# Patient Record
Sex: FEMALE | Race: BLACK OR AFRICAN AMERICAN | Employment: UNEMPLOYED | URBAN - METROPOLITAN AREA
[De-identification: names, ages, dates, MRNs, and addresses within clinical notes are randomized per-mention and may not be internally consistent; named-entity substitution may affect disease eponyms.]

---

## 2017-07-06 ENCOUNTER — HOSPITAL ENCOUNTER (INPATIENT)
Age: 52
LOS: 4 days | Discharge: HOME | DRG: 750 | End: 2017-07-10
Attending: EMERGENCY MEDICINE | Admitting: PSYCHIATRY & NEUROLOGY
Payer: MEDICAID

## 2017-07-06 DIAGNOSIS — R45.851 SUICIDAL IDEATION: Primary | ICD-10-CM

## 2017-07-06 PROBLEM — F31.9 BIPOLAR 1 DISORDER (HCC): Status: ACTIVE | Noted: 2017-07-06

## 2017-07-06 LAB
ALBUMIN SERPL BCP-MCNC: 3.7 G/DL (ref 3.5–5)
ALBUMIN/GLOB SERPL: 0.9 {RATIO} (ref 1.1–2.2)
ALP SERPL-CCNC: 147 U/L (ref 45–117)
ALT SERPL-CCNC: 24 U/L (ref 12–78)
AMPHET UR QL SCN: NEGATIVE
ANION GAP BLD CALC-SCNC: 7 MMOL/L (ref 5–15)
APAP SERPL-MCNC: <2 UG/ML (ref 10–30)
APPEARANCE UR: CLEAR
AST SERPL W P-5'-P-CCNC: 15 U/L (ref 15–37)
BACTERIA URNS QL MICRO: NEGATIVE /HPF
BARBITURATES UR QL SCN: NEGATIVE
BASOPHILS # BLD AUTO: 0 K/UL (ref 0–0.1)
BASOPHILS # BLD: 0 % (ref 0–1)
BENZODIAZ UR QL: NEGATIVE
BILIRUB SERPL-MCNC: 0.3 MG/DL (ref 0.2–1)
BILIRUB UR QL: NEGATIVE
BUN SERPL-MCNC: 10 MG/DL (ref 6–20)
BUN/CREAT SERPL: 9 (ref 12–20)
CALCIUM SERPL-MCNC: 9.6 MG/DL (ref 8.5–10.1)
CANNABINOIDS UR QL SCN: NEGATIVE
CHLORIDE SERPL-SCNC: 110 MMOL/L (ref 97–108)
CO2 SERPL-SCNC: 27 MMOL/L (ref 21–32)
COCAINE UR QL SCN: NEGATIVE
COLOR UR: ABNORMAL
CREAT SERPL-MCNC: 1.1 MG/DL (ref 0.55–1.02)
DRUG SCRN COMMENT,DRGCM: NORMAL
EOSINOPHIL # BLD: 0.2 K/UL (ref 0–0.4)
EOSINOPHIL NFR BLD: 2 % (ref 0–7)
EPITH CASTS URNS QL MICRO: ABNORMAL /LPF
ERYTHROCYTE [DISTWIDTH] IN BLOOD BY AUTOMATED COUNT: 12.8 % (ref 11.5–14.5)
ETHANOL SERPL-MCNC: <10 MG/DL
GLOBULIN SER CALC-MCNC: 3.9 G/DL (ref 2–4)
GLUCOSE SERPL-MCNC: 112 MG/DL (ref 65–100)
GLUCOSE UR STRIP.AUTO-MCNC: NEGATIVE MG/DL
HCT VFR BLD AUTO: 38.4 % (ref 35–47)
HGB BLD-MCNC: 13 G/DL (ref 11.5–16)
HGB UR QL STRIP: NEGATIVE
HYALINE CASTS URNS QL MICRO: ABNORMAL /LPF (ref 0–5)
KETONES UR QL STRIP.AUTO: NEGATIVE MG/DL
LEUKOCYTE ESTERASE UR QL STRIP.AUTO: NEGATIVE
LYMPHOCYTES # BLD AUTO: 31 % (ref 12–49)
LYMPHOCYTES # BLD: 4.2 K/UL (ref 0.8–3.5)
MCH RBC QN AUTO: 32.9 PG (ref 26–34)
MCHC RBC AUTO-ENTMCNC: 33.9 G/DL (ref 30–36.5)
MCV RBC AUTO: 97.2 FL (ref 80–99)
METHADONE UR QL: NEGATIVE
MONOCYTES # BLD: 0.9 K/UL (ref 0–1)
MONOCYTES NFR BLD AUTO: 6 % (ref 5–13)
NEUTS SEG # BLD: 8.4 K/UL (ref 1.8–8)
NEUTS SEG NFR BLD AUTO: 61 % (ref 32–75)
NITRITE UR QL STRIP.AUTO: NEGATIVE
OPIATES UR QL: NEGATIVE
PCP UR QL: NEGATIVE
PH UR STRIP: 6 [PH] (ref 5–8)
PLATELET # BLD AUTO: 231 K/UL (ref 150–400)
POTASSIUM SERPL-SCNC: 4.1 MMOL/L (ref 3.5–5.1)
PROT SERPL-MCNC: 7.6 G/DL (ref 6.4–8.2)
PROT UR STRIP-MCNC: ABNORMAL MG/DL
RBC # BLD AUTO: 3.95 M/UL (ref 3.8–5.2)
RBC #/AREA URNS HPF: ABNORMAL /HPF (ref 0–5)
SALICYLATES SERPL-MCNC: 3.7 MG/DL (ref 2.8–20)
SODIUM SERPL-SCNC: 144 MMOL/L (ref 136–145)
SP GR UR REFRACTOMETRY: 1.02 (ref 1–1.03)
UROBILINOGEN UR QL STRIP.AUTO: 4 EU/DL (ref 0.2–1)
WBC # BLD AUTO: 13.7 K/UL (ref 3.6–11)
WBC URNS QL MICRO: ABNORMAL /HPF (ref 0–4)

## 2017-07-06 PROCEDURE — 93005 ELECTROCARDIOGRAM TRACING: CPT

## 2017-07-06 PROCEDURE — 80053 COMPREHEN METABOLIC PANEL: CPT | Performed by: NURSE PRACTITIONER

## 2017-07-06 PROCEDURE — 99284 EMERGENCY DEPT VISIT MOD MDM: CPT

## 2017-07-06 PROCEDURE — 36415 COLL VENOUS BLD VENIPUNCTURE: CPT | Performed by: NURSE PRACTITIONER

## 2017-07-06 PROCEDURE — 80307 DRUG TEST PRSMV CHEM ANLYZR: CPT | Performed by: NURSE PRACTITIONER

## 2017-07-06 PROCEDURE — 81001 URINALYSIS AUTO W/SCOPE: CPT | Performed by: NURSE PRACTITIONER

## 2017-07-06 PROCEDURE — 65220000003 HC RM SEMIPRIVATE PSYCH

## 2017-07-06 PROCEDURE — 85025 COMPLETE CBC W/AUTO DIFF WBC: CPT | Performed by: NURSE PRACTITIONER

## 2017-07-06 PROCEDURE — 74011250637 HC RX REV CODE- 250/637: Performed by: PSYCHIATRY & NEUROLOGY

## 2017-07-06 PROCEDURE — 90791 PSYCH DIAGNOSTIC EVALUATION: CPT

## 2017-07-06 RX ORDER — IBUPROFEN 200 MG
1 TABLET ORAL
Status: DISCONTINUED | OUTPATIENT
Start: 2017-07-06 | End: 2017-07-10 | Stop reason: HOSPADM

## 2017-07-06 RX ORDER — LITHIUM CARBONATE 300 MG/1
600 CAPSULE ORAL 2 TIMES DAILY
COMMUNITY
End: 2017-07-10

## 2017-07-06 RX ORDER — OLANZAPINE 15 MG/1
15 TABLET ORAL
COMMUNITY
End: 2017-07-10

## 2017-07-06 RX ORDER — ADHESIVE BANDAGE
30 BANDAGE TOPICAL DAILY PRN
Status: DISCONTINUED | OUTPATIENT
Start: 2017-07-06 | End: 2017-07-10 | Stop reason: HOSPADM

## 2017-07-06 RX ORDER — ZOLPIDEM TARTRATE 10 MG/1
10 TABLET ORAL
Status: DISCONTINUED | OUTPATIENT
Start: 2017-07-06 | End: 2017-07-10 | Stop reason: HOSPADM

## 2017-07-06 RX ORDER — LORAZEPAM 2 MG/ML
2 INJECTION INTRAMUSCULAR
Status: DISCONTINUED | OUTPATIENT
Start: 2017-07-06 | End: 2017-07-10 | Stop reason: HOSPADM

## 2017-07-06 RX ORDER — LORAZEPAM 1 MG/1
1 TABLET ORAL
Status: DISCONTINUED | OUTPATIENT
Start: 2017-07-06 | End: 2017-07-10 | Stop reason: HOSPADM

## 2017-07-06 RX ORDER — OLANZAPINE 5 MG/1
5 TABLET ORAL
Status: DISCONTINUED | OUTPATIENT
Start: 2017-07-06 | End: 2017-07-10 | Stop reason: HOSPADM

## 2017-07-06 RX ORDER — TOPIRAMATE 100 MG/1
100 TABLET, FILM COATED ORAL 2 TIMES DAILY
COMMUNITY
End: 2017-07-10

## 2017-07-06 RX ORDER — LAMOTRIGINE 100 MG/1
100 TABLET ORAL 2 TIMES DAILY
COMMUNITY
End: 2017-07-10

## 2017-07-06 RX ORDER — LISINOPRIL 40 MG/1
40 TABLET ORAL DAILY
Status: ON HOLD | COMMUNITY
End: 2017-07-10

## 2017-07-06 RX ORDER — TRAMADOL HYDROCHLORIDE 50 MG/1
50 TABLET ORAL
COMMUNITY
End: 2017-07-10

## 2017-07-06 RX ORDER — IBUPROFEN 400 MG/1
400 TABLET ORAL
Status: DISCONTINUED | OUTPATIENT
Start: 2017-07-06 | End: 2017-07-10 | Stop reason: HOSPADM

## 2017-07-06 RX ORDER — BENZTROPINE MESYLATE 2 MG/1
2 TABLET ORAL
Status: DISCONTINUED | OUTPATIENT
Start: 2017-07-06 | End: 2017-07-10 | Stop reason: HOSPADM

## 2017-07-06 RX ORDER — BENZTROPINE MESYLATE 1 MG/ML
2 INJECTION INTRAMUSCULAR; INTRAVENOUS
Status: DISCONTINUED | OUTPATIENT
Start: 2017-07-06 | End: 2017-07-10 | Stop reason: HOSPADM

## 2017-07-06 RX ORDER — ACETAMINOPHEN 325 MG/1
650 TABLET ORAL
Status: DISCONTINUED | OUTPATIENT
Start: 2017-07-06 | End: 2017-07-10 | Stop reason: HOSPADM

## 2017-07-06 RX ADMIN — IBUPROFEN 400 MG: 400 TABLET, FILM COATED ORAL at 22:53

## 2017-07-06 NOTE — IP AVS SNAPSHOT
[address] 
[phone] Patient: Susan Dubois MRN: [id number] :1965 You are allergic to the following Allergen Reactions Aspirin Nausea and Vomiting Morphine Hives Recent Documentation Height Weight Breastfeeding? BMI Smoking Status 1.727 m 62.9 kg No 21.07 kg/m2 Current Some Day Smoker Emergency Contacts Name Discharge Info Relation Home Work Mobile [employer] [20] [phone] About your hospitalization You were admitted on:  2017 You last received care in the:  [facility] You were discharged on:  July 10, 2017 Why you were hospitalized Your primary diagnosis was:  Schizoaffective Disorder, Bipolar Type (Hcc) Your diagnoses also included:  Bipolar 1 Disorder (Hcc), Vision Impairment, Essential Hypertension Providers Seen During Your Hospitalizations Provider Role Specialty Primary office phone Holli Suero. Eliana Soto MD Attending Provider Emergency Medicine [phone] Ladon Goodpasture, MD Attending Provider Psychiatry [phone] Your Primary Care Physician (PCP) Primary Care Physician Office Phone Office Fax Greg Pitt [phone] Follow-up Information Follow up With Details Comments Contact Info Ladon Goodpasture, MD Schedule an appointment as soon as possible for a visit  [address] [facility] [phone] 
[phone] 
  
 Ab Jose NP On 2017 you have a 2:45 appointment  [phone] Current Discharge Medication List  
  
CONTINUE these medications which have CHANGED Dose & Instructions Dispensing Information Comments Morning Noon Evening Bedtime  
 lamoTRIgine 25 mg tablet Commonly known as: LaMICtal  
What changed:   
- medication strength 
- how much to take - when to take this Your next dose is:  2017 Dose:  25 mg Take 1 Tab by mouth daily. Indications: DEPRESSION ASSOCIATED WITH BIPOLAR DISORDER Quantity:  15 Tab Refills:  1 OLANZapine 10 mg tablet Commonly known as:  ZyPREXA What changed:   
- medication strength 
- how much to take Your next dose is: Tonight, 07/10/2017 Dose:  10 mg Take 1 Tab by mouth nightly. Indications: DEPRESSION ASSOCIATED WITH BIPOLAR DISORDER, ADJUNCT Quantity:  15 Tab Refills:  1 CONTINUE these medications which have NOT CHANGED Dose & Instructions Dispensing Information Comments Morning Noon Evening Bedtime  
 lisinopril 40 mg tablet Commonly known as:  Tita Justin Dose:  40 mg Take 1 Tab by mouth daily. Indications: hypertension Quantity:  15 Tab Refills:  1 ONGLYZA 2.5 mg tablet Generic drug:  sAXagliptin Dose:  2.5 mg Take 2.5 mg by mouth daily. Refills:  0  
     
  
   
   
   
  
 topiramate 100 mg tablet Commonly known as:  TOPAMAX Dose:  100 mg Take 1 Tab by mouth two (2) times a day. Indications: mood lability Quantity:  30 Tab Refills:  1 V-C FORTE PO Dose:  1 Cap Take 1 Cap by mouth daily. Refills:  0 STOP taking these medications   
 lithium carbonate 300 mg capsule  
   
  
 traMADol 50 mg tablet Commonly known as:  ULTRAM  
   
  
  
  
Where to Get Your Medications Information on where to get these meds will be given to you by the nurse or doctor. ! Ask your nurse or doctor about these medications  
  lamoTRIgine 25 mg tablet  
 lisinopril 40 mg tablet OLANZapine 10 mg tablet  
 topiramate 100 mg tablet Discharge Instructions DISCHARGE SUMMARY 
 
NAME:Parulbrian Pastor : 1965 MRN: [id number] The patient Natalie Eastman exhibits the ability to control behavior in a less restrictive environment. Patient's level of functioning is improving. No assaultive/destructive behavior has been observed for the past 24 hours. No suicidal/homicidal threat or behavior has been observed for the past 24 hours. There is no evidence of serious medication side effects. Patient has not been in physical or protective restraints for at least the past 24 hours. If weapons involved, how are they secured? No weapons involved Is patient aware of and in agreement with discharge plan? Patient is aware of discharge and is in agreement Arrangements for medication:  Prescriptions given to patient. Copy of discharge instructions to  provider?:  Fax to [facility] [phone] Arrangements for transportation home: patient to arrange Keep all follow up appointments as scheduled, continue to take prescribed medications per physician instructions. Mental health crisis number:  930 or your local mental health crisis line number at [phone] DISCHARGE SUMMARY from Nurse The following personal items are in your possession at time of discharge: 
 
Dental Appliances: None Visual Aid: Glasses Home Medications: None Jewelry: None Clothing: Pajamas, Pants, Shirt (nightie,2shirts,pants) Other Valuables: Personal toiletries (shaq,hairgel,cocoa butter. ..in cubbie) Personal Items Sent to Safe: none PATIENT INSTRUCTIONS: 
What to do at Home: 
Recommended activity: Activity as tolerated If you experience any of the following symptoms thoughts of wanting to hurt yourself or others, increased hopelessness/helplessness please follow up with 911 or your local mental health crisis line number at [phone] . *  Please give a list of your current medications to your Primary Care Provider.  
 
*  Please update this list whenever your medications are discontinued, doses are 
    changed, or new medications (including over-the-counter products) are added. *  Please carry medication information at all times in case of emergency situations. These are general instructions for a healthy lifestyle: No smoking/ No tobacco products/ Avoid exposure to second hand smoke Surgeon General's Warning:  Quitting smoking now greatly reduces serious risk to your health. Obesity, smoking, and sedentary lifestyle greatly increases your risk for illness A healthy diet, regular physical exercise & weight monitoring are important for maintaining a healthy lifestyle You may be retaining fluid if you have a history of heart failure or if you experience any of the following symptoms:  Weight gain of 3 pounds or more overnight or 5 pounds in a week, increased swelling in our hands or feet or shortness of breath while lying flat in bed. Please call your doctor as soon as you notice any of these symptoms; do not wait until your next office visit. Recognize signs and symptoms of STROKE: 
 
F-face looks uneven A-arms unable to move or move unevenly S-speech slurred or non-existent T-time-call 911 as soon as signs and symptoms begin-DO NOT go Back to bed or wait to see if you get better-TIME IS BRAIN. Warning Signs of HEART ATTACK Call 911 if you have these symptoms: 
? Chest discomfort. Most heart attacks involve discomfort in the center of the chest that lasts more than a few minutes, or that goes away and comes back. It can feel like uncomfortable pressure, squeezing, fullness, or pain. ? Discomfort in other areas of the upper body. Symptoms can include pain or discomfort in one or both arms, the back, neck, jaw, or stomach. ? Shortness of breath with or without chest discomfort. ? Other signs may include breaking out in a cold sweat, nausea, or lightheadedness. Don't wait more than five minutes to call [address]!  Fast action can save your life. Calling 911 is almost always the fastest way to get lifesaving treatment. Emergency Medical Services staff can begin treatment when they arrive  up to an hour sooner than if someone gets to the hospital by car. The discharge information has been reviewed with the patient. The patient verbalized understanding. Discharge medications reviewed with the patient and appropriate educational materials and side effects teaching were provided. Discharge Orders None General Information Please provide this summary of care documentation to your next provider. Introducing hospitals & HEALTH SERVICES! Lexi Macedo introduces [facility] patient portal. Now you can access parts of your medical record, email your doctor's office, and request medication refills online. 1. In your internet browser, go to [email or web address] 2. Click on the First Time User? Click Here link in the Sign In box. You will see the New Member Sign Up page. 3. Enter your [facility] Access Code exactly as it appears below. You will not need to use this code after youve completed the sign-up process. If you do not sign up before the expiration date, you must request a new code. · [facility] Access Code: [email or web address] Expires: 10/8/2017  1:15 PM 
 
4. Enter the last four digits of your Social Security Number (xxxx) and Date of Birth (mm/dd/yyyy) as indicated and click Submit. You will be taken to the next sign-up page. 5. Create a [facility] ID. This will be your [facility] login ID and cannot be changed, so think of one that is secure and easy to remember. 6. Create a [facility] password. You can change your password at any time. 7. Enter your Password Reset Question and Answer. This can be used at a later time if you forget your password. 8. Enter your e-mail address. You will receive e-mail notification when new information is available in [address] 9. Click Sign Up. You can now view and download portions of your medical record. 10. Click the Download Summary menu link to download a portable copy of your medical information. If you have questions, please visit the Frequently Asked Questions section of the [employer] website. Remember, [employer] is NOT to be used for urgent needs. For medical emergencies, dial 911. Now available from your iPhone and Android! Patient Signature:  ____________________________________________________________ Date:  ____________________________________________________________  
  
Edrie Gunnels Provider Signature:  ____________________________________________________________ Date:  ____________________________________________________________

## 2017-07-06 NOTE — ED TRIAGE NOTES
Triage: pt arrives by EMS for c/o SI x 2 weeks. Pt attempted to take all of her pills to overdose - was stopped by . \"i have a plan but i'm not gonna tell you. \" Denies HI. Tearful in triage.
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
## 2017-07-06 NOTE — IP AVS SNAPSHOT
[address] 
[phone] Patient: Glenys Pantoja MRN: [id number] :1965 You are allergic to the following Allergen Reactions Aspirin Nausea and Vomiting Morphine Hives Recent Documentation Height Weight Breastfeeding? BMI Smoking Status 1.727 m 62.9 kg No 21.07 kg/m2 Current Some Day Smoker Emergency Contacts Name Discharge Info Relation Home Work Mobile [employer] [20] [phone] About your hospitalization You were admitted on:  2017 You last received care in the:  [facility] You were discharged on:  July 10, 2017 Unit phone number:  [phone] Why you were hospitalized Your primary diagnosis was:  Schizoaffective Disorder, Bipolar Type (Hcc) Your diagnoses also included:  Bipolar 1 Disorder (Hcc), Vision Impairment, Essential Hypertension Providers Seen During Your Hospitalizations Provider Role Specialty Primary office phone Moses Arthur. Ulises Castro MD Attending Provider Emergency Medicine [phone] Zachariah Moreno MD Attending Provider Psychiatry [phone] Your Primary Care Physician (PCP) Primary Care Physician Office Phone Office Fax Anish Ten [phone] Follow-up Information Follow up With Details Comments Contact Info Zachariah Moreno MD Schedule an appointment as soon as possible for a visit  [address] [facility] [phone] 
[phone] 
  
 Guicho Hilario NP On 2017 you have a 2:45 appointment  [phone] Current Discharge Medication List  
  
CONTINUE these medications which have CHANGED Dose & Instructions Dispensing Information Comments Morning Noon Evening Bedtime  
 lamoTRIgine 25 mg tablet Commonly known as: LaMICtal  
What changed: - medication strength 
- how much to take - when to take this Your next dose is:  2017 Dose:  25 mg Take 1 Tab by mouth daily. Indications: DEPRESSION ASSOCIATED WITH BIPOLAR DISORDER Quantity:  15 Tab Refills:  1 OLANZapine 10 mg tablet Commonly known as:  ZyPREXA What changed:   
- medication strength 
- how much to take Your next dose is: Tonight, 07/10/2017 Dose:  10 mg Take 1 Tab by mouth nightly. Indications: DEPRESSION ASSOCIATED WITH BIPOLAR DISORDER, ADJUNCT Quantity:  15 Tab Refills:  1 CONTINUE these medications which have NOT CHANGED Dose & Instructions Dispensing Information Comments Morning Noon Evening Bedtime  
 lisinopril 40 mg tablet Commonly known as:  Therisa Semen Dose:  40 mg Take 1 Tab by mouth daily. Indications: hypertension Quantity:  15 Tab Refills:  1 ONGLYZA 2.5 mg tablet Generic drug:  sAXagliptin Dose:  2.5 mg Take 2.5 mg by mouth daily. Refills:  0  
     
  
   
   
   
  
 topiramate 100 mg tablet Commonly known as:  TOPAMAX Dose:  100 mg Take 1 Tab by mouth two (2) times a day. Indications: mood lability Quantity:  30 Tab Refills:  1 V-C FORTE PO Dose:  1 Cap Take 1 Cap by mouth daily. Refills:  0 STOP taking these medications   
 lithium carbonate 300 mg capsule  
   
  
 traMADol 50 mg tablet Commonly known as:  ULTRAM  
   
  
  
  
Where to Get Your Medications Information on where to get these meds will be given to you by the nurse or doctor. ! Ask your nurse or doctor about these medications  
  lamoTRIgine 25 mg tablet  
 lisinopril 40 mg tablet OLANZapine 10 mg tablet  
 topiramate 100 mg tablet Discharge Instructions DISCHARGE SUMMARY 
 
NAME:Parul Bashir : 1965 MRN: [id number] The patient Navid Navarrete exhibits the ability to control behavior in a less restrictive environment. Patient's level of functioning is improving. No assaultive/destructive behavior has been observed for the past 24 hours. No suicidal/homicidal threat or behavior has been observed for the past 24 hours. There is no evidence of serious medication side effects. Patient has not been in physical or protective restraints for at least the past 24 hours. If weapons involved, how are they secured? No weapons involved Is patient aware of and in agreement with discharge plan? Patient is aware of discharge and is in agreement Arrangements for medication:  Prescriptions given to patient. Copy of discharge instructions to  provider?:  [facility] [phone] Arrangements for transportation home: patient to arrange Keep all follow up appointments as scheduled, continue to take prescribed medications per physician instructions. Mental health crisis number:  759 or your local mental health crisis line number at [phone] DISCHARGE SUMMARY from Nurse The following personal items are in your possession at time of discharge: 
 
Dental Appliances: None Visual Aid: Glasses Home Medications: None Jewelry: None Clothing: Pajamas, Pants, Shirt (nightie,2shirts,pants) Other Valuables: Personal toiletries (shaq,hairgel,cocoa butter. ..in cubbie) Personal Items Sent to Safe: none PATIENT INSTRUCTIONS: 
What to do at Home: 
Recommended activity: Activity as tolerated If you experience any of the following symptoms thoughts of wanting to hurt yourself or others, increased hopelessness/helplessness please follow up with 911 or your local mental health crisis line number at [phone] . *  Please give a list of your current medications to your Primary Care Provider. *  Please update this list whenever your medications are discontinued, doses are 
    changed, or new medications (including over-the-counter products) are added. *  Please carry medication information at all times in case of emergency situations. These are general instructions for a healthy lifestyle: No smoking/ No tobacco products/ Avoid exposure to second hand smoke Surgeon General's Warning:  Quitting smoking now greatly reduces serious risk to your health. Obesity, smoking, and sedentary lifestyle greatly increases your risk for illness A healthy diet, regular physical exercise & weight monitoring are important for maintaining a healthy lifestyle You may be retaining fluid if you have a history of heart failure or if you experience any of the following symptoms:  Weight gain of 3 pounds or more overnight or 5 pounds in a week, increased swelling in our hands or feet or shortness of breath while lying flat in bed. Please call your doctor as soon as you notice any of these symptoms; do not wait until your next office visit. Recognize signs and symptoms of STROKE: 
 
F-face looks uneven A-arms unable to move or move unevenly S-speech slurred or non-existent T-time-call 911 as soon as signs and symptoms begin-DO NOT go Back to bed or wait to see if you get better-TIME IS BRAIN. Warning Signs of HEART ATTACK Call 911 if you have these symptoms: 
? Chest discomfort. Most heart attacks involve discomfort in the center of the chest that lasts more than a few minutes, or that goes away and comes back. It can feel like uncomfortable pressure, squeezing, fullness, or pain. ? Discomfort in other areas of the upper body. Symptoms can include pain or discomfort in one or both arms, the back, neck, jaw, or stomach. ? Shortness of breath with or without chest discomfort. ? Other signs may include breaking out in a cold sweat, nausea, or lightheadedness. Don't wait more than five minutes to call [address]! Fast action can save your life. Calling 911 is almost always the fastest way to get lifesaving treatment. Emergency Medical Services staff can begin treatment when they arrive  up to an hour sooner than if someone gets to the hospital by car. The discharge information has been reviewed with the patient. The patient verbalized understanding. Discharge medications reviewed with the patient and appropriate educational materials and side effects teaching were provided. Discharge Orders None Introducing [facility]! Eva Austin introduces [employer] patient portal. Now you can access parts of your medical record, email your doctor's office, and request medication refills online. 1. In your internet browser, go to [email or web address] 2. Click on the First Time User? Click Here link in the Sign In box. You will see the New Member Sign Up page. 3. Enter your [employer] Access Code exactly as it appears below. You will not need to use this code after youve completed the sign-up process. If you do not sign up before the expiration date, you must request a new code. · [employer] Access Code: [email or web address] Expires: 10/8/2017  1:15 PM 
 
4. Enter the last four digits of your Social Security Number (xxxx) and Date of Birth (mm/dd/yyyy) as indicated and click Submit. You will be taken to the next sign-up page. 5. Create a [employer] ID. This will be your [employer] login ID and cannot be changed, so think of one that is secure and easy to remember. 6. Create a [employer] password. You can change your password at any time. 7. Enter your Password Reset Question and Answer. This can be used at a later time if you forget your password. 8. Enter your e-mail address. You will receive e-mail notification when new information is available in [address] 9. Click Sign Up. You can now view and download portions of your medical record. 10. Click the Download Summary menu link to download a portable copy of your medical information. If you have questions, please visit the Frequently Asked Questions section of the [email or web address] website. Remember, [email or web address] is NOT to be used for urgent needs. For medical emergencies, dial 911. Now available from your iPhone and Android! General Information Please provide this summary of care documentation to your next provider. Patient Signature:  ____________________________________________________________ Date:  ____________________________________________________________  
  
Terry Snipe Provider Signature:  ____________________________________________________________ Date:  ____________________________________________________________

## 2017-07-06 NOTE — IP AVS SNAPSHOT
Current Discharge Medication List  
  
CONTINUE these medications which have CHANGED Dose & Instructions Dispensing Information Comments Morning Noon Evening Bedtime  
 lamoTRIgine 25 mg tablet Commonly known as: LaMICtal  
What changed:   
- medication strength 
- how much to take - when to take this Your next dose is:  07/11/2017 Dose:  25 mg Take 1 Tab by mouth daily. Indications: DEPRESSION ASSOCIATED WITH BIPOLAR DISORDER Quantity:  15 Tab Refills:  1 OLANZapine 10 mg tablet Commonly known as:  ZyPREXA What changed:   
- medication strength 
- how much to take Your next dose is: Tonight, 07/10/2017 Dose:  10 mg Take 1 Tab by mouth nightly. Indications: DEPRESSION ASSOCIATED WITH BIPOLAR DISORDER, ADJUNCT Quantity:  15 Tab Refills:  1 CONTINUE these medications which have NOT CHANGED Dose & Instructions Dispensing Information Comments Morning Noon Evening Bedtime  
 lisinopril 40 mg tablet Commonly known as:  Velora Driss Dose:  40 mg Take 1 Tab by mouth daily. Indications: hypertension Quantity:  15 Tab Refills:  1 ONGLYZA 2.5 mg tablet Generic drug:  sAXagliptin Dose:  2.5 mg Take 2.5 mg by mouth daily. Refills:  0  
     
  
   
   
   
  
 topiramate 100 mg tablet Commonly known as:  TOPAMAX Dose:  100 mg Take 1 Tab by mouth two (2) times a day. Indications: mood lability Quantity:  30 Tab Refills:  1 V-C FORTE PO Dose:  1 Cap Take 1 Cap by mouth daily. Refills:  0 STOP taking these medications   
 lithium carbonate 300 mg capsule  
   
  
 traMADol 50 mg tablet Commonly known as:  ULTRAM  
   
  
  
  
Where to Get Your Medications Information on where to get these meds will be given to you by the nurse or doctor. ! Ask your nurse or doctor about these medications  
  lamoTRIgine 25 mg tablet  
 lisinopril 40 mg tablet OLANZapine 10 mg tablet  
 topiramate 100 mg tablet

## 2017-07-07 PROBLEM — H54.7 VISION IMPAIRMENT: Status: ACTIVE | Noted: 2017-07-07

## 2017-07-07 PROBLEM — F25.0 SCHIZOAFFECTIVE DISORDER, BIPOLAR TYPE (HCC): Status: ACTIVE | Noted: 2017-07-07

## 2017-07-07 PROBLEM — F31.9 BIPOLAR 1 DISORDER (HCC): Status: RESOLVED | Noted: 2017-07-06 | Resolved: 2017-07-07

## 2017-07-07 PROBLEM — I10 ESSENTIAL HYPERTENSION: Status: ACTIVE | Noted: 2017-07-07

## 2017-07-07 LAB
ATRIAL RATE: 88 BPM
CALCULATED P AXIS, ECG09: 77 DEGREES
CALCULATED R AXIS, ECG10: 78 DEGREES
CALCULATED T AXIS, ECG11: 60 DEGREES
DIAGNOSIS, 93000: NORMAL
GLUCOSE BLD STRIP.AUTO-MCNC: 88 MG/DL (ref 65–100)
P-R INTERVAL, ECG05: 174 MS
Q-T INTERVAL, ECG07: 358 MS
QRS DURATION, ECG06: 74 MS
QTC CALCULATION (BEZET), ECG08: 433 MS
SERVICE CMNT-IMP: NORMAL
VENTRICULAR RATE, ECG03: 88 BPM

## 2017-07-07 PROCEDURE — 74011250637 HC RX REV CODE- 250/637: Performed by: PSYCHIATRY & NEUROLOGY

## 2017-07-07 PROCEDURE — 82962 GLUCOSE BLOOD TEST: CPT

## 2017-07-07 PROCEDURE — 65220000003 HC RM SEMIPRIVATE PSYCH

## 2017-07-07 RX ORDER — OLANZAPINE 5 MG/1
10 TABLET ORAL
Status: DISCONTINUED | OUTPATIENT
Start: 2017-07-07 | End: 2017-07-10 | Stop reason: HOSPADM

## 2017-07-07 RX ORDER — LAMOTRIGINE 25 MG/1
25 TABLET ORAL DAILY
Status: DISCONTINUED | OUTPATIENT
Start: 2017-07-07 | End: 2017-07-10 | Stop reason: HOSPADM

## 2017-07-07 RX ORDER — TOPIRAMATE 100 MG/1
100 TABLET, FILM COATED ORAL 2 TIMES DAILY
Status: DISCONTINUED | OUTPATIENT
Start: 2017-07-07 | End: 2017-07-10 | Stop reason: HOSPADM

## 2017-07-07 RX ADMIN — TOPIRAMATE 100 MG: 100 TABLET, FILM COATED ORAL at 21:54

## 2017-07-07 RX ADMIN — OLANZAPINE 10 MG: 5 TABLET, FILM COATED ORAL at 21:53

## 2017-07-07 RX ADMIN — LORAZEPAM 1 MG: 1 TABLET ORAL at 12:57

## 2017-07-07 RX ADMIN — TOPIRAMATE 100 MG: 100 TABLET, FILM COATED ORAL at 14:16

## 2017-07-07 RX ADMIN — LAMOTRIGINE 25 MG: 25 TABLET ORAL at 14:16

## 2017-07-07 RX ADMIN — IBUPROFEN 400 MG: 400 TABLET, FILM COATED ORAL at 11:14

## 2017-07-07 RX ADMIN — OLANZAPINE 5 MG: 5 TABLET, FILM COATED ORAL at 13:04

## 2017-07-07 NOTE — BH NOTES
PRN Medication Documentation    Specific patient behavior that led to need for PRN medication: Pt anxious, hearing voices  Staff interventions attempted prior to PRN being given: Redirection, distraction  PRN medication given:PO Ativan, Zyprexa    1445-  Patient response/effectiveness of PRN medication:  Pt resting in her room

## 2017-07-07 NOTE — PROGRESS NOTES
Problem: Depressed Mood (Adult/Pediatric)  Goal: *STG: Remains safe in hospital  Outcome: Progressing Towards Goal  Visible on the unit. Compliant with meals and medications. Mood is calm and quiet. Pt denies SI. Continue to encourage and educate.

## 2017-07-07 NOTE — PROGRESS NOTES
Problem: Depressed Mood (Adult/Pediatric)7/12/17  Goal: *STG: Demonstrates reduction in symptoms and increase in insight into coping skills/future focused  Outcome: Not Progressing Towards Goal  Self reports depression with SI but mark for safety on unit. Patient stated \"My neighbor is a drug dealer and says mean things to me. I want a new place to live. All this makes me so sad. \"  Goal: *STG: Complies with medication therapy  Outcome: Not Progressing Towards Goal  Stated 'I haven't been taking my medications for a long while because the voices told me not to do it. \"    Problem: Falls - Risk of  Goal: *Absence of falls  Outcome: Progressing Towards Goal  Gait is steady with no reported fall history. Legal blindness is a concern related to this issue.     [address] [address]  Master Treatment Plan for Thom James Treatment Plan Initiated: 7/7/17    Treatment Plan Modalities:  Type of Modality Amount  (x minutes) Frequency (x/week) Duration (x days) Name of Responsible Staff   Community & wrap-up meetings to encourage peer interactions 15 7 1   BAKARI Mulligan Located within [facility]   Group psychotherapy to assist in building coping skills and internal controls 60 7 1 Cheng Acosta [facility]   Therapeutic activity groups to build coping skills 60 7 1 Jose Alberto [facility]   Psychoeducation in group setting to address:   Medication education   15 7 1 MACI Rodriguez RN   Coping skills         Relaxation techniques         Symptom management         Discharge planning   On admission 7 5712 [facility]   Spirituality    60 2 [address]         Recovery/AA/NA         Physician medication management   15 7 1 Dr Prince Yeung

## 2017-07-07 NOTE — H&P
INITIAL PSYCHIATRIC EVALUATION         IDENTIFICATION:    Patient Name  Navid Navarrete   Date of Birth 1965   [facility] [id number]   Medical Record Number  [id number]      Age  47 y.o. PCP Mal Smith MD   Admit date:  7/6/2017    Room Number  726/02  @ [facility], 1928[facility]1983   Date of Service  7/7/2017            HISTORY         REASON FOR HOSPITALIZATION:  CC: Suicidal. Pt admitted on a voluntary basis for suicidal ideations and psychosis posing a risk of harm to himself and others    HISTORY OF PRESENT ILLNESS:    The patient, Navid Navarrete, is a 47 y.o. BLACK OR  female with severe vision impairment,  with a past psychiatric history significant for Schizoaffective Disorder, who presents at this time with complaints of (and/or evidence of) the following emotional symptoms: suicidal thoughts/threats and psychotic behavior. The patient was a very poor historian, but ER note reflects that the patient reported to her  feeling increasingly depressed, hearing voices and planning to kill herself via OD. She only reported a neighbor calling her names as a stressor and high drug activity in her apartment building. ALLERGIES:  Allergies   Allergen Reactions    Aspirin Nausea and Vomiting    Morphine Hives      MEDICATIONS PRIOR TO ADMISSION:  Prescriptions Prior to Admission   Medication Sig    lamoTRIgine (LAMICTAL) 100 mg tablet Take 100 mg by mouth two (2) times a day.  lisinopril (PRINIVIL, ZESTRIL) 40 mg tablet Take 40 mg by mouth daily.  lithium carbonate 300 mg capsule Take 600 mg by mouth two (2) times a day.  MULTIVIT-MINS NO.7/FOLIC ACID (V-C FORTE PO) Take 1 Cap by mouth daily.  OLANZapine (ZYPREXA) 15 mg tablet Take 15 mg by mouth nightly.  sAXagliptin (ONGLYZA) 2.5 mg tablet Take 2.5 mg by mouth daily.  topiramate (TOPAMAX) 100 mg tablet Take 100 mg by mouth two (2) times a day.     traMADol (ULTRAM) 50 mg tablet Take 50 mg by mouth three (3) times daily as needed for Pain. PAST MEDICAL HISTORY:  Past Medical History:   Diagnosis Date    Diabetes ([facility] [address]     Hypertension     Psychiatric disorder    History reviewed. No pertinent surgical history. SOCIAL HISTORY: lives alone; 6 children, but limited contact; unemployed  Social History     Social History    Marital status: SINGLE     Spouse name: N/A    Number of children: N/A    Years of education: N/A     Occupational History    Not on file. Social History Main Topics    Smoking status: Current Some Day Smoker    Smokeless tobacco: Not on file    Alcohol use No    Drug use: No    Sexual activity: Not on file     Other Topics Concern    Not on file     Social History Narrative    No narrative on file      FAMILY HISTORY: History reviewed. No pertinent family history. History reviewed. No pertinent family history. REVIEW OF SYSTEMS:   Negative except (+)depressed, suicidal, AH  Pertinent items are noted in the History of Present Illness. All other Systems reviewed and are considered negative. MENTAL STATUS EXAM & VITALS         MENTAL STATUS EXAM (MSE):    MSE FINDINGS ARE WITHIN NORMAL LIMITS (WNL) UNLESS OTHERWISE STATED BELOW. ( ALL OF THE BELOW CATEGORIES OF THE MSE HAVE BEEN REVIEWED (reviewed 7/7/2017) AND UPDATED AS DEEMED APPROPRIATE )  General Presentation age appropriate and casually dressed, cooperative   Orientation oriented to time, place and person   Vital Signs  See below (reviewed 7/7/2017); Vital Signs (BP, Pulse, & Temp) are within normal limits if not listed below.    Gait and Station Stable/steady, no ataxia   Musculoskeletal System No extrapyramidal symptoms (EPS); no abnormal muscular movements or Tardive Dyskinesia (TD); muscle strength and tone are within normal limits   Language No aphasia or dysarthria   Speech:  hypoverbal and soft   Thought Processes Illogical; slow rate of thoughts; poor abstract reasoning/computation Thought Associations blocked  and circumstantial   Thought Content auditory hallucinations   Suicidal Ideations death wishes with plan to od   Homicidal Ideations none   Mood:  depressed   Affect:  depressed   Memory recent  good   Memory remote:  good   Concentration/Attention:  wnl   Fund of Knowledge wnl   Insight:  limited   Reliability poor   Judgment:  poor            VITALS:     Patient Vitals for the past 24 hrs:   Temp Pulse Resp BP SpO2   07/07/17 1639 98.6 °F (37 °C) 77 16 127/84 100 %   07/07/17 1103 98.6 °F (37 °C) 72 16 132/89 100 %   07/07/17 0751 98.1 °F (36.7 °C) 77 16 130/88 100 %   07/06/17 2103 98.8 °F (37.1 °C) 76 14 136/81 98 %     Wt Readings from Last 3 Encounters:   07/06/17 62.9 kg (138 lb 9.6 oz)     Temp Readings from Last 3 Encounters:   07/07/17 98.6 °F (37 °C)     BP Readings from Last 3 Encounters:   07/07/17 127/84     Pulse Readings from Last 3 Encounters:   07/07/17 77            DATA       LABORATORY DATA:  Labs Reviewed   CBC WITH AUTOMATED DIFF - Abnormal; Notable for the following:        Result Value    WBC 13.7 (*)     ABS. NEUTROPHILS 8.4 (*)     ABS. LYMPHOCYTES 4.2 (*)     All other components within normal limits   METABOLIC PANEL, COMPREHENSIVE - Abnormal; Notable for the following:     Chloride 110 (*)     Glucose 112 (*)     Creatinine 1.10 (*)     BUN/Creatinine ratio 9 (*)     GFR est non-AA 52 (*)     Alk.  phosphatase 147 (*)     A-G Ratio 0.9 (*)     All other components within normal limits   ACETAMINOPHEN - Abnormal; Notable for the following:     Acetaminophen level <2 (*)     All other components within normal limits   URINALYSIS W/MICROSCOPIC - Abnormal; Notable for the following:     Protein TRACE (*)     Urobilinogen 4.0 (*)     All other components within normal limits   ETHYL ALCOHOL   SALICYLATE   DRUG SCREEN, URINE   GLUCOSE, POC     Admission on 07/06/2017   Component Date Value Ref Range Status    WBC 07/06/2017 13.7* 3.6 - 11.0 K/uL Final    RBC 07/06/2017 3.95  3.80 - 5.20 M/uL Final    HGB 07/06/2017 13.0  11.5 - 16.0 g/dL Final    HCT 07/06/2017 38.4  35.0 - 47.0 % Final    MCV 07/06/2017 97.2  80.0 - 99.0 FL Final    MCH 07/06/2017 32.9  26.0 - 34.0 PG Final    MCHC 07/06/2017 33.9  30.0 - 36.5 g/dL Final    RDW 07/06/2017 12.8  11.5 - 14.5 % Final    PLATELET 12/31/4407 404  150 - 400 K/uL Final    NEUTROPHILS 07/06/2017 61  32 - 75 % Final    LYMPHOCYTES 07/06/2017 31  12 - 49 % Final    MONOCYTES 07/06/2017 6  5 - 13 % Final    EOSINOPHILS 07/06/2017 2  0 - 7 % Final    BASOPHILS 07/06/2017 0  0 - 1 % Final    ABS. NEUTROPHILS 07/06/2017 8.4* 1.8 - 8.0 K/UL Final    ABS. LYMPHOCYTES 07/06/2017 4.2* 0.8 - 3.5 K/UL Final    ABS. MONOCYTES 07/06/2017 0.9  0.0 - 1.0 K/UL Final    ABS. EOSINOPHILS 07/06/2017 0.2  0.0 - 0.4 K/UL Final    ABS. BASOPHILS 07/06/2017 0.0  0.0 - 0.1 K/UL Final    Sodium 07/06/2017 144  136 - 145 mmol/L Final    Potassium 07/06/2017 4.1  3.5 - 5.1 mmol/L Final    Chloride 07/06/2017 110* 97 - 108 mmol/L Final    CO2 07/06/2017 27  21 - 32 mmol/L Final    Anion gap 07/06/2017 7  5 - 15 mmol/L Final    Glucose 07/06/2017 112* 65 - 100 mg/dL Final    BUN 07/06/2017 10  6 - 20 MG/DL Final    Creatinine 07/06/2017 1.10* 0.55 - 1.02 MG/DL Final    BUN/Creatinine ratio 07/06/2017 9* 12 - 20   Final    GFR est AA 07/06/2017 >60  >60 ml/min/1.73m2 Final    GFR est non-AA 07/06/2017 52* >60 ml/min/1.73m2 Final    Calcium 07/06/2017 9.6  8.5 - 10.1 MG/DL Final    Bilirubin, total 07/06/2017 0.3  0.2 - 1.0 MG/DL Final    ALT (SGPT) 07/06/2017 24  12 - 78 U/L Final    AST (SGOT) 07/06/2017 15  15 - 37 U/L Final    Alk.  phosphatase 07/06/2017 147* 45 - 117 U/L Final    Protein, total 07/06/2017 7.6  6.4 - 8.2 g/dL Final    Albumin 07/06/2017 3.7  3.5 - 5.0 g/dL Final    Globulin 07/06/2017 3.9  2.0 - 4.0 g/dL Final    A-G Ratio 07/06/2017 0.9* 1.1 - 2.2   Final    ALCOHOL(ETHYL),SERUM 07/06/2017 <10  <10 MG/DL Final    SALICYLATE 14/31/0176 3.7  2.8 - 20.0 MG/DL Final    Acetaminophen level 07/06/2017 <2* 10 - 30 ug/mL Final    Color 07/06/2017 YELLOW/STRAW    Final    Appearance 07/06/2017 CLEAR  CLEAR   Final    Specific gravity 07/06/2017 1.021  1.003 - 1.030   Final    pH (UA) 07/06/2017 6.0  5.0 - 8.0   Final    Protein 07/06/2017 TRACE* NEG mg/dL Final    Glucose 07/06/2017 NEGATIVE   NEG mg/dL Final    Ketone 07/06/2017 NEGATIVE   NEG mg/dL Final    Bilirubin 07/06/2017 NEGATIVE   NEG   Final    Blood 07/06/2017 NEGATIVE   NEG   Final    Urobilinogen 07/06/2017 4.0* 0.2 - 1.0 EU/dL Final    Nitrites 07/06/2017 NEGATIVE   NEG   Final    Leukocyte Esterase 07/06/2017 NEGATIVE   NEG   Final    WBC 07/06/2017 0-4  0 - 4 /hpf Final    RBC 07/06/2017 0-5  0 - 5 /hpf Final    Epithelial cells 07/06/2017 FEW  FEW /lpf Final    Bacteria 07/06/2017 NEGATIVE   NEG /hpf Final    Hyaline cast 07/06/2017 0-2  0 - 5 /lpf Final    AMPHETAMINES 07/06/2017 NEGATIVE   NEG   Final    BARBITURATES 07/06/2017 NEGATIVE   NEG   Final    BENZODIAZEPINE 07/06/2017 NEGATIVE   NEG   Final    COCAINE 07/06/2017 NEGATIVE   NEG   Final    METHADONE 07/06/2017 NEGATIVE   NEG   Final    OPIATES 07/06/2017 NEGATIVE   NEG   Final    PCP(PHENCYCLIDINE) 07/06/2017 NEGATIVE   NEG   Final    THC (TH-CANNABINOL) 07/06/2017 NEGATIVE   NEG   Final    Drug screen comment 07/06/2017 (NOTE)   Final    Ventricular Rate 07/06/2017 88  BPM Final    Atrial Rate 07/06/2017 88  BPM Final    P-R Interval 07/06/2017 174  ms Final    QRS Duration 07/06/2017 74  ms Final    Q-T Interval 07/06/2017 358  ms Final    QTC Calculation (Bezet) 07/06/2017 433  ms Final    Calculated P Axis 07/06/2017 77  degrees Final    Calculated R Axis 07/06/2017 78  degrees Final    Calculated T Axis 07/06/2017 60  degrees Final    Diagnosis 07/06/2017    Final                    Value:Normal sinus rhythm  When compared with ECG of 25-MAY-2000 10:17,  Vent. rate has increased BY  29 BPM  Confirmed by Farzana Crooks M.D., Catrachito Woo ([id number]) on 7/7/2017 8:52:34 AM      Glucose (POC) 07/07/2017 88  65 - 100 mg/dL Final    Performed by 07/07/2017 Roxane Zhang   Final        RADIOLOGY REPORTS:  No results found for this or any previous visit. No results found.            MEDICATIONS       ALL MEDICATIONS  Current Facility-Administered Medications   Medication Dose Route Frequency    OLANZapine (ZyPREXA) tablet 10 mg  10 mg Oral QHS    topiramate (TOPAMAX) tablet 100 mg  100 mg Oral BID    lamoTRIgine (LaMICtal) tablet 25 mg  25 mg Oral DAILY    ziprasidone (GEODON) 20 mg in sterile water (preservative free) 1 mL injection  20 mg IntraMUSCular BID PRN    OLANZapine (ZyPREXA) tablet 5 mg  5 mg Oral Q6H PRN    benztropine (COGENTIN) tablet 2 mg  2 mg Oral BID PRN    benztropine (COGENTIN) injection 2 mg  2 mg IntraMUSCular BID PRN    LORazepam (ATIVAN) injection 2 mg  2 mg IntraMUSCular Q4H PRN    LORazepam (ATIVAN) tablet 1 mg  1 mg Oral Q4H PRN    zolpidem (AMBIEN) tablet 10 mg  10 mg Oral QHS PRN    acetaminophen (TYLENOL) tablet 650 mg  650 mg Oral Q4H PRN    ibuprofen (MOTRIN) tablet 400 mg  400 mg Oral Q8H PRN    magnesium hydroxide (MILK OF MAGNESIA) 400 mg/5 mL oral suspension 30 mL  30 mL Oral DAILY PRN    nicotine (NICODERM CQ) 21 mg/24 hr patch 1 Patch  1 Patch TransDERmal DAILY PRN      SCHEDULED MEDICATIONS  Current Facility-Administered Medications   Medication Dose Route Frequency    OLANZapine (ZyPREXA) tablet 10 mg  10 mg Oral QHS    topiramate (TOPAMAX) tablet 100 mg  100 mg Oral BID    lamoTRIgine (LaMICtal) tablet 25 mg  25 mg Oral DAILY                ASSESSMENT & PLAN        The patient Kimberly Johnson is a 47 y.o.  female who presents at this time for treatment of the following diagnoses:  Patient Active Hospital Problem List:     Schizoaffective disorder ([facility] [address] (7/6/2017)    Assessment: severe mood disorder and associated psychosis    Plan: Agree with inpatient admission for further stabilization, safety monitoring and medication management  Medication management- resume home meds since patient reports noncompliance  Check lithium level, prior to restarting lithium  Provided supportive psychotherapy  Gather collateral information from family          In summary, Natalie Eastman presents with a severe exacerbation of the principal diagnosis, Schizoaffective Disorder. While on the unit Parul Brothers will be provided with individual, milieu, occupational, group, and substance abuse therapies to address target symptoms as deemed appropriate for the individual patient. I will continue to monitor blood levels ( lithium---a drug with a narrow therapeutic index= NTI) and associated labs for drug therapy implemented that require intense monitoring for toxicity as deemed appropriate base on current medication side effects and pharmacodynamically determined drug 1/2 lives. I agree with decision to admit patient. I have spoken to [facility] psychiatric /ED staff regarding the nature of patients's admission at this time. A coordinated, multidisplinary treatment team (includes the nurse, unit pharmcist,  and writer) round was conducted for this initial evaluation with the patient present. The following regarding medications was addressed during rounds with patient:   the risks and benefits of the proposed medication. The patient was given the opportunity to ask questions. Informed consent given to the use of the above medications. I will continue to adjust psychiatric and non-psychiatric medications (see above \"medication\" section and orders section for details) as deemed appropriate & based upon diagnoses and response to treatment. I have reviewed admission (and previous/old) labs and medical tests in the EHR and or transferring hospital documents.  I will continue to order blood tests/labs and diagnostic tests as deemed appropriate and review results as they become available (see orders for details). I have reviewed old psychiatric and medical records available in the EHR. I Will order additional psychiatric records from other institutions to further elucidate the nature of patient's psychopathology and review once available. I will gather additional collateral information from friends, family and o/p treatment team to further elucidate the nature of patient's psychopathology and baselline level of psychiatric functioning.         ESTIMATED LENGTH OF STAY:   3-5 days       STRENGTHS:  Access to housing/residential stability, Interpersonal/supportive relationships (family, friends, peers) and Knowledge of medications                      SIGNED:    Jg Arcos MD  7/7/2017

## 2017-07-07 NOTE — BH NOTES
PRN Medication Documentation    Specific patient behavior that led to need for PRN medication: left shoulder pain  Staff interventions attempted prior to PRN being given:   PRN medication given:Motrin 400 mg po  Patient response/effectiveness of PRN medication:

## 2017-07-07 NOTE — ED NOTES
TRANSFER - OUT REPORT:    Verbal report given to Rajiv RN(name) on Jameson Sanders  being transferred to (unit) for routine progression of care       Report consisted of patients Situation, Background, Assessment and   Recommendations(SBAR). Information from the following report(s) SBAR and MAR was reviewed with the receiving nurse. Lines:       Opportunity for questions and clarification was provided.       Patient transported with:   Registered Nurse   CARLO

## 2017-07-07 NOTE — INTERDISCIPLINARY ROUNDS
Behavioral Health Interdisciplinary Rounds     Patient Name: Sherin Daley  Age: 47 y.o. Room/Bed:  726/02  Primary Diagnosis: <principal problem not specified>   Admission Status: Voluntary     Readmission within 30 days: no  Power of  in place: no  Patient requires a blocked bed: no          Reason for blocked bed:     VTE Prophylaxis: Not indicated  Mobility needs/Fall risk: no    Nutritional Plan: no  Consults:          Labs/Testing due today?: no    Sleep hours:  6 1/2 hours       Participation in Care/Groups:  New pt   Medication Compliant?: Yes  PRNS (last 24 hours): Pain    Restraints (last 24 hours):  no  Substance Abuse:  no  CIWA (range last 24 hours):  COWS (range last 24 hours):   Alcohol screening (AUDIT) completed -  AUDIT Score: 0  If applicable, date SBIRT discussed in treatment team AND documented:   Tobacco - patient is a smoker:  ocasionally  Date tobacco education completed by RN:   24 hour chart check complete: yes    Patient goal(s) for today: - \" I want to Move , to much drug activity \"   Treatment team focus/goals: Plan to restart medications.    LOS:  1  Expected LOS: TBD   Psychiatric Advanced Care Directives -  no  Name of Decision maker if patient has Psychiatric Care Directive   Patient was offered information   Financial concerns/prescription coverage:   Date of last family contact:      Family requesting physician contact today:    Discharge plan:- she will return home when ready for discharge        Outpatient provider(s): Sheri Bar NP - Thor Jarquin, - Janeen Greenberg - therapist     Participating treatment team members: Sherin Daley,  - Ava Aguiar - Edelmira Fagan, PharmD. - Den Bhatia RN

## 2017-07-07 NOTE — PROGRESS NOTES
Problem: Falls - Risk of  Goal: *Absence of falls  Outcome: Progressing Towards Goal  Lying quietly with eyes closed , respirations even and unlabored , NAD noted   Bathroom light on for safety , Q15 min safety checks continue

## 2017-07-07 NOTE — BH NOTES
TRANSFER - IN REPORT:    Verbal report received from Kurt Valentine on [address]  being received from ED for routine progression of care      Report consisted of patients Situation, Background, Assessment and   Recommendations(SBAR). Information from the following report(s) SBAR was reviewed with the receiving nurse. Opportunity for questions and clarification was provided. Assessment completed upon patients arrival to unit and care assumed.
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
## 2017-07-07 NOTE — BH NOTES
GROUP THERAPY PROGRESS NOTE    Parul Mayfield did not participate in a forty-five minute Acute Unit Afternoon Process Group with a focus on identifying feelings and planning for the rest of the day.
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
## 2017-07-07 NOTE — PROGRESS NOTES
Admission Medication Reconciliation:    Information obtained from: Patient; Rx Query    Significant PMH/Disease States:   Past Medical History:   Diagnosis Date    Diabetes ([facility] [address]     Hypertension     Psychiatric disorder        Chief Complaint for this Admission:  Mental health problem    Allergies:  Aspirin and Morphine    Prior to Admission Medications:   Prior to Admission Medications   Prescriptions Last Dose Informant Patient Reported? Taking? MULTIVIT-MINS NO.7/FOLIC ACID (V-C FORTE PO) 7/6/2017 at Unknown time  Yes Yes   Sig: Take 1 Cap by mouth daily. OLANZapine (ZYPREXA) 15 mg tablet 7/5/2017 at Unknown time  Yes Yes   Sig: Take 15 mg by mouth nightly. lamoTRIgine (LAMICTAL) 100 mg tablet 7/6/2017 at Unknown time  Yes Yes   Sig: Take 100 mg by mouth two (2) times a day. lisinopril (PRINIVIL, ZESTRIL) 40 mg tablet 7/6/2017 at Unknown time  Yes Yes   Sig: Take 40 mg by mouth daily. lithium carbonate 300 mg capsule 7/6/2017 at Unknown time  Yes Yes   Sig: Take 600 mg by mouth two (2) times a day. sAXagliptin (ONGLYZA) 2.5 mg tablet 7/6/2017 at Unknown time  Yes Yes   Sig: Take 2.5 mg by mouth daily. topiramate (TOPAMAX) 100 mg tablet 7/6/2017 at Unknown time  Yes Yes   Sig: Take 100 mg by mouth two (2) times a day. traMADol (ULTRAM) 50 mg tablet 7/6/2017 at Unknown time  Yes Yes   Sig: Take 50 mg by mouth three (3) times daily. Facility-Administered Medications: None         Comments/Recommendations: Added lamictal, lisinopril, lithium carbonate, v-c forte, onglyza, topamax, tramadol.     Zbigniew Lanier, KathieD, BCPS

## 2017-07-08 LAB
DATE LAST DOSE: ABNORMAL
EST. AVERAGE GLUCOSE BLD GHB EST-MCNC: 117 MG/DL
HBA1C MFR BLD: 5.7 % (ref 4.2–6.3)
LITHIUM SERPL-SCNC: <0.2 MMOL/L (ref 0.6–1.2)
REPORTED DOSE,DOSE: ABNORMAL UNITS
REPORTED DOSE/TIME,TMG: ABNORMAL

## 2017-07-08 PROCEDURE — 65220000003 HC RM SEMIPRIVATE PSYCH

## 2017-07-08 PROCEDURE — 83036 HEMOGLOBIN GLYCOSYLATED A1C: CPT | Performed by: PSYCHIATRY & NEUROLOGY

## 2017-07-08 PROCEDURE — 80178 ASSAY OF LITHIUM: CPT | Performed by: PSYCHIATRY & NEUROLOGY

## 2017-07-08 PROCEDURE — 74011250637 HC RX REV CODE- 250/637: Performed by: PSYCHIATRY & NEUROLOGY

## 2017-07-08 PROCEDURE — 80061 LIPID PANEL: CPT | Performed by: PSYCHIATRY & NEUROLOGY

## 2017-07-08 PROCEDURE — 36415 COLL VENOUS BLD VENIPUNCTURE: CPT | Performed by: PSYCHIATRY & NEUROLOGY

## 2017-07-08 RX ADMIN — TOPIRAMATE 100 MG: 100 TABLET, FILM COATED ORAL at 08:25

## 2017-07-08 RX ADMIN — LAMOTRIGINE 25 MG: 25 TABLET ORAL at 08:25

## 2017-07-08 RX ADMIN — OLANZAPINE 10 MG: 5 TABLET, FILM COATED ORAL at 21:39

## 2017-07-08 RX ADMIN — TOPIRAMATE 100 MG: 100 TABLET, FILM COATED ORAL at 21:39

## 2017-07-08 NOTE — PROGRESS NOTES
Problem: Depressed Mood (Adult/Pediatric)  Goal: *STG: Remains safe in hospital  Outcome: Progressing Towards Goal  Pt is out in milieu interacting with select peers. Affect is somewhat flat and depressed. Denies any s/i. Will continue to monitor pt q 15 min checks.

## 2017-07-08 NOTE — BH NOTES
PSYCHIATRIC PROGRESS NOTE         Patient Name  Sherin Daley   Date of Birth 1965   [facility] [id number]   Medical Record Number  [id number]      Age  47 y.o. PCP General Mylene MD   Admit date:  7/6/2017    Room Number  726/02  @ Replaced by [facility]   Date of Service  7/8/2017          PSYCHOTHERAPY SESSION NOTE:  Length of psychotherapy session: 45 minutes    Main condition/diagnosis/issues treated during session today, 7/8/2017 :COPING SKILLS, MEDICATION COMPLIANCE anxiety management    I employed Cognitive Behavioral therapy techniques, Reality-Oriented psychotherapy, as well as supportive psychotherapy in regards to various ongoing psychosocial stressors, including the following: pre-admission and current problems; housing issues; medical issues; and stress of hospitalization. Interpersonal relationship issues and psychodynamic conflicts explored. Attempts made to alleviate maladaptive patterns. Overall, patient is not progressing    Treatment Plan Update (reviewed an updated 7/8/2017) : I will modify psychotherapy tx plan by implementing more stress management strategies, building upon cognitive behavioral techniques, increasing coping skills, as well as shoring up psychological defenses). An extended energy and skill set was needed to engage pt in psychotherapy due to some of the following: resistiveness, complexity, negativity, confrontational nature, hostile behaviors, and/or severe abnormalities in thought processes/psychosis resulting in the loss of expressive/receptive language communication skills. E & M PROGRESS NOTE:         HISTORY       CC:  \"depression \"  HISTORY OF PRESENT ILLNESS/INTERVAL HISTORY:  (reviewed/updated 7/8/2017). per initial evaluation:     Sherin Daley presents/reports/evidences the following emotional symptoms today, 7/8/2017:depression. The above symptoms have been present for 1 week.  These symptoms are of severe severity. The symptoms are constant in nature. Additional symptomatology and features include anxiety. SIDE EFFECTS: (reviewed/updated 7/8/2017)  None reported or admitted to. No noted toxicity with use of Depakote/Tegretol/lithium/Clozaril/TCAs   ALLERGIES:(reviewed/updated 7/8/2017)  Allergies   Allergen Reactions    Aspirin Nausea and Vomiting    Morphine Hives      MEDICATIONS PRIOR TO ADMISSION:(reviewed/updated 7/8/2017)  Prescriptions Prior to Admission   Medication Sig    lamoTRIgine (LAMICTAL) 100 mg tablet Take 100 mg by mouth two (2) times a day.  lisinopril (PRINIVIL, ZESTRIL) 40 mg tablet Take 40 mg by mouth daily.  lithium carbonate 300 mg capsule Take 600 mg by mouth two (2) times a day.  MULTIVIT-MINS NO.7/FOLIC ACID (V-C FORTE PO) Take 1 Cap by mouth daily.  OLANZapine (ZYPREXA) 15 mg tablet Take 15 mg by mouth nightly.  sAXagliptin (ONGLYZA) 2.5 mg tablet Take 2.5 mg by mouth daily.  topiramate (TOPAMAX) 100 mg tablet Take 100 mg by mouth two (2) times a day.  traMADol (ULTRAM) 50 mg tablet Take 50 mg by mouth three (3) times daily as needed for Pain. PAST MEDICAL HISTORY: Past medical history from the initial psychiatric evaluation has been reviewed (reviewed/updated 7/8/2017) with no additional updates (I asked patient and no additional past medical history provided). Past Medical History:   Diagnosis Date    Diabetes ([facility]     Hypertension     Psychiatric disorder    History reviewed. No pertinent surgical history. SOCIAL HISTORY: Social history from the initial psychiatric evaluation has been reviewed (reviewed/updated 7/8/2017) with no additional updates (I asked patient and no additional social history provided). Social History     Social History    Marital status: SINGLE     Spouse name: N/A    Number of children: N/A    Years of education: N/A     Occupational History    Not on file.      Social History Main Topics    Smoking status: Current Some Day Smoker    Smokeless tobacco: Not on file    Alcohol use No    Drug use: No    Sexual activity: Not on file     Other Topics Concern    Not on file     Social History Narrative    No narrative on file      FAMILY HISTORY: Family history from the initial psychiatric evaluation has been reviewed (reviewed/updated 7/8/2017) with no additional updates (I asked patient and no additional family history provided). History reviewed. No pertinent family history. REVIEW OF SYSTEMS: (reviewed/updated 7/8/2017)  Appetite:no change from normal   Sleep: no change   All other Review of Systems: Psychological ROS: depressed mood  Respiratory ROS: no cough, shortness of breath, or wheezing  Cardiovascular ROS: no chest pain or dyspnea on exertion         [facility] (MSE):    MSE FINDINGS ARE WITHIN NORMAL LIMITS (WNL) UNLESS OTHERWISE STATED BELOW. ( ALL OF THE BELOW CATEGORIES OF THE MSE HAVE BEEN REVIEWED (reviewed 7/8/2017) AND UPDATED AS DEEMED APPROPRIATE )  General Presentation age appropriate, evasive and guarded   Orientation Alert and Oriented x 1   Vital Signs  See below (reviewed 7/8/2017); Vital Signs (BP, Pulse, & Temp) are within normal limits if not listed below.    Gait and Station Stable/steady, no ataxia   Musculoskeletal System No extrapyramidal symptoms (EPS); no abnormal muscular movements or Tardive Dyskinesia (TD); muscle strength and tone are within normal limits   Language No aphasia or dysarthria   Speech:  hypoverbal   Thought Processes concrete; slow rate of thoughts; poor abstract reasoning/computation   Thought Associations blocked    Thought Content internally preoccupied   Suicidal Ideations none   Homicidal Ideations none   Mood:  anxious    Affect:  mood-congruent   Memory recent  fair   Memory remote:  good   Concentration/Attention:  fair   Fund of Knowledge below average   Insight:  poor   Reliability poor   Judgment:  poor          VITALS: Patient Vitals for the past 24 hrs:   Temp Pulse Resp BP SpO2   07/08/17 0800 98.1 °F (36.7 °C) 87 16 128/83 100 %   07/07/17 2000 97.3 °F (36.3 °C) 71 16 116/81 -   07/07/17 1639 98.6 °F (37 °C) 77 16 127/84 100 %     Wt Readings from Last 3 Encounters:   07/06/17 62.9 kg (138 lb 9.6 oz)     Temp Readings from Last 3 Encounters:   07/08/17 98.1 °F (36.7 °C)     BP Readings from Last 3 Encounters:   07/08/17 128/83     Pulse Readings from Last 3 Encounters:   07/08/17 87            DATA     LABORATORY DATA:(reviewed/updated 7/8/2017)  Recent Results (from the past 24 hour(s))   LITHIUM    Collection Time: 07/08/17  9:10 AM   Result Value Ref Range    Lithium level <0.20 (L) 0.60 - 1.20 MMOL/L    Reported dose date: NOT PROVIDED      Reported dose time: NOT PROVIDED      Reported dose: NOT PROVIDED UNITS   HEMOGLOBIN A1C WITH EAG    Collection Time: 07/08/17  9:10 AM   Result Value Ref Range    Hemoglobin A1c 5.7 4.2 - 6.3 %    Est. average glucose 117 mg/dL     No results found for: VALF2, VALAC, VALP, VALPR, DS6, CRBAM, CRBAMP, CARB2, XCRBAM  Lab Results   Component Value Date/Time    Lithium level <0.20 07/08/2017 09:10 AM      RADIOLOGY REPORTS:(reviewed/updated 7/8/2017)  No results found.        MEDICATIONS     ALL MEDICATIONS:   Current Facility-Administered Medications   Medication Dose Route Frequency    OLANZapine (ZyPREXA) tablet 10 mg  10 mg Oral QHS    topiramate (TOPAMAX) tablet 100 mg  100 mg Oral BID    lamoTRIgine (LaMICtal) tablet 25 mg  25 mg Oral DAILY    ziprasidone (GEODON) 20 mg in sterile water (preservative free) 1 mL injection  20 mg IntraMUSCular BID PRN    OLANZapine (ZyPREXA) tablet 5 mg  5 mg Oral Q6H PRN    benztropine (COGENTIN) tablet 2 mg  2 mg Oral BID PRN    benztropine (COGENTIN) injection 2 mg  2 mg IntraMUSCular BID PRN    LORazepam (ATIVAN) injection 2 mg  2 mg IntraMUSCular Q4H PRN    LORazepam (ATIVAN) tablet 1 mg  1 mg Oral Q4H PRN    zolpidem (AMBIEN) tablet 10 mg  10 mg Oral QHS PRN    acetaminophen (TYLENOL) tablet 650 mg  650 mg Oral Q4H PRN    ibuprofen (MOTRIN) tablet 400 mg  400 mg Oral Q8H PRN    magnesium hydroxide (MILK OF MAGNESIA) 400 mg/5 mL oral suspension 30 mL  30 mL Oral DAILY PRN    nicotine (NICODERM CQ) 21 mg/24 hr patch 1 Patch  1 Patch TransDERmal DAILY PRN      SCHEDULED MEDICATIONS:   Current Facility-Administered Medications   Medication Dose Route Frequency    OLANZapine (ZyPREXA) tablet 10 mg  10 mg Oral QHS    topiramate (TOPAMAX) tablet 100 mg  100 mg Oral BID    lamoTRIgine (LaMICtal) tablet 25 mg  25 mg Oral DAILY          ASSESSMENT & PLAN     DIAGNOSES REQUIRING ACTIVE TREATMENT AND MONITORING: (reviewed/updated 7/8/2017)  Patient Active Hospital Problem List:   [facility]ffective [facility], [facility] ([facility] (7/7/2017)    Assessment: noy/ depression alternating with psychosis    Plan: continue with mood stabilizers and antipstchotic medications   Vision impairment (7/7/2017)    Assessment: stable    Plan: continue current care   Essential hypertension (7/7/2017)    Assessment:elevated blood pressure -stable    Plan: continue current management            I will continue to monitor blood levels (Depakote, Tegretol, lithium, clozapine---a drug with a narrow therapeutic index= NTI) and associated labs for drug therapy implemented that require intense monitoring for toxicity as deemed appropriate based on current medication side effects and pharmacodynamically determined drug 1/2 lives. In summary, Dave Senior, is a 47 y.o.  female who presents with a severe exacerbation of the principal diagnosis of Schizoaffective disorder, bipolar type ([facility]  Patient's condition is worsening/not improving/not stable . Patient requires continued inpatient hospitalization for further stabilization, safety monitoring and medication management.   I will continue to coordinate the provision of individual, milieu, occupational, group, and substance abuse therapies to address target symptoms/diagnoses as deemed appropriate for the individual patient. A coordinated, multidisplinary treatment team round was conducted with the patient (this team consists of the nurse, psychiatric unit pharmcist,  and writer). Complete current electronic health record for patient has been reviewed today including consultant notes, ancillary staff notes, nurses and psychiatric tech notes. Suicide risk assessment completed and patient deemed to be of low risk for suicide at this time. The following regarding medications was addressed during rounds with patient:   the risks and benefits of the proposed medication. The patient was given the opportunity to ask questions. Informed consent given to the use of the above medications. Will continue to adjust psychiatric and non-psychiatric medications (see above \"medication\" section and orders section for details) as deemed appropriate & based upon diagnoses and response to treatment. I will continue to order blood tests/labs and diagnostic tests as deemed appropriate and review results as they become available (see orders for details and above listed lab/test results). I will order psychiatric records from previous [facility] hospitals to further elucidate the nature of patient's psychopathology and review once available. I will gather additional collateral information from friends, family and o/p treatment team to further elucidate the nature of patient's psychopathology and baselline level of psychiatric functioning.          I certify that this patient's inpatient psychiatric hospital services furnished since the previous certification were, and continue to be, required for treatment that could reasonably be expected to improve the patient's condition, or for diagnostic study, and that the patient continues to need, on a daily basis, active treatment furnished directly by or requiring the supervision of inpatient psychiatric facility personnel. In addition, the hospital records show that services furnished were intensive treatment services, admission or related services, or equivalent services.     EXPECTED DISCHARGE DATE/DAY: TBD     DISPOSITION: Home       Signed By:   Yamil Stone MD  7/8/2017

## 2017-07-08 NOTE — PROGRESS NOTES
Problem: Depressed Mood (Adult/Pediatric)  Goal: *STG: Remains safe in hospital  Outcome: Progressing Towards Goal  Pt is currently resting. NAD noted. Continue to monitor. Pt refused labs. Behavioral Health Interdisciplinary Rounds     Patient Name: Attila Coyle  Age: 47 y.o. Room/Bed:  726/02  Primary Diagnosis: Schizoaffective disorder, bipolar type ([facility]   Admission Status: Voluntary     Readmission within 30 days: no  Power of  in place: no  Patient requires a blocked bed: no          Reason for blocked bed: n/a    VTE Prophylaxis: No  Mobility needs/Fall risk: yes    Nutritional Plan: no  Consults: n/a         Labs/Testing due today?: no    Sleep hours: 6.75      Participation in Care/Groups:  no  Medication Compliant?: Yes  PRNS (last 24 hours): Antipsychotic (PO), Antianxiety and Pain    Restraints (last 24 hours):  no  Substance Abuse:  no  CIWA (range last 24 hours): n/a COWS (range last 24 hours): n/a  Alcohol screening (AUDIT) completed -  AUDIT Score: 0  If applicable, date SBIRT discussed in treatment team AND documented: n/a  Tobacco - patient is a smoker: yes   Date tobacco education completed by RN: 7/6/17  24 hour chart check complete: yes     Patient goal(s) for today: Allow staff to take Lithium level and start medications  Treatment team focus/goals: Attempt to take Lithium level so Lithium treatment can be started  LOS:  1  Expected LOS: TBD  Psychiatric Advanced Care Directives -  No   Name of Decision maker if patient has Psychiatric Care Directive: None   Patient was offered information, patient declined. Financial concerns/prescription coverage: [employer]  Date of last family contact:      Family requesting physician contact today: No  Discharge plan: Will return home when stable for discharge       Outpatient provider(s): Deana Tan (NP); Gianna Valentine (therapist);  [facility])    Participating treatment team members: Thanh Randall BARRETT Barbosa; Dr. Sofie Eli MD; Erasmo Bennett RN

## 2017-07-08 NOTE — PROGRESS NOTES
Problem: Depressed Mood (Adult/Pediatric)  Goal: *STG: Complies with medication therapy  Outcome: Progressing Towards Goal  Pt taking scheduled medications but has been refusing labs. Pt received this morning resting in bed quietly. No acute distress. Will continue to monitor with Q 15 minute safety checks.

## 2017-07-09 LAB
CHOLEST SERPL-MCNC: 224 MG/DL
HDLC SERPL-MCNC: 54 MG/DL
HDLC SERPL: 4.1 {RATIO} (ref 0–5)
LDLC SERPL CALC-MCNC: 150 MG/DL (ref 0–100)
LIPID PROFILE,FLP: ABNORMAL
TRIGL SERPL-MCNC: 100 MG/DL (ref ?–150)
VLDLC SERPL CALC-MCNC: 20 MG/DL

## 2017-07-09 PROCEDURE — 65220000003 HC RM SEMIPRIVATE PSYCH

## 2017-07-09 PROCEDURE — 74011250637 HC RX REV CODE- 250/637: Performed by: PSYCHIATRY & NEUROLOGY

## 2017-07-09 RX ADMIN — TOPIRAMATE 100 MG: 100 TABLET, FILM COATED ORAL at 21:31

## 2017-07-09 RX ADMIN — ACETAMINOPHEN 650 MG: 325 TABLET, FILM COATED ORAL at 09:36

## 2017-07-09 RX ADMIN — LAMOTRIGINE 25 MG: 25 TABLET ORAL at 09:10

## 2017-07-09 RX ADMIN — OLANZAPINE 10 MG: 5 TABLET, FILM COATED ORAL at 21:31

## 2017-07-09 RX ADMIN — TOPIRAMATE 100 MG: 100 TABLET, FILM COATED ORAL at 09:10

## 2017-07-09 NOTE — BH NOTES
PSYCHIATRIC PROGRESS NOTE         Patient Name  Dayo Madden   Date of Birth 1965   [facility] [id number]   Medical Record Number  [id number]      Age  47 y.o. PCP Raji Payne MD   Admit date:  7/6/2017    Room Number  726/02  @ [facility]   Date of Service  7/9/2017          PSYCHOTHERAPY SESSION NOTE:  Length of psychotherapy session: 45 minutes    Main condition/diagnosis/issues treated during session today, 7/9/2017 :COPING SKILLS, MEDICATION COMPLIANCE anxiety management    I employed Cognitive Behavioral therapy techniques, Reality-Oriented psychotherapy, as well as supportive psychotherapy in regards to various ongoing psychosocial stressors, including the following: pre-admission and current problems; housing issues; medical issues; and stress of hospitalization. Interpersonal relationship issues and psychodynamic conflicts explored. Attempts made to alleviate maladaptive patterns. Overall, patient is not progressing    Treatment Plan Update (reviewed an updated 7/9/2017) : I will modify psychotherapy tx plan by implementing more stress management strategies, building upon cognitive behavioral techniques, increasing coping skills, as well as shoring up psychological defenses). An extended energy and skill set was needed to engage pt in psychotherapy due to some of the following: resistiveness, complexity, negativity, confrontational nature, hostile behaviors, and/or severe abnormalities in thought processes/psychosis resulting in the loss of expressive/receptive language communication skills. E & M PROGRESS NOTE:         HISTORY       CC:  \"depression \"  HISTORY OF PRESENT ILLNESS/INTERVAL HISTORY:  (reviewed/updated 7/9/2017). per initial evaluation:     Dayo Madden presents/reports/evidences the following emotional symptoms today, 7/9/2017:depression. The above symptoms have been present for 1 week.  These symptoms are of severe severity. The symptoms are constant in nature. Additional symptomatology and features include anxiety. 7/9/17- Very social with full range of affect. Denies ah AND si. SIDE EFFECTS: (reviewed/updated 7/9/2017)  None reported or admitted to. No noted toxicity with use of Depakote/Tegretol/lithium/Clozaril/TCAs   ALLERGIES:(reviewed/updated 7/9/2017)  Allergies   Allergen Reactions    Aspirin Nausea and Vomiting    Morphine Hives      MEDICATIONS PRIOR TO ADMISSION:(reviewed/updated 7/9/2017)  Prescriptions Prior to Admission   Medication Sig    lamoTRIgine (LAMICTAL) 100 mg tablet Take 100 mg by mouth two (2) times a day.  lisinopril (PRINIVIL, ZESTRIL) 40 mg tablet Take 40 mg by mouth daily.  lithium carbonate 300 mg capsule Take 600 mg by mouth two (2) times a day.  MULTIVIT-MINS NO.7/FOLIC ACID (V-C FORTE PO) Take 1 Cap by mouth daily.  OLANZapine (ZYPREXA) 15 mg tablet Take 15 mg by mouth nightly.  sAXagliptin (ONGLYZA) 2.5 mg tablet Take 2.5 mg by mouth daily.  topiramate (TOPAMAX) 100 mg tablet Take 100 mg by mouth two (2) times a day.  traMADol (ULTRAM) 50 mg tablet Take 50 mg by mouth three (3) times daily as needed for Pain. PAST MEDICAL HISTORY: Past medical history from the initial psychiatric evaluation has been reviewed (reviewed/updated 7/9/2017) with no additional updates (I asked patient and no additional past medical history provided). Past Medical History:   Diagnosis Date    Diabetes ([facility] [address]     Hypertension     Psychiatric disorder    History reviewed. No pertinent surgical history. SOCIAL HISTORY: Social history from the initial psychiatric evaluation has been reviewed (reviewed/updated 7/9/2017) with no additional updates (I asked patient and no additional social history provided).    Social History     Social History    Marital status: SINGLE     Spouse name: N/A    Number of children: N/A    Years of education: N/A     Occupational History    Not on file.     Social History Main Topics    Smoking status: Current Some Day Smoker    Smokeless tobacco: Not on file    Alcohol use No    Drug use: No    Sexual activity: Not on file     Other Topics Concern    Not on file     Social History Narrative    No narrative on file      FAMILY HISTORY: Family history from the initial psychiatric evaluation has been reviewed (reviewed/updated 7/9/2017) with no additional updates (I asked patient and no additional family history provided). History reviewed. No pertinent family history. REVIEW OF SYSTEMS: (reviewed/updated 7/9/2017)  Appetite:no change from normal   Sleep: no change   All other Review of Systems: Psychological ROS: depressed mood  Respiratory ROS: no cough, shortness of breath, or wheezing  Cardiovascular ROS: no chest pain or dyspnea on exertion         [address] [facility] (MSE):    MSE FINDINGS ARE WITHIN NORMAL LIMITS (WNL) UNLESS OTHERWISE STATED BELOW. ( ALL OF THE BELOW CATEGORIES OF THE MSE HAVE BEEN REVIEWED (reviewed 7/9/2017) AND UPDATED AS DEEMED APPROPRIATE )  General Presentation age appropriate, evasive and guarded   Orientation Alert and Oriented x 1   Vital Signs  See below (reviewed 7/9/2017); Vital Signs (BP, Pulse, & Temp) are within normal limits if not listed below.    Gait and Station Stable/steady, no ataxia   Musculoskeletal System No extrapyramidal symptoms (EPS); no abnormal muscular movements or Tardive Dyskinesia (TD); muscle strength and tone are within normal limits   Language No aphasia or dysarthria   Speech:  hypoverbal   Thought Processes concrete; slow rate of thoughts; poor abstract reasoning/computation   Thought Associations blocked    Thought Content internally preoccupied   Suicidal Ideations none   Homicidal Ideations none   Mood:  anxious    Affect:  mood-congruent   Memory recent  fair   Memory remote:  good   Concentration/Attention:  fair   Fund of Knowledge below average Insight:  poor   Reliability poor   Judgment:  poor          VITALS:     Patient Vitals for the past 24 hrs:   Temp Pulse Resp BP SpO2   07/09/17 1124 98.6 °F (37 °C) 76 16 118/75 100 %   07/08/17 2115 98.3 °F (36.8 °C) 96 16 126/84 -   07/08/17 1602 98.1 °F (36.7 °C) 86 16 101/69 100 %     Wt Readings from Last 3 Encounters:   07/06/17 62.9 kg (138 lb 9.6 oz)     Temp Readings from Last 3 Encounters:   07/09/17 98.6 °F (37 °C)     BP Readings from Last 3 Encounters:   07/09/17 118/75     Pulse Readings from Last 3 Encounters:   07/09/17 76            DATA     LABORATORY DATA:(reviewed/updated 7/9/2017)  No results found for this or any previous visit (from the past 24 hour(s)). No results found for: VALF2, VALAC, VALP, VALPR, DS6, CRBAM, CRBAMP, CARB2, XCRBAM  Lab Results   Component Value Date/Time    Lithium level <0.20 07/08/2017 09:10 AM      RADIOLOGY REPORTS:(reviewed/updated 7/9/2017)  No results found.        MEDICATIONS     ALL MEDICATIONS:   Current Facility-Administered Medications   Medication Dose Route Frequency    OLANZapine (ZyPREXA) tablet 10 mg  10 mg Oral QHS    topiramate (TOPAMAX) tablet 100 mg  100 mg Oral BID    lamoTRIgine (LaMICtal) tablet 25 mg  25 mg Oral DAILY    ziprasidone (GEODON) 20 mg in sterile water (preservative free) 1 mL injection  20 mg IntraMUSCular BID PRN    OLANZapine (ZyPREXA) tablet 5 mg  5 mg Oral Q6H PRN    benztropine (COGENTIN) tablet 2 mg  2 mg Oral BID PRN    benztropine (COGENTIN) injection 2 mg  2 mg IntraMUSCular BID PRN    LORazepam (ATIVAN) injection 2 mg  2 mg IntraMUSCular Q4H PRN    LORazepam (ATIVAN) tablet 1 mg  1 mg Oral Q4H PRN    zolpidem (AMBIEN) tablet 10 mg  10 mg Oral QHS PRN    acetaminophen (TYLENOL) tablet 650 mg  650 mg Oral Q4H PRN    ibuprofen (MOTRIN) tablet 400 mg  400 mg Oral Q8H PRN    magnesium hydroxide (MILK OF MAGNESIA) 400 mg/5 mL oral suspension 30 mL  30 mL Oral DAILY PRN    nicotine (NICODERM CQ) 21 mg/24 hr patch 1 Patch  1 Patch TransDERmal DAILY PRN      SCHEDULED MEDICATIONS:   Current Facility-Administered Medications   Medication Dose Route Frequency    OLANZapine (ZyPREXA) tablet 10 mg  10 mg Oral QHS    topiramate (TOPAMAX) tablet 100 mg  100 mg Oral BID    lamoTRIgine (LaMICtal) tablet 25 mg  25 mg Oral DAILY          ASSESSMENT & PLAN     DIAGNOSES REQUIRING ACTIVE TREATMENT AND MONITORING: (reviewed/updated 7/9/2017)  Patient Active Hospital Problem List:   Schizoaffective disorder, bipolar type ([facility] [address] (7/7/2017)    Assessment: noy/ depression alternating with psychosis    Plan: continue with mood stabilizers and antipstchotic medications   Vision impairment (7/7/2017)    Assessment: stable    Plan: continue current care   Essential hypertension (7/7/2017)    Assessment:elevated blood pressure -stable    Plan: continue current management            I will continue to monitor blood levels (Depakote, Tegretol, lithium, clozapine---a drug with a narrow therapeutic index= NTI) and associated labs for drug therapy implemented that require intense monitoring for toxicity as deemed appropriate based on current medication side effects and pharmacodynamically determined drug 1/2 lives. In summary, Cristiana Mistry, is a 47 y.o.  female who presents with a severe exacerbation of the principal diagnosis of Schizoaffective disorder, bipolar type ([facility] [address]  Patient's condition is worsening/not improving/not stable . Patient requires continued inpatient hospitalization for further stabilization, safety monitoring and medication management. I will continue to coordinate the provision of individual, milieu, occupational, group, and substance abuse therapies to address target symptoms/diagnoses as deemed appropriate for the individual patient. A coordinated, multidisplinary treatment team round was conducted with the patient (this team consists of the nurse, psychiatric unit pharmcist,  and writer). Complete current electronic health record for patient has been reviewed today including consultant notes, ancillary staff notes, nurses and psychiatric tech notes. Suicide risk assessment completed and patient deemed to be of low risk for suicide at this time. The following regarding medications was addressed during rounds with patient:   the risks and benefits of the proposed medication. The patient was given the opportunity to ask questions. Informed consent given to the use of the above medications. Will continue to adjust psychiatric and non-psychiatric medications (see above \"medication\" section and orders section for details) as deemed appropriate & based upon diagnoses and response to treatment. I will continue to order blood tests/labs and diagnostic tests as deemed appropriate and review results as they become available (see orders for details and above listed lab/test results). I will order psychiatric records from previous [facility] hospitals to further elucidate the nature of patient's psychopathology and review once available. I will gather additional collateral information from friends, family and o/p treatment team to further elucidate the nature of patient's psychopathology and baselline level of psychiatric functioning. I certify that this patient's inpatient psychiatric hospital services furnished since the previous certification were, and continue to be, required for treatment that could reasonably be expected to improve the patient's condition, or for diagnostic study, and that the patient continues to need, on a daily basis, active treatment furnished directly by or requiring the supervision of inpatient psychiatric facility personnel. In addition, the hospital records show that services furnished were intensive treatment services, admission or related services, or equivalent services.     EXPECTED DISCHARGE DATE/DAY: TBD     DISPOSITION: Home       Signed By:   Ian Sevilla Pérez Veliz MD  7/9/2017

## 2017-07-09 NOTE — INTERDISCIPLINARY ROUNDS
Behavioral Health Interdisciplinary Rounds     Patient Name: Joelle Kelsey  Age: 47 y.o.   Room/Bed:  726/02  Primary Diagnosis: [facility]oaffective disorder[facility] ([facility]   Admission Status: Voluntary     Readmission within 30 days: no  Power of  in place: no  Patient requires a blocked bed: no          Reason for blocked bed:     VTE Prophylaxis: No  Mobility needs/Fall risk: yes    Nutritional Plan: no  Consults:          Labs/Testing due today?: yes    Sleep hours:  7.45      Participation in Care/Groups:  yes  Medication Compliant?: Yes  PRNS (last 24 hours): None    Restraints (last 24 hours):  no  Substance Abuse:  no  CIWA (range last 24 hours):  COWS (range last 24 hours):   Alcohol screening (AUDIT) completed -  AUDIT Score: 0  If applicable, date SBIRT discussed in treatment team AND documented:   Tobacco - patient is a smoker: no   Date tobacco education completed by RN:   24 hour chart check complete: yes     Patient goal(s) for today:   Treatment team focus/goals:   LOS:  3  Expected LOS:   Psychiatric Advanced Care Directives -  no   Name of Decision maker if patient has Psychiatric Care Directive   Patient was offered information   Financial concerns/prescription coverage:  yes  Date of last family contact:       Family requesting physician contact today:  no  Discharge plan:        Outpatient provider(s):     Participating treatment team members: Joelle Kelsey, * (assigned SW),

## 2017-07-09 NOTE — PROGRESS NOTES
Problem: Depressed Mood (Adult/Pediatric)  Goal: *STG: Remains safe in hospital  Outcome: Not Progressing Towards Goal  Patient remains safe in the hospital. Pt with brighter affect when approach by staff. \" I feel a little better today. \" Pt states still having suicidal thoughts, but not as much as before. Pt also states hearing voices. Goal: *STG: Complies with medication therapy  Outcome: Progressing Towards Goal  Patient is medication compliant. Goal: *LTG: Returns to previous level of functioning and participates with after care plan  Outcome: Not Progressing Towards Goal  Pt still having SI thoughts. Goal: Interventions  Outcome: Not Progressing Towards Goal  Staff encourage positive coping still skills. Medication education.

## 2017-07-09 NOTE — BH NOTES
PSYCHIATRIC PROGRESS NOTE         Patient Name  Brand Boas   Date of Birth 1965   [facility] [id number]   Medical Record Number  [id number]      Age  47 y.o. PCP Napoleon May MD   Admit date:  7/6/2017    Room Number  726/02  @ [facility]   Date of Service  7/9/2017          PSYCHOTHERAPY SESSION NOTE:  Length of psychotherapy session: 45 minutes    Main condition/diagnosis/issues treated during session today, 7/9/2017 :COPING SKILLS, MEDICATION COMPLIANCE anxiety management    I employed Cognitive Behavioral therapy techniques, Reality-Oriented psychotherapy, as well as supportive psychotherapy in regards to various ongoing psychosocial stressors, including the following: pre-admission and current problems; housing issues; medical issues; and stress of hospitalization. Interpersonal relationship issues and psychodynamic conflicts explored. Attempts made to alleviate maladaptive patterns. Overall, patient is not progressing    Treatment Plan Update (reviewed an updated 7/9/2017) : I will modify psychotherapy tx plan by implementing more stress management strategies, building upon cognitive behavioral techniques, increasing coping skills, as well as shoring up psychological defenses). An extended energy and skill set was needed to engage pt in psychotherapy due to some of the following: resistiveness, complexity, negativity, confrontational nature, hostile behaviors, and/or severe abnormalities in thought processes/psychosis resulting in the loss of expressive/receptive language communication skills. E & M PROGRESS NOTE:         HISTORY       CC:  \"depression \"  HISTORY OF PRESENT ILLNESS/INTERVAL HISTORY:  (reviewed/updated 7/9/2017). per initial evaluation:     Brand Boas presents/reports/evidences the following emotional symptoms today, 7/9/2017:depression. The above symptoms have been present for 1 week.  These symptoms are of severe severity. The symptoms are constant in nature. Additional symptomatology and features include anxiety. 7/9/17- uncooperative with team requests to spek with her family. Still presents with blunted affetc and angry mood. Asking about discharge. Poor insight. SIDE EFFECTS: (reviewed/updated 7/9/2017)  None reported or admitted to. No noted toxicity with use of Depakote/Tegretol/lithium/Clozaril/TCAs   ALLERGIES:(reviewed/updated 7/9/2017)  Allergies   Allergen Reactions    Aspirin Nausea and Vomiting    Morphine Hives      MEDICATIONS PRIOR TO ADMISSION:(reviewed/updated 7/9/2017)  Prescriptions Prior to Admission   Medication Sig    lamoTRIgine (LAMICTAL) 100 mg tablet Take 100 mg by mouth two (2) times a day.  lisinopril (PRINIVIL, ZESTRIL) 40 mg tablet Take 40 mg by mouth daily.  lithium carbonate 300 mg capsule Take 600 mg by mouth two (2) times a day.  MULTIVIT-MINS NO.7/FOLIC ACID (V-C FORTE PO) Take 1 Cap by mouth daily.  OLANZapine (ZYPREXA) 15 mg tablet Take 15 mg by mouth nightly.  sAXagliptin (ONGLYZA) 2.5 mg tablet Take 2.5 mg by mouth daily.  topiramate (TOPAMAX) 100 mg tablet Take 100 mg by mouth two (2) times a day.  traMADol (ULTRAM) 50 mg tablet Take 50 mg by mouth three (3) times daily as needed for Pain. PAST MEDICAL HISTORY: Past medical history from the initial psychiatric evaluation has been reviewed (reviewed/updated 7/9/2017) with no additional updates (I asked patient and no additional past medical history provided). Past Medical History:   Diagnosis Date    Diabetes ([address]     Hypertension     Psychiatric disorder    History reviewed. No pertinent surgical history. SOCIAL HISTORY: Social history from the initial psychiatric evaluation has been reviewed (reviewed/updated 7/9/2017) with no additional updates (I asked patient and no additional social history provided).    Social History     Social History    Marital status: SINGLE     Spouse name: N/A  Number of children: N/A    Years of education: N/A     Occupational History    Not on file. Social History Main Topics    Smoking status: Current Some Day Smoker    Smokeless tobacco: Not on file    Alcohol use No    Drug use: No    Sexual activity: Not on file     Other Topics Concern    Not on file     Social History Narrative    No narrative on file      FAMILY HISTORY: Family history from the initial psychiatric evaluation has been reviewed (reviewed/updated 7/9/2017) with no additional updates (I asked patient and no additional family history provided). History reviewed. No pertinent family history. REVIEW OF SYSTEMS: (reviewed/updated 7/9/2017)  Appetite:no change from normal   Sleep: no change   All other Review of Systems: Psychological ROS: depressed mood  Respiratory ROS: no cough, shortness of breath, or wheezing  Cardiovascular ROS: no chest pain or dyspnea on exertion         [facility] (MSE):    MSE FINDINGS ARE WITHIN NORMAL LIMITS (WNL) UNLESS OTHERWISE STATED BELOW. ( ALL OF THE BELOW CATEGORIES OF THE MSE HAVE BEEN REVIEWED (reviewed 7/9/2017) AND UPDATED AS DEEMED APPROPRIATE )  General Presentation age appropriate, evasive and guarded   Orientation Alert and Oriented x 1   Vital Signs  See below (reviewed 7/9/2017); Vital Signs (BP, Pulse, & Temp) are within normal limits if not listed below.    Gait and Station Stable/steady, no ataxia   Musculoskeletal System No extrapyramidal symptoms (EPS); no abnormal muscular movements or Tardive Dyskinesia (TD); muscle strength and tone are within normal limits   Language No aphasia or dysarthria   Speech:  hypoverbal   Thought Processes concrete; slow rate of thoughts; poor abstract reasoning/computation   Thought Associations blocked    Thought Content internally preoccupied   Suicidal Ideations none   Homicidal Ideations none   Mood:  anxious    Affect:  mood-congruent   Memory recent  fair Memory remote:  good   Concentration/Attention:  fair   Fund of Knowledge below average   Insight:  poor   Reliability poor   Judgment:  poor          VITALS:     Patient Vitals for the past 24 hrs:   Temp Pulse Resp BP SpO2   07/09/17 1124 98.6 °F (37 °C) 76 16 118/75 100 %   07/08/17 2115 98.3 °F (36.8 °C) 96 16 126/84 -   07/08/17 1602 98.1 °F (36.7 °C) 86 16 101/69 100 %     Wt Readings from Last 3 Encounters:   07/06/17 62.9 kg (138 lb 9.6 oz)     Temp Readings from Last 3 Encounters:   07/09/17 98.6 °F (37 °C)     BP Readings from Last 3 Encounters:   07/09/17 118/75     Pulse Readings from Last 3 Encounters:   07/09/17 76            DATA     LABORATORY DATA:(reviewed/updated 7/9/2017)  No results found for this or any previous visit (from the past 24 hour(s)). No results found for: VALF2, VALAC, VALP, VALPR, DS6, CRBAM, CRBAMP, CARB2, XCRBAM  Lab Results   Component Value Date/Time    Lithium level <0.20 07/08/2017 09:10 AM      RADIOLOGY REPORTS:(reviewed/updated 7/9/2017)  No results found.        MEDICATIONS     ALL MEDICATIONS:   Current Facility-Administered Medications   Medication Dose Route Frequency    OLANZapine (ZyPREXA) tablet 10 mg  10 mg Oral QHS    topiramate (TOPAMAX) tablet 100 mg  100 mg Oral BID    lamoTRIgine (LaMICtal) tablet 25 mg  25 mg Oral DAILY    ziprasidone (GEODON) 20 mg in sterile water (preservative free) 1 mL injection  20 mg IntraMUSCular BID PRN    OLANZapine (ZyPREXA) tablet 5 mg  5 mg Oral Q6H PRN    benztropine (COGENTIN) tablet 2 mg  2 mg Oral BID PRN    benztropine (COGENTIN) injection 2 mg  2 mg IntraMUSCular BID PRN    LORazepam (ATIVAN) injection 2 mg  2 mg IntraMUSCular Q4H PRN    LORazepam (ATIVAN) tablet 1 mg  1 mg Oral Q4H PRN    zolpidem (AMBIEN) tablet 10 mg  10 mg Oral QHS PRN    acetaminophen (TYLENOL) tablet 650 mg  650 mg Oral Q4H PRN    ibuprofen (MOTRIN) tablet 400 mg  400 mg Oral Q8H PRN    magnesium hydroxide (MILK OF MAGNESIA) 400 mg/5 mL oral suspension 30 mL  30 mL Oral DAILY PRN    nicotine (NICODERM CQ) 21 mg/24 hr patch 1 Patch  1 Patch TransDERmal DAILY PRN      SCHEDULED MEDICATIONS:   Current Facility-Administered Medications   Medication Dose Route Frequency    OLANZapine (ZyPREXA) tablet 10 mg  10 mg Oral QHS    topiramate (TOPAMAX) tablet 100 mg  100 mg Oral BID    lamoTRIgine (LaMICtal) tablet 25 mg  25 mg Oral DAILY          ASSESSMENT & PLAN     DIAGNOSES REQUIRING ACTIVE TREATMENT AND MONITORING: (reviewed/updated 7/9/2017)  Patient Active Hospital Problem List:   Schizoaffective disorder, bipolar type ([facility] (7/7/2017)    Assessment: noy/ depression alternating with psychosis    Plan: continue with mood stabilizers and antipstchotic medications   Vision impairment (7/7/2017)    Assessment: stable    Plan: continue current care   Essential hypertension (7/7/2017)    Assessment:elevated blood pressure -stable    Plan: continue current management            I will continue to monitor blood levels (Depakote, Tegretol, lithium, clozapine---a drug with a narrow therapeutic index= NTI) and associated labs for drug therapy implemented that require intense monitoring for toxicity as deemed appropriate based on current medication side effects and pharmacodynamically determined drug 1/2 lives. In summary, Jameson Sanders, is a 47 y.o.  female who presents with a severe exacerbation of the principal diagnosis of Schizoaffective disorder, bipolar type ([facility]  Patient's condition is worsening/not improving/not stable . Patient requires continued inpatient hospitalization for further stabilization, safety monitoring and medication management. I will continue to coordinate the provision of individual, milieu, occupational, group, and substance abuse therapies to address target symptoms/diagnoses as deemed appropriate for the individual patient.   A coordinated, multidisplinary treatment team round was conducted with the patient (this team consists of the nurse, psychiatric unit pharmcist,  and writer). Complete current electronic health record for patient has been reviewed today including consultant notes, ancillary staff notes, nurses and psychiatric tech notes. Suicide risk assessment completed and patient deemed to be of low risk for suicide at this time. The following regarding medications was addressed during rounds with patient:   the risks and benefits of the proposed medication. The patient was given the opportunity to ask questions. Informed consent given to the use of the above medications. Will continue to adjust psychiatric and non-psychiatric medications (see above \"medication\" section and orders section for details) as deemed appropriate & based upon diagnoses and response to treatment. I will continue to order blood tests/labs and diagnostic tests as deemed appropriate and review results as they become available (see orders for details and above listed lab/test results). I will order psychiatric records from previous [facility] hospitals to further elucidate the nature of patient's psychopathology and review once available. I will gather additional collateral information from friends, family and o/p treatment team to further elucidate the nature of patient's psychopathology and baselline level of psychiatric functioning. I certify that this patient's inpatient psychiatric hospital services furnished since the previous certification were, and continue to be, required for treatment that could reasonably be expected to improve the patient's condition, or for diagnostic study, and that the patient continues to need, on a daily basis, active treatment furnished directly by or requiring the supervision of inpatient psychiatric facility personnel.  In addition, the hospital records show that services furnished were intensive treatment services, admission or related services, or equivalent services.     EXPECTED DISCHARGE DATE/DAY: TBD     DISPOSITION: Home       Signed By:   Figueroa Chaney MD  7/9/2017

## 2017-07-09 NOTE — PROGRESS NOTES
Problem: Depressed Mood (Adult/Pediatric)  Goal: *STG: Complies with medication therapy  Outcome: Progressing Towards Goal  Visible on the unit. Compliant with meals and medications. Mood is calm at this time. Pt denies SI. Continue to encourage and educate. 1910: Male friend visited. Care card provided.

## 2017-07-09 NOTE — PROGRESS NOTES
Problem: Falls - Risk of  Goal: *Absence of falls  Outcome: Progressing Towards Goal  Patient has been fall free today. She is currently sleeping, no stress noted.

## 2017-07-09 NOTE — BH NOTES
GROUP THERAPY PROGRESS NOTE    Desiderio Bloch is participating in Todd. Group time: 10 minutes    Personal goal for participation: Don't have one right now. Goal orientation: personal    Group therapy participation: active    Therapeutic interventions reviewed and discussed: Writer listened attentively and explained unit rules. Impression of participation: Patient participated actively.

## 2017-07-09 NOTE — BH NOTES
PRN Medication Documentation    Specific patient behavior that led to need for PRN medication: abdominal pain  Staff interventions attempted prior to PRN being given: rest  PRN medication given: Tylenol 650 mg po PRN.   Patient response/effectiveness of PRN medication:

## 2017-07-10 VITALS
RESPIRATION RATE: 16 BRPM | HEIGHT: 68 IN | WEIGHT: 138.6 LBS | HEART RATE: 96 BPM | BODY MASS INDEX: 21.01 KG/M2 | OXYGEN SATURATION: 96 % | SYSTOLIC BLOOD PRESSURE: 125 MMHG | TEMPERATURE: 98.1 F | DIASTOLIC BLOOD PRESSURE: 90 MMHG

## 2017-07-10 PROCEDURE — 74011250637 HC RX REV CODE- 250/637: Performed by: PSYCHIATRY & NEUROLOGY

## 2017-07-10 RX ORDER — LAMOTRIGINE 25 MG/1
25 TABLET ORAL DAILY
Qty: 15 TAB | Refills: 1 | Status: ON HOLD | OUTPATIENT
Start: 2017-07-10 | End: 2018-07-19

## 2017-07-10 RX ORDER — LISINOPRIL 40 MG/1
40 TABLET ORAL DAILY
Qty: 15 TAB | Refills: 1 | Status: ON HOLD | OUTPATIENT
Start: 2017-07-10 | End: 2019-04-19

## 2017-07-10 RX ORDER — OLANZAPINE 10 MG/1
10 TABLET ORAL
Qty: 15 TAB | Refills: 1 | Status: ON HOLD | OUTPATIENT
Start: 2017-07-10 | End: 2018-07-19

## 2017-07-10 RX ORDER — TOPIRAMATE 100 MG/1
100 TABLET, FILM COATED ORAL 2 TIMES DAILY
Qty: 30 TAB | Refills: 1 | Status: ON HOLD | OUTPATIENT
Start: 2017-07-10 | End: 2018-07-19

## 2017-07-10 RX ADMIN — TOPIRAMATE 100 MG: 100 TABLET, FILM COATED ORAL at 08:49

## 2017-07-10 RX ADMIN — LAMOTRIGINE 25 MG: 25 TABLET ORAL at 08:49

## 2017-07-10 RX ADMIN — MAGNESIUM HYDROXIDE 30 ML: 400 SUSPENSION ORAL at 12:24

## 2017-07-10 RX ADMIN — ACETAMINOPHEN 650 MG: 325 TABLET, FILM COATED ORAL at 08:49

## 2017-07-10 NOTE — PROGRESS NOTES
Laboratory Monitoring for Antipsychotics and Mood Stabilizers    This patient is currently prescribed the following medication(s):   Current Facility-Administered Medications   Medication Dose Route Frequency    OLANZapine (ZyPREXA) tablet 10 mg  10 mg Oral QHS    topiramate (TOPAMAX) tablet 100 mg  100 mg Oral BID    lamoTRIgine (LaMICtal) tablet 25 mg  25 mg Oral DAILY       The following labs have been completed for monitoring of antipsychotics and/or mood stabilizers:    Height, Weight, BMI Estimation  Estimated body mass index is 21.07 kg/(m^2) as calculated from the following:    Height as of this encounter: 172.7 cm (68\"). Weight as of this encounter: 62.9 kg (138 lb 9.6 oz). Vital Signs/Blood Pressure  Visit Vitals    /90    Pulse 96    Temp 98.1 °F (36.7 °C)    Resp 16    Ht 172.7 cm (68\")    Wt 62.9 kg (138 lb 9.6 oz)    SpO2 96%    Breastfeeding No    BMI 21.07 kg/m2       Renal Function, Hepatic Function and Chemistry  Estimated Creatinine Clearance: 60.1 mL/min (based on Cr of 1.1). Lab Results   Component Value Date/Time    Sodium 144 07/06/2017 06:12 PM    Potassium 4.1 07/06/2017 06:12 PM    Chloride 110 07/06/2017 06:12 PM    CO2 27 07/06/2017 06:12 PM    Anion gap 7 07/06/2017 06:12 PM    BUN 10 07/06/2017 06:12 PM    Creatinine 1.10 07/06/2017 06:12 PM    BUN/Creatinine ratio 9 07/06/2017 06:12 PM    Bilirubin, total 0.3 07/06/2017 06:12 PM    Protein, total 7.6 07/06/2017 06:12 PM    Albumin 3.7 07/06/2017 06:12 PM    Globulin 3.9 07/06/2017 06:12 PM    A-G Ratio 0.9 07/06/2017 06:12 PM    ALT (SGPT) 24 07/06/2017 06:12 PM    Alk.  phosphatase 147 07/06/2017 06:12 PM       Lab Results   Component Value Date/Time    Glucose 112 07/06/2017 06:12 PM    Glucose (POC) 88 07/07/2017 07:27 AM       Lab Results   Component Value Date/Time    Hemoglobin A1c 5.7 07/08/2017 09:10 AM       Hematology  Lab Results   Component Value Date/Time    WBC 13.7 07/06/2017 06:12 PM    RBC 3.95 07/06/2017 06:12 PM    HGB 13.0 07/06/2017 06:12 PM    HCT 38.4 07/06/2017 06:12 PM    MCV 97.2 07/06/2017 06:12 PM    MCH 32.9 07/06/2017 06:12 PM    MCHC 33.9 07/06/2017 06:12 PM    RDW 12.8 07/06/2017 06:12 PM    PLATELET 113 84/61/2903 06:12 PM       Lipids  Lab Results   Component Value Date/Time    Cholesterol, total 224 07/08/2017 09:10 AM    HDL Cholesterol 54 07/08/2017 09:10 AM    LDL, calculated 150 07/08/2017 09:10 AM    Triglyceride 100 07/08/2017 09:10 AM    CHOL/HDL Ratio 4.1 07/08/2017 09:10 AM       Assessment/Plan:  Recommended baseline laboratory monitoring has been completed based on this patient's current medication regimen.          Elva Prakash, PharmD, [address], BCPS

## 2017-07-10 NOTE — PROGRESS NOTES
Problem: Falls - Risk of  Goal: *Absence of falls  Outcome: Progressing Towards Goal  Patient has been fall free since arriving on the Acute Unit. Patient currently sleeping, no stress noted.

## 2017-07-10 NOTE — PROGRESS NOTES
Pharmacist Discharge Medication Reconciliation    Discharging Provider: Dr. Worthy Collet PMH:   Past Medical History:   Diagnosis Date    Diabetes ([facility]     Hypertension     Psychiatric disorder      Chief Complaint for this Admission:   Chief Complaint   Patient presents with    Mental Health Problem     Allergies: Aspirin and Morphine    Discharge Medications:   Current Discharge Medication List        CONTINUE these medications which have CHANGED    Details   lamoTRIgine (LAMICTAL) 25 mg tablet Take 1 Tab by mouth daily. Indications: DEPRESSION ASSOCIATED WITH BIPOLAR DISORDER  Qty: 15 Tab, Refills: 1      lisinopril (PRINIVIL, ZESTRIL) 40 mg tablet Take 1 Tab by mouth daily. Indications: hypertension  Qty: 15 Tab, Refills: 1      OLANZapine (ZYPREXA) 10 mg tablet Take 1 Tab by mouth nightly. Indications: DEPRESSION ASSOCIATED WITH BIPOLAR DISORDER, ADJUNCT  Qty: 15 Tab, Refills: 1      topiramate (TOPAMAX) 100 mg tablet Take 1 Tab by mouth two (2) times a day. Indications: mood lability  Qty: 30 Tab, Refills: 1           CONTINUE these medications which have NOT CHANGED    Details   MULTIVIT-MINS NO.7/FOLIC ACID (V-C FORTE PO) Take 1 Cap by mouth daily. sAXagliptin (ONGLYZA) 2.5 mg tablet Take 2.5 mg by mouth daily.            STOP taking these medications       lithium carbonate 300 mg capsule Comments:   Reason for Stopping:         traMADol (ULTRAM) 50 mg tablet Comments:   Reason for Stopping:               The patient's chart, MAR and AVS were reviewed by SHWETA KinneyD.

## 2017-07-10 NOTE — BH NOTES
08:49  PRN Medication Documentation    Specific patient behavior that led to need for PRN medication: Patient c/o headache 7/10  Staff interventions attempted prior to PRN being given: Distraction, relaxation  PRN medication given: Tylenol 650 mg PO PRN   Patient response/effectiveness of PRN medication: Patient reports relief of pain    12:25  PRN Medication Documentation    Specific patient behavior that led to need for PRN medication: patient c/o constipation  Staff interventions attempted prior to PRN being given: prune juice  PRN medication given: Milk of Magnesia 30 mL PO PRN  Patient response/effectiveness of PRN medication: will determine      14:00  Discharge instructions, education, and medications provided to patient. Patient given opportunity for questions and clarifications. Patient verbalized understanding and voiced no concerns. Patient to be discharged via cab back to residence. 15:00  Patient left ambulatory with all belongings to cab ride.

## 2017-07-10 NOTE — DISCHARGE INSTRUCTIONS
DISCHARGE SUMMARY    NAME:Parul Salmon  : 1965  MRN: [id number]    The patient Peña Wakefield exhibits the ability to control behavior in a less restrictive environment. Patient's level of functioning is improving. No assaultive/destructive behavior has been observed for the past 24 hours. No suicidal/homicidal threat or behavior has been observed for the past 24 hours. There is no evidence of serious medication side effects. Patient has not been in physical or protective restraints for at least the past 24 hours. If weapons involved, how are they secured? No weapons involved    Is patient aware of and in agreement with discharge plan? Patient is aware of discharge and is in agreement     Arrangements for medication:  Prescriptions given to patient. Copy of discharge instructions to  provider?:  Fax to Lindy Livingston at [address] [place] [address] [phone]     Arrangements for transportation home: patient to arrange     Keep all follow up appointments as scheduled, continue to take prescribed medications per physician instructions. Mental health crisis number:  [phone] or your local mental health crisis line number at [place] [phone] from Nurse    The following personal items are in your possession at time of discharge:    Dental Appliances: None  Visual Aid: Glasses     Home Medications: None  Jewelry: None  Clothing: Pajamas, Pants, Shirt (nightie,2shirts,pants)  Other Valuables: Personal toiletries (shaq,hairgel,cocoa butter. ..in [facility])  Personal Items Sent to Safe: none          PATIENT INSTRUCTIONS:  What to do at Home:  Recommended activity: Activity as tolerated    If you experience any of the following symptoms thoughts of wanting to hurt yourself or others, increased hopelessness/helplessness please follow up with 911 or your local mental health crisis line number at [phone] .       *  Please give a list of your current medications to your Primary Care Provider. *  Please update this list whenever your medications are discontinued, doses are      changed, or new medications (including over-the-counter products) are added. *  Please carry medication information at all times in case of emergency situations. These are general instructions for a healthy lifestyle:    No smoking/ No tobacco products/ Avoid exposure to second hand smoke    Surgeon General's Warning:  Quitting smoking now greatly reduces serious risk to your health. Obesity, smoking, and sedentary lifestyle greatly increases your risk for illness    A healthy diet, regular physical exercise & weight monitoring are important for maintaining a healthy lifestyle    You may be retaining fluid if you have a history of heart failure or if you experience any of the following symptoms:  Weight gain of 3 pounds or more overnight or 5 pounds in a week, increased swelling in our hands or feet or shortness of breath while lying flat in bed. Please call your doctor as soon as you notice any of these symptoms; do not wait until your next office visit. Recognize signs and symptoms of STROKE:    F-face looks uneven    A-arms unable to move or move unevenly    S-speech slurred or non-existent    T-time-call 911 as soon as signs and symptoms begin-DO NOT go       Back to bed or wait to see if you get better-TIME IS BRAIN. Warning Signs of HEART ATTACK     Call 911 if you have these symptoms:   Chest discomfort. Most heart attacks involve discomfort in the center of the chest that lasts more than a few minutes, or that goes away and comes back. It can feel like uncomfortable pressure, squeezing, fullness, or pain.  Discomfort in other areas of the upper body. Symptoms can include pain or discomfort in one or both arms, the back, neck, jaw, or stomach.  Shortness of breath with or without chest discomfort.    Other signs may include breaking out in a cold sweat, nausea, or lightheadedness. Don't wait more than five minutes to call 911 - MINUTES MATTER! Fast action can save your life. Calling 911 is almost always the fastest way to get lifesaving treatment. Emergency Medical Services staff can begin treatment when they arrive -- up to an hour sooner than if someone gets to the hospital by car. The discharge information has been reviewed with the patient. The patient verbalized understanding. Discharge medications reviewed with the patient and appropriate educational materials and side effects teaching were provided.

## 2017-07-10 NOTE — BH NOTES
GROUP THERAPY PROGRESS NOTE    Parul Mayfield is participating in Reflections Group.      Group time: 20 minutes    Personal goal for participation:   Goal orientation: community    Group therapy participation: active    Therapeutic interventions reviewed and discussed:     Impression of participation: Patient states'' My day was alright''and ''I didn't have a goal.

## 2017-07-10 NOTE — BH NOTES
Behavioral Health Transition Record to Provider    Patient Name: Glenys Pantoja  YOB: 1965  Medical Record Number: [id number]  Date of Admission: 7/6/2017  Date of Discharge: 7/10/2017    Attending Provider: No att. providers found  Discharging Provider: Dr. Lattie Halsted   To contact this individual call [phone]  and ask the  to page. If unavailable, ask to be transferred to [facility] Provider on call. [facility] Provider will be available on call 24/7 and during holidays. Primary Care Provider: Zachariah Moreno MD    Allergies   Allergen Reactions    Aspirin Nausea and Vomiting    Morphine Hives          H&P Summary Notes      H&P by Zachariah Moreno MD at 07/07/17 1908     Author:  Zachariah Moreno MD Service:  PSYCHIATRY Author Type:  Physician    Filed:  07/07/17 1916 Date of Service:  07/07/17 1908 Status:  Signed    :  Zachariah Moreno MD (Physician)             INITIAL PSYCHIATRIC EVALUATION         IDENTIFICATION:    Patient Name  Glenys Pantoja   Date of Birth 1965   [facility] [id number]   Medical Record Number  [id number]      Age  47 y.o. PCP Zachariah Moreno MD   Admit date:  7/6/2017    Room Number  726/02  @ [facility]   Date of Service  7/7/2017            HISTORY         REASON FOR HOSPITALIZATION:  CC: Suicidal. Pt admitted on a voluntary basis for suicidal ideations and psychosis posing a risk of harm to himself and others    HISTORY OF PRESENT ILLNESS:    The patient, Glenys Pantoja, is a 47 y.o. BLACK OR  female with severe vision impairment,  with a past psychiatric history significant for Schizoaffective Disorder, who presents at this time with complaints of (and/or evidence of) the following emotional symptoms: suicidal thoughts/threats and psychotic behavior.   The patient was a very poor historian, but ER note reflects that the patient reported to her  feeling increasingly depressed, hearing voices and planning to kill herself via OD. She only reported a neighbor calling her names as a stressor and high drug activity in her apartment building. ALLERGIES:  Allergies   Allergen Reactions    Aspirin Nausea and Vomiting    Morphine Hives      MEDICATIONS PRIOR TO ADMISSION:  Prescriptions Prior to Admission   Medication Sig    lamoTRIgine (LAMICTAL) 100 mg tablet Take 100 mg by mouth two (2) times a day.  lisinopril (PRINIVIL, ZESTRIL) 40 mg tablet Take 40 mg by mouth daily.  lithium carbonate 300 mg capsule Take 600 mg by mouth two (2) times a day.  MULTIVIT-MINS NO.7/FOLIC ACID (V-C FORTE PO) Take 1 Cap by mouth daily.  OLANZapine (ZYPREXA) 15 mg tablet Take 15 mg by mouth nightly.  sAXagliptin (ONGLYZA) 2.5 mg tablet Take 2.5 mg by mouth daily.  topiramate (TOPAMAX) 100 mg tablet Take 100 mg by mouth two (2) times a day.  traMADol (ULTRAM) 50 mg tablet Take 50 mg by mouth three (3) times daily as needed for Pain. PAST MEDICAL HISTORY:  Past Medical History:   Diagnosis Date    Diabetes ([facility] [address]     Hypertension     Psychiatric disorder    History reviewed. No pertinent surgical history. SOCIAL HISTORY: lives alone; 6 children, but limited contact; unemployed  Social History     Social History    Marital status: SINGLE     Spouse name: N/A    Number of children: N/A    Years of education: N/A     Occupational History    Not on file. Social History Main Topics    Smoking status: Current Some Day Smoker    Smokeless tobacco: Not on file    Alcohol use No    Drug use: No    Sexual activity: Not on file     Other Topics Concern    Not on file     Social History Narrative    No narrative on file      FAMILY HISTORY: History reviewed. No pertinent family history. History reviewed. No pertinent family history. REVIEW OF SYSTEMS:   Negative except (+)depressed, suicidal, AH  Pertinent items are noted in the History of Present Illness.  All other Systems reviewed and are considered negative. MENTAL STATUS EXAM & VITALS         MENTAL STATUS EXAM (MSE):    MSE FINDINGS ARE WITHIN NORMAL LIMITS (WNL) UNLESS OTHERWISE STATED BELOW. ( ALL OF THE BELOW CATEGORIES OF THE MSE HAVE BEEN REVIEWED (reviewed 7/7/2017) AND UPDATED AS DEEMED APPROPRIATE )  General Presentation age appropriate and casually dressed, cooperative   Orientation oriented to time, place and person   Vital Signs  See below (reviewed 7/7/2017); Vital Signs (BP, Pulse, & Temp) are within normal limits if not listed below.    Gait and Station Stable/steady, no ataxia   Musculoskeletal System No extrapyramidal symptoms (EPS); no abnormal muscular movements or Tardive Dyskinesia (TD); muscle strength and tone are within normal limits   Language No aphasia or dysarthria   Speech:  hypoverbal and soft   Thought Processes Illogical; slow rate of thoughts; poor abstract reasoning/computation   Thought Associations blocked  and circumstantial   Thought Content auditory hallucinations   Suicidal Ideations death wishes with plan to od   Homicidal Ideations none   Mood:  depressed   Affect:  depressed   Memory recent  good   Memory remote:  good   Concentration/Attention:  wnl   Fund of Knowledge wnl   Insight:  limited   Reliability poor   Judgment:  poor            VITALS:     Patient Vitals for the past 24 hrs:   Temp Pulse Resp BP SpO2   07/07/17 1639 98.6 °F (37 °C) 77 16 127/84 100 %   07/07/17 1103 98.6 °F (37 °C) 72 16 132/89 100 %   07/07/17 0751 98.1 °F (36.7 °C) 77 16 130/88 100 %   07/06/17 2103 98.8 °F (37.1 °C) 76 14 136/81 98 %     Wt Readings from Last 3 Encounters:   07/06/17 62.9 kg (138 lb 9.6 oz)     Temp Readings from Last 3 Encounters:   07/07/17 98.6 °F (37 °C)     BP Readings from Last 3 Encounters:   07/07/17 127/84     Pulse Readings from Last 3 Encounters:   07/07/17 77            DATA       LABORATORY DATA:  Labs Reviewed   CBC WITH AUTOMATED DIFF - Abnormal; Notable for the following:        Result Value    WBC 13.7 (*)     ABS. NEUTROPHILS 8.4 (*)     ABS. LYMPHOCYTES 4.2 (*)     All other components within normal limits   METABOLIC PANEL, COMPREHENSIVE - Abnormal; Notable for the following:     Chloride 110 (*)     Glucose 112 (*)     Creatinine 1.10 (*)     BUN/Creatinine ratio 9 (*)     GFR est non-AA 52 (*)     Alk. phosphatase 147 (*)     A-G Ratio 0.9 (*)     All other components within normal limits   ACETAMINOPHEN - Abnormal; Notable for the following:     Acetaminophen level <2 (*)     All other components within normal limits   URINALYSIS W/MICROSCOPIC - Abnormal; Notable for the following:     Protein TRACE (*)     Urobilinogen 4.0 (*)     All other components within normal limits   ETHYL ALCOHOL   SALICYLATE   DRUG SCREEN, URINE   GLUCOSE, POC     Admission on 07/06/2017   Component Date Value Ref Range Status    WBC 07/06/2017 13.7* 3.6 - 11.0 K/uL Final    RBC 07/06/2017 3.95  3.80 - 5.20 M/uL Final    HGB 07/06/2017 13.0  11.5 - 16.0 g/dL Final    HCT 07/06/2017 38.4  35.0 - 47.0 % Final    MCV 07/06/2017 97.2  80.0 - 99.0 FL Final    MCH 07/06/2017 32.9  26.0 - 34.0 PG Final    MCHC 07/06/2017 33.9  30.0 - 36.5 g/dL Final    RDW 07/06/2017 12.8  11.5 - 14.5 % Final    PLATELET 35/38/0634 269  150 - 400 K/uL Final    NEUTROPHILS 07/06/2017 61  32 - 75 % Final    LYMPHOCYTES 07/06/2017 31  12 - 49 % Final    MONOCYTES 07/06/2017 6  5 - 13 % Final    EOSINOPHILS 07/06/2017 2  0 - 7 % Final    BASOPHILS 07/06/2017 0  0 - 1 % Final    ABS. NEUTROPHILS 07/06/2017 8.4* 1.8 - 8.0 K/UL Final    ABS. LYMPHOCYTES 07/06/2017 4.2* 0.8 - 3.5 K/UL Final    ABS. MONOCYTES 07/06/2017 0.9  0.0 - 1.0 K/UL Final    ABS. EOSINOPHILS 07/06/2017 0.2  0.0 - 0.4 K/UL Final    ABS.  BASOPHILS 07/06/2017 0.0  0.0 - 0.1 K/UL Final    Sodium 07/06/2017 144  136 - 145 mmol/L Final    Potassium 07/06/2017 4.1  3.5 - 5.1 mmol/L Final    Chloride 07/06/2017 110* 97 - 108 mmol/L Final    CO2 07/06/2017 27  21 - 32 mmol/L Final    Anion gap 07/06/2017 7  5 - 15 mmol/L Final    Glucose 07/06/2017 112* 65 - 100 mg/dL Final    BUN 07/06/2017 10  6 - 20 MG/DL Final    Creatinine 07/06/2017 1.10* 0.55 - 1.02 MG/DL Final    BUN/Creatinine ratio 07/06/2017 9* 12 - 20   Final    GFR est AA 07/06/2017 >60  >60 ml/min/1.73m2 Final    GFR est non-AA 07/06/2017 52* >60 ml/min/1.73m2 Final    Calcium 07/06/2017 9.6  8.5 - 10.1 MG/DL Final    Bilirubin, total 07/06/2017 0.3  0.2 - 1.0 MG/DL Final    ALT (SGPT) 07/06/2017 24  12 - 78 U/L Final    AST (SGOT) 07/06/2017 15  15 - 37 U/L Final    Alk.  phosphatase 07/06/2017 147* 45 - 117 U/L Final    Protein, total 07/06/2017 7.6  6.4 - 8.2 g/dL Final    Albumin 07/06/2017 3.7  3.5 - 5.0 g/dL Final    Globulin 07/06/2017 3.9  2.0 - 4.0 g/dL Final    A-G Ratio 07/06/2017 0.9* 1.1 - 2.2   Final    ALCOHOL(ETHYL),SERUM 07/06/2017 <10  <10 MG/DL Final    SALICYLATE 95/52/8926 3.7  2.8 - 20.0 MG/DL Final    Acetaminophen level 07/06/2017 <2* 10 - 30 ug/mL Final    Color 07/06/2017 YELLOW/STRAW    Final    Appearance 07/06/2017 CLEAR  CLEAR   Final    Specific gravity 07/06/2017 1.021  1.003 - 1.030   Final    pH (UA) 07/06/2017 6.0  5.0 - 8.0   Final    Protein 07/06/2017 TRACE* NEG mg/dL Final    Glucose 07/06/2017 NEGATIVE   NEG mg/dL Final    Ketone 07/06/2017 NEGATIVE   NEG mg/dL Final    Bilirubin 07/06/2017 NEGATIVE   NEG   Final    Blood 07/06/2017 NEGATIVE   NEG   Final    Urobilinogen 07/06/2017 4.0* 0.2 - 1.0 EU/dL Final    Nitrites 07/06/2017 NEGATIVE   NEG   Final    Leukocyte Esterase 07/06/2017 NEGATIVE   NEG   Final    WBC 07/06/2017 0-4  0 - 4 /hpf Final    RBC 07/06/2017 0-5  0 - 5 /hpf Final    Epithelial cells 07/06/2017 FEW  FEW /lpf Final    Bacteria 07/06/2017 NEGATIVE   NEG /hpf Final    Hyaline cast 07/06/2017 0-2  0 - 5 /lpf Final    AMPHETAMINES 07/06/2017 NEGATIVE   NEG   Final    BARBITURATES 07/06/2017 NEGATIVE   NEG   Final    BENZODIAZEPINE 07/06/2017 NEGATIVE   NEG   Final    COCAINE 07/06/2017 NEGATIVE   NEG   Final    METHADONE 07/06/2017 NEGATIVE   NEG   Final    OPIATES 07/06/2017 NEGATIVE   NEG   Final    PCP(PHENCYCLIDINE) 07/06/2017 NEGATIVE   NEG   Final    THC (TH-CANNABINOL) 07/06/2017 NEGATIVE   NEG   Final    Drug screen comment 07/06/2017 (NOTE)   Final    Ventricular Rate 07/06/2017 88  BPM Final    Atrial Rate 07/06/2017 88  BPM Final    P-R Interval 07/06/2017 174  ms Final    QRS Duration 07/06/2017 74  ms Final    Q-T Interval 07/06/2017 358  ms Final    QTC Calculation (Bezet) 07/06/2017 433  ms Final    Calculated P Axis 07/06/2017 77  degrees Final    Calculated R Axis 07/06/2017 78  degrees Final    Calculated T Axis 07/06/2017 60  degrees Final    Diagnosis 07/06/2017    Final                    Value:Normal sinus rhythm  When compared with ECG of 25-MAY-2000 10:17,  Vent. rate has increased BY  29 BPM  Confirmed by Grupo Fuentes M.D., Ezra Mckeon ([id number]) on 7/7/2017 8:52:34 AM      Glucose (POC) 07/07/2017 88  65 - 100 mg/dL Final    Performed by 07/07/2017 Lavonne Russell   Final        RADIOLOGY REPORTS:  No results found for this or any previous visit. No results found.            MEDICATIONS       ALL MEDICATIONS  Current Facility-Administered Medications   Medication Dose Route Frequency    OLANZapine (ZyPREXA) tablet 10 mg  10 mg Oral QHS    topiramate (TOPAMAX) tablet 100 mg  100 mg Oral BID    lamoTRIgine (LaMICtal) tablet 25 mg  25 mg Oral DAILY    ziprasidone (GEODON) 20 mg in sterile water (preservative free) 1 mL injection  20 mg IntraMUSCular BID PRN    OLANZapine (ZyPREXA) tablet 5 mg  5 mg Oral Q6H PRN    benztropine (COGENTIN) tablet 2 mg  2 mg Oral BID PRN    benztropine (COGENTIN) injection 2 mg  2 mg IntraMUSCular BID PRN    LORazepam (ATIVAN) injection 2 mg  2 mg IntraMUSCular Q4H PRN    LORazepam (ATIVAN) tablet 1 mg  1 mg Oral Q4H PRN    zolpidem (AMBIEN) tablet 10 mg  10 mg Oral QHS PRN    acetaminophen (TYLENOL) tablet 650 mg  650 mg Oral Q4H PRN    ibuprofen (MOTRIN) tablet 400 mg  400 mg Oral Q8H PRN    magnesium hydroxide (MILK OF MAGNESIA) 400 mg/5 mL oral suspension 30 mL  30 mL Oral DAILY PRN    nicotine (NICODERM CQ) 21 mg/24 hr patch 1 Patch  1 Patch TransDERmal DAILY PRN      SCHEDULED MEDICATIONS  Current Facility-Administered Medications   Medication Dose Route Frequency    OLANZapine (ZyPREXA) tablet 10 mg  10 mg Oral QHS    topiramate (TOPAMAX) tablet 100 mg  100 mg Oral BID    lamoTRIgine (LaMICtal) tablet 25 mg  25 mg Oral DAILY                ASSESSMENT & PLAN        The patient Shahida Tillman is a 47 y.o.  female who presents at this time for treatment of the following diagnoses:  Patient Active Hospital Problem List:     Schizoaffective disorder ([facility] [address] (7/6/2017)    Assessment: severe mood disorder and associated psychosis    Plan: Agree with inpatient admission for further stabilization, safety monitoring and medication management  Medication management- resume home meds since patient reports noncompliance  Check lithium level, prior to restarting lithium  Provided supportive psychotherapy  Gather collateral information from family          In summary, Shahida Tillman presents with a severe exacerbation of the principal diagnosis, Schizoaffective Disorder. While on the unit Parul Gotti will be provided with individual, milieu, occupational, group, and substance abuse therapies to address target symptoms as deemed appropriate for the individual patient. I will continue to monitor blood levels ( lithium---a drug with a narrow therapeutic index= NTI) and associated labs for drug therapy implemented that require intense monitoring for toxicity as deemed appropriate base on current medication side effects and pharmacodynamically determined drug 1/2 lives.        I agree with decision to admit patient. I have spoken to [facility] psychiatric /ED staff regarding the nature of patients's admission at this time. A coordinated, multidisplinary treatment team (includes the nurse, unit pharmcist,  and writer) round was conducted for this initial evaluation with the patient present. The following regarding medications was addressed during rounds with patient:   the risks and benefits of the proposed medication. The patient was given the opportunity to ask questions. Informed consent given to the use of the above medications. I will continue to adjust psychiatric and non-psychiatric medications (see above \"medication\" section and orders section for details) as deemed appropriate & based upon diagnoses and response to treatment. I have reviewed admission (and previous/old) labs and medical tests in the EHR and or transferring hospital documents. I will continue to order blood tests/labs and diagnostic tests as deemed appropriate and review results as they become available (see orders for details). I have reviewed old psychiatric and medical records available in the EHR. I Will order additional psychiatric records from other institutions to further elucidate the nature of patient's psychopathology and review once available. I will gather additional collateral information from friends, family and o/p treatment team to further elucidate the nature of patient's psychopathology and baselline level of psychiatric functioning.         ESTIMATED LENGTH OF STAY:   3-5 days       STRENGTHS:  Access to housing/residential stability, Interpersonal/supportive relationships (family, friends, peers) and Knowledge of medications                      SIGNED:    Mal Smith MD  7/7/2017[RG1.1]                  Revision History       User Key Date/Time User Provider Type Action    > RG1.1 07/07/17 Lise Adams MD Physician Sign              Admission Diagnosis: Bipolar 1 disorder ([facility] [address]    * No surgery found *    Results for orders placed or performed during the hospital encounter of 07/06/17   CBC WITH AUTOMATED DIFF   Result Value Ref Range    WBC 13.7 (H) 3.6 - 11.0 K/uL    RBC 3.95 3.80 - 5.20 M/uL    HGB 13.0 11.5 - 16.0 g/dL    HCT 38.4 35.0 - 47.0 %    MCV 97.2 80.0 - 99.0 FL    MCH 32.9 26.0 - 34.0 PG    MCHC 33.9 30.0 - 36.5 g/dL    RDW 12.8 11.5 - 14.5 %    PLATELET 345 198 - 143 K/uL    NEUTROPHILS 61 32 - 75 %    LYMPHOCYTES 31 12 - 49 %    MONOCYTES 6 5 - 13 %    EOSINOPHILS 2 0 - 7 %    BASOPHILS 0 0 - 1 %    ABS. NEUTROPHILS 8.4 (H) 1.8 - 8.0 K/UL    ABS. LYMPHOCYTES 4.2 (H) 0.8 - 3.5 K/UL    ABS. MONOCYTES 0.9 0.0 - 1.0 K/UL    ABS. EOSINOPHILS 0.2 0.0 - 0.4 K/UL    ABS. BASOPHILS 0.0 0.0 - 0.1 K/UL   METABOLIC PANEL, COMPREHENSIVE   Result Value Ref Range    Sodium 144 136 - 145 mmol/L    Potassium 4.1 3.5 - 5.1 mmol/L    Chloride 110 (H) 97 - 108 mmol/L    CO2 27 21 - 32 mmol/L    Anion gap 7 5 - 15 mmol/L    Glucose 112 (H) 65 - 100 mg/dL    BUN 10 6 - 20 MG/DL    Creatinine 1.10 (H) 0.55 - 1.02 MG/DL    BUN/Creatinine ratio 9 (L) 12 - 20      GFR est AA >60 >60 ml/min/1.73m2    GFR est non-AA 52 (L) >60 ml/min/1.73m2    Calcium 9.6 8.5 - 10.1 MG/DL    Bilirubin, total 0.3 0.2 - 1.0 MG/DL    ALT (SGPT) 24 12 - 78 U/L    AST (SGOT) 15 15 - 37 U/L    Alk.  phosphatase 147 (H) 45 - 117 U/L    Protein, total 7.6 6.4 - 8.2 g/dL    Albumin 3.7 3.5 - 5.0 g/dL    Globulin 3.9 2.0 - 4.0 g/dL    A-G Ratio 0.9 (L) 1.1 - 2.2     ETHYL ALCOHOL   Result Value Ref Range    ALCOHOL(ETHYL),SERUM <26 <12 MG/DL   SALICYLATE   Result Value Ref Range    SALICYLATE 3.7 2.8 - 23.4 MG/DL   ACETAMINOPHEN   Result Value Ref Range    Acetaminophen level <2 (L) 10 - 30 ug/mL   URINALYSIS W/MICROSCOPIC   Result Value Ref Range    Color YELLOW/STRAW      Appearance CLEAR CLEAR      Specific gravity 1.021 1.003 - 1.030      pH (UA) 6.0 5.0 - 8.0      Protein TRACE (A) NEG mg/dL    Glucose NEGATIVE  NEG mg/dL    Ketone NEGATIVE  NEG mg/dL    Bilirubin NEGATIVE  NEG      Blood NEGATIVE  NEG      Urobilinogen 4.0 (H) 0.2 - 1.0 EU/dL    Nitrites NEGATIVE  NEG      Leukocyte Esterase NEGATIVE  NEG      WBC 0-4 0 - 4 /hpf    RBC 0-5 0 - 5 /hpf    Epithelial cells FEW FEW /lpf    Bacteria NEGATIVE  NEG /hpf    Hyaline cast 0-2 0 - 5 /lpf   DRUG SCREEN, URINE   Result Value Ref Range    AMPHETAMINES NEGATIVE  NEG      BARBITURATES NEGATIVE  NEG      BENZODIAZEPINE NEGATIVE  NEG      COCAINE NEGATIVE  NEG      METHADONE NEGATIVE  NEG      OPIATES NEGATIVE  NEG      PCP(PHENCYCLIDINE) NEGATIVE  NEG      THC (TH-CANNABINOL) NEGATIVE  NEG      Drug screen comment (NOTE)    LITHIUM   Result Value Ref Range    Lithium level <0.20 (L) 0.60 - 1.20 MMOL/L    Reported dose date: NOT PROVIDED      Reported dose time: NOT PROVIDED      Reported dose: NOT PROVIDED UNITS   HEMOGLOBIN A1C WITH EAG   Result Value Ref Range    Hemoglobin A1c 5.7 4.2 - 6.3 %    Est. average glucose 117 mg/dL   LIPID PANEL   Result Value Ref Range    LIPID PROFILE          Cholesterol, total 224 (H) <200 MG/DL    Triglyceride 100 <150 MG/DL    HDL Cholesterol 54 MG/DL    LDL, calculated 150 (H) 0 - 100 MG/DL    VLDL, calculated 20 MG/DL    CHOL/HDL Ratio 4.1 0 - 5.0     GLUCOSE, POC   Result Value Ref Range    Glucose (POC) 88 65 - 100 mg/dL    Performed by Lashell Vinson    EKG, 12 LEAD, INITIAL   Result Value Ref Range    Ventricular Rate 88 BPM    Atrial Rate 88 BPM    P-R Interval 174 ms    QRS Duration 74 ms    Q-T Interval 358 ms    QTC Calculation (Bezet) 433 ms    Calculated P Axis 77 degrees    Calculated R Axis 78 degrees    Calculated T Axis 60 degrees    Diagnosis       Normal sinus rhythm  When compared with ECG of 25-MAY-2000 10:17,  Vent. rate has increased BY  29 BPM  Confirmed by Arielle Camacho M.D., [place] Man ([id number]) on 7/7/2017 8:52:34 AM         Immunizations administered during this encounter:    There is no immunization history on file for this patient. Screening for Metabolic Disorders for Patients on Antipsychotic Medications    Estimated Body Mass Index  Estimated body mass index is 21.07 kg/(m^2) as calculated from the following:    Height as of this encounter: 5' 8\" (1.727 m). Weight as of this encounter: 62.9 kg (138 lb 9.6 oz). Vital Signs/Blood Pressure  Visit Vitals    /90    Pulse 96    Temp 98.1 °F (36.7 °C)    Resp 16    Ht 5' 8\" (1.727 m)    Wt 62.9 kg (138 lb 9.6 oz)    SpO2 96%    Breastfeeding No    BMI 21.07 kg/m2       Blood Glucose/Hemoglobin A1c  Lab Results   Component Value Date/Time    Glucose 112 2017 06:12 PM    Glucose (POC) 88 2017 07:27 AM        Lab Results   Component Value Date/Time    Hemoglobin A1c 5.7 2017 09:10 AM        Lipid Panel  Lab Results   Component Value Date/Time    Cholesterol, total 224 2017 09:10 AM    HDL Cholesterol 54 2017 09:10 AM    LDL, calculated 150 2017 09:10 AM    Triglyceride 100 2017 09:10 AM    CHOL/HDL Ratio 4.1 2017 09:10 AM            Discharge Diagnosis: Schizoaffective disorder - Bipolar type     Discharge Plan:  She will return to her apartment. NAME:Parul Guillaume  : 1965  MRN: [id number]    The patient Wilfrido Bruner exhibits the ability to control behavior in a less restrictive environment. Patient's level of functioning is improving. No assaultive/destructive behavior has been observed for the past 24 hours. No suicidal/homicidal threat or behavior has been observed for the past 24 hours. There is no evidence of serious medication side effects. Patient has not been in physical or protective restraints for at least the past 24 hours. If weapons involved, how are they secured? No weapons involved    Is patient aware of and in agreement with discharge plan?  Patient is aware of discharge and is in agreement     Arrangements for medication:  Prescriptions given to patient. Copy of discharge instructions to  provider?:  Fax to [facility] at [address] [facility] [phone]     Arrangements for transportation home: patient to arrange     Keep all follow up appointments as scheduled, continue to take prescribed medications per physician instructions. Mental health crisis number:  [phone] or your local mental health crisis line number at [phone]         Discharge Medication List and Instructions:   Discharge Medication List as of 7/10/2017  3:13 PM      CONTINUE these medications which have CHANGED    Details   lamoTRIgine (LAMICTAL) 25 mg tablet Take 1 Tab by mouth daily. Indications: DEPRESSION ASSOCIATED WITH BIPOLAR DISORDER, Print, Disp-15 Tab, R-1      lisinopril (PRINIVIL, ZESTRIL) 40 mg tablet Take 1 Tab by mouth daily. Indications: hypertension, Print, Disp-15 Tab, R-1      OLANZapine (ZYPREXA) 10 mg tablet Take 1 Tab by mouth nightly. Indications: DEPRESSION ASSOCIATED WITH BIPOLAR DISORDER, ADJUNCT, Print, Disp-15 Tab, R-1      topiramate (TOPAMAX) 100 mg tablet Take 1 Tab by mouth two (2) times a day. Indications: mood lability, Print, Disp-30 Tab, R-1         CONTINUE these medications which have NOT CHANGED    Details   MULTIVIT-MINS NO.7/FOLIC ACID (V-C FORTE PO) Take 1 Cap by mouth daily. , Historical Med      sAXagliptin (ONGLYZA) 2.5 mg tablet Take 2.5 mg by mouth daily. , Historical Med         STOP taking these medications       lithium carbonate 300 mg capsule Comments:   Reason for Stopping:         traMADol (ULTRAM) 50 mg tablet Comments:   Reason for Stopping:               Unresulted Labs     None        To obtain results of studies pending at discharge, please contact [phone]    Follow-up Information     Follow up With Details Comments Celine Enriquez MD Schedule an appointment as soon as possible for a visit  [address] [place] [address]  [phone]      Joan Jones NP On 7/25/2017 you have a 2:45 appointment  [phone]          Advanced Directive:   Does the patient have an appointed surrogate decision maker? No  Does the patient have a Medical Advance Directive? No  Does the patient have a Psychiatric Advance Directive? No  If the patient does not have a surrogate or Medical Advance Directive AND Psychiatric Advance Directive, the patient was offered information on these advance directives Patient declined to complete    Patient Instructions: Please continue all medications until otherwise directed by physician. Tobacco Cessation Discharge Plan:   Is the patient a smoker and needs referral for smoking cessation? yes  Patient referred to the following for smoking cessation with an appointment? Refused     Patient was offered medication to assist with smoking cessation at discharge? Refused  Was education for smoking cessation added to the discharge instructions? Yes    Alcohol/Substance Abuse Discharge Plan:   Does the patient have a history of substance/alcohol abuse and requires a referral for treatment? No  Patient referred to the following for substance/alcohol abuse treatment with an appointment? Not applicable  Patient was offered medication to assist with alcohol cessation at discharge? Not applicable  Was education for substance/alcohol abuse added to discharge instructions? Not applicable    Patient discharged to Home; discussed with patient/caregiver and provided to the patient/caregiver either in hard copy or electronically.

## 2017-07-10 NOTE — PROGRESS NOTES
Problem: Depressed Mood (Adult/Pediatric)  Goal: *STG: Attends activities and groups  Outcome: Progressing Towards Goal  Group attendance and participation has improved. Goal: *STG: Remains safe in hospital  Outcome: Progressing Towards Goal  Denies SI/HI stated 'my main problem is that my apt building is selling drugs and I`m afraid there. \"Patient expressed \"I will tell my  to try to get me moved to another apt in the building because the main dealer lives next door. \"Patient provided with Crime Stopper phone number.

## 2017-07-10 NOTE — BH NOTES
GROUP THERAPY PROGRESS NOTE    Katty Head is participating in [place]. Group time: 10 minutes    Personal goal for participation: To get my hair done. Goal orientation: personal    Group therapy participation: active    Therapeutic interventions reviewed and discussed: Writer listened attentively and explained unit rules. Impression of participation: Patient participated actively.

## 2017-07-10 NOTE — BH NOTES
GROUP THERAPY PROGRESS NOTE    Parul Quezada did not participate in the Acute Unit's Process Group, with a focus identifying feelings and planning for the rest of the day.

## 2017-07-10 NOTE — INTERDISCIPLINARY ROUNDS
Behavioral Health Interdisciplinary Rounds     Patient Name: Glenys Pantoja  Age: 47 y.o. Room/Bed:  726/02  Primary Diagnosis: [facility]ffective disorder, [facility]   Admission Status: Voluntary     Readmission within 30 days: no  Power of  in place: no  Patient requires a blocked bed: no          Reason for blocked bed:     VTE Prophylaxis: No  Mobility needs/Fall risk: no    Nutritional Plan: no  Consults:          Labs/Testing due today?: no    Sleep hours:  6.30      Participation in Care/Groups:  yes  Medication Compliant?: Yes  PRNS (last 24 hours): None    Restraints (last 24 hours):  no  Substance Abuse:  no  CIWA (range last 24 hours):  COWS (range last 24 hours):   Alcohol screening (AUDIT) completed -  AUDIT Score: 0  If applicable, date SBIRT discussed in treatment team AND documented:   Tobacco - patient is a smoker:    Date tobacco education completed by RN:   24 hour chart check complete: yes     Patient goal(s) for today:   Treatment team focus/goals: Plan for discharge today.     LOS:  4  Expected LOS: plan for discharge  today   Psychiatric [address] -  no   Name of Decision maker if patient has Psychiatric Care Directive   Patient was offered information   Financial concerns/prescription coverage:  yes  Date of last family contact:       Family requesting physician contact today:  no  Discharge plan:        Outpatient provider(s):     Participating treatment team members: Jodean Hilding, Doneta Basil Dr. Lattie Halsted - Antonia Red, RN

## 2017-07-19 NOTE — DISCHARGE SUMMARY
PSYCHIATRIC DISCHARGE SUMMARY         IDENTIFICATION:    Patient Name  Daria Rizzo   Date of Birth 1965   [facility] [id number]   Medical Record Number  [id number]      Age  47 y.o. PCP Darrius Oliver MD   Admit date:  7/6/2017    Discharge date: 7/10/17   Room Number  726/02  @ [facility]   Date of Service  7/10/17            TYPE OF DISCHARGE: REGULAR               CONDITION AT DISCHARGE: improved       PROVISIONAL & DISCHARGE DIAGNOSES:    Problem List  Never Reviewed          Codes Class    * (Principal)Schizoaffective disorder, bipolar type ([facility] ICD-10-CM: F25.0  ICD-9-CM: 295.70         Vision impairment ICD-10-CM: H54.7  ICD-9-CM: 369.9         Essential hypertension ICD-10-CM: I10  ICD-9-CM: 401.9               Active Hospital Problems    *Schizoaffective disorder, [facility]      Vision impairment      Essential hypertension        DISCHARGE DIAGNOSIS:   Axis I:  SEE ABOVE  Axis II: SEE ABOVE  Axis III: SEE ABOVE  Axis IV:  Limited support system;lack of structure  Axis V:  35 on admission, 55 on discharge 65(baseline)       CC & HISTORY OF PRESENT ILLNESS:  The patient, Kimberly Johnson, is a 47 y.o. BLACK OR  female with severe vision impairment,  with a past psychiatric history significant for Schizoaffective Disorder, who presents at this time with complaints of (and/or evidence of) the following emotional symptoms: suicidal thoughts/threats and psychotic behavior. The patient was a very poor historian, but ER note reflects that the patient reported to her  feeling increasingly depressed, hearing voices and planning to kill herself via OD. She only reported a neighbor calling her names as a stressor and high drug activity in her apartment building.        SOCIAL HISTORY:    Social History     Social History    Marital status: SINGLE     Spouse name: N/A    Number of children: N/A    Years of education: N/A     Occupational History  Not on file. Social History Main Topics    Smoking status: Current Some Day Smoker    Smokeless tobacco: Not on file    Alcohol use No    Drug use: No    Sexual activity: Not on file     Other Topics Concern    Not on file     Social History Narrative    No narrative on file      FAMILY HISTORY:   History reviewed. No pertinent family history. HOSPITALIZATION COURSE:    Flavio Maki was admitted to the inpatient psychiatric unit [facility] for acute psychiatric stabilization in regards to symptomatology as described in the HPI above. The differential diagnosis at time of admission included: MDD vs. Bipolar disorder vs. Schizoaffective disorder. While on the unit Flavio Maki was involved in individual, group, occupational and milieu therapy. Psychiatric medications were adjusted during this hospitalization including resuming Lamictal, Zyprexa; lithium was discontinued due to noncompliance issues . Flavio Maki demonstrated a  progressive improvement in overall condition. Much of patient's depression appeared to be related to situational stressors  and psychological factors. Please see individual progress notes for more specific details regarding patient's hospitalization course. At time of discharge, Flavio Maki is without significant problems of depression, psychosis and noy. Patient free of suicidal and homicidal ideations (appears to be at very low risk of suicide or homicide) and reports many positive predictive factors in terms of not attempting suicide or homicide. Overall presentation at time of discharge is most consistent with the diagnosis of  Schizoaffective Disorder-Bipolar type, depressed with psychosis. Patient with request for discharge today. Patient has maximized benefit to be derived from acute inpatient psychiatric treatment.   All members of the treatment team concur with each other in regards to plans for discharge today as per patient's request.  Patient and family are aware and in agreement with discharge and discharge plan. LABS AND IMAGING:    Labs Reviewed   CBC WITH AUTOMATED DIFF - Abnormal; Notable for the following:        Result Value    WBC 13.7 (*)     ABS. NEUTROPHILS 8.4 (*)     ABS. LYMPHOCYTES 4.2 (*)     All other components within normal limits   METABOLIC PANEL, COMPREHENSIVE - Abnormal; Notable for the following:     Chloride 110 (*)     Glucose 112 (*)     Creatinine 1.10 (*)     BUN/Creatinine ratio 9 (*)     GFR est non-AA 52 (*)     Alk.  phosphatase 147 (*)     A-G Ratio 0.9 (*)     All other components within normal limits   ACETAMINOPHEN - Abnormal; Notable for the following:     Acetaminophen level <2 (*)     All other components within normal limits   URINALYSIS W/MICROSCOPIC - Abnormal; Notable for the following:     Protein TRACE (*)     Urobilinogen 4.0 (*)     All other components within normal limits   LITHIUM - Abnormal; Notable for the following:     Lithium level <0.20 (*)     All other components within normal limits   LIPID PANEL - Abnormal; Notable for the following:     Cholesterol, total 224 (*)     LDL, calculated 150 (*)     All other components within normal limits   ETHYL ALCOHOL   SALICYLATE   DRUG SCREEN, URINE   HEMOGLOBIN A1C WITH EAG   GLUCOSE, POC     No results found for: DS35, PHEN, PHENO, PHENT, DILF, DS39, PHENY, PTN, VALF2, VALAC, VALP, VALPR, DS6, CRBAM, CRBAMP, CARB2, XCRBAM  Admission on 07/06/2017, Discharged on 07/10/2017   Component Date Value Ref Range Status    WBC 07/06/2017 13.7* 3.6 - 11.0 K/uL Final    RBC 07/06/2017 3.95  3.80 - 5.20 M/uL Final    HGB 07/06/2017 13.0  11.5 - 16.0 g/dL Final    HCT 07/06/2017 38.4  35.0 - 47.0 % Final    MCV 07/06/2017 97.2  80.0 - 99.0 FL Final    MCH 07/06/2017 32.9  26.0 - 34.0 PG Final    MCHC 07/06/2017 33.9  30.0 - 36.5 g/dL Final    RDW 07/06/2017 12.8  11.5 - 14.5 % Final    PLATELET 97/76/9720 740  150 - 400 K/uL Final    NEUTROPHILS 07/06/2017 61  32 - 75 % Final    LYMPHOCYTES 07/06/2017 31  12 - 49 % Final    MONOCYTES 07/06/2017 6  5 - 13 % Final    EOSINOPHILS 07/06/2017 2  0 - 7 % Final    BASOPHILS 07/06/2017 0  0 - 1 % Final    ABS. NEUTROPHILS 07/06/2017 8.4* 1.8 - 8.0 K/UL Final    ABS. LYMPHOCYTES 07/06/2017 4.2* 0.8 - 3.5 K/UL Final    ABS. MONOCYTES 07/06/2017 0.9  0.0 - 1.0 K/UL Final    ABS. EOSINOPHILS 07/06/2017 0.2  0.0 - 0.4 K/UL Final    ABS. BASOPHILS 07/06/2017 0.0  0.0 - 0.1 K/UL Final    Sodium 07/06/2017 144  136 - 145 mmol/L Final    Potassium 07/06/2017 4.1  3.5 - 5.1 mmol/L Final    Chloride 07/06/2017 110* 97 - 108 mmol/L Final    CO2 07/06/2017 27  21 - 32 mmol/L Final    Anion gap 07/06/2017 7  5 - 15 mmol/L Final    Glucose 07/06/2017 112* 65 - 100 mg/dL Final    BUN 07/06/2017 10  6 - 20 MG/DL Final    Creatinine 07/06/2017 1.10* 0.55 - 1.02 MG/DL Final    BUN/Creatinine ratio 07/06/2017 9* 12 - 20   Final    GFR est AA 07/06/2017 >60  >60 ml/min/1.73m2 Final    GFR est non-AA 07/06/2017 52* >60 ml/min/1.73m2 Final    Calcium 07/06/2017 9.6  8.5 - 10.1 MG/DL Final    Bilirubin, total 07/06/2017 0.3  0.2 - 1.0 MG/DL Final    ALT (SGPT) 07/06/2017 24  12 - 78 U/L Final    AST (SGOT) 07/06/2017 15  15 - 37 U/L Final    Alk.  phosphatase 07/06/2017 147* 45 - 117 U/L Final    Protein, total 07/06/2017 7.6  6.4 - 8.2 g/dL Final    Albumin 07/06/2017 3.7  3.5 - 5.0 g/dL Final    Globulin 07/06/2017 3.9  2.0 - 4.0 g/dL Final    A-G Ratio 07/06/2017 0.9* 1.1 - 2.2   Final    ALCOHOL(ETHYL),SERUM 07/06/2017 <10  <10 MG/DL Final    SALICYLATE 37/85/5713 3.7  2.8 - 20.0 MG/DL Final    Acetaminophen level 07/06/2017 <2* 10 - 30 ug/mL Final    Color 07/06/2017 YELLOW/STRAW    Final    Appearance 07/06/2017 CLEAR  CLEAR   Final    Specific gravity 07/06/2017 1.021  1.003 - 1.030   Final    pH (UA) 07/06/2017 6.0  5.0 - 8.0   Final    Protein 07/06/2017 TRACE* NEG mg/dL Final    Glucose 07/06/2017 NEGATIVE   NEG mg/dL Final    Ketone 07/06/2017 NEGATIVE   NEG mg/dL Final    Bilirubin 07/06/2017 NEGATIVE   NEG   Final    Blood 07/06/2017 NEGATIVE   NEG   Final    Urobilinogen 07/06/2017 4.0* 0.2 - 1.0 EU/dL Final    Nitrites 07/06/2017 NEGATIVE   NEG   Final    Leukocyte Esterase 07/06/2017 NEGATIVE   NEG   Final    WBC 07/06/2017 0-4  0 - 4 /hpf Final    RBC 07/06/2017 0-5  0 - 5 /hpf Final    Epithelial cells 07/06/2017 FEW  FEW /lpf Final    Bacteria 07/06/2017 NEGATIVE   NEG /hpf Final    Hyaline cast 07/06/2017 0-2  0 - 5 /lpf Final    AMPHETAMINES 07/06/2017 NEGATIVE   NEG   Final    BARBITURATES 07/06/2017 NEGATIVE   NEG   Final    BENZODIAZEPINE 07/06/2017 NEGATIVE   NEG   Final    COCAINE 07/06/2017 NEGATIVE   NEG   Final    METHADONE 07/06/2017 NEGATIVE   NEG   Final    OPIATES 07/06/2017 NEGATIVE   NEG   Final    PCP(PHENCYCLIDINE) 07/06/2017 NEGATIVE   NEG   Final    THC (TH-CANNABINOL) 07/06/2017 NEGATIVE   NEG   Final    Drug screen comment 07/06/2017 (NOTE)   Final    Ventricular Rate 07/06/2017 88  BPM Final    Atrial Rate 07/06/2017 88  BPM Final    P-R Interval 07/06/2017 174  ms Final    QRS Duration 07/06/2017 74  ms Final    Q-T Interval 07/06/2017 358  ms Final    QTC Calculation (Bezet) 07/06/2017 433  ms Final    Calculated P Axis 07/06/2017 77  degrees Final    Calculated R Axis 07/06/2017 78  degrees Final    Calculated T Axis 07/06/2017 60  degrees Final    Diagnosis 07/06/2017    Final                    Value:Normal sinus rhythm  When compared with ECG of 25-MAY-2000 10:17,  Vent.  rate has increased BY  29 BPM  Confirmed by Farzana Crooks M.D., [place] Single ([id number]) on 7/7/2017 8:52:34 AM      Glucose (POC) 07/07/2017 88  65 - 100 mg/dL Final    Performed by 07/07/2017 Viola Balzarine   Final    Lithium level 07/08/2017 <0.20* 0.60 - 1.20 MMOL/L Final    Reported dose date: 07/08/2017 NOT PROVIDED    Final    Reported dose time: 07/08/2017 NOT PROVIDED    Final    Reported dose: 07/08/2017 NOT PROVIDED  UNITS Final    Hemoglobin A1c 07/08/2017 5.7  4.2 - 6.3 % Final    Est. average glucose 07/08/2017 117  mg/dL Final    LIPID PROFILE 07/08/2017        Final    Cholesterol, total 07/08/2017 224* <200 MG/DL Final    Triglyceride 07/08/2017 100  <150 MG/DL Final    HDL Cholesterol 07/08/2017 54  MG/DL Final    LDL, calculated 07/08/2017 150* 0 - 100 MG/DL Final    VLDL, calculated 07/08/2017 20  MG/DL Final    CHOL/HDL Ratio 07/08/2017 4.1  0 - 5.0   Final     No results found. DISPOSITION:    Home. Patient to f/u with psychiatric and psychotherapy appointments. Patient is to f/u with internist as directed. Patient warned of risk of dangerous rash with lamictal and advised to seek out her pcp or emergency care if she develops a rash               FOLLOW-UP CARE:    Activity as tolerated  Regular Diet  Wound Care: none needed. Follow-up Information     Follow up With Details Comments Contact Nu Hein MD Schedule an appointment as soon as possible for a visit  [address] [facility] [address]      Thi Acuña NP On 7/25/2017 you have a 2:45 appointment  [phone]                 PROGNOSIS:   Good---- based on nature of patient's pathology/ies and treatment compliance issues. Prognosis is greatly dependent upon patient's ability to  Follow up with psychiatric/psychotherapy appointments as well as to comply with psychiatric medications as prescribed. DISCHARGE MEDICATIONS:   Informed consent given for the use of following psychotropic medications:  Discharge Medication List as of 7/10/2017  3:13 PM      CONTINUE these medications which have CHANGED    Details   lamoTRIgine (LAMICTAL) 25 mg tablet Take 1 Tab by mouth daily.  Indications: DEPRESSION ASSOCIATED WITH BIPOLAR DISORDER, Print, Disp-15 Tab, R-1      lisinopril (PRINIVIL, ZESTRIL) 40 mg tablet Take 1 Tab by mouth daily. Indications: hypertension, Print, Disp-15 Tab, R-1      OLANZapine (ZYPREXA) 10 mg tablet Take 1 Tab by mouth nightly. Indications: DEPRESSION ASSOCIATED WITH BIPOLAR DISORDER, ADJUNCT, Print, Disp-15 Tab, R-1      topiramate (TOPAMAX) 100 mg tablet Take 1 Tab by mouth two (2) times a day. Indications: mood lability, Print, Disp-30 Tab, R-1         CONTINUE these medications which have NOT CHANGED    Details   MULTIVIT-MINS NO.7/FOLIC ACID (V-C FORTE PO) Take 1 Cap by mouth daily. , Historical Med      sAXagliptin (ONGLYZA) 2.5 mg tablet Take 2.5 mg by mouth daily. , Historical Med         STOP taking these medications       lithium carbonate 300 mg capsule Comments:   Reason for Stopping:         traMADol (ULTRAM) 50 mg tablet Comments:   Reason for Stopping:                      A coordinated, multidisplinary treatment team round was conducted with Clarissa Brasher---this is done daily here at [facility]. This team consists of the nurse, psychiatric unit pharmcist,  and writer. I have spent greater than 35 minutes on discharge work.     Signed:  Mal Smith MD  7/10/17

## 2017-10-04 ENCOUNTER — HOSPITAL ENCOUNTER (EMERGENCY)
Age: 52
Discharge: HOME | End: 2017-10-04
Attending: EMERGENCY MEDICINE
Payer: MEDICAID

## 2017-10-04 VITALS
TEMPERATURE: 99.4 F | SYSTOLIC BLOOD PRESSURE: 143 MMHG | HEIGHT: 60 IN | RESPIRATION RATE: 18 BRPM | WEIGHT: 155 LBS | DIASTOLIC BLOOD PRESSURE: 90 MMHG | HEART RATE: 98 BPM | BODY MASS INDEX: 30.43 KG/M2 | OXYGEN SATURATION: 98 %

## 2017-10-04 DIAGNOSIS — N39.0 URINARY TRACT INFECTION WITHOUT HEMATURIA, SITE UNSPECIFIED: Primary | ICD-10-CM

## 2017-10-04 DIAGNOSIS — A59.01 TRICHOMONAS VAGINALIS (TV) INFECTION: ICD-10-CM

## 2017-10-04 LAB
APPEARANCE UR: ABNORMAL
BACTERIA URNS QL MICRO: NEGATIVE /HPF
BILIRUB UR QL: NEGATIVE
CLUE CELLS VAG QL WET PREP: NORMAL
COLOR UR: ABNORMAL
EPITH CASTS URNS QL MICRO: ABNORMAL /LPF
GLUCOSE UR STRIP.AUTO-MCNC: NEGATIVE MG/DL
HCG UR QL: NEGATIVE
HGB UR QL STRIP: NEGATIVE
KETONES UR QL STRIP.AUTO: NEGATIVE MG/DL
KOH PREP SPEC: NORMAL
LEUKOCYTE ESTERASE UR QL STRIP.AUTO: ABNORMAL
MUCOUS THREADS URNS QL MICRO: ABNORMAL /LPF
NITRITE UR QL STRIP.AUTO: NEGATIVE
PH UR STRIP: 5.5 [PH] (ref 5–8)
PROT UR STRIP-MCNC: NEGATIVE MG/DL
RBC #/AREA URNS HPF: ABNORMAL /HPF (ref 0–5)
SERVICE CMNT-IMP: NORMAL
SP GR UR REFRACTOMETRY: 1.01 (ref 1–1.03)
T VAGINALIS VAG QL WET PREP: NORMAL
UA: UC IF INDICATED,UAUC: ABNORMAL
UROBILINOGEN UR QL STRIP.AUTO: 1 EU/DL (ref 0.2–1)
WBC URNS QL MICRO: ABNORMAL /HPF (ref 0–4)

## 2017-10-04 PROCEDURE — 87210 SMEAR WET MOUNT SALINE/INK: CPT | Performed by: PHYSICIAN ASSISTANT

## 2017-10-04 PROCEDURE — 81001 URINALYSIS AUTO W/SCOPE: CPT | Performed by: PHYSICIAN ASSISTANT

## 2017-10-04 PROCEDURE — 87086 URINE CULTURE/COLONY COUNT: CPT | Performed by: PHYSICIAN ASSISTANT

## 2017-10-04 PROCEDURE — 87491 CHLMYD TRACH DNA AMP PROBE: CPT | Performed by: PHYSICIAN ASSISTANT

## 2017-10-04 PROCEDURE — 81025 URINE PREGNANCY TEST: CPT

## 2017-10-04 PROCEDURE — 99284 EMERGENCY DEPT VISIT MOD MDM: CPT

## 2017-10-04 RX ORDER — AZITHROMYCIN 250 MG/1
1000 TABLET, FILM COATED ORAL
Status: DISCONTINUED | OUTPATIENT
Start: 2017-10-04 | End: 2017-10-04 | Stop reason: HOSPADM

## 2017-10-04 RX ORDER — CEPHALEXIN 500 MG/1
500 CAPSULE ORAL 2 TIMES DAILY
Qty: 15 CAP | Refills: 0 | Status: ON HOLD | OUTPATIENT
Start: 2017-10-04 | End: 2018-07-19

## 2017-10-04 RX ORDER — ONDANSETRON 4 MG/1
4 TABLET, ORALLY DISINTEGRATING ORAL
Status: DISCONTINUED | OUTPATIENT
Start: 2017-10-04 | End: 2017-10-04 | Stop reason: HOSPADM

## 2017-10-04 RX ORDER — METRONIDAZOLE 500 MG/1
500 TABLET ORAL 2 TIMES DAILY
Qty: 14 TAB | Refills: 0 | Status: SHIPPED | OUTPATIENT
Start: 2017-10-04 | End: 2017-10-11

## 2017-10-04 NOTE — ED NOTES
Emergency Department Nursing Plan of Care       The Nursing Plan of Care is developed from the Nursing assessment and Emergency Department Attending provider initial evaluation. The plan of care may be reviewed in the ED Provider note.     The Plan of Care was developed with the following considerations:   Patient / Family readiness to learn indicated by:verbalized understanding  Persons(s) to be included in education: patient  Barriers to Learning/Limitations:No    Signed     Quentin Krause RN    10/4/2017   2:55 PM

## 2017-10-04 NOTE — ED NOTES
Pt DC by provider. Pt accepted plan of care and left unit steady gait. Patient (s)  given copy of dc instructions and 2 script(s). Patient (s)  verbalized understanding of instructions and script (s). Patient given a current medication reconciliation form and verbalized understanding of their medications. Patient (s) verbalized understanding of the importance of discussing medications with  his or her physician or clinic they will be following up with. Patient alert and oriented and in no acute distress. Patient discharged home ambulatory with self.

## 2017-10-04 NOTE — ED PROVIDER NOTES
Patient is a 47 y.o. female presenting with vaginal discharge. Vaginal Discharge    Pertinent negatives include no fever, no abdominal pain, no nausea, no vomiting, no dysuria and no frequency. To ED with complaints of vaginal discharge for about 5 days. Clear/ yellowish discharge. Some vaginal irritation. No lesions. Some nondescript odor. No new sex partners, but agreeable to testing and treatment. No oral lesions. No other sx. Past Medical History:   Diagnosis Date    Diabetes ([address]     Hypertension     Psychiatric disorder        Past Surgical History:   Procedure Laterality Date    HX GYN       x2    HX OTHER SURGICAL      skin grafting    HX OTHER SURGICAL      oral surgery         History reviewed. No pertinent family history. Social History     Social History    Marital status: SINGLE     Spouse name: N/A    Number of children: N/A    Years of education: N/A     Occupational History    Not on file. Social History Main Topics    Smoking status: Current Some Day Smoker    Smokeless tobacco: Never Used    Alcohol use No    Drug use: No    Sexual activity: Not on file     Other Topics Concern    Not on file     Social History Narrative         ALLERGIES: Aspirin and Morphine    Review of Systems   Constitutional: Negative for chills and fever. HENT: Negative for congestion, rhinorrhea and sore throat. Eyes: Negative for pain and discharge. Respiratory: Negative for cough and shortness of breath. Cardiovascular: Negative for chest pain. Gastrointestinal: Negative for abdominal pain, nausea and vomiting. Genitourinary: Positive for vaginal discharge. Negative for dysuria, frequency and urgency. Musculoskeletal: Negative for back pain and neck pain. Skin: Negative for rash and wound. Neurological: Negative for seizures, syncope and headaches. Psychiatric/Behavioral: Negative for confusion. The patient is not nervous/anxious.     All other systems reviewed and are negative. Vitals:    10/04/17 1345   BP: 143/90   Pulse: 98   Resp: 18   Temp: 99.4 °F (37.4 °C)   SpO2: 98%   Weight: 70.3 kg (155 lb)   Height: 5' (1.524 m)            Physical Exam   Constitutional: She is oriented to person, place, and time. She appears well-developed and well-nourished. HENT:   Head: Normocephalic and atraumatic. Nose: Nose normal.   Mouth/Throat: Oropharynx is clear and moist.   Eyes: Conjunctivae and EOM are normal. Pupils are equal, round, and reactive to light. Neck: Normal range of motion. Neck supple. Cardiovascular: Normal rate, regular rhythm and normal heart sounds. No murmur heard. Pulmonary/Chest: Effort normal and breath sounds normal. She has no wheezes. She has no rales. Abdominal: Soft. Bowel sounds are normal. There is no tenderness. There is no rebound. Genitourinary:   Genitourinary Comments: See procedure note   Musculoskeletal: Normal range of motion. Neurological: She is alert and oriented to person, place, and time. Skin: Skin is warm and dry. Psychiatric: She has a normal mood and affect. Her behavior is normal.   Nursing note and vitals reviewed. MDM  Number of Diagnoses or Management Options  Trichomonas vaginalis (TV) infection:   Urinary tract infection without hematuria, site unspecified:   Diagnosis management comments: DDX: uti, sti, vaginitis    ED Course       Pelvic Exam  Performed by: PA  Type of exam performed: bimanual and speculum. External genitalia appearance: normal.    Vaginal exam:  discharge. The discharge was frothy, grey and white. Cervical exam:  normal.    Specimen(s) collected:  chlamydia and GC (wet prep/ koh).   Patient tolerance: Patient tolerated the procedure well with no immediate complications        LABORATORY TESTS:  Recent Results (from the past 12 hour(s))   URINALYSIS W/ REFLEX CULTURE    Collection Time: 10/04/17  3:14 PM   Result Value Ref Range    Color YELLOW/STRAW Appearance CLOUDY (A) CLEAR      Specific gravity 1.010 1.003 - 1.030      pH (UA) 5.5 5.0 - 8.0      Protein NEGATIVE  NEG mg/dL    Glucose NEGATIVE  NEG mg/dL    Ketone NEGATIVE  NEG mg/dL    Bilirubin NEGATIVE  NEG      Blood NEGATIVE  NEG      Urobilinogen 1.0 0.2 - 1.0 EU/dL    Nitrites NEGATIVE  NEG      Leukocyte Esterase LARGE (A) NEG      WBC 10-20 0 - 4 /hpf    RBC 5-10 0 - 5 /hpf    Epithelial cells FEW FEW /lpf    Bacteria NEGATIVE  NEG /hpf    UA:UC IF INDICATED URINE CULTURE ORDERED (A) CNI      Mucus TRACE (A) NEG /lpf   WET PREP    Collection Time: 10/04/17  3:14 PM   Result Value Ref Range    Clue cells CLUE CELLS ABSENT      Wet prep TRICHOMONAS PRESENT     KOH, OTHER SOURCES    Collection Time: 10/04/17  3:14 PM   Result Value Ref Range    Special Requests: NO SPECIAL REQUESTS      KOH NO YEAST SEEN     HCG URINE, QL. - POC    Collection Time: 10/04/17  3:43 PM   Result Value Ref Range    Pregnancy test,urine (POC) NEGATIVE  NEG         IMAGING RESULTS:  No orders to display       MEDICATIONS GIVEN:  Medications - No data to display    IMPRESSION:  1. Urinary tract infection without hematuria, site unspecified    2. Trichomonas vaginalis (TV) infection        PLAN:  1. Discharge Medication List as of 10/4/2017  3:52 PM      START taking these medications    Details   metroNIDAZOLE (FLAGYL) 500 mg tablet Take 1 Tab by mouth two (2) times a day for 7 days. , Print, Disp-14 Tab, R-0      cephALEXin (KEFLEX) 500 mg capsule Take 1 Cap by mouth two (2) times a day., Print, Disp-15 Cap, R-0         CONTINUE these medications which have NOT CHANGED    Details   lamoTRIgine (LAMICTAL) 25 mg tablet Take 1 Tab by mouth daily. Indications: DEPRESSION ASSOCIATED WITH BIPOLAR DISORDER, Print, Disp-15 Tab, R-1      lisinopril (PRINIVIL, ZESTRIL) 40 mg tablet Take 1 Tab by mouth daily. Indications: hypertension, Print, Disp-15 Tab, R-1      OLANZapine (ZYPREXA) 10 mg tablet Take 1 Tab by mouth nightly. Indications: DEPRESSION ASSOCIATED WITH BIPOLAR DISORDER, ADJUNCT, Print, Disp-15 Tab, R-1      topiramate (TOPAMAX) 100 mg tablet Take 1 Tab by mouth two (2) times a day. Indications: mood lability, Print, Disp-30 Tab, R-1      MULTIVIT-MINS NO.7/FOLIC ACID (V-C FORTE PO) Take 1 Cap by mouth daily. , Historical Med      sAXagliptin (ONGLYZA) 2.5 mg tablet Take 2.5 mg by mouth daily. , Historical Med           2. Follow-up Information     Follow up With Details Comments [facility] [address] [address]  [phone]      [address] [id number] [phone]      Follow up your primary care doctor or the health department for further STD testing including HIV testing.       [facility] [place] EM[facility]GENCY DEPT  If symptoms worsen [address] [facility]        Return to ED if worse

## 2017-10-04 NOTE — DISCHARGE INSTRUCTIONS
Trichomoniasis: Care Instructions  Your Care Instructions  Trichomoniasis is a sexually transmitted infection (STI) that is spread by having sex with an infected partner. Trichomoniasis is commonly called trich (say \"trick\"). In women, trich may cause vaginal itching and a smelly discharge. But in many cases, especially in men, there are no symptoms. Toni Torres is treated so that you do not spread it to others. Both you and your sex partner or partners should be treated at the same time so you do not infect each other again. Trich may cause problems with pregnancy. Your doctor will talk with you about treatment for Trich if you are pregnant. Follow-up care is a key part of your treatment and safety. Be sure to make and go to all appointments, and call your doctor if you are having problems. Its also a good idea to know your test results and keep a list of the medicines you take. How can you care for yourself at home? · Take your antibiotics as directed. Do not stop taking them just because you feel better. You need to take the full course of antibiotics. · Do not have sex while you are being treated. If your doctor gave you a single dose of antibiotics, do not have sex for one week after being treated and until your partner also has been treated. · Tell your sex partner (or partners) that he or she will also need to be tested and treated. · Use a cold water compress or cool baths to relieve itching. To prevent trichomoniasis in the future  · Use latex condoms every time you have sex. Use them from the beginning to the end of sexual contact. · Talk to your partner before having sex. Find out if he or she has or is at risk for trich or any other STI. Keep in mind that a person may be able to spread an STI even if he or she does not have symptoms. · Do not have sex if you are being treated for trich or any other STI.   · Do not have sex with anyone who has symptoms of an STI, such as sores on the genitals or mouth.  · Having one sex partner (who does not have STIs and does not have sex with anyone else) is a good way to avoid STIs. When should you call for help? Call your doctor now or seek immediate medical care if:  · You have unusual vaginal bleeding. · You have a fever. · You have new discharge from the vagina or penis. · You have pelvic pain. Watch closely for changes in your health, and be sure to contact your doctor if:  · You do not get better as expected. · You have any new symptoms or your symptoms get worse. Where can you learn more? Go to http://kirill-ivonne.info/. Enter V236 in the search box to learn more about \"Trichomoniasis: Care Instructions. \"  Current as of: 2017  Content Version: 11.3  © 8339-1717 [employer]. Care instructions adapted under license by [employer] (which disclaims liability or warranty for this information). If you have questions about a medical condition or this instruction, always ask your healthcare professional. [place] [address] any warranty or liability for your use of this information. Urinary Tract Infection in Pregnancy: Care Instructions  Your Care Instructions    A urinary tract infection, or UTI, is an infection of the bladder and other urinary structures. Most UTIs occur in the bladder. They often cause pain or burning when you urinate. UTI is the most common bacterial infection in pregnancy. If untreated, a UTI could lead to problems such as a kidney infection or  labor. Most UTIs can be cured with antibiotics. Your doctor will prescribe an antibiotic that is safe during pregnancy. Be sure to finish your medicine so that the infection does not spread to your kidneys. Follow-up care is a key part of your treatment and safety. Be sure to make and go to all appointments, and call your doctor if you are having problems.  It's also a good idea to know your test results and keep a list of the medicines you take. How can you care for yourself at home? · Take your antibiotics as directed. Do not stop taking them just because you feel better. You need to take the full course of antibiotics. · Drink extra water and other fluids for the next day or two. This will help wash out the bacteria causing the infection. If you have kidney, heart, or liver disease and have to limit fluids, talk with your doctor before you increase the amount of fluids you drink. · Do not drink alcohol. · Urinate often. Try to empty your bladder each time. Preventing UTIs  · Drink plenty of fluids, enough so that your urine is light yellow or clear like water. This helps you urinate often, which clears bacteria from your system. If you have kidney, heart, or liver disease and have to limit fluids, talk with your doctor before you increase the amount of fluids you drink. · Urinate when you first have the urge. · Urinate right after you have sex. This is the best way for women to avoid UTIs. · When going to the bathroom, wipe from front to back to keep bacteria from entering the vagina or urethra. When should you call for help? Call your doctor now or seek immediate medical care if:  · Symptoms such as fever, chills, nausea, or vomiting get worse or appear for the first time. · You have new pain in your back just below your rib cage. This is called flank pain. · There is new blood or pus in your urine. · You have any problems with your antibiotic medicine. Watch closely for changes in your health, and be sure to contact your doctor if:  · You are not getting better after 1 day (24 hours). · You have new symptoms, such as blood in your urine. Where can you learn more? Go to http://kirill-ivonne.info/. Enter M982 in the search box to learn more about \"Urinary Tract Infection in Pregnancy: Care Instructions. \"  Current as of: March 16, 2017  Content Version: 11.3  © 8453-3257 [employer]. Care instructions adapted under license by [employer] (which disclaims liability or warranty for this information). If you have questions about a medical condition or this instruction, always ask your healthcare professional. [address] any warranty or liability for your use of this information.

## 2017-10-05 LAB
BACTERIA SPEC CULT: NORMAL
CC UR VC: NORMAL
SERVICE CMNT-IMP: NORMAL

## 2017-10-06 LAB
C TRACH DNA SPEC QL NAA+PROBE: NEGATIVE
N GONORRHOEA DNA SPEC QL NAA+PROBE: NEGATIVE
SAMPLE TYPE: NORMAL
SERVICE CMNT-IMP: NORMAL
SPECIMEN SOURCE: NORMAL

## 2018-07-05 ENCOUNTER — HOSPITAL ENCOUNTER (EMERGENCY)
Age: 53
Discharge: HOME | End: 2018-07-05
Attending: EMERGENCY MEDICINE
Payer: MEDICAID

## 2018-07-05 VITALS
RESPIRATION RATE: 12 BRPM | OXYGEN SATURATION: 99 % | HEIGHT: 60 IN | WEIGHT: 158 LBS | SYSTOLIC BLOOD PRESSURE: 141 MMHG | TEMPERATURE: 98.5 F | DIASTOLIC BLOOD PRESSURE: 94 MMHG | BODY MASS INDEX: 31.02 KG/M2 | HEART RATE: 101 BPM

## 2018-07-05 DIAGNOSIS — A59.00 UROGENITAL TRICHOMONIASIS: Primary | ICD-10-CM

## 2018-07-05 LAB
APPEARANCE UR: ABNORMAL
BACTERIA URNS QL MICRO: NEGATIVE /HPF
BILIRUB UR QL: NEGATIVE
CLUE CELLS VAG QL WET PREP: NORMAL
COLOR UR: ABNORMAL
EPITH CASTS URNS QL MICRO: ABNORMAL /LPF
GLUCOSE UR STRIP.AUTO-MCNC: >1000 MG/DL
HGB UR QL STRIP: ABNORMAL
KETONES UR QL STRIP.AUTO: NEGATIVE MG/DL
KOH PREP SPEC: NORMAL
LEUKOCYTE ESTERASE UR QL STRIP.AUTO: ABNORMAL
NITRITE UR QL STRIP.AUTO: NEGATIVE
PH UR STRIP: 5.5 [PH] (ref 5–8)
PROT UR STRIP-MCNC: NEGATIVE MG/DL
RBC #/AREA URNS HPF: ABNORMAL /HPF (ref 0–5)
SERVICE CMNT-IMP: NORMAL
SP GR UR REFRACTOMETRY: 1.01 (ref 1–1.03)
T VAGINALIS VAG QL WET PREP: NORMAL
TRICHOMONAS UR QL MICRO: PRESENT
UA: UC IF INDICATED,UAUC: ABNORMAL
UROBILINOGEN UR QL STRIP.AUTO: 0.2 EU/DL (ref 0.2–1)
WBC URNS QL MICRO: ABNORMAL /HPF (ref 0–4)

## 2018-07-05 PROCEDURE — 87210 SMEAR WET MOUNT SALINE/INK: CPT | Performed by: EMERGENCY MEDICINE

## 2018-07-05 PROCEDURE — 87491 CHLMYD TRACH DNA AMP PROBE: CPT | Performed by: EMERGENCY MEDICINE

## 2018-07-05 PROCEDURE — 99283 EMERGENCY DEPT VISIT LOW MDM: CPT

## 2018-07-05 PROCEDURE — 87086 URINE CULTURE/COLONY COUNT: CPT | Performed by: EMERGENCY MEDICINE

## 2018-07-05 PROCEDURE — 74011000250 HC RX REV CODE- 250: Performed by: EMERGENCY MEDICINE

## 2018-07-05 PROCEDURE — 74011250637 HC RX REV CODE- 250/637: Performed by: EMERGENCY MEDICINE

## 2018-07-05 PROCEDURE — 96372 THER/PROPH/DIAG INJ SC/IM: CPT

## 2018-07-05 PROCEDURE — 81001 URINALYSIS AUTO W/SCOPE: CPT | Performed by: EMERGENCY MEDICINE

## 2018-07-05 PROCEDURE — 74011250636 HC RX REV CODE- 250/636: Performed by: EMERGENCY MEDICINE

## 2018-07-05 RX ORDER — METRONIDAZOLE 500 MG/1
2000 TABLET ORAL
Qty: 4 TAB | Refills: 0 | Status: SHIPPED | OUTPATIENT
Start: 2018-07-05 | End: 2018-07-05

## 2018-07-05 RX ORDER — AZITHROMYCIN 500 MG/1
1000 TABLET, FILM COATED ORAL
Status: COMPLETED | OUTPATIENT
Start: 2018-07-05 | End: 2018-07-05

## 2018-07-05 RX ORDER — HALOPERIDOL 5 MG/1
5 TABLET ORAL
COMMUNITY
End: 2018-07-23

## 2018-07-05 RX ORDER — TRAZODONE HYDROCHLORIDE 50 MG/1
TABLET ORAL
COMMUNITY
End: 2018-07-23

## 2018-07-05 RX ORDER — TRAMADOL HYDROCHLORIDE 50 MG/1
50 TABLET ORAL
COMMUNITY
End: 2020-02-20 | Stop reason: SDUPTHER

## 2018-07-05 RX ADMIN — AZITHROMYCIN 1000 MG: 500 TABLET, FILM COATED ORAL at 14:25

## 2018-07-05 RX ADMIN — LIDOCAINE HYDROCHLORIDE 250 MG: 10 INJECTION, SOLUTION EPIDURAL; INFILTRATION; INTRACAUDAL; PERINEURAL at 14:24

## 2018-07-05 NOTE — DISCHARGE INSTRUCTIONS
Trichomoniasis: Care Instructions  Your Care Instructions  Trichomoniasis is a sexually transmitted infection (STI) that is spread by having sex with an infected partner. Trichomoniasis is commonly called trich (say \"trick\"). In women, trich may cause vaginal itching and a smelly discharge. But in many cases, especially in men, there are no symptoms. Curtis Labrum is treated so that you do not spread it to others. Both you and your sex partner or partners should be treated at the same time so you do not infect each other again. Trich may cause problems with pregnancy. Your doctor will talk with you about treatment for Trich if you are pregnant. Follow-up care is a key part of your treatment and safety. Be sure to make and go to all appointments, and call your doctor if you are having problems. It's also a good idea to know your test results and keep a list of the medicines you take. How can you care for yourself at home? · Take your antibiotics as directed. Do not stop taking them just because you feel better. You need to take the full course of antibiotics. · Do not have sex while you are being treated. If your doctor gave you a single dose of antibiotics, do not have sex for one week after being treated and until your partner also has been treated. · Tell your sex partner (or partners) that he or she will also need to be tested and treated. · Use a cold water compress or cool baths to relieve itching. To prevent trichomoniasis in the future  · Use latex condoms every time you have sex. Use them from the beginning to the end of sexual contact. · Talk to your partner before having sex. Find out if he or she has or is at risk for trich or any other STI. Keep in mind that a person may be able to spread an STI even if he or she does not have symptoms. · Do not have sex if you are being treated for trich or any other STI.   · Do not have sex with anyone who has symptoms of an STI, such as sores on the genitals or mouth.  · Having one sex partner (who does not have STIs and does not have sex with anyone else) is a good way to avoid STIs. When should you call for help? Call your doctor now or seek immediate medical care if:  ? · You have unusual vaginal bleeding. ? · You have a fever. ? · You have new discharge from the vagina or penis. ? · You have pelvic pain. ? Watch closely for changes in your health, and be sure to contact your doctor if:  ? · You do not get better as expected. ? · You have any new symptoms or your symptoms get worse. Where can you learn more? Go to http://kirill-ivonne.info/. Enter X037 in the search box to learn more about \"Trichomoniasis: Care Instructions. \"  Current as of: March 20, 2017  Content Version: 11.4  © 6177-8308 [employer]. Care instructions adapted under license by [employer] (which disclaims liability or warranty for this information). If you have questions about a medical condition or this instruction, always ask your healthcare professional. [address] any warranty or liability for your use of this information.

## 2018-07-05 NOTE — ED PROVIDER NOTES
EMERGENCY DEPARTMENT HISTORY AND PHYSICAL EXAM      Date: 7/5/2018  Patient Name: Natacha Bonner    History of Presenting Illness     Chief Complaint   Patient presents with    Vaginal Discharge     yellow discharge. denies any dysuria or frequency. reports \"I think I have a UTI\"       History Provided By: Patient    HPI: Natacha Bonner, 47 y.o. female with PMHx significant for HTN, DM, and UTI, presents ambulatory to the ED with cc of yellow vaginal discharge x 3 days. Pt denies any associated sxs, nor any exacerbating or alleviating factors. Pt explains that she has been having vaginal discharge for the past 3 days that is yellow in appearance. Pt notes that she is currently sexually active, and she last had sexual intercourse 3 days ago. Pt specifically denies dysuria or hematuria. There are no other complaints, changes, or physical findings at this time. PCP: Joseph Oliver MD    Current Outpatient Prescriptions   Medication Sig Dispense Refill    metroNIDAZOLE (FLAGYL) 500 mg tablet Take 4 Tabs by mouth now for 1 dose. Take all 4 pills one time. 4 Tab 0    traMADol (ULTRAM) 50 mg tablet Take 50 mg by mouth every six (6) hours as needed for Pain.  traZODone (DESYREL) 50 mg tablet Take  by mouth nightly.  haloperidol (HALDOL) 5 mg tablet Take 5 mg by mouth.  sertraline HCl (ZOLOFT PO) Take  by mouth.  cephALEXin (KEFLEX) 500 mg capsule Take 1 Cap by mouth two (2) times a day. 15 Cap 0    lamoTRIgine (LAMICTAL) 25 mg tablet Take 1 Tab by mouth daily. Indications: DEPRESSION ASSOCIATED WITH BIPOLAR DISORDER 15 Tab 1    lisinopril (PRINIVIL, ZESTRIL) 40 mg tablet Take 1 Tab by mouth daily. Indications: hypertension 15 Tab 1    OLANZapine (ZYPREXA) 10 mg tablet Take 1 Tab by mouth nightly. Indications: DEPRESSION ASSOCIATED WITH BIPOLAR DISORDER, ADJUNCT 15 Tab 1    topiramate (TOPAMAX) 100 mg tablet Take 1 Tab by mouth two (2) times a day.  Indications: mood lability 30 Tab 1    MULTIVIT-MINS NO.7/FOLIC ACID (V-C FORTE PO) Take 1 Cap by mouth daily.  sAXagliptin (ONGLYZA) 2.5 mg tablet Take 2.5 mg by mouth daily. Past History     Past Medical History:  Past Medical History:   Diagnosis Date    Diabetes ([address]     Hypertension     Psychiatric disorder        Past Surgical History:  Past Surgical History:   Procedure Laterality Date    HX GYN       x2    HX OTHER SURGICAL  1988    skin grafting    HX OTHER SURGICAL  2016    oral surgery       Family History:  History reviewed. No pertinent family history. Social History:  Social History   Substance Use Topics    Smoking status: Current Some Day Smoker    Smokeless tobacco: Never Used    Alcohol use No       Allergies: Allergies   Allergen Reactions    Aspirin Nausea and Vomiting    Morphine Hives         Review of Systems   Review of Systems   Constitutional: Negative for chills and fever. HENT: Negative for congestion, rhinorrhea, sneezing and sore throat. Eyes: Negative for redness and visual disturbance. Respiratory: Negative for shortness of breath. Cardiovascular: Negative for chest pain and leg swelling. Gastrointestinal: Negative for abdominal pain, nausea and vomiting. Genitourinary: Positive for vaginal discharge. Negative for difficulty urinating and frequency. Musculoskeletal: Negative for back pain, myalgias and neck pain. Skin: Negative for rash. Neurological: Negative for dizziness, syncope, weakness and headaches. Hematological: Negative for adenopathy. All other systems reviewed and are negative. Physical Exam   Physical Exam   Constitutional: She is oriented to person, place, and time. She appears well-developed and well-nourished. HENT:   Head: Normocephalic. Mouth/Throat: Oropharynx is clear and moist.   Eyes: Conjunctivae and EOM are normal. Pupils are equal, round, and reactive to light. Neck: Normal range of motion. Neck supple. Cardiovascular: Normal rate, regular rhythm, normal heart sounds and intact distal pulses. Pulmonary/Chest: Effort normal and breath sounds normal.   Abdominal: Soft. Bowel sounds are normal. She exhibits no distension. There is no rebound. Musculoskeletal: Normal range of motion. She exhibits no edema or deformity. Neurological: She is alert and oriented to person, place, and time. Skin: Skin is warm and dry. Psychiatric: She has a normal mood and affect. Her behavior is normal. Judgment and thought content normal.       Diagnostic Study Results     Labs -     Recent Results (from the past 12 hour(s))   URINALYSIS W/ REFLEX CULTURE    Collection Time: 07/05/18  1:41 PM   Result Value Ref Range    Color YELLOW/STRAW      Appearance CLOUDY (A) CLEAR      Specific gravity 1.010 1.003 - 1.030      pH (UA) 5.5 5.0 - 8.0      Protein NEGATIVE  NEG mg/dL    Glucose >1000 (A) NEG mg/dL    Ketone NEGATIVE  NEG mg/dL    Bilirubin NEGATIVE  NEG      Blood TRACE (A) NEG      Urobilinogen 0.2 0.2 - 1.0 EU/dL    Nitrites NEGATIVE  NEG      Leukocyte Esterase LARGE (A) NEG      WBC 5-10 0 - 4 /hpf    RBC 0-5 0 - 5 /hpf    Epithelial cells FEW FEW /lpf    Bacteria NEGATIVE  NEG /hpf    UA:UC IF INDICATED URINE CULTURE ORDERED (A) CNI      Trichomonas PRESENT (A) NEG     KOH, OTHER SOURCES    Collection Time: 07/05/18  1:41 PM   Result Value Ref Range    Special Requests: NO SPECIAL REQUESTS      KOH NO YEAST SEEN     WET PREP    Collection Time: 07/05/18  1:41 PM   Result Value Ref Range    Clue cells CLUE CELLS ABSENT      Wet prep TRICHOMONAS PRESENT       Medical Decision Making   I am the first provider for this patient. I reviewed the vital signs, available nursing notes, past medical history, past surgical history, family history and social history. Vital Signs-Reviewed the patient's vital signs.   Patient Vitals for the past 12 hrs:   Temp Pulse Resp BP SpO2   07/05/18 1321 98.5 °F (36.9 °C) (!) 101 12 (!) 141/94 99 %       Pulse Oximetry Analysis - 99% on RA    Cardiac Monitor:   Rate: 98 bpm  Rhythm: Normal Sinus Rhythm      Records Reviewed: Nursing Notes, Old Medical Records and Previous Laboratory Studies    Provider Notes (Medical Decision Making):   DDx: UTI, STD    ED Course:   Initial assessment performed. The patients presenting problems have been discussed, and they are in agreement with the care plan formulated and outlined with them. I have encouraged them to ask questions as they arise throughout their visit. Critical Care Time:   0    Disposition:  2:32 PM  The patient has been re-evaluated and is ready for discharge. Reviewed available results with patient. Counseled patient on diagnosis and care plan. Patient has expressed understanding, and all questions have been answered. Patient agrees with plan and agrees to follow up as recommended, or return to the ED if their symptoms worsen. Discharge instructions have been provided and explained to the patient, along with reasons to return to the ED. PLAN:  1. Discharge Medication List as of 7/5/2018  2:32 PM      START taking these medications    Details   metroNIDAZOLE (FLAGYL) 500 mg tablet Take 4 Tabs by mouth now for 1 dose. Take all 4 pills one time., Normal, Disp-4 Tab, R-0         CONTINUE these medications which have NOT CHANGED    Details   traMADol (ULTRAM) 50 mg tablet Take 50 mg by mouth every six (6) hours as needed for Pain., Historical Med      traZODone (DESYREL) 50 mg tablet Take  by mouth nightly., Historical Med      haloperidol (HALDOL) 5 mg tablet Take 5 mg by mouth., Historical Med      sertraline HCl (ZOLOFT PO) Take  by mouth., Historical Med      cephALEXin (KEFLEX) 500 mg capsule Take 1 Cap by mouth two (2) times a day., Print, Disp-15 Cap, R-0      lamoTRIgine (LAMICTAL) 25 mg tablet Take 1 Tab by mouth daily.  Indications: DEPRESSION ASSOCIATED WITH BIPOLAR DISORDER, Print, Disp-15 Tab, R-1      lisinopril (PRINIVIL, ZESTRIL) 40 mg tablet Take 1 Tab by mouth daily. Indications: hypertension, Print, Disp-15 Tab, R-1      OLANZapine (ZYPREXA) 10 mg tablet Take 1 Tab by mouth nightly. Indications: DEPRESSION ASSOCIATED WITH BIPOLAR DISORDER, ADJUNCT, Print, Disp-15 Tab, R-1      topiramate (TOPAMAX) 100 mg tablet Take 1 Tab by mouth two (2) times a day. Indications: mood lability, Print, Disp-30 Tab, R-1      MULTIVIT-MINS NO.7/FOLIC ACID (V-C FORTE PO) Take 1 Cap by mouth daily. , Historical Med      sAXagliptin (ONGLYZA) 2.5 mg tablet Take 2.5 mg by mouth daily. , Historical Med           2. Follow-up Information     Follow up With Details Comments [address], [place] Call  [address] [facility] [address] [phone]      [facility] EMERGENCY DEPT  As needed, If symptoms worsen [address] [facility]  [phone]        Return to ED if worse     Diagnosis     Clinical Impression:   1. Urogenital trichomoniasis        Attestations: This note is prepared by Madyson Erickson, acting as Scribe for Casi Amor MD.    Casi Amor MD: The scribe's documentation has been prepared under my direction and personally reviewed by me in its entirety. I confirm that the note above accurately reflects all work, treatment, procedures, and medical decision making performed by me.

## 2018-07-06 LAB
BACTERIA SPEC CULT: NORMAL
CC UR VC: NORMAL
SERVICE CMNT-IMP: NORMAL

## 2018-07-18 ENCOUNTER — HOSPITAL ENCOUNTER (INPATIENT)
Age: 53
LOS: 5 days | Discharge: HOME | DRG: 751 | End: 2018-07-23
Attending: STUDENT IN AN ORGANIZED HEALTH CARE EDUCATION/TRAINING PROGRAM
Payer: MEDICAID

## 2018-07-18 DIAGNOSIS — R45.851 SUICIDAL THOUGHTS: Primary | ICD-10-CM

## 2018-07-18 PROBLEM — F32.A DEPRESSION: Status: ACTIVE | Noted: 2018-07-18

## 2018-07-18 LAB
ALBUMIN SERPL-MCNC: 3.8 G/DL (ref 3.5–5)
ALBUMIN/GLOB SERPL: 0.9 {RATIO} (ref 1.1–2.2)
ALP SERPL-CCNC: 172 U/L (ref 45–117)
ALT SERPL-CCNC: 19 U/L (ref 12–78)
AMPHET UR QL SCN: NEGATIVE
ANION GAP SERPL CALC-SCNC: 9 MMOL/L (ref 5–15)
APAP SERPL-MCNC: <2 UG/ML (ref 10–30)
APPEARANCE UR: CLEAR
AST SERPL-CCNC: 11 U/L (ref 15–37)
BACTERIA URNS QL MICRO: NEGATIVE /HPF
BARBITURATES UR QL SCN: NEGATIVE
BASOPHILS # BLD: 0 K/UL (ref 0–0.1)
BASOPHILS NFR BLD: 0 % (ref 0–1)
BENZODIAZ UR QL: NEGATIVE
BILIRUB SERPL-MCNC: 0.3 MG/DL (ref 0.2–1)
BILIRUB UR QL: NEGATIVE
BUN SERPL-MCNC: 8 MG/DL (ref 6–20)
BUN/CREAT SERPL: 7 (ref 12–20)
CALCIUM SERPL-MCNC: 10.1 MG/DL (ref 8.5–10.1)
CANNABINOIDS UR QL SCN: NEGATIVE
CHLORIDE SERPL-SCNC: 103 MMOL/L (ref 97–108)
CO2 SERPL-SCNC: 27 MMOL/L (ref 21–32)
COCAINE UR QL SCN: NEGATIVE
COLOR UR: ABNORMAL
CREAT SERPL-MCNC: 1.09 MG/DL (ref 0.55–1.02)
DIFFERENTIAL METHOD BLD: ABNORMAL
DRUG SCRN COMMENT,DRGCM: NORMAL
EOSINOPHIL # BLD: 0.1 K/UL (ref 0–0.4)
EOSINOPHIL NFR BLD: 1 % (ref 0–7)
EPITH CASTS URNS QL MICRO: ABNORMAL /LPF
ERYTHROCYTE [DISTWIDTH] IN BLOOD BY AUTOMATED COUNT: 11.8 % (ref 11.5–14.5)
ETHANOL SERPL-MCNC: <10 MG/DL
GLOBULIN SER CALC-MCNC: 4.4 G/DL (ref 2–4)
GLUCOSE SERPL-MCNC: 289 MG/DL (ref 65–100)
GLUCOSE UR STRIP.AUTO-MCNC: >1000 MG/DL
HCT VFR BLD AUTO: 41.9 % (ref 35–47)
HGB BLD-MCNC: 14 G/DL (ref 11.5–16)
HGB UR QL STRIP: NEGATIVE
IMM GRANULOCYTES # BLD: 0 K/UL (ref 0–0.04)
IMM GRANULOCYTES NFR BLD AUTO: 0 % (ref 0–0.5)
KETONES UR QL STRIP.AUTO: NEGATIVE MG/DL
LEUKOCYTE ESTERASE UR QL STRIP.AUTO: NEGATIVE
LYMPHOCYTES # BLD: 4.1 K/UL (ref 0.8–3.5)
LYMPHOCYTES NFR BLD: 34 % (ref 12–49)
MCH RBC QN AUTO: 33.3 PG (ref 26–34)
MCHC RBC AUTO-ENTMCNC: 33.4 G/DL (ref 30–36.5)
MCV RBC AUTO: 99.8 FL (ref 80–99)
METHADONE UR QL: NEGATIVE
MONOCYTES # BLD: 0.8 K/UL (ref 0–1)
MONOCYTES NFR BLD: 7 % (ref 5–13)
NEUTS SEG # BLD: 7.1 K/UL (ref 1.8–8)
NEUTS SEG NFR BLD: 59 % (ref 32–75)
NITRITE UR QL STRIP.AUTO: NEGATIVE
NRBC # BLD: 0 K/UL (ref 0–0.01)
NRBC BLD-RTO: 0 PER 100 WBC
OPIATES UR QL: NEGATIVE
PCP UR QL: NEGATIVE
PH UR STRIP: 5.5 [PH] (ref 5–8)
PLATELET # BLD AUTO: 217 K/UL (ref 150–400)
PMV BLD AUTO: 10.5 FL (ref 8.9–12.9)
POTASSIUM SERPL-SCNC: 4 MMOL/L (ref 3.5–5.1)
PROT SERPL-MCNC: 8.2 G/DL (ref 6.4–8.2)
PROT UR STRIP-MCNC: NEGATIVE MG/DL
RBC # BLD AUTO: 4.2 M/UL (ref 3.8–5.2)
RBC #/AREA URNS HPF: ABNORMAL /HPF (ref 0–5)
SALICYLATES SERPL-MCNC: 5 MG/DL (ref 2.8–20)
SODIUM SERPL-SCNC: 139 MMOL/L (ref 136–145)
SP GR UR REFRACTOMETRY: 1.01 (ref 1–1.03)
UR CULT HOLD, URHOLD: NORMAL
UROBILINOGEN UR QL STRIP.AUTO: 0.2 EU/DL (ref 0.2–1)
WBC # BLD AUTO: 12.1 K/UL (ref 3.6–11)
WBC URNS QL MICRO: ABNORMAL /HPF (ref 0–4)

## 2018-07-18 PROCEDURE — 80053 COMPREHEN METABOLIC PANEL: CPT | Performed by: STUDENT IN AN ORGANIZED HEALTH CARE EDUCATION/TRAINING PROGRAM

## 2018-07-18 PROCEDURE — 80307 DRUG TEST PRSMV CHEM ANLYZR: CPT | Performed by: STUDENT IN AN ORGANIZED HEALTH CARE EDUCATION/TRAINING PROGRAM

## 2018-07-18 PROCEDURE — 99285 EMERGENCY DEPT VISIT HI MDM: CPT

## 2018-07-18 PROCEDURE — 90791 PSYCH DIAGNOSTIC EVALUATION: CPT

## 2018-07-18 PROCEDURE — 36415 COLL VENOUS BLD VENIPUNCTURE: CPT | Performed by: STUDENT IN AN ORGANIZED HEALTH CARE EDUCATION/TRAINING PROGRAM

## 2018-07-18 PROCEDURE — 85025 COMPLETE CBC W/AUTO DIFF WBC: CPT | Performed by: STUDENT IN AN ORGANIZED HEALTH CARE EDUCATION/TRAINING PROGRAM

## 2018-07-18 PROCEDURE — 81001 URINALYSIS AUTO W/SCOPE: CPT | Performed by: STUDENT IN AN ORGANIZED HEALTH CARE EDUCATION/TRAINING PROGRAM

## 2018-07-18 PROCEDURE — 74011250637 HC RX REV CODE- 250/637

## 2018-07-18 PROCEDURE — 65220000003 HC RM SEMIPRIVATE PSYCH

## 2018-07-18 RX ORDER — ACETAMINOPHEN 325 MG/1
650 TABLET ORAL
Status: DISCONTINUED | OUTPATIENT
Start: 2018-07-18 | End: 2018-07-23 | Stop reason: HOSPADM

## 2018-07-18 RX ORDER — BENZTROPINE MESYLATE 1 MG/1
2 TABLET ORAL
Status: DISCONTINUED | OUTPATIENT
Start: 2018-07-18 | End: 2018-07-23 | Stop reason: HOSPADM

## 2018-07-18 RX ORDER — BENZTROPINE MESYLATE 1 MG/ML
2 INJECTION INTRAMUSCULAR; INTRAVENOUS
Status: DISCONTINUED | OUTPATIENT
Start: 2018-07-18 | End: 2018-07-23 | Stop reason: HOSPADM

## 2018-07-18 RX ORDER — LORAZEPAM 1 MG/1
1 TABLET ORAL
Status: DISCONTINUED | OUTPATIENT
Start: 2018-07-18 | End: 2018-07-23 | Stop reason: HOSPADM

## 2018-07-18 RX ORDER — LORAZEPAM 2 MG/ML
2 INJECTION INTRAMUSCULAR
Status: DISCONTINUED | OUTPATIENT
Start: 2018-07-18 | End: 2018-07-23 | Stop reason: HOSPADM

## 2018-07-18 RX ORDER — IBUPROFEN 400 MG/1
400 TABLET ORAL
Status: DISCONTINUED | OUTPATIENT
Start: 2018-07-18 | End: 2018-07-23 | Stop reason: HOSPADM

## 2018-07-18 RX ORDER — OLANZAPINE 5 MG/1
5 TABLET ORAL
Status: DISCONTINUED | OUTPATIENT
Start: 2018-07-18 | End: 2018-07-23 | Stop reason: HOSPADM

## 2018-07-18 RX ORDER — ADHESIVE BANDAGE
30 BANDAGE TOPICAL DAILY PRN
Status: DISCONTINUED | OUTPATIENT
Start: 2018-07-18 | End: 2018-07-23 | Stop reason: HOSPADM

## 2018-07-18 RX ORDER — ZOLPIDEM TARTRATE 10 MG/1
10 TABLET ORAL
Status: DISCONTINUED | OUTPATIENT
Start: 2018-07-18 | End: 2018-07-20

## 2018-07-18 RX ORDER — IBUPROFEN 200 MG
1 TABLET ORAL
Status: DISCONTINUED | OUTPATIENT
Start: 2018-07-18 | End: 2018-07-23 | Stop reason: HOSPADM

## 2018-07-18 RX ADMIN — ZOLPIDEM TARTRATE 10 MG: 10 TABLET ORAL at 22:15

## 2018-07-18 NOTE — IP AVS SNAPSHOT
[address] 
[phone] Patient: Wilver Wilson MRN: [id number] :1965 A check juan indicates which time of day the medication should be taken. My Medications CHANGE how you take these medications Instructions Each Dose to Equal  
 Morning Noon Evening Bedtime  
 benztropine 1 mg tablet Commonly known as:  COGENTIN What changed:   
- when to take this 
- reasons to take this Your next dose is:  9pm 
  
   
 Take 1 Tab by mouth nightly. Indications: drug-induced extrapyramidal reaction 1 mg  
    
   
   
   
  
  
 haloperidol 5 mg tablet Commonly known as:  HALDOL What changed:  when to take this Your next dose is:  9pm  
   
 Take 1 Tab by mouth nightly. Indications: psychotic disorder 5 mg  
    
   
   
   
  
  
 lamoTRIgine 25 mg tablet Commonly known as: LaMICtal  
What changed:   
- medication strength 
- how much to take Your next dose is:  9pm  
   
 Take 2 Tabs by mouth two (2) times a day. Indications: DEPRESSION ASSOCIATED WITH BIPOLAR DISORDER  
 50 mg  
    
  
   
   
   
  
  
 traZODone 50 mg tablet Commonly known as:  Babak Rivera What changed:  how much to take Your next dose is:  Bedtime Take 1 Tab by mouth nightly. Indications: major depressive disorder 50 mg CONTINUE taking these medications Instructions Each Dose to Equal  
 Morning Noon Evening Bedtime  
 cetirizine 10 mg tablet Commonly known as:  ZYRTEC Take 10 mg by mouth daily. 10 mg  
    
   
   
   
  
 fluticasone 50 mcg/actuation nasal spray Commonly known as:  FLONASE  
   
 1 Waterflow by Both Nostrils route daily. 1 Spray  
    
   
   
   
  
 lisinopril 40 mg tablet Commonly known as:  Ora Tiffany Your next dose is:  9am  
   
 Take 1 Tab by mouth daily. Indications: hypertension  40 mg  
    
  
   
   
   
  
 sertraline 50 mg tablet Commonly known as:  ZOLOFT Start taking on:  7/24/2018 Your next dose is:  9am  
   
 Take 1 Tab by mouth daily. Indications: major depressive disorder 50 mg  
    
  
   
   
   
  
 traMADol 50 mg tablet Commonly known as:  ULTRAM  
   
 Take 50 mg by mouth every eight (8) hours as needed for Pain. 50 mg  
    
   
   
   
  
 V-C FORTE PO Your next dose is:  9am  
   
 Take 1 Cap by mouth daily. 1 Cap Where to Get Your Medications Information on where to get these meds will be given to you by the nurse or doctor. ! Ask your nurse or doctor about these medications  
  benztropine 1 mg tablet  
 haloperidol 5 mg tablet  
 lamoTRIgine 25 mg tablet  
 sertraline 50 mg tablet  
 traZODone 50 mg tablet

## 2018-07-18 NOTE — ED TRIAGE NOTES
Patient arrives via EMS from her therapists office with c/o SI x 1 week with a plan to overdose on medications. Patient contracts for safety. Patient wants a psych evaluation and help adjusting her medications.

## 2018-07-18 NOTE — IP AVS SNAPSHOT
[address] 
[phone] Patient: Wilver Wilson MRN: [id number] :1965 About your hospitalization You were admitted on:  2018 You last received care in the:  [address] You were discharged on:  2018 Why you were hospitalized Your primary diagnosis was:  Depression Follow-up Information Follow up With Details Comments Contact Info Vallerie Phalen On 2018 You have an 11:25am appointment with your psychiatrist for medication management. [facility] [address] Hubert Jorgensen 23 
[phone] Gail Lopez On 2018 You have a 4:00pm appointment for individual therapy. [facility] [address] [facility], [address] [place] [address] 
[phone] Felicia Milligan MD On 8/3/2018  [facility] [address] 
[phone] Discharge Orders None A check juan indicates which time of day the medication should be taken. My Medications CHANGE how you take these medications Instructions Each Dose to Equal  
 Morning Noon Evening Bedtime  
 benztropine 1 mg tablet Commonly known as:  COGENTIN What changed:   
- when to take this 
- reasons to take this Your next dose is:  9pm 
  
   
 Take 1 Tab by mouth nightly. Indications: drug-induced extrapyramidal reaction 1 mg  
    
   
   
   
  
  
 haloperidol 5 mg tablet Commonly known as:  HALDOL What changed:  when to take this Your next dose is:  9pm  
   
 Take 1 Tab by mouth nightly. Indications: psychotic disorder 5 mg  
    
   
   
   
  
  
 lamoTRIgine 25 mg tablet Commonly known as: LaMICtal  
What changed:   
- medication strength 
- how much to take Your next dose is:  9pm  
   
 Take 2 Tabs by mouth two (2) times a day.  Indications: DEPRESSION ASSOCIATED WITH BIPOLAR DISORDER  
 50 mg  
    
 traZODone 50 mg tablet Commonly known as:  Mika Thomas What changed:  how much to take Your next dose is:  Bedtime Take 1 Tab by mouth nightly. Indications: major depressive disorder 50 mg CONTINUE taking these medications Instructions Each Dose to Equal  
 Morning Noon Evening Bedtime  
 cetirizine 10 mg tablet Commonly known as:  ZYRTEC Take 10 mg by mouth daily. 10 mg  
    
   
   
   
  
 fluticasone 50 mcg/actuation nasal spray Commonly known as:  FLONASE  
   
 1 [place] by Both Nostrils route daily. 1 Spray  
    
   
   
   
  
 lisinopril 40 mg tablet Commonly known as:  Aundra Jaylen Your next dose is:  9am  
   
 Take 1 Tab by mouth daily. Indications: hypertension 40 mg  
    
  
   
   
   
  
 sertraline 50 mg tablet Commonly known as:  ZOLOFT Start taking on:  7/24/2018 Your next dose is:  9am  
   
 Take 1 Tab by mouth daily. Indications: major depressive disorder 50 mg  
    
  
   
   
   
  
 traMADol 50 mg tablet Commonly known as:  ULTRAM  
   
 Take 50 mg by mouth every eight (8) hours as needed for Pain. 50 mg  
    
   
   
   
  
 V-C FORTE PO Your next dose is:  9am  
   
 Take 1 Cap by mouth daily. 1 Cap Where to Get Your Medications Information on where to get these meds will be given to you by the nurse or doctor. ! Ask your nurse or doctor about these medications  
  benztropine 1 mg tablet  
 haloperidol 5 mg tablet  
 lamoTRIgine 25 mg tablet  
 sertraline 50 mg tablet  
 traZODone 50 mg tablet Opioid Education Prescription Opioids: What You Need to Know: 
 
 
Deepa Blake : 1965 MRN: [id number] The patient Luis Alfredo Tillman exhibits the ability to control behavior in a less restrictive environment. Patient's level of functioning is improving. No assaultive/destructive behavior has been observed for the past 24 hours. No suicidal/homicidal threat or behavior has been observed for the past 24 hours. There is no evidence of serious medication side effects. Patient has not been in physical or protective restraints for at least the past 24 hours. If weapons involved, how are they secured? No weapons involved. Is patient aware of and in agreement with discharge plan? Yes Arrangements for medication:  Prescriptions given to patient. Referral for substance abuse treatment? Patient is not using substances/Not applicable. Referral for smoking cessation needed? Patient is not a smoker/Not applicable. Copy of discharge instructions to  provider?:  Dr. Adela Varner Arrangements for transportation home:  Medicaid taxi Keep all follow up appointments as scheduled, continue to take prescribed medications per physician instructions. Mental health crisis number:  058 or your local mental health crisis line number at [phone]. DISCHARGE SUMMARY from Nurse PATIENT INSTRUCTIONS: 
 
 
 
What to do at Home: 
Recommended activity: Activity as tolerated If you experience any of the following symptoms hearing voices, feeling hopeless and lonely, please follow up with your assigned providers and local crisis number. *  Please give a list of your current medications to your Primary Care Provider. *  Please update this list whenever your medications are discontinued, doses are 
    changed, or new medications (including over-the-counter products) are added. *  Please carry medication information at all times in case of emergency situations. These are general instructions for a healthy lifestyle: No smoking/ No tobacco products/ Avoid exposure to second hand smoke Surgeon General's Warning:  Quitting smoking now greatly reduces serious risk to your health. Obesity, smoking, and sedentary lifestyle greatly increases your risk for illness A healthy diet, regular physical exercise & weight monitoring are important for maintaining a healthy lifestyle You may be retaining fluid if you have a history of heart failure or if you experience any of the following symptoms:  Weight gain of 3 pounds or more overnight or 5 pounds in a week, increased swelling in our hands or feet or shortness of breath while lying flat in bed. Please call your doctor as soon as you notice any of these symptoms; do not wait until your next office visit. Recognize signs and symptoms of STROKE: 
 
F-face looks uneven A-arms unable to move or move unevenly S-speech slurred or non-existent T-time-call 911 as soon as signs and symptoms begin-DO NOT go Back to bed or wait to see if you get better-TIME IS BRAIN. Warning Signs of HEART ATTACK Call 911 if you have these symptoms: 
? Chest discomfort. Most heart attacks involve discomfort in the center of the chest that lasts more than a few minutes, or that goes away and comes back. It can feel like uncomfortable pressure, squeezing, fullness, or pain. ? Discomfort in other areas of the upper body. Symptoms can include pain or discomfort in one or both arms, the back, neck, jaw, or stomach. ? Shortness of breath with or without chest discomfort. ? Other signs may include breaking out in a cold sweat, nausea, or lightheadedness. Don't wait more than five minutes to call [address]! Fast action can save your life. Calling 911 is almost always the fastest way to get lifesaving treatment.  Emergency Medical Services staff can begin treatment when they arrive  up to an hour sooner than if someone gets to the hospital by car. The discharge information has been reviewed with the patient. The patient verbalized understanding. Discharge medications reviewed with the patient and appropriate educational materials and side effects teaching were provided. ___________________________________________________________________________________________________________________________________ [employer] Announcement We are excited to announce that we are making your provider's discharge notes available to you in Anonymess. You will see these notes when they are completed and signed by the physician that discharged you from your recent hospital stay. If you have any questions or concerns about any information you see in Anonymess, please call the Health Information Department where you were seen or reach out to your Primary Care Provider for more information about your plan of care. Introducing [facility]! [employer] introduces Anonymess patient portal. Now you can access parts of your medical record, email your doctor's office, and request medication refills online. 1. In your internet browser, go to [email or web address] 2. Click on the First Time User? Click Here link in the Sign In box. You will see the New Member Sign Up page. 3. Enter your Anonymess Access Code exactly as it appears below. You will not need to use this code after youve completed the sign-up process. If you do not sign up before the expiration date, you must request a new code. · Anonymess Access Code: [email or web address] Expires: 10/3/2018  1:16 PM 
 
4. Enter the last four digits of your Social Security Number (xxxx) and Date of Birth (mm/dd/yyyy) as indicated and click Submit. You will be taken to the next sign-up page. 5. Create a MyCPreventlyt ID.  This will be your eTectt login ID and cannot be changed, so think of one that is secure and easy to remember. 6. Create a [employer] password. You can change your password at any time. 7. Enter your Password Reset Question and Answer. This can be used at a later time if you forget your password. 8. Enter your e-mail address. You will receive e-mail notification when new information is available in [address] 9. Click Sign Up. You can now view and download portions of your medical record. 10. Click the Download Summary menu link to download a portable copy of your medical information. If you have questions, please visit the Frequently Asked Questions section of the [employer] website. Remember, [employer] is NOT to be used for urgent needs. For medical emergencies, dial 911. Now available from your iPhone and Android! Introducing Kenneth Reyes As a [facility] patient, I wanted to make you aware of our electronic visit tool called Kenneth Reyes. [employer] allows you to connect within minutes with a medical provider 24 hours a day, seven days a week via a mobile device or tablet or logging into a secure website from your computer. You can access Kenneth Reyes from anywhere in the [place]. A virtual visit might be right for you when you have a simple condition and feel like you just dont want to get out of bed, or cant get away from work for an appointment, when your [facility] is not available (evenings, weekends or holidays), or when youre out of town and need minor care. Electronic visits cost only $49 and if the [employer] provider determines a prescription is needed to treat your condition, one can be electronically transmitted to a nearby pharmacy*. Please take a moment to enroll today if you have not already done so. The enrollment process is free and takes just a few minutes.   To enroll, please download the [employer] della to your tablet or phone, or visit [email or web address] to enroll on your computer. And, as an [address] [place] [address] patient with a [employer] account, the results of your visits will be scanned into your electronic medical record and your primary care provider will be able to view the scanned results. We urge you to continue to see your regular [facility] provider for your ongoing medical care. And while your primary care provider may not be the one available when you seek a [employer] virtual visit, the peace of mind you get from getting a real diagnosis real time can be priceless. For more information on [employer], view our Frequently Asked Questions (FAQs) at [email or web address]. Sincerely, 
 
Radha Thomas MD 
Chief Medical Officer [address] *:  certain medications cannot be prescribed via [employer] Providers Seen During Your Hospitalization Provider Specialty Primary office phone Trish Dia MD Emergency Medicine [phone] Petra Bañuelos MD Psychiatry [phone] Pati Gonzalez MD Psychiatry [phone] Your Primary Care Physician (PCP) Primary Care Physician Office Phone Office Fax Anxada Presser [phone] You are allergic to the following Allergen Reactions Aspirin Nausea and Vomiting Morphine Hives Recent Documentation Height Weight Breastfeeding? BMI OB Status Smoking Status 1.524 m 73.2 kg No 31.5 kg/m2 Postmenopausal Current Some Day Smoker Emergency Contacts Name Discharge Info Relation Home Work [facility] DISCHARGE CAREGIVER [3] Son [22] [phone] Patient Belongings The following personal items are in your possession at time of discharge: 
  Dental Appliances: None  Visual Aid: None      Home Medications: Locked   Jewelry: None  Clothing:  Footwear, Pants, Shirt, Socks, Undergarments, With patient    Other Valuables: Keys, Other (comment) (back pack, and black shoes locked in [address] [place] [address]. )  Personal Items Sent to Safe:  (none) Please provide this summary of care documentation to your next provider. Signatures-by signing, you are acknowledging that this After Visit Summary has been reviewed with you and you have received a copy. Patient Signature:  ____________________________________________________________ Date:  ____________________________________________________________  
  
[facility] Provider Signature:  ____________________________________________________________ Date:  ____________________________________________________________

## 2018-07-18 NOTE — ED PROVIDER NOTES
HPI Comments: 47 y.o. female with past medical history significant for HTN, DM, and psychiatric disorder who presents from Therapist office via EMS with chief complaint of SI. Pt states that she has been tired, can't sleep or eat and is \"at (her) wits end.\" She has been experiencing hallucinations for the past 7 days with voices telling her to hurt herself and other people. Her hallucinations worsened today and she states that she was about to try and overdose on her Psych medications before her friend stopped her and took her to her Therapist. She reports that she was at first experiencing HI but not now. Pt also notes that she is a \"Psych patient\" because she has a hx of \"bipolar, schizophrenia, SI, HI, hallucinations, and mood swings. \" She has a hx of SI one year ago where she was going to OD on medications and her  called the police who stopped her. She denies access to firearms. There are no other acute medical concerns at this time. Social hx: current some day tobacco smoker (\"4 cigarettes/day); no EtOH use   PCP: Lm Donato MD    Note written by Deepika Park, as dictated by Jese Lockhart MD 5:48 PM      The history is provided by the patient. No  was used. Past Medical History:   Diagnosis Date    Diabetes ([address]     Hypertension     Psychiatric disorder        Past Surgical History:   Procedure Laterality Date    HX GYN       x2    HX OTHER SURGICAL      skin grafting    HX OTHER SURGICAL      oral surgery         No family history on file. Social History     Social History    Marital status: SINGLE     Spouse name: N/A    Number of children: N/A    Years of education: N/A     Occupational History    Not on file.      Social History Main Topics    Smoking status: Current Some Day Smoker    Smokeless tobacco: Never Used    Alcohol use No    Drug use: No    Sexual activity: Not on file     Other Topics Concern    Not on file     Social History Narrative         ALLERGIES: Aspirin and Morphine    Review of Systems   Constitutional: Negative for activity change, diaphoresis, fatigue and fever. HENT: Negative for congestion and sore throat. Eyes: Negative for photophobia and visual disturbance. Respiratory: Negative for chest tightness and shortness of breath. Cardiovascular: Negative for chest pain, palpitations and leg swelling. Gastrointestinal: Negative for abdominal pain, blood in stool, constipation, diarrhea, nausea and vomiting. Genitourinary: Negative for difficulty urinating, dysuria, flank pain, frequency and hematuria. Musculoskeletal: Negative for back pain. Neurological: Negative for dizziness, syncope, numbness and headaches. Psychiatric/Behavioral: Positive for hallucinations and suicidal ideas. All other systems reviewed and are negative. Vitals:    07/18/18 1728   BP: (!) 155/109   Pulse: 89   Resp: 18   Temp: 98.7 °F (37.1 °C)   SpO2: 98%   Weight: 71.7 kg (158 lb)   Height: 5' (1.524 m)            Physical Exam   Constitutional: She is oriented to person, place, and time. She appears well-developed and well-nourished. No distress. HENT:   Head: Normocephalic and atraumatic. Nose: Nose normal.   Mouth/Throat: Oropharynx is clear and moist. No oropharyngeal exudate. Eyes: Conjunctivae are normal. Right eye exhibits no discharge. Left eye exhibits no discharge. No scleral icterus. Left eye exhibits nystagmus (leftward with rotational component). Neck: Normal range of motion. Neck supple. No JVD present. No tracheal deviation present. No thyromegaly present. Cardiovascular: Normal rate, regular rhythm, normal heart sounds and intact distal pulses. Exam reveals no gallop and no friction rub. No murmur heard. Pulmonary/Chest: Effort normal and breath sounds normal. No stridor. No respiratory distress. She has no wheezes. She has no rales. She exhibits no tenderness. Abdominal: Bowel sounds are normal. She exhibits no distension and no mass. There is no tenderness. There is no rebound. Musculoskeletal: Normal range of motion. She exhibits no edema or tenderness. Lymphadenopathy:     She has no cervical adenopathy. Neurological: She is alert and oriented to person, place, and time. No cranial nerve deficit. Coordination normal.   Skin: Skin is warm and dry. No rash noted. She is not diaphoretic. No erythema. No pallor. Psychiatric: She has a normal mood and affect. Judgment and thought content normal. She is actively hallucinating. Pt is having auditory hallucinations   Nursing note and vitals reviewed. Note written by Deepika Zaldivar, as dictated by Nemesio Farias MD 5:48 PM    MDM  Number of Diagnoses or Management Options  Suicidal thoughts:      Amount and/or Complexity of Data Reviewed  Clinical lab tests: ordered and reviewed  Review and summarize past medical records: yes  Discuss the patient with other providers: yes    Risk of Complications, Morbidity, and/or Mortality  Presenting problems: moderate  Diagnostic procedures: moderate  Management options: moderate    Patient Progress  Patient progress: stable        ED Course       Procedures     CONSULT NOTE:  8:24 PM Nemesio Farias MD, Consult for [employer]. Discussed available diagnostic tests and clinical findings. Pt will be admitted. Dr. Rj Zaragoza is the accepting MD.    10:14 PM  Patient is being admitted to the hospital.  The results of their tests and reasons for their admission have been discussed with them and/or available family. They convey agreement and understanding for the need to be admitted and for their admission diagnosis. Consultation has been made with the inpatient physician specialist for hospitalization.     LABORATORY TESTS:  Recent Results (from the past 12 hour(s))   CBC WITH AUTOMATED DIFF    Collection Time: 07/18/18  6:17 PM   Result Value Ref Range    WBC 12.1 (H) 3.6 - 11.0 K/uL    RBC 4.20 3.80 - 5.20 M/uL    HGB 14.0 11.5 - 16.0 g/dL    HCT 41.9 35.0 - 47.0 %    MCV 99.8 (H) 80.0 - 99.0 FL    MCH 33.3 26.0 - 34.0 PG    MCHC 33.4 30.0 - 36.5 g/dL    RDW 11.8 11.5 - 14.5 %    PLATELET 751 838 - 334 K/uL    MPV 10.5 8.9 - 12.9 FL    NRBC 0.0 0  WBC    ABSOLUTE NRBC 0.00 0.00 - 0.01 K/uL    NEUTROPHILS 59 32 - 75 %    LYMPHOCYTES 34 12 - 49 %    MONOCYTES 7 5 - 13 %    EOSINOPHILS 1 0 - 7 %    BASOPHILS 0 0 - 1 %    IMMATURE GRANULOCYTES 0 0.0 - 0.5 %    ABS. NEUTROPHILS 7.1 1.8 - 8.0 K/UL    ABS. LYMPHOCYTES 4.1 (H) 0.8 - 3.5 K/UL    ABS. MONOCYTES 0.8 0.0 - 1.0 K/UL    ABS. EOSINOPHILS 0.1 0.0 - 0.4 K/UL    ABS. BASOPHILS 0.0 0.0 - 0.1 K/UL    ABS. IMM. GRANS. 0.0 0.00 - 0.04 K/UL    DF AUTOMATED     METABOLIC PANEL, COMPREHENSIVE    Collection Time: 07/18/18  6:17 PM   Result Value Ref Range    Sodium 139 136 - 145 mmol/L    Potassium 4.0 3.5 - 5.1 mmol/L    Chloride 103 97 - 108 mmol/L    CO2 27 21 - 32 mmol/L    Anion gap 9 5 - 15 mmol/L    Glucose 289 (H) 65 - 100 mg/dL    BUN 8 6 - 20 MG/DL    Creatinine 1.09 (H) 0.55 - 1.02 MG/DL    BUN/Creatinine ratio 7 (L) 12 - 20      GFR est AA >60 >60 ml/min/1.73m2    GFR est non-AA 53 (L) >60 ml/min/1.73m2    Calcium 10.1 8.5 - 10.1 MG/DL    Bilirubin, total 0.3 0.2 - 1.0 MG/DL    ALT (SGPT) 19 12 - 78 U/L    AST (SGOT) 11 (L) 15 - 37 U/L    Alk.  phosphatase 172 (H) 45 - 117 U/L    Protein, total 8.2 6.4 - 8.2 g/dL    Albumin 3.8 3.5 - 5.0 g/dL    Globulin 4.4 (H) 2.0 - 4.0 g/dL    A-G Ratio 0.9 (L) 1.1 - 2.2     ETHYL ALCOHOL    Collection Time: 07/18/18  6:17 PM   Result Value Ref Range    ALCOHOL(ETHYL),SERUM <87 <38 MG/DL   SALICYLATE    Collection Time: 07/18/18  6:17 PM   Result Value Ref Range    Salicylate level 5.0 2.8 - 20.0 MG/DL   ACETAMINOPHEN    Collection Time: 07/18/18  6:17 PM   Result Value Ref Range    Acetaminophen level <2 (L) 10 - 30 ug/mL   URINALYSIS W/MICROSCOPIC    Collection Time: 07/18/18 6:17 PM   Result Value Ref Range    Color YELLOW/STRAW      Appearance CLEAR CLEAR      Specific gravity 1.010 1.003 - 1.030      pH (UA) 5.5 5.0 - 8.0      Protein NEGATIVE  NEG mg/dL    Glucose >1000 (A) NEG mg/dL    Ketone NEGATIVE  NEG mg/dL    Bilirubin NEGATIVE  NEG      Blood NEGATIVE  NEG      Urobilinogen 0.2 0.2 - 1.0 EU/dL    Nitrites NEGATIVE  NEG      Leukocyte Esterase NEGATIVE  NEG      WBC 0-4 0 - 4 /hpf    RBC 0-5 0 - 5 /hpf    Epithelial cells FEW FEW /lpf    Bacteria NEGATIVE  NEG /hpf   URINE CULTURE HOLD SAMPLE    Collection Time: 07/18/18  6:17 PM   Result Value Ref Range    Urine culture hold        URINE ON HOLD IN MICROBIOLOGY DEPT FOR 3 DAYS. IF UNPRESERVED URINE IS SUBMITTED, IT CANNOT BE USED FOR ADDITIONAL TESTING AFTER 24 HRS, RECOLLECTION WILL BE REQUIRED. DRUG SCREEN, URINE    Collection Time: 07/18/18  6:17 PM   Result Value Ref Range    AMPHETAMINES NEGATIVE  NEG      BARBITURATES NEGATIVE  NEG      BENZODIAZEPINES NEGATIVE  NEG      COCAINE NEGATIVE  NEG      METHADONE NEGATIVE  NEG      OPIATES NEGATIVE  NEG      PCP(PHENCYCLIDINE) NEGATIVE  NEG      THC (TH-CANNABINOL) NEGATIVE  NEG      Drug screen comment (NOTE)        IMAGING RESULTS:  No orders to display     No results found.     MEDICATIONS GIVEN:  Medications   ziprasidone (GEODON) 20 mg in sterile water (preservative free) 1 mL injection (not administered)   OLANZapine (ZyPREXA) tablet 5 mg (not administered)   benztropine (COGENTIN) tablet 2 mg (not administered)   benztropine (COGENTIN) injection 2 mg (not administered)   LORazepam (ATIVAN) injection 2 mg (not administered)   LORazepam (ATIVAN) tablet 1 mg (not administered)   zolpidem (AMBIEN) tablet 10 mg (not administered)   acetaminophen (TYLENOL) tablet 650 mg (not administered)   ibuprofen (MOTRIN) tablet 400 mg (not administered)   magnesium hydroxide (MILK OF MAGNESIA) 400 mg/5 mL oral suspension 30 mL (not administered)   nicotine (NICODERM CQ) 21 mg/24 hr patch 1 Patch (not administered)       IMPRESSION:  1. Suicidal thoughts        PLAN:  1.  Admit to Pyschiatry          Martha Hauser MD

## 2018-07-18 NOTE — ED NOTES
1950:  [facility], A&O X3, skin is warm and dry, respirations are even and unlabored. Belongings are at nurse's station, patient is visible from nurses station as well, call bell within reach, will continue to monitor for behavioral changes.

## 2018-07-19 PROCEDURE — 74011250637 HC RX REV CODE- 250/637: Performed by: PSYCHIATRY & NEUROLOGY

## 2018-07-19 PROCEDURE — 74011250637 HC RX REV CODE- 250/637

## 2018-07-19 PROCEDURE — 84443 ASSAY THYROID STIM HORMONE: CPT | Performed by: PSYCHIATRY & NEUROLOGY

## 2018-07-19 PROCEDURE — 65220000003 HC RM SEMIPRIVATE PSYCH

## 2018-07-19 RX ORDER — HALOPERIDOL 5 MG/1
5 TABLET ORAL
Status: DISCONTINUED | OUTPATIENT
Start: 2018-07-19 | End: 2018-07-23 | Stop reason: HOSPADM

## 2018-07-19 RX ORDER — BENZTROPINE MESYLATE 1 MG/1
1 TABLET ORAL
COMMUNITY
End: 2018-07-23

## 2018-07-19 RX ORDER — LAMOTRIGINE 100 MG/1
TABLET ORAL 2 TIMES DAILY
COMMUNITY
End: 2018-07-23

## 2018-07-19 RX ORDER — CETIRIZINE HCL 10 MG
10 TABLET ORAL DAILY
COMMUNITY
End: 2020-03-19 | Stop reason: SDUPTHER

## 2018-07-19 RX ORDER — FLUTICASONE PROPIONATE 50 MCG
1 SPRAY, SUSPENSION (ML) NASAL DAILY
COMMUNITY
End: 2020-03-19 | Stop reason: SDUPTHER

## 2018-07-19 RX ORDER — SERTRALINE HYDROCHLORIDE 50 MG/1
50 TABLET, FILM COATED ORAL DAILY
COMMUNITY
End: 2018-07-23

## 2018-07-19 RX ORDER — BENZTROPINE MESYLATE 1 MG/1
1 TABLET ORAL
Status: DISCONTINUED | OUTPATIENT
Start: 2018-07-19 | End: 2018-07-23 | Stop reason: HOSPADM

## 2018-07-19 RX ORDER — SERTRALINE HYDROCHLORIDE 50 MG/1
50 TABLET, FILM COATED ORAL DAILY
Status: DISCONTINUED | OUTPATIENT
Start: 2018-07-19 | End: 2018-07-23 | Stop reason: HOSPADM

## 2018-07-19 RX ADMIN — BENZTROPINE MESYLATE 1 MG: 1 TABLET ORAL at 21:40

## 2018-07-19 RX ADMIN — SERTRALINE HYDROCHLORIDE 50 MG: 50 TABLET ORAL at 11:59

## 2018-07-19 RX ADMIN — ACETAMINOPHEN 650 MG: 325 TABLET, FILM COATED ORAL at 03:53

## 2018-07-19 RX ADMIN — HALOPERIDOL 5 MG: 5 TABLET ORAL at 21:40

## 2018-07-19 RX ADMIN — ACETAMINOPHEN 650 MG: 325 TABLET, FILM COATED ORAL at 08:20

## 2018-07-19 NOTE — PROGRESS NOTES
[address] [facility] 60  Master Treatment Plan for [address]    Date Treatment Plan Initiated: 7/19/18    Treatment Plan Modalities:  Type of Modality Amount  (x minutes) Frequency (x/week) Duration (x days) Name of Responsible Staff   [address] [facility] meetings to encourage peer interactions 13 7 1 Avila Bravo   Group psychotherapy to assist in building coping skills and internal controls 60 7 1 Cheng Chand   Therapeutic activity groups to build coping skills 60 7 1 Cheng Chand   Psychoeducation in group setting to address:   Medication education   15 7 1 Carolin ELLIS   Coping skills         Relaxation techniques         Symptom management         Discharge planning   60 2 Florentino Hunter 115   60 1 1 volunteer   Recovery/AA/NA      volunteer   Physician medication management   13 7 1 Dr. Marlen Vizcarra meeting/discharge planning   15 2 [facility]                                        Problem: Depressed Mood (Adult/Pediatric) these goals will be met by 7/23/18  Goal: *STG: Participates in treatment plan  Outcome: Progressing Towards Goal  Out on unit passively engaged. Mood and affect anxious to sad. Daily goal is to talk with tx team about medications and her increase amount of SI. Staff focus is on offering support, reassurance and coping skillls education  Goal: *STG: Verbalizes anger, guilt, and other feelings in a constructive manor  Outcome: Progressing Towards Goal  Grief and hopelessness   Goal: *STG: Attends activities and groups  Outcome: Progressing Towards Goal  engaged  Goal: *STG: Demonstrates reduction in symptoms and increase in insight into coping skills/future focused  Outcome: Progressing Towards Goal  Denies SI, no self harming behaviors.  Symptoms such as hopelessness remain due to all four of her children being incarcerated, describes AH however describes voices coming from inside her head when she is thinking negatively about her kids and life. Does not appear to be internally stimulated or preoccupied. Insight into benefits of therapy and talking with staff. Goal: Interventions  Outcome: Progressing Towards Goal  Staff focus is on coping skills education offering support and reassurance.
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
## 2018-07-19 NOTE — BH NOTES
PRN Medication Documentation    Specific patient behavior that led to need for PRN medication: Complained of headache   Staff interventions attempted prior to PRN being given: Assessed level of pain and location. Scored 7    PRN medication given: Tylenol 650 p.o. Prn pain  Patient response/effectiveness of PRN medication: Effective as patient was able to return to sleep.

## 2018-07-19 NOTE — PROGRESS NOTES
Problem: Depressed Mood (Adult/Pediatric)  Goal: *STG: Complies with medication therapy  Outcome: Progressing Towards Goal  1530: Greeted patient on unit in room resting. Appeared in no acute distress. No voiced concerns at present. Will continue to monitor on Q 15 minute safety checks. 1830: Patient alert, vitals stable, wnl.   Remains ambulatory with no assist.   Attended Reflections group. Cooperative and appropriate with staff and peers.

## 2018-07-19 NOTE — BSMART NOTE
Comprehensive Assessment Form Part 1    Section I - Disposition    Axis I - Schizophrenia   Axis II - Deferred  Axis III - Hypertension, Diabetes, Visual impairment  Axis IV - None  Axis V - 35      The Medical Doctor to Psychiatrist conference was not completed. The Medical Doctor is in agreement with Psychiatrist disposition because of (reason) patient is requesting voluntary admission. The plan is admit to [facility], general unit. The on-call Psychiatrist consulted was Dr. Rolanda Lomas. The admitting Psychiatrist will be Dr. Rolanda Lomas. The admitting Diagnosis is Schizophrenia. The Payor source is [facility]. Section II - Integrated Summary  Summary:  Patient is a 47year old female seen face to face in the ER. She came to the ER via EMS from her therapist's office after expressing suicidal ideation with a plan. Patient reported she is tired and can't sleep or eat. She felt at her \"wit's end.\"  She stated she has auditory hallucinations at her baseline, however for the past 7 days the voices have been telling her to hurt herself and others. The hallucinations were worse today and she wanted to hurt herself. She stated she is feeling better at the moment because she feels like she is in a safe place and will be able to get help. Patient was last psychiatrically hospitalized in July 2017 at [facility]. She reported she has 15-20 prior hospitalizations in her life. She has a psychiatrist and therapist.  She reports medication compliance. She has 4-5 prior suicide attempts via cutting and overdose. Her most recent attempt was last year via overdose. She is requesting voluntary admission. The patient has demonstrated mental capacity to provide informed consent. The information is given by the patient and past medical records. The Chief Complaint is suicidal ideation. The Precipitant Factors are none.   Previous Hospitalizations: yes  The patient has not previously been in restraints. Current Psychiatrist is \"Dr. PRINGLE" and therapist is Claude Crigler at [facility]. Lethality Assessment:    The potential for suicide is noted by the following: active psychosis, previous history of attempts, defined plan and ideation. The potential for homicide is not noted. The patient has not been a perpetrator of sexual or physical abuse. There are not pending charges. The patient is felt to be at risk for self harm or harm to others. The attending nurse was advised the patient needs supervision. Section III - Psychosocial  The patient's overall mood and attitude is depressed. Feelings of helplessness and hopelessness are not observed. Generalized anxiety is not observed. Panic is not observed. Phobias are not observed. Obsessive compulsive tendencies are not observed. Section IV - Mental Status Exam  The patient's appearance shows no evidence of impairment. The patient's behavior shows no evidence of impairment. The patient is oriented to time, place, person and situation. The patient's speech is soft. The patient's mood is depressed. The range of affect is flat. The patient's thought content demonstrates no evidence of impairment. The thought process shows no evidence of impairment. The patient's perception demonstrated changes in the following:  auditory hallucinations. The patient's memory shows no evidence of impairment. The patient's appetite is decreased. The patient's sleep has evidence of insomnia. The patient's insight shows no evidence of impairment. The patient's judgement is psychologically impaired. Section V - Substance Abuse  The patient is not using substances. Section VI - Living Arrangements  The patient is single. The patient lives with a roommate. The patient has 6 adult children. The patient does plan to return home upon discharge. The patient does not have legal issues pending.  The patient's source of income comes from social security. Denominational and cultural practices have not been voiced at this time. The patient's greatest support comes from a friend and this person will not be involved with the treatment. The patient has not been in an event described as horrible or outside the realm of ordinary life experience either currently or in the past.  The patient has not been a victim of sexual/physical abuse. Section VII - Other Areas of Clinical Concern  The highest grade achieved is unknown with the overall quality of school experience being described as unknown. The patient is currently disabled and speaks [place] as a primary language. The patient has no communication impairments affecting communication. The patient's preference for learning can be described as: learns best by oral information. The patient's hearing is normal.  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       Samantha Mays

## 2018-07-19 NOTE — BH NOTES
GROUP THERAPY PROGRESS NOTE    Bela Amado did not participate in a 65 minute Process Group on the General Unit with a focus identifying feelings, planning for the day, and reviewing DBT coping skills related to distress management.

## 2018-07-19 NOTE — BH NOTES
TRANSFER - IN REPORT:    Verbal report received from [address]  being received from ERfor routine progression of care      Report consisted of patients Situation, Background, Assessment and   Recommendations(SBAR). Information from the following report(s) ED Summary was reviewed with the receiving nurse. Opportunity for questions and clarification was provided. Assessment completed upon patients arrival to unit and care assumed.

## 2018-07-19 NOTE — PROGRESS NOTES
Admission Medication Reconciliation:    Comments/Recommendations: This medication history was obtained from patient with support from [employer] ([phone]) and [facility]([phone]); An RX Query is available but is not a complete list.     Medications added: Benztropine 1mg BID PRN, Fluticasone nasal spray 1 spray each nostril daily, Cetirizine 10mg daily  Medications deleted: Olanzapine 10mg, Saxagliptin 2.5mg, Topiramate 100mg  Medications amended: Changed Lamotrigine from 25mg tablet daily to 100mg BID, added the dose for sertraline which is 50mg daily. Also of note: Called both pharmacies at which patient fills medications to verify what has been picked up. Patient only gets Tramadol from [facility] and gets everything else from [facility]. The medications that were removed from patients list have not been picked up/refilled/renewed in the past 6 months. Significant PMH/Disease States:   Past Medical History:   Diagnosis Date    Diabetes ([facility] [address]     Hypertension     Psychiatric disorder        Chief Complaint for this Admission:  Behavioral Health     Allergies:  Aspirin and Morphine    Prior to Admission Medications:   Prior to Admission Medications   Prescriptions Last Dose Informant Patient Reported? Taking? MULTIVIT-MINS NO.7/FOLIC ACID (V-C FORTE PO)   Yes Yes   Sig: Take 1 Cap by mouth daily. benztropine (COGENTIN) 1 mg tablet   Yes Yes   Sig: Take 1 mg by mouth two (2) times daily as needed. cetirizine (ZYRTEC) 10 mg tablet   Yes Yes   Sig: Take 10 mg by mouth daily. fluticasone (FLONASE) 50 mcg/actuation nasal spray   Yes Yes   Si [place] by Both Nostrils route daily. haloperidol (HALDOL) 5 mg tablet   Yes Yes   Sig: Take 5 mg by mouth.   lamoTRIgine (LAMICTAL) 100 mg tablet   Yes Yes   Sig: Take  by mouth two (2) times a day. lisinopril (PRINIVIL, ZESTRIL) 40 mg tablet   No Yes   Sig: Take 1 Tab by mouth daily.  Indications: hypertension   sertraline (ZOLOFT) 50 mg tablet Yes Yes   Sig: Take 50 mg by mouth daily. traMADol (ULTRAM) 50 mg tablet   Yes Yes   Sig: Take 50 mg by mouth every eight (8) hours as needed for Pain. traZODone (DESYREL) 50 mg tablet   Yes Yes   Sig: Take  by mouth nightly. Facility-Administered Medications: None         Thank you for allowing me to participate in the care of this patient.    Jayla Vigil  PharmD Candidate [id number]

## 2018-07-19 NOTE — BH NOTES
2154:Spoke with nurse Yvonne Corona, concerning patients blood sugar, which has been 289 since 1817. Asked that doctor in the ER  address the blood sugar level  before bringing patient to our floor. Nurse stated she would speak with doctor concerning orders to address the blood sugar. Awaiting return call.

## 2018-07-19 NOTE — PROGRESS NOTES
GROUP THERAPY PROGRESS NOTE    Aric Diaz is participating in Goals Group and Community. Group time: 35 minutes    Personal goal for participation: Shower, change bed sheets    Goal orientation: personal    Group therapy participation: minimal    Therapeutic interventions reviewed and discussed: Reviewed roles of different staff members to orient patients to unit. Discussed the different roles of the members of treatment team and highlighted importance of monitoring symptoms and side effects to discuss with the doctor, nurse, pharmacist, and  throughout the day. Discussed importance of daily goal setting and importance of setting goals that are definable, achievable and things that can be tracked. Impression of participation: Needed encouragement to set goals, said she \"has too much going on in her head to think of a goal\", but accepted suggestions from staff.

## 2018-07-19 NOTE — ROUTINE PROCESS
TRANSFER - OUT REPORT:    Verbal report given to Tristin Stevens RN(name) on [employer]  being transferred to (unit) for routine progression of care       Report consisted of patients Situation, Background, Assessment and   Recommendations(SBAR). Information from the following report(s) SBAR, Kardex, ED Summary and Recent Results was reviewed with the receiving nurse. Lines:       Opportunity for questions and clarification was provided.       Patient transported with:   Registered Nurse
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
## 2018-07-19 NOTE — PROGRESS NOTES
NUTRITION       Nutrition screening referral was triggered based on results obtained during nursing admission assessment for \"Unsure wt loss. \"      The patient's chart was reviewed and nutrition assessment is not indicated at this time. No weight loss noted and weight remains WNL  Wt Readings from Last 10 Encounters:   07/18/18 71.7 kg (158 lb)   07/05/18 71.7 kg (158 lb)   10/04/17 70.3 kg (155 lb)   07/06/17 62.9 kg (138 lb 9.6 oz)     Please consult as needed. Thank you.      Ivan Massey RD

## 2018-07-19 NOTE — H&P
INITIAL PSYCHIATRIC EVALUATION            IDENTIFICATION:    Patient Name  Alia Vila   Date of Birth 1965   [facility] [id number]   Medical Record Number  [id number]      Age  47 y.o. PCP Gwynneth Nageotte, MD   Admit date:  7/18/2018    Room Number  26/1  @ [facility]   Date of Service  7/19/2018            HISTORY         REASON FOR HOSPITALIZATION:  CC: \". Pt admitted under a voluntary basis for severe depression with suicidal ideations and  psychosis  proving to be an imminent danger to self and an inability to care for self. HISTORY OF PRESENT ILLNESS:    The patient, Alia Vila, is a 47 y.o. BLACK OR  female with a past psychiatric history significant for depression and psychosis , who presents at this time with complaints of (and/or evidence of) the following emotional symptoms: depression, suicidal thoughts/threats, psychotic behavior, paranoid behavior and psychosis. Additional symptomatology include depression worse, difficulty sleeping, feeling depressed, feeling suicidal and \"I wanted to die\". The above symptoms have been present for months . These symptoms are of moderate  severity. These symptoms are constant  in nature. The patient's condition has been precipitated by her mental illness and psychosocial stressors (her sons are incarcerated  ). She went to her therapist yesterday and she expressed suicidal thoughts and hearing voices and she came to ER . ALLERGIES:   Allergies   Allergen Reactions    Aspirin Nausea and Vomiting    Morphine Hives      MEDICATIONS PRIOR TO ADMISSION:   Prescriptions Prior to Admission   Medication Sig    traMADol (ULTRAM) 50 mg tablet Take 50 mg by mouth every six (6) hours as needed for Pain.  traZODone (DESYREL) 50 mg tablet Take  by mouth nightly.  haloperidol (HALDOL) 5 mg tablet Take 5 mg by mouth.  sertraline HCl (ZOLOFT PO) Take  by mouth.     cephALEXin (KEFLEX) 500 mg capsule Take 1 Cap by mouth two (2) times a day.  lamoTRIgine (LAMICTAL) 25 mg tablet Take 1 Tab by mouth daily. Indications: DEPRESSION ASSOCIATED WITH BIPOLAR DISORDER    lisinopril (PRINIVIL, ZESTRIL) 40 mg tablet Take 1 Tab by mouth daily. Indications: hypertension    OLANZapine (ZYPREXA) 10 mg tablet Take 1 Tab by mouth nightly. Indications: DEPRESSION ASSOCIATED WITH BIPOLAR DISORDER, ADJUNCT    topiramate (TOPAMAX) 100 mg tablet Take 1 Tab by mouth two (2) times a day. Indications: mood lability    MULTIVIT-MINS NO.7/FOLIC ACID (V-C FORTE PO) Take 1 Cap by mouth daily.  sAXagliptin (ONGLYZA) 2.5 mg tablet Take 2.5 mg by mouth daily. PAST MEDICAL HISTORY:   Past Medical History:   Diagnosis Date    Diabetes ([address]     Hypertension     Psychiatric disorder      Past Surgical History:   Procedure Laterality Date    HX GYN       x2    HX OTHER SURGICAL  1988    skin grafting    HX OTHER SURGICAL  2016    oral surgery      SOCIAL HISTORY: single    Social History     Social History    Marital status: SINGLE     Spouse name: N/A    Number of children: N/A    Years of education: N/A     Occupational History    Not on file. Social History Main Topics    Smoking status: Current Some Day Smoker    Smokeless tobacco: Never Used    Alcohol use No    Drug use: No    Sexual activity: Not on file     Other Topics Concern    Not on file     Social History Narrative      FAMILY HISTORY: History reviewed. No pertinent family history. No family history on file. REVIEW OF SYSTEMS:   History obtained from the patient  Pertinent items are noted in the History of Present Illness. All other Systems reviewed and are considered negative.            MENTAL STATUS EXAM & VITALS     MENTAL STATUS EXAM (MSE):    MSE FINDINGS ARE WITHIN NORMAL LIMITS (WNL) UNLESS OTHERWISE STATED BELOW. ( ALL OF THE BELOW CATEGORIES OF THE MSE HAVE BEEN REVIEWED (reviewed 2018) AND UPDATED AS DEEMED APPROPRIATE )  General Presentation age appropriate, cooperative   Orientation oriented to time, place and person   Vital Signs  See below (reviewed 7/19/2018); Vital Signs (BP, Pulse, & Temp) are within normal limits if not listed below. Gait and Station Stable/steady, no ataxia   Musculoskeletal System No extrapyramidal symptoms (EPS); no abnormal muscular movements or Tardive Dyskinesia (TD); muscle strength and tone are within normal limits   Language No aphasia or dysarthria   Speech:  normal pitch and normal volume   Thought Processes logical; normal rate of thoughts; fair abstract reasoning/computation   Thought Associations goal directed   Thought Content paranoid delusions and auditory hallucinations   Suicidal Ideations intention and no plan    Homicidal Ideations no plan  and no intention   Mood:  depressed   Affect:  depressed   Memory recent  good   Memory remote:  good   Concentration/Attention:  good   Fund of Knowledge average   Insight:  limited   Reliability fair   Judgment:  limited          VITALS:     Patient Vitals for the past 24 hrs:   Temp Pulse Resp BP SpO2   07/19/18 0709 98.8 °F (37.1 °C) 82 16 112/80 98 %   07/18/18 2200 98.8 °F (37.1 °C) 88 16 121/77 97 %   07/18/18 1728 98.7 °F (37.1 °C) 89 18 (!) 155/109 98 %     Wt Readings from Last 3 Encounters:   07/18/18 71.7 kg (158 lb)   07/05/18 71.7 kg (158 lb)   10/04/17 70.3 kg (155 lb)     Temp Readings from Last 3 Encounters:   07/19/18 98.8 °F (37.1 °C)   07/05/18 98.5 °F (36.9 °C)   10/04/17 99.4 °F (37.4 °C)     BP Readings from Last 3 Encounters:   07/19/18 112/80   07/05/18 (!) 141/94   10/04/17 143/90     Pulse Readings from Last 3 Encounters:   07/19/18 82   07/05/18 (!) 101   10/04/17 98            DATA     LABORATORY DATA:  Labs Reviewed   CBC WITH AUTOMATED DIFF - Abnormal; Notable for the following:        Result Value    WBC 12.1 (*)     MCV 99.8 (*)     ABS.  LYMPHOCYTES 4.1 (*)     All other components within normal limits   METABOLIC PANEL, COMPREHENSIVE - Abnormal; Notable for the following:     Glucose 289 (*)     Creatinine 1.09 (*)     BUN/Creatinine ratio 7 (*)     GFR est non-AA 53 (*)     AST (SGOT) 11 (*)     Alk. phosphatase 172 (*)     Globulin 4.4 (*)     A-G Ratio 0.9 (*)     All other components within normal limits   ACETAMINOPHEN - Abnormal; Notable for the following:     Acetaminophen level <2 (*)     All other components within normal limits   URINALYSIS W/MICROSCOPIC - Abnormal; Notable for the following:     Glucose >1000 (*)     All other components within normal limits   URINE CULTURE HOLD SAMPLE   ETHYL ALCOHOL   SALICYLATE   DRUG SCREEN, URINE   SAMPLES BEING HELD   TSH 3RD GENERATION     Admission on 07/18/2018   Component Date Value Ref Range Status    WBC 07/18/2018 12.1* 3.6 - 11.0 K/uL Final    RBC 07/18/2018 4.20  3.80 - 5.20 M/uL Final    HGB 07/18/2018 14.0  11.5 - 16.0 g/dL Final    HCT 07/18/2018 41.9  35.0 - 47.0 % Final    MCV 07/18/2018 99.8* 80.0 - 99.0 FL Final    MCH 07/18/2018 33.3  26.0 - 34.0 PG Final    MCHC 07/18/2018 33.4  30.0 - 36.5 g/dL Final    RDW 07/18/2018 11.8  11.5 - 14.5 % Final    PLATELET 33/61/2058 284  150 - 400 K/uL Final    MPV 07/18/2018 10.5  8.9 - 12.9 FL Final    NRBC 07/18/2018 0.0  0  WBC Final    ABSOLUTE NRBC 07/18/2018 0.00  0.00 - 0.01 K/uL Final    NEUTROPHILS 07/18/2018 59  32 - 75 % Final    LYMPHOCYTES 07/18/2018 34  12 - 49 % Final    MONOCYTES 07/18/2018 7  5 - 13 % Final    EOSINOPHILS 07/18/2018 1  0 - 7 % Final    BASOPHILS 07/18/2018 0  0 - 1 % Final    IMMATURE GRANULOCYTES 07/18/2018 0  0.0 - 0.5 % Final    ABS. NEUTROPHILS 07/18/2018 7.1  1.8 - 8.0 K/UL Final    ABS. LYMPHOCYTES 07/18/2018 4.1* 0.8 - 3.5 K/UL Final    ABS. MONOCYTES 07/18/2018 0.8  0.0 - 1.0 K/UL Final    ABS. EOSINOPHILS 07/18/2018 0.1  0.0 - 0.4 K/UL Final    ABS. BASOPHILS 07/18/2018 0.0  0.0 - 0.1 K/UL Final    ABS. IMM.  GRANS. 07/18/2018 0.0  0.00 - 0.04 K/UL Final  DF 07/18/2018 AUTOMATED    Final    Sodium 07/18/2018 139  136 - 145 mmol/L Final    Potassium 07/18/2018 4.0  3.5 - 5.1 mmol/L Final    Chloride 07/18/2018 103  97 - 108 mmol/L Final    CO2 07/18/2018 27  21 - 32 mmol/L Final    Anion gap 07/18/2018 9  5 - 15 mmol/L Final    Glucose 07/18/2018 289* 65 - 100 mg/dL Final    BUN 07/18/2018 8  6 - 20 MG/DL Final    Creatinine 07/18/2018 1.09* 0.55 - 1.02 MG/DL Final    BUN/Creatinine ratio 07/18/2018 7* 12 - 20   Final    GFR est AA 07/18/2018 >60  >60 ml/min/1.73m2 Final    GFR est non-AA 07/18/2018 53* >60 ml/min/1.73m2 Final    Calcium 07/18/2018 10.1  8.5 - 10.1 MG/DL Final    Bilirubin, total 07/18/2018 0.3  0.2 - 1.0 MG/DL Final    ALT (SGPT) 07/18/2018 19  12 - 78 U/L Final    AST (SGOT) 07/18/2018 11* 15 - 37 U/L Final    Alk.  phosphatase 07/18/2018 172* 45 - 117 U/L Final    Protein, total 07/18/2018 8.2  6.4 - 8.2 g/dL Final    Albumin 07/18/2018 3.8  3.5 - 5.0 g/dL Final    Globulin 07/18/2018 4.4* 2.0 - 4.0 g/dL Final    A-G Ratio 07/18/2018 0.9* 1.1 - 2.2   Final    ALCOHOL(ETHYL),SERUM 07/18/2018 <10  <10 MG/DL Final    Salicylate level 60/04/8402 5.0  2.8 - 20.0 MG/DL Final    Acetaminophen level 07/18/2018 <2* 10 - 30 ug/mL Final    Color 07/18/2018 YELLOW/STRAW    Final    Appearance 07/18/2018 CLEAR  CLEAR   Final    Specific gravity 07/18/2018 1.010  1.003 - 1.030   Final    pH (UA) 07/18/2018 5.5  5.0 - 8.0   Final    Protein 07/18/2018 NEGATIVE   NEG mg/dL Final    Glucose 07/18/2018 >1000* NEG mg/dL Final    Ketone 07/18/2018 NEGATIVE   NEG mg/dL Final    Bilirubin 07/18/2018 NEGATIVE   NEG   Final    Blood 07/18/2018 NEGATIVE   NEG   Final    Urobilinogen 07/18/2018 0.2  0.2 - 1.0 EU/dL Final    Nitrites 07/18/2018 NEGATIVE   NEG   Final    Leukocyte Esterase 07/18/2018 NEGATIVE   NEG   Final    WBC 07/18/2018 0-4  0 - 4 /hpf Final    RBC 07/18/2018 0-5  0 - 5 /hpf Final    Epithelial cells 07/18/2018 FEW FEW /lpf Final    Bacteria 07/18/2018 NEGATIVE   NEG /hpf Final    Urine culture hold 07/18/2018 URINE ON HOLD IN MICROBIOLOGY DEPT FOR 3 DAYS. IF UNPRESERVED URINE IS SUBMITTED, IT CANNOT BE USED FOR ADDITIONAL TESTING AFTER 24 HRS, RECOLLECTION WILL BE REQUIRED. Final    AMPHETAMINES 07/18/2018 NEGATIVE   NEG   Final    BARBITURATES 07/18/2018 NEGATIVE   NEG   Final    BENZODIAZEPINES 07/18/2018 NEGATIVE   NEG   Final    COCAINE 07/18/2018 NEGATIVE   NEG   Final    METHADONE 07/18/2018 NEGATIVE   NEG   Final    OPIATES 07/18/2018 NEGATIVE   NEG   Final    PCP(PHENCYCLIDINE) 07/18/2018 NEGATIVE   NEG   Final    THC (TH-CANNABINOL) 07/18/2018 NEGATIVE   NEG   Final    Drug screen comment 07/18/2018 (NOTE)   Final        RADIOLOGY REPORTS:  No results found for this or any previous visit. No results found.            MEDICATIONS       ALL MEDICATIONS  Current Facility-Administered Medications   Medication Dose Route Frequency    ziprasidone (GEODON) 20 mg in sterile water (preservative free) 1 mL injection  20 mg IntraMUSCular BID PRN    OLANZapine (ZyPREXA) tablet 5 mg  5 mg Oral Q6H PRN    benztropine (COGENTIN) tablet 2 mg  2 mg Oral BID PRN    benztropine (COGENTIN) injection 2 mg  2 mg IntraMUSCular BID PRN    LORazepam (ATIVAN) injection 2 mg  2 mg IntraMUSCular Q4H PRN    LORazepam (ATIVAN) tablet 1 mg  1 mg Oral Q4H PRN    zolpidem (AMBIEN) tablet 10 mg  10 mg Oral QHS PRN    acetaminophen (TYLENOL) tablet 650 mg  650 mg Oral Q4H PRN    ibuprofen (MOTRIN) tablet 400 mg  400 mg Oral Q8H PRN    magnesium hydroxide (MILK OF MAGNESIA) 400 mg/5 mL oral suspension 30 mL  30 mL Oral DAILY PRN    nicotine (NICODERM CQ) 21 mg/24 hr patch 1 Patch  1 Patch TransDERmal DAILY PRN      SCHEDULED MEDICATIONS  Current Facility-Administered Medications   Medication Dose Route Frequency                ASSESSMENT & PLAN        The patient, Anderson Vargas, is a 47 y.o.  female who presents at this time for treatment of the following diagnoses:  Patient Active Hospital Problem List:   Depression (7/18/2018)    Assessment: depression and psychosis     Plan: collateral information and need to restart her medications            I will continue to monitor blood levels (Depakote, Tegretol, lithium, clozapine---a drug with a narrow therapeutic index= NTI) and associated labs for drug therapy implemented that require intense monitoring for toxicity as deemed appropriate based on current medication side effects and pharmacodynamically determined drug 1/2 lives. A coordinated, multidisplinary treatment team (includes the nurse, unit pharmcist,  and writer) round was conducted for this initial evaluation with the patient present. The following regarding medications was addressed during rounds with patient:   the risks and benefits of the proposed medication. The patient was given the opportunity to ask questions. Informed consent given to the use of the above medications. I will continue to adjust psychiatric and non-psychiatric medications (see above \"medication\" section and orders section for details) as deemed appropriate & based upon diagnoses and response to treatment. I have reviewed admission (and previous/old) labs and medical tests in the EHR and or transferring hospital documents. I will continue to order blood tests/labs and diagnostic tests as deemed appropriate and review results as they become available (see orders for details). I have reviewed old psychiatric and medical records available in the EHR. I Will order additional psychiatric records from other institutions to further elucidate the nature of patient's psychopathology and review once available. I will gather additional collateral information from friends, family and o/p treatment team to further elucidate the nature of patient's psychopathology and baselline level of psychiatric functioning.       ESTIMATED LENGTH OF STAY:    3       STRENGTHS:  Exercising self-direction/Resourceful, Access to housing/residential stability and Interpersonal/supportive relationships (family, friends, peers)                                        SIGNED:    Juliet Marmolejo MD  7/19/2018

## 2018-07-19 NOTE — INTERDISCIPLINARY ROUNDS
Behavioral Health Interdisciplinary Rounds     Patient Name: Dave Caicedo  Age: 47 y.o. Room/Bed:  731/02  Primary Diagnosis: <principal problem not specified>   Admission Status: Voluntary     Readmission within 30 days: no  Power of  in place: no  Patient requires a blocked bed: no          Reason for blocked bed: n/a    VTE Prophylaxis: No    Mobility needs/Fall risk: no    Nutritional Plan: no  Consults: no         Labs/Testing due today?: yes    Sleep hours:  5.75      Participation in Care/Groups:  No new admit  Medication Compliant?: Yes  PRNS (last 24 hours): Sleep Aid    Restraints (last 24 hours):  no     CIWA (range last 24 hours):     COWS (range last 24 hours):      Alcohol screening (AUDIT) completed -   AUDIT Score: 0     If applicable, date SBIRT discussed in treatment team AND documented:     Tobacco - patient is a smoker: Have You Used Tobacco in the Past 30 Days: No  Illegal Drugs use: Have You Used Any Illegal Substances Over the Past 12 Months: No    24 hour chart check complete: yes     Patient goal(s) for today: attend groups  Treatment team focus/goals: psychosocial  Progress note: Patient reports increased depression and inability to care for self. LOS:  1  Expected LOS: TBD    Financial concerns/prescription coverage:  [place] [employer]  Date of last family contact: None     Family requesting physician contact today:  No  Discharge plan: Return home when stable for discharge  Guns in the home:  No       Outpatient provider(s): Dr. Luci Amin (psychiatrist);  [facility] (therapeutic treatment center)    Participating treatment team members: Dodie Sexton MSW; Dr. Joey Gibbs MD; Omar Walton RN; Marla Nichols, PharmD; Pharmacy student

## 2018-07-19 NOTE — PROGRESS NOTES
Parul was present in Spirituality Group about lainey on [facility]    aleksandar Elizalde M.Div, M.S, Lakshmi [phone] available at [phone])

## 2018-07-20 LAB — TSH SERPL DL<=0.05 MIU/L-ACNC: 0.61 UIU/ML (ref 0.36–3.74)

## 2018-07-20 PROCEDURE — 65220000003 HC RM SEMIPRIVATE PSYCH

## 2018-07-20 PROCEDURE — 74011250637 HC RX REV CODE- 250/637

## 2018-07-20 PROCEDURE — 74011250637 HC RX REV CODE- 250/637: Performed by: PSYCHIATRY & NEUROLOGY

## 2018-07-20 RX ORDER — TRAZODONE HYDROCHLORIDE 50 MG/1
50 TABLET ORAL
Status: DISCONTINUED | OUTPATIENT
Start: 2018-07-20 | End: 2018-07-23 | Stop reason: HOSPADM

## 2018-07-20 RX ORDER — LAMOTRIGINE 25 MG/1
50 TABLET ORAL 2 TIMES DAILY
Status: DISCONTINUED | OUTPATIENT
Start: 2018-07-20 | End: 2018-07-23 | Stop reason: HOSPADM

## 2018-07-20 RX ADMIN — LAMOTRIGINE 50 MG: 25 TABLET ORAL at 09:50

## 2018-07-20 RX ADMIN — SERTRALINE HYDROCHLORIDE 50 MG: 50 TABLET ORAL at 08:32

## 2018-07-20 RX ADMIN — BENZTROPINE MESYLATE 1 MG: 1 TABLET ORAL at 21:09

## 2018-07-20 RX ADMIN — HALOPERIDOL 5 MG: 5 TABLET ORAL at 21:09

## 2018-07-20 RX ADMIN — LAMOTRIGINE 50 MG: 25 TABLET ORAL at 21:09

## 2018-07-20 RX ADMIN — ACETAMINOPHEN 650 MG: 325 TABLET, FILM COATED ORAL at 18:45

## 2018-07-20 RX ADMIN — TRAZODONE HYDROCHLORIDE 50 MG: 50 TABLET ORAL at 21:09

## 2018-07-20 NOTE — BH NOTES
PRN Medication Documentation    Specific patient behavior that led to need for PRN medication: Complaining of headache   Staff interventions attempted prior to PRN being given: deep breathing, distraction offered  PRN medication given: Tylenol given PO   Patient response/effectiveness of PRN medication: Will let next shift re-assess patients pain.  \

## 2018-07-20 NOTE — INTERDISCIPLINARY ROUNDS
Behavioral Health Interdisciplinary Rounds     Patient Name: Lei Gallego  Age: 47 y.o. Room/Bed:  731/02  Primary Diagnosis: <principal problem not specified>   Admission Status: Voluntary     Readmission within 30 days: no  Power of  in place: no  Patient requires a blocked bed: no          Reason for blocked bed:     VTE Prophylaxis: No    Mobility needs/Fall risk: no    Nutritional Plan: no  Consults:          Labs/Testing due today?: no    Sleep hours:  6.45      Participation in Care/Groups:  yes  Medication Compliant?: Yes  PRNS (last 24 hours): Pain    Restraints (last 24 hours):  no     CIWA (range last 24 hours):     COWS (range last 24 hours):      Alcohol screening (AUDIT) completed -   AUDIT Score: 0     If applicable, date SBIRT discussed in treatment team AND documented:     Tobacco - patient is a smoker: Have You Used Tobacco in the Past 30 Days: No  Illegal Drugs use: Have You Used Any Illegal Substances Over the Past 12 Months: No    24 hour chart check complete: yes     Patient goal(s) for today: Attend all groups  Treatment team focus/goals:  Add Trazodone  Progress note: Pt reports improved mood; endorsing AH of voices    LOS:  2  Expected LOS: TBD    Financial concerns/prescription coverage: [employer]  Date of last family contact: None    Family requesting physician contact today:  No  Discharge plan: Return home  Guns in the home: No       Outpatient provider(s): Dr. Stefanie Rivera    Participating treatment team members: Lei Gallego, BARRETT Anna; Dr. Zaynab Colvin MD; Darryle Blood, RN

## 2018-07-20 NOTE — BH NOTES
PSYCHIATRIC PROGRESS NOTE             IDENTIFICATION:    Patient Name  Anderson Vargas   Date of Birth 1965   [facility] [id number]   Medical Record Number  [id number]      Age  47 y.o. PCP Danica Wilcox MD   Admit date:  7/18/2018    Room Number  26/1  @ [facility]   Date of Service  7/20/2018            HISTORY         REASON FOR HOSPITALIZATION:  CC: \". Pt admitted under a voluntary basis for severe depression with suicidal ideations and  psychosis  proving to be an imminent danger to self and an inability to care for self. HISTORY OF PRESENT ILLNESS:    The patient, Anderson Vargas, is a 47 y.o. BLACK OR  female with a past psychiatric history significant for depression and psychosis , who presents at this time with complaints of (and/or evidence of) the following emotional symptoms: depression, suicidal thoughts/threats, psychotic behavior, paranoid behavior and psychosis. Additional symptomatology include depression worse, difficulty sleeping, feeling depressed, feeling suicidal and \"I wanted to die\". The above symptoms have been present for months . These symptoms are of moderate  severity. These symptoms are constant  in nature. The patient's condition has been precipitated by her mental illness and psychosocial stressors (her sons are incarcerated  ). She went to her therapist yesterday and she expressed suicidal thoughts and hearing voices and she came to ER .  7/20/18 she is feeling better and she has no thoughts about hurting herself but she still hear voices and she is not feeling angry or agitated and no self harm behavior and  She still feels depressed     ALLERGIES:   Allergies   Allergen Reactions    Aspirin Nausea and Vomiting    Morphine Hives      MEDICATIONS PRIOR TO ADMISSION:   Prescriptions Prior to Admission   Medication Sig    lamoTRIgine (LAMICTAL) 100 mg tablet Take  by mouth two (2) times a day.     sertraline (ZOLOFT) 50 mg tablet Take 50 mg by mouth daily.  benztropine (COGENTIN) 1 mg tablet Take 1 mg by mouth two (2) times daily as needed.  cetirizine (ZYRTEC) 10 mg tablet Take 10 mg by mouth daily.  fluticasone (FLONASE) 50 mcg/actuation nasal spray 1 [place] by Both Nostrils route daily.  traMADol (ULTRAM) 50 mg tablet Take 50 mg by mouth every eight (8) hours as needed for Pain.  traZODone (DESYREL) 50 mg tablet Take  by mouth nightly.  haloperidol (HALDOL) 5 mg tablet Take 5 mg by mouth.  lisinopril (PRINIVIL, ZESTRIL) 40 mg tablet Take 1 Tab by mouth daily. Indications: hypertension    MULTIVIT-MINS NO.7/FOLIC ACID (V-C FORTE PO) Take 1 Cap by mouth daily. PAST MEDICAL HISTORY:   Past Medical History:   Diagnosis Date    Diabetes ([facility] [address]     Hypertension     Psychiatric disorder      Past Surgical History:   Procedure Laterality Date    HX GYN       x2    HX OTHER SURGICAL      skin grafting    HX OTHER SURGICAL  2016    oral surgery      SOCIAL HISTORY: single    Social History     Social History    Marital status: SINGLE     Spouse name: N/A    Number of children: N/A    Years of education: N/A     Occupational History    Not on file. Social History Main Topics    Smoking status: Current Some Day Smoker    Smokeless tobacco: Never Used    Alcohol use No    Drug use: No    Sexual activity: Not on file     Other Topics Concern    Not on file     Social History Narrative      FAMILY HISTORY: History reviewed. No pertinent family history. No family history on file. REVIEW OF SYSTEMS:   History obtained from the patient  Pertinent items are noted in the History of Present Illness. All other Systems reviewed and are considered negative.            MENTAL STATUS EXAM & VITALS     MENTAL STATUS EXAM (MSE):    MSE FINDINGS ARE WITHIN NORMAL LIMITS (WNL) UNLESS OTHERWISE STATED BELOW. ( ALL OF THE BELOW CATEGORIES OF THE MSE HAVE BEEN REVIEWED (reviewed 2018) AND UPDATED AS DEEMED APPROPRIATE )  General Presentation age appropriate, cooperative   Orientation oriented to time, place and person   Vital Signs  See below (reviewed 7/20/2018); Vital Signs (BP, Pulse, & Temp) are within normal limits if not listed below. Gait and Station Stable/steady, no ataxia   Musculoskeletal System No extrapyramidal symptoms (EPS); no abnormal muscular movements or Tardive Dyskinesia (TD); muscle strength and tone are within normal limits   Language No aphasia or dysarthria   Speech:  normal pitch and normal volume   Thought Processes logical; normal rate of thoughts; fair abstract reasoning/computation   Thought Associations goal directed   Thought Content paranoid delusions and auditory hallucinations   Suicidal Ideations intention and no plan    Homicidal Ideations no plan  and no intention   Mood:  depressed   Affect:  depressed   Memory recent  good   Memory remote:  good   Concentration/Attention:  good   Fund of Knowledge average   Insight:  limited   Reliability fair   Judgment:  limited          VITALS:     Patient Vitals for the past 24 hrs:   Temp Pulse Resp BP SpO2   07/19/18 2027 98.6 °F (37 °C) 70 16 148/89 99 %   07/19/18 1557 98.6 °F (37 °C) 65 16 139/87 100 %   07/19/18 1148 98.5 °F (36.9 °C) 82 16 127/89 100 %     Wt Readings from Last 3 Encounters:   07/18/18 71.7 kg (158 lb)   07/05/18 71.7 kg (158 lb)   10/04/17 70.3 kg (155 lb)     Temp Readings from Last 3 Encounters:   07/19/18 98.6 °F (37 °C)   07/05/18 98.5 °F (36.9 °C)   10/04/17 99.4 °F (37.4 °C)     BP Readings from Last 3 Encounters:   07/19/18 148/89   07/05/18 (!) 141/94   10/04/17 143/90     Pulse Readings from Last 3 Encounters:   07/19/18 70   07/05/18 (!) 101   10/04/17 98            DATA     LABORATORY DATA:  Labs Reviewed   CBC WITH AUTOMATED DIFF - Abnormal; Notable for the following:        Result Value    WBC 12.1 (*)     MCV 99.8 (*)     ABS.  LYMPHOCYTES 4.1 (*)     All other components within normal limits METABOLIC PANEL, COMPREHENSIVE - Abnormal; Notable for the following:     Glucose 289 (*)     Creatinine 1.09 (*)     BUN/Creatinine ratio 7 (*)     GFR est non-AA 53 (*)     AST (SGOT) 11 (*)     Alk. phosphatase 172 (*)     Globulin 4.4 (*)     A-G Ratio 0.9 (*)     All other components within normal limits   ACETAMINOPHEN - Abnormal; Notable for the following:     Acetaminophen level <2 (*)     All other components within normal limits   URINALYSIS W/MICROSCOPIC - Abnormal; Notable for the following:     Glucose >1000 (*)     All other components within normal limits   URINE CULTURE HOLD SAMPLE   ETHYL ALCOHOL   SALICYLATE   DRUG SCREEN, URINE   TSH 3RD GENERATION   SAMPLES BEING HELD     Admission on 07/18/2018   Component Date Value Ref Range Status    WBC 07/18/2018 12.1* 3.6 - 11.0 K/uL Final    RBC 07/18/2018 4.20  3.80 - 5.20 M/uL Final    HGB 07/18/2018 14.0  11.5 - 16.0 g/dL Final    HCT 07/18/2018 41.9  35.0 - 47.0 % Final    MCV 07/18/2018 99.8* 80.0 - 99.0 FL Final    MCH 07/18/2018 33.3  26.0 - 34.0 PG Final    MCHC 07/18/2018 33.4  30.0 - 36.5 g/dL Final    RDW 07/18/2018 11.8  11.5 - 14.5 % Final    PLATELET 16/28/8541 799  150 - 400 K/uL Final    MPV 07/18/2018 10.5  8.9 - 12.9 FL Final    NRBC 07/18/2018 0.0  0  WBC Final    ABSOLUTE NRBC 07/18/2018 0.00  0.00 - 0.01 K/uL Final    NEUTROPHILS 07/18/2018 59  32 - 75 % Final    LYMPHOCYTES 07/18/2018 34  12 - 49 % Final    MONOCYTES 07/18/2018 7  5 - 13 % Final    EOSINOPHILS 07/18/2018 1  0 - 7 % Final    BASOPHILS 07/18/2018 0  0 - 1 % Final    IMMATURE GRANULOCYTES 07/18/2018 0  0.0 - 0.5 % Final    ABS. NEUTROPHILS 07/18/2018 7.1  1.8 - 8.0 K/UL Final    ABS. LYMPHOCYTES 07/18/2018 4.1* 0.8 - 3.5 K/UL Final    ABS. MONOCYTES 07/18/2018 0.8  0.0 - 1.0 K/UL Final    ABS. EOSINOPHILS 07/18/2018 0.1  0.0 - 0.4 K/UL Final    ABS. BASOPHILS 07/18/2018 0.0  0.0 - 0.1 K/UL Final    ABS. IMM.  GRANS. 07/18/2018 0.0  0.00 - 0.04 K/UL Final    DF 07/18/2018 AUTOMATED    Final    Sodium 07/18/2018 139  136 - 145 mmol/L Final    Potassium 07/18/2018 4.0  3.5 - 5.1 mmol/L Final    Chloride 07/18/2018 103  97 - 108 mmol/L Final    CO2 07/18/2018 27  21 - 32 mmol/L Final    Anion gap 07/18/2018 9  5 - 15 mmol/L Final    Glucose 07/18/2018 289* 65 - 100 mg/dL Final    BUN 07/18/2018 8  6 - 20 MG/DL Final    Creatinine 07/18/2018 1.09* 0.55 - 1.02 MG/DL Final    BUN/Creatinine ratio 07/18/2018 7* 12 - 20   Final    GFR est AA 07/18/2018 >60  >60 ml/min/1.73m2 Final    GFR est non-AA 07/18/2018 53* >60 ml/min/1.73m2 Final    Calcium 07/18/2018 10.1  8.5 - 10.1 MG/DL Final    Bilirubin, total 07/18/2018 0.3  0.2 - 1.0 MG/DL Final    ALT (SGPT) 07/18/2018 19  12 - 78 U/L Final    AST (SGOT) 07/18/2018 11* 15 - 37 U/L Final    Alk.  phosphatase 07/18/2018 172* 45 - 117 U/L Final    Protein, total 07/18/2018 8.2  6.4 - 8.2 g/dL Final    Albumin 07/18/2018 3.8  3.5 - 5.0 g/dL Final    Globulin 07/18/2018 4.4* 2.0 - 4.0 g/dL Final    A-G Ratio 07/18/2018 0.9* 1.1 - 2.2   Final    ALCOHOL(ETHYL),SERUM 07/18/2018 <10  <10 MG/DL Final    Salicylate level 47/42/4663 5.0  2.8 - 20.0 MG/DL Final    Acetaminophen level 07/18/2018 <2* 10 - 30 ug/mL Final    Color 07/18/2018 YELLOW/STRAW    Final    Appearance 07/18/2018 CLEAR  CLEAR   Final    Specific gravity 07/18/2018 1.010  1.003 - 1.030   Final    pH (UA) 07/18/2018 5.5  5.0 - 8.0   Final    Protein 07/18/2018 NEGATIVE   NEG mg/dL Final    Glucose 07/18/2018 >1000* NEG mg/dL Final    Ketone 07/18/2018 NEGATIVE   NEG mg/dL Final    Bilirubin 07/18/2018 NEGATIVE   NEG   Final    Blood 07/18/2018 NEGATIVE   NEG   Final    Urobilinogen 07/18/2018 0.2  0.2 - 1.0 EU/dL Final    Nitrites 07/18/2018 NEGATIVE   NEG   Final    Leukocyte Esterase 07/18/2018 NEGATIVE   NEG   Final    WBC 07/18/2018 0-4  0 - 4 /hpf Final    RBC 07/18/2018 0-5  0 - 5 /hpf Final    Epithelial cells 07/18/2018 FEW  FEW /lpf Final    Bacteria 07/18/2018 NEGATIVE   NEG /hpf Final    Urine culture hold 07/18/2018 URINE ON HOLD IN MICROBIOLOGY DEPT FOR 3 DAYS. IF UNPRESERVED URINE IS SUBMITTED, IT CANNOT BE USED FOR ADDITIONAL TESTING AFTER 24 HRS, RECOLLECTION WILL BE REQUIRED. Final    AMPHETAMINES 07/18/2018 NEGATIVE   NEG   Final    BARBITURATES 07/18/2018 NEGATIVE   NEG   Final    BENZODIAZEPINES 07/18/2018 NEGATIVE   NEG   Final    COCAINE 07/18/2018 NEGATIVE   NEG   Final    METHADONE 07/18/2018 NEGATIVE   NEG   Final    OPIATES 07/18/2018 NEGATIVE   NEG   Final    PCP(PHENCYCLIDINE) 07/18/2018 NEGATIVE   NEG   Final    THC (TH-CANNABINOL) 07/18/2018 NEGATIVE   NEG   Final    Drug screen comment 07/18/2018 (NOTE)   Final    TSH 07/19/2018 0.61  0.36 - 3.74 uIU/mL Final        RADIOLOGY REPORTS:  No results found for this or any previous visit. No results found.            MEDICATIONS       ALL MEDICATIONS  Current Facility-Administered Medications   Medication Dose Route Frequency    traZODone (DESYREL) tablet 50 mg  50 mg Oral QHS    haloperidol (HALDOL) tablet 5 mg  5 mg Oral QHS    benztropine (COGENTIN) tablet 1 mg  1 mg Oral QHS    sertraline (ZOLOFT) tablet 50 mg  50 mg Oral DAILY    ziprasidone (GEODON) 20 mg in sterile water (preservative free) 1 mL injection  20 mg IntraMUSCular BID PRN    OLANZapine (ZyPREXA) tablet 5 mg  5 mg Oral Q6H PRN    benztropine (COGENTIN) tablet 2 mg  2 mg Oral BID PRN    benztropine (COGENTIN) injection 2 mg  2 mg IntraMUSCular BID PRN    LORazepam (ATIVAN) injection 2 mg  2 mg IntraMUSCular Q4H PRN    LORazepam (ATIVAN) tablet 1 mg  1 mg Oral Q4H PRN    zolpidem (AMBIEN) tablet 10 mg  10 mg Oral QHS PRN    acetaminophen (TYLENOL) tablet 650 mg  650 mg Oral Q4H PRN    ibuprofen (MOTRIN) tablet 400 mg  400 mg Oral Q8H PRN    magnesium hydroxide (MILK OF MAGNESIA) 400 mg/5 mL oral suspension 30 mL  30 mL Oral DAILY PRN    nicotine (NICODERM CQ) 21 mg/24 hr patch 1 Patch  1 Patch TransDERmal DAILY PRN      SCHEDULED MEDICATIONS  Current Facility-Administered Medications   Medication Dose Route Frequency    traZODone (DESYREL) tablet 50 mg  50 mg Oral QHS    haloperidol (HALDOL) tablet 5 mg  5 mg Oral QHS    benztropine (COGENTIN) tablet 1 mg  1 mg Oral QHS    sertraline (ZOLOFT) tablet 50 mg  50 mg Oral DAILY                ASSESSMENT & PLAN        The patient, Katey Brito, is a 47 y.o.  female who presents at this time for treatment of the following diagnoses:  Patient Active Hospital Problem List:   Depression (7/18/2018)    Assessment: depression and psychosis     Plan: collateral information and need to restart her medications            I will continue to monitor blood levels (Depakote, Tegretol, lithium, clozapine---a drug with a narrow therapeutic index= NTI) and associated labs for drug therapy implemented that require intense monitoring for toxicity as deemed appropriate based on current medication side effects and pharmacodynamically determined drug 1/2 lives. A coordinated, multidisplinary treatment team (includes the nurse, unit pharmcist,  and writer) round was conducted for this initial evaluation with the patient present. The following regarding medications was addressed during rounds with patient:   the risks and benefits of the proposed medication. The patient was given the opportunity to ask questions. Informed consent given to the use of the above medications. I will continue to adjust psychiatric and non-psychiatric medications (see above \"medication\" section and orders section for details) as deemed appropriate & based upon diagnoses and response to treatment. I have reviewed admission (and previous/old) labs and medical tests in the EHR and or transferring hospital documents.  I will continue to order blood tests/labs and diagnostic tests as deemed appropriate and review results as they become available (see orders for details). I have reviewed old psychiatric and medical records available in the EHR. I Will order additional psychiatric records from other institutions to further elucidate the nature of patient's psychopathology and review once available. I will gather additional collateral information from friends, family and o/p treatment team to further elucidate the nature of patient's psychopathology and baselline level of psychiatric functioning.       ESTIMATED LENGTH OF STAY:    3       STRENGTHS:  Exercising self-direction/Resourceful, Access to housing/residential stability and Interpersonal/supportive relationships (family, friends, peers)                                        SIGNED:    Melquiades Jenkins MD  7/20/2018

## 2018-07-20 NOTE — PROGRESS NOTES
Problem: Falls - Risk of  Goal: *Absence of Falls  Document Georgiana Fall Risk and appropriate interventions in the flowsheet. Outcome: Progressing Towards Goal  Fall Risk Interventions:  Mobility Interventions: Assess mobility with egress test         Medication Interventions: Teach patient to arise slowly         History of Falls Interventions: Bed/chair exit alarm    Resting in bed with eyes closed, no complaints, no distress noted. Safety measures in place, will continue to monitor.

## 2018-07-20 NOTE — BH NOTES
GROUP THERAPY PROGRESS NOTE    Lei Gallego did not participate in a 65 minute Process Group on the General Unit with a focus identifying feelings, planning for the day, and learning about using the [address] as a long-term personal treatment plan.

## 2018-07-20 NOTE — PROGRESS NOTES
Problem: Depressed Mood (Adult/Pediatric)  Goal: *STG: Participates in treatment plan  Outcome: Progressing Towards Goal  Out on unit social w peers and staff. Mood and affect brighter when engaged. Does not appear to be internally stimulated or preoccupied. Daily goal is to rest and attend a group.  Staff focus is on d/c planning

## 2018-07-20 NOTE — PROGRESS NOTES
Problem: Depressed Mood (Adult/Pediatric)  Goal: *STG: Attends activities and groups  Outcome: Progressing Towards Goal  Pt is depressed and anxious. Ambulates steadily. Staff give emotional support and encouragement, encourage groups and activities, continue q15 checks.

## 2018-07-21 LAB
GLUCOSE BLD STRIP.AUTO-MCNC: 126 MG/DL (ref 65–100)
GLUCOSE BLD STRIP.AUTO-MCNC: 189 MG/DL (ref 65–100)
GLUCOSE BLD STRIP.AUTO-MCNC: 239 MG/DL (ref 65–100)
GLUCOSE BLD STRIP.AUTO-MCNC: 389 MG/DL (ref 65–100)
SERVICE CMNT-IMP: ABNORMAL

## 2018-07-21 PROCEDURE — 74011250637 HC RX REV CODE- 250/637: Performed by: PSYCHIATRY & NEUROLOGY

## 2018-07-21 PROCEDURE — 82962 GLUCOSE BLOOD TEST: CPT

## 2018-07-21 PROCEDURE — 65220000003 HC RM SEMIPRIVATE PSYCH

## 2018-07-21 PROCEDURE — 74011636637 HC RX REV CODE- 636/637: Performed by: NURSE PRACTITIONER

## 2018-07-21 RX ORDER — INSULIN LISPRO 100 [IU]/ML
INJECTION, SOLUTION INTRAVENOUS; SUBCUTANEOUS
Status: DISCONTINUED | OUTPATIENT
Start: 2018-07-21 | End: 2018-07-23 | Stop reason: HOSPADM

## 2018-07-21 RX ORDER — MAGNESIUM SULFATE 100 %
4 CRYSTALS MISCELLANEOUS AS NEEDED
Status: DISCONTINUED | OUTPATIENT
Start: 2018-07-21 | End: 2018-07-23 | Stop reason: HOSPADM

## 2018-07-21 RX ORDER — DEXTROSE 50 % IN WATER (D50W) INTRAVENOUS SYRINGE
12.5-25 AS NEEDED
Status: DISCONTINUED | OUTPATIENT
Start: 2018-07-21 | End: 2018-07-23 | Stop reason: HOSPADM

## 2018-07-21 RX ADMIN — BENZTROPINE MESYLATE 1 MG: 1 TABLET ORAL at 21:13

## 2018-07-21 RX ADMIN — SERTRALINE HYDROCHLORIDE 50 MG: 50 TABLET ORAL at 08:57

## 2018-07-21 RX ADMIN — HALOPERIDOL 5 MG: 5 TABLET ORAL at 21:13

## 2018-07-21 RX ADMIN — LAMOTRIGINE 50 MG: 25 TABLET ORAL at 21:13

## 2018-07-21 RX ADMIN — LAMOTRIGINE 50 MG: 25 TABLET ORAL at 08:57

## 2018-07-21 RX ADMIN — TRAZODONE HYDROCHLORIDE 50 MG: 50 TABLET ORAL at 21:13

## 2018-07-21 RX ADMIN — INSULIN LISPRO 3 UNITS: 100 INJECTION, SOLUTION INTRAVENOUS; SUBCUTANEOUS at 17:04

## 2018-07-21 NOTE — PROGRESS NOTES
Problem: Depressed Mood (Adult/Pediatric)  Goal: *STG: Verbalizes anger, guilt, and other feelings in a constructive manor  Outcome: Progressing Towards Goal  Pt smiles as she is seated in dining room watching tv. She ate dinner 100%. Quietly seated with peers. Ambulates steadily. Pt denies SI, AH/VH. She is asked about her blood work [facility] that is ordered. She waves her hand. \"Y'all have enough blood\" pt refuses.

## 2018-07-21 NOTE — PROGRESS NOTES
Problem: Falls - Risk of  Goal: *Absence of Falls  Document Georgiana Fall Risk and appropriate interventions in the flowsheet. Outcome: Progressing Towards Goal  Fall Risk Interventions:  Mobility Interventions: Patient to call before getting OOB         Medication Interventions: Teach patient to arise slowly         History of Falls Interventions: Room close to nurse's station    Resting in bed with eyes closed, no complaints, no distress noted. Safety measures in place, will continue to monitor.

## 2018-07-21 NOTE — CONSULTS
Hospitalist Consult Note  Elisa Bland NP  Call physician on-call through the  7pm-7am    Primary Care Provider: Gwynneth Nageotte, MD  Source of Information: Patient     History of Presenting Illness:   Alia Vila is a 47 y.o. AA female who we are consulted for diabetes management. Ms. Damon had a PMHx of htn, dm, depression and psychosis who presented from her therapist office with hallucinations with thoughts to harm herself. Review of Systems:  General: negative for fever, chills, sweats, weakness, weight loss  Eyes: negative for changes or loss in vision, eye pain  Ear Nose and Throat: negative for rhinorrhea, otalgia, speech or swallowing difficulties  Respiratory:  negative for cough, sputum production, SOB, wheezing, JIMENES  Cardiology:  negative for chest pain, palpitations, orthopnea, edema, syncope   Gastrointestinal: negative for abdominal pain, N/V, change in bowel habits, bleeding  Genitourinary: negative for frequency, urgency, dysuria, hematuria, incontinence  Musculoskeletal : negative for arthralgia, myalgia  Skin: negative for easy bruising, bleeding, negative for rash, ulceration, new lesion  Endocrine: negative for hot flashes or polydipsia  Neurological: negative for headache, dizziness, confusion or memory loss, focal weakness, paresthesia, gait disturbance  Psychological: negative for anxiety, depression, agitation      Past Medical History:   Diagnosis Date    Diabetes ([facility] [address]     Hypertension     Psychiatric disorder       Past Surgical History:   Procedure Laterality Date    HX GYN       x2    HX OTHER SURGICAL      skin grafting    HX OTHER SURGICAL      oral surgery     Prior to Admission medications    Medication Sig Start Date End Date Taking? Authorizing Provider   lamoTRIgine (LAMICTAL) 100 mg tablet Take  by mouth two (2) times a day.    Yes Historical Provider   sertraline (ZOLOFT) 50 mg tablet Take 50 mg by mouth daily. Yes Historical Provider   benztropine (COGENTIN) 1 mg tablet Take 1 mg by mouth two (2) times daily as needed. Yes Historical Provider   cetirizine (ZYRTEC) 10 mg tablet Take 10 mg by mouth daily. Yes Historical Provider   fluticasone (FLONASE) 50 mcg/actuation nasal spray 1 [place] by Both Nostrils route daily. Yes Historical Provider   traMADol (ULTRAM) 50 mg tablet Take 50 mg by mouth every eight (8) hours as needed for Pain. Yes Phys Other, MD   traZODone (DESYREL) 50 mg tablet Take  by mouth nightly. Yes Phys Other, MD   haloperidol (HALDOL) 5 mg tablet Take 5 mg by mouth. Yes Phys Other, MD   lisinopril (PRINIVIL, ZESTRIL) 40 mg tablet Take 1 Tab by mouth daily. Indications: hypertension 7/10/17  Yes Valeri George MD   MULTIVIT-MINS NO.7/FOLIC ACID (V-C FORTE PO) Take 1 Cap by mouth daily. Yes Historical Provider     Allergies   Allergen Reactions    Aspirin Nausea and Vomiting    Morphine Hives        SOCIAL HISTORY:     Smoking history:   None    Former    Chronic x     Alcohol history:   None x   Social    Chronic      CODE STATUS:  DNR    Full x   Other      Objective:   Physical Exam:   Visit Vitals    /78    Pulse 95    Temp 98.7 °F (37.1 °C)    Resp 16    Ht 5' (1.524 m)    Wt 71.7 kg (158 lb)    SpO2 98%    Breastfeeding No    BMI 30.86 kg/m2      O2 Device: Room air    General:  Alert, cooperative, no distress, appears stated age. HEENT:  Normocephalic, atraumatic. Conjunctivae/corneas clear. PERRL, EOMs intact. Nares nl. Septum midline. Mucosa nl. No drainage or sinus tenderness. Lips, mucosa, and tongue nl. Teeth and gums nl. Neck: Supple, symmetrical, trachea midline, no adenopathy, thyroid: no enlargement/tenderness/nodules, no carotid bruit and no JVD. Back:   Symmetric, no curvature. ROM nl. No CVA tenderness. Lungs:   Clear to auscultation bilaterally. No Wheezing/Rhonchi/Rales.  No SOB, no accessory muscle use    Heart:  Regular rate and rhythm, S1, S2 nl, no murmur, click, rub or gallop. Abdomen:   Soft, non-tender. Bowel sounds nl. No HSM. Extremities: Extremities nl, atraumatic, no clubbing, cyanosis or edema. Pulses 2+ and symmetric    Skin: Skin color, texture, turgor nl. No rashes or lesions. Cap refill <3 sec    Psych: Cooperative, not anxious or agitated. A/O x 3. Neurologic: CNII-XII grossly intact. no focal neurological deficits,  moving all extremities, speech clear      EKG: none    Data Review:   24 Hour Results:  Recent Results (from the past 24 hour(s))   GLUCOSE, POC    Collection Time: 07/21/18  7:33 AM   Result Value Ref Range    Glucose (POC) 189 (H) 65 - 100 mg/dL    Performed by Blanca Shaw    GLUCOSE, POC    Collection Time: 07/21/18 11:22 AM   Result Value Ref Range    Glucose (POC) 389 (H) 65 - 100 mg/dL    Performed by Blanca Shaw        Imaging:     Assessment/Plan     Depression and psychosis - per primary team    DM  - check A1c  - poc, ssi, diabetic diet  - was formerly on saxtagliptin but her pcp d/c'ed this several months ago    HTN- c/w lisiniopril    We will sign off and be available as needed. Thank you for allowing us to participate in your patient's care.         Signed By: Kim Gomez NP     July 21, 2018

## 2018-07-21 NOTE — INTERDISCIPLINARY ROUNDS
Behavioral Health Interdisciplinary Rounds     Patient Name: Dmitri Penn  Age: 47 y.o.   Room/Bed:  731/  Primary Diagnosis: <principal problem not specified>   Admission Status: Voluntary     Readmission within 30 days: no  Power of  in place: no  Patient requires a blocked bed: no          Reason for blocked bed:     VTE Prophylaxis: No    Mobility needs/Fall risk: no    Nutritional Plan: no  Consults:          Labs/Testing due today?: yes-refused    Sleep hours:  7      Participation in Care/Groups:  yes  Medication Compliant?: Yes  PRNS (last 24 hours): Pain    Restraints (last 24 hours):  no     CIWA (range last 24 hours):     COWS (range last 24 hours):      Alcohol screening (AUDIT) completed -   AUDIT Score: 0     If applicable, date SBIRT discussed in treatment team AND documented:     Tobacco - patient is a smoker: Have You Used Tobacco in the Past 30 Days: No  Illegal Drugs use: Have You Used Any Illegal Substances Over the Past 12 Months: No    24 hour chart check complete: yes     Patient goal(s) for today:   Treatment team focus/goals:   Progress note     LOS:  3  Expected LOS:     Financial concerns/prescription coverage:   Date of last family contact:      Family requesting physician contact today:    Discharge plan:   Guns in the home:       Outpatient provider(s):     Participating treatment team members: Dmitri Penn, * (assigned SW),

## 2018-07-21 NOTE — PROGRESS NOTES
Pt is refusing any blood work. \"you got enough blood\". Patient states as she waves her hand at staff.

## 2018-07-21 NOTE — BH NOTES
GROUP THERAPY PROGRESS NOTE    Wilfrido Freeman is participating in reflections group. Group time: 30 minutes    Personal goal for participation: Shower    Goal orientation: personal    Group therapy participation: passive    Therapeutic interventions reviewed and discussed: Unit Rules/Goals     Impression of participation: Pt. originally stated she didn't know what to say regarding today's goal or any future goals.  After prompting, stated showering would continue to be her goal.

## 2018-07-21 NOTE — BH NOTES
PSYCHIATRIC PROGRESS NOTE             IDENTIFICATION:    Patient Name  Natacha Bonner   Date of Birth 1965   [facility] [id number]   Medical Record Number  [id number]      Age  47 y.o. PCP Joseph Oliver MD   Admit date:  7/18/2018    Room Number  26/1  @ [facility]   Date of Service  7/21/2018            HISTORY         REASON FOR HOSPITALIZATION:  CC: \". Pt admitted under a voluntary basis for severe depression with suicidal ideations and  psychosis  proving to be an imminent danger to self and an inability to care for self. HISTORY OF PRESENT ILLNESS:    The patient, Natacha Bonner, is a 47 y.o. BLACK OR  female with a past psychiatric history significant for depression and psychosis , who presents at this time with complaints of (and/or evidence of) the following emotional symptoms: depression, suicidal thoughts/threats, psychotic behavior, paranoid behavior and psychosis. Additional symptomatology include depression worse, difficulty sleeping, feeling depressed, feeling suicidal and \"I wanted to die\". The above symptoms have been present for months . These symptoms are of moderate  severity. These symptoms are constant  in nature. The patient's condition has been precipitated by her mental illness and psychosocial stressors (her sons are incarcerated  ).   She went to her therapist yesterday and she expressed suicidal thoughts and hearing voices and she came to ER .  7/20/18 she is feeling better and she has no thoughts about hurting herself but she still hear voices and she is not feeling angry or agitated and no self harm behavior and  She still feels depressed    7/21/18 she is doing better and no anger issues and no self harm behavior and she is not hearing voices and she is not suicidal like before     ALLERGIES:   Allergies   Allergen Reactions    Aspirin Nausea and Vomiting    Morphine Hives      MEDICATIONS PRIOR TO ADMISSION:   Prescriptions Prior to Admission   Medication Sig    lamoTRIgine (LAMICTAL) 100 mg tablet Take  by mouth two (2) times a day.  sertraline (ZOLOFT) 50 mg tablet Take 50 mg by mouth daily.  benztropine (COGENTIN) 1 mg tablet Take 1 mg by mouth two (2) times daily as needed.  cetirizine (ZYRTEC) 10 mg tablet Take 10 mg by mouth daily.  fluticasone (FLONASE) 50 mcg/actuation nasal spray 1 [place] by Both Nostrils route daily.  traMADol (ULTRAM) 50 mg tablet Take 50 mg by mouth every eight (8) hours as needed for Pain.  traZODone (DESYREL) 50 mg tablet Take  by mouth nightly.  haloperidol (HALDOL) 5 mg tablet Take 5 mg by mouth.  lisinopril (PRINIVIL, ZESTRIL) 40 mg tablet Take 1 Tab by mouth daily. Indications: hypertension    MULTIVIT-MINS NO.7/FOLIC ACID (V-C FORTE PO) Take 1 Cap by mouth daily. PAST MEDICAL HISTORY:   Past Medical History:   Diagnosis Date    Diabetes ([facility] [address]     Hypertension     Psychiatric disorder      Past Surgical History:   Procedure Laterality Date    HX GYN       x2    HX OTHER SURGICAL  1988    skin grafting    HX OTHER SURGICAL  2016    oral surgery      SOCIAL HISTORY: single    Social History     Social History    Marital status: SINGLE     Spouse name: N/A    Number of children: N/A    Years of education: N/A     Occupational History    Not on file. Social History Main Topics    Smoking status: Current Some Day Smoker    Smokeless tobacco: Never Used    Alcohol use No    Drug use: No    Sexual activity: Not on file     Other Topics Concern    Not on file     Social History Narrative      FAMILY HISTORY: History reviewed. No pertinent family history. No family history on file. REVIEW OF SYSTEMS:   History obtained from the patient  Pertinent items are noted in the History of Present Illness. All other Systems reviewed and are considered negative.            MENTAL STATUS EXAM & VITALS     MENTAL STATUS EXAM (MSE):    MSE FINDINGS ARE WITHIN NORMAL LIMITS (WNL) UNLESS OTHERWISE STATED BELOW. ( ALL OF THE BELOW CATEGORIES OF THE MSE HAVE BEEN REVIEWED (reviewed 7/21/2018) AND UPDATED AS DEEMED APPROPRIATE )  General Presentation age appropriate, cooperative   Orientation oriented to time, place and person   Vital Signs  See below (reviewed 7/21/2018); Vital Signs (BP, Pulse, & Temp) are within normal limits if not listed below.    Gait and Station Stable/steady, no ataxia   Musculoskeletal System No extrapyramidal symptoms (EPS); no abnormal muscular movements or Tardive Dyskinesia (TD); muscle strength and tone are within normal limits   Language No aphasia or dysarthria   Speech:  normal pitch and normal volume   Thought Processes logical; normal rate of thoughts; fair abstract reasoning/computation   Thought Associations goal directed   Thought Content paranoid delusions and auditory hallucinations   Suicidal Ideations intention and no plan    Homicidal Ideations no plan  and no intention   Mood:  depressed   Affect:  depressed   Memory recent  good   Memory remote:  good   Concentration/Attention:  good   Fund of Knowledge average   Insight:  limited   Reliability fair   Judgment:  limited          VITALS:     Patient Vitals for the past 24 hrs:   Temp Pulse Resp BP SpO2   07/21/18 0810 98.7 °F (37.1 °C) 95 16 110/78 98 %   07/20/18 2058 98.5 °F (36.9 °C) 68 16 139/90 98 %   07/20/18 1547 98.4 °F (36.9 °C) 74 16 119/81 98 %   07/20/18 1220 98.3 °F (36.8 °C) 70 16 129/85 99 %     Wt Readings from Last 3 Encounters:   07/18/18 71.7 kg (158 lb)   07/05/18 71.7 kg (158 lb)   10/04/17 70.3 kg (155 lb)     Temp Readings from Last 3 Encounters:   07/21/18 98.7 °F (37.1 °C)   07/05/18 98.5 °F (36.9 °C)   10/04/17 99.4 °F (37.4 °C)     BP Readings from Last 3 Encounters:   07/21/18 110/78   07/05/18 (!) 141/94   10/04/17 143/90     Pulse Readings from Last 3 Encounters:   07/21/18 95   07/05/18 (!) 101   10/04/17 98            DATA     LABORATORY DATA:  Labs Reviewed CBC WITH AUTOMATED DIFF - Abnormal; Notable for the following:        Result Value    WBC 12.1 (*)     MCV 99.8 (*)     ABS. LYMPHOCYTES 4.1 (*)     All other components within normal limits   METABOLIC PANEL, COMPREHENSIVE - Abnormal; Notable for the following:     Glucose 289 (*)     Creatinine 1.09 (*)     BUN/Creatinine ratio 7 (*)     GFR est non-AA 53 (*)     AST (SGOT) 11 (*)     Alk.  phosphatase 172 (*)     Globulin 4.4 (*)     A-G Ratio 0.9 (*)     All other components within normal limits   ACETAMINOPHEN - Abnormal; Notable for the following:     Acetaminophen level <2 (*)     All other components within normal limits   URINALYSIS W/MICROSCOPIC - Abnormal; Notable for the following:     Glucose >1000 (*)     All other components within normal limits   GLUCOSE, POC - Abnormal; Notable for the following:     Glucose (POC) 189 (*)     All other components within normal limits   GLUCOSE, POC - Abnormal; Notable for the following:     Glucose (POC) 389 (*)     All other components within normal limits   URINE CULTURE HOLD SAMPLE   ETHYL ALCOHOL   SALICYLATE   DRUG SCREEN, URINE   TSH 3RD GENERATION   SAMPLES BEING HELD   LIPID PANEL   HEMOGLOBIN A1C WITH EAG     Admission on 07/18/2018   Component Date Value Ref Range Status    WBC 07/18/2018 12.1* 3.6 - 11.0 K/uL Final    RBC 07/18/2018 4.20  3.80 - 5.20 M/uL Final    HGB 07/18/2018 14.0  11.5 - 16.0 g/dL Final    HCT 07/18/2018 41.9  35.0 - 47.0 % Final    MCV 07/18/2018 99.8* 80.0 - 99.0 FL Final    MCH 07/18/2018 33.3  26.0 - 34.0 PG Final    MCHC 07/18/2018 33.4  30.0 - 36.5 g/dL Final    RDW 07/18/2018 11.8  11.5 - 14.5 % Final    PLATELET 91/25/6915 218  150 - 400 K/uL Final    MPV 07/18/2018 10.5  8.9 - 12.9 FL Final    NRBC 07/18/2018 0.0  0  WBC Final    ABSOLUTE NRBC 07/18/2018 0.00  0.00 - 0.01 K/uL Final    NEUTROPHILS 07/18/2018 59  32 - 75 % Final    LYMPHOCYTES 07/18/2018 34  12 - 49 % Final    MONOCYTES 07/18/2018 7  5 - 13 % Final    EOSINOPHILS 07/18/2018 1  0 - 7 % Final    BASOPHILS 07/18/2018 0  0 - 1 % Final    IMMATURE GRANULOCYTES 07/18/2018 0  0.0 - 0.5 % Final    ABS. NEUTROPHILS 07/18/2018 7.1  1.8 - 8.0 K/UL Final    ABS. LYMPHOCYTES 07/18/2018 4.1* 0.8 - 3.5 K/UL Final    ABS. MONOCYTES 07/18/2018 0.8  0.0 - 1.0 K/UL Final    ABS. EOSINOPHILS 07/18/2018 0.1  0.0 - 0.4 K/UL Final    ABS. BASOPHILS 07/18/2018 0.0  0.0 - 0.1 K/UL Final    ABS. IMM. GRANS. 07/18/2018 0.0  0.00 - 0.04 K/UL Final    DF 07/18/2018 AUTOMATED    Final    Sodium 07/18/2018 139  136 - 145 mmol/L Final    Potassium 07/18/2018 4.0  3.5 - 5.1 mmol/L Final    Chloride 07/18/2018 103  97 - 108 mmol/L Final    CO2 07/18/2018 27  21 - 32 mmol/L Final    Anion gap 07/18/2018 9  5 - 15 mmol/L Final    Glucose 07/18/2018 289* 65 - 100 mg/dL Final    BUN 07/18/2018 8  6 - 20 MG/DL Final    Creatinine 07/18/2018 1.09* 0.55 - 1.02 MG/DL Final    BUN/Creatinine ratio 07/18/2018 7* 12 - 20   Final    GFR est AA 07/18/2018 >60  >60 ml/min/1.73m2 Final    GFR est non-AA 07/18/2018 53* >60 ml/min/1.73m2 Final    Calcium 07/18/2018 10.1  8.5 - 10.1 MG/DL Final    Bilirubin, total 07/18/2018 0.3  0.2 - 1.0 MG/DL Final    ALT (SGPT) 07/18/2018 19  12 - 78 U/L Final    AST (SGOT) 07/18/2018 11* 15 - 37 U/L Final    Alk.  phosphatase 07/18/2018 172* 45 - 117 U/L Final    Protein, total 07/18/2018 8.2  6.4 - 8.2 g/dL Final    Albumin 07/18/2018 3.8  3.5 - 5.0 g/dL Final    Globulin 07/18/2018 4.4* 2.0 - 4.0 g/dL Final    A-G Ratio 07/18/2018 0.9* 1.1 - 2.2   Final    ALCOHOL(ETHYL),SERUM 07/18/2018 <10  <10 MG/DL Final    Salicylate level 43/46/8495 5.0  2.8 - 20.0 MG/DL Final    Acetaminophen level 07/18/2018 <2* 10 - 30 ug/mL Final    Color 07/18/2018 YELLOW/STRAW    Final    Appearance 07/18/2018 CLEAR  CLEAR   Final    Specific gravity 07/18/2018 1.010  1.003 - 1.030   Final    pH (UA) 07/18/2018 5.5  5.0 - 8.0   Final    Protein 07/18/2018 NEGATIVE   NEG mg/dL Final    Glucose 07/18/2018 >1000* NEG mg/dL Final    Ketone 07/18/2018 NEGATIVE   NEG mg/dL Final    Bilirubin 07/18/2018 NEGATIVE   NEG   Final    Blood 07/18/2018 NEGATIVE   NEG   Final    Urobilinogen 07/18/2018 0.2  0.2 - 1.0 EU/dL Final    Nitrites 07/18/2018 NEGATIVE   NEG   Final    Leukocyte Esterase 07/18/2018 NEGATIVE   NEG   Final    WBC 07/18/2018 0-4  0 - 4 /hpf Final    RBC 07/18/2018 0-5  0 - 5 /hpf Final    Epithelial cells 07/18/2018 FEW  FEW /lpf Final    Bacteria 07/18/2018 NEGATIVE   NEG /hpf Final    Urine culture hold 07/18/2018 URINE ON HOLD IN MICROBIOLOGY DEPT FOR 3 DAYS. IF UNPRESERVED URINE IS SUBMITTED, IT CANNOT BE USED FOR ADDITIONAL TESTING AFTER 24 HRS, RECOLLECTION WILL BE REQUIRED. Final    AMPHETAMINES 07/18/2018 NEGATIVE   NEG   Final    BARBITURATES 07/18/2018 NEGATIVE   NEG   Final    BENZODIAZEPINES 07/18/2018 NEGATIVE   NEG   Final    COCAINE 07/18/2018 NEGATIVE   NEG   Final    METHADONE 07/18/2018 NEGATIVE   NEG   Final    OPIATES 07/18/2018 NEGATIVE   NEG   Final    PCP(PHENCYCLIDINE) 07/18/2018 NEGATIVE   NEG   Final    THC (TH-CANNABINOL) 07/18/2018 NEGATIVE   NEG   Final    Drug screen comment 07/18/2018 (NOTE)   Final    TSH 07/19/2018 0.61  0.36 - 3.74 uIU/mL Final    Glucose (POC) 07/21/2018 189* 65 - 100 mg/dL Final    Performed by 07/21/2018 Kia Stai   Final    Glucose (POC) 07/21/2018 389* 65 - 100 mg/dL Final    Performed by 07/21/2018 Kia Stai   Final        RADIOLOGY REPORTS:  No results found for this or any previous visit. No results found.            MEDICATIONS       ALL MEDICATIONS  Current Facility-Administered Medications   Medication Dose Route Frequency    traZODone (DESYREL) tablet 50 mg  50 mg Oral QHS    lamoTRIgine (LaMICtal) tablet 50 mg  50 mg Oral BID    haloperidol (HALDOL) tablet 5 mg  5 mg Oral QHS    benztropine (COGENTIN) tablet 1 mg  1 mg Oral QHS    sertraline (ZOLOFT) tablet 50 mg  50 mg Oral DAILY    ziprasidone (GEODON) 20 mg in sterile water (preservative free) 1 mL injection  20 mg IntraMUSCular BID PRN    OLANZapine (ZyPREXA) tablet 5 mg  5 mg Oral Q6H PRN    benztropine (COGENTIN) tablet 2 mg  2 mg Oral BID PRN    benztropine (COGENTIN) injection 2 mg  2 mg IntraMUSCular BID PRN    LORazepam (ATIVAN) injection 2 mg  2 mg IntraMUSCular Q4H PRN    LORazepam (ATIVAN) tablet 1 mg  1 mg Oral Q4H PRN    acetaminophen (TYLENOL) tablet 650 mg  650 mg Oral Q4H PRN    ibuprofen (MOTRIN) tablet 400 mg  400 mg Oral Q8H PRN    magnesium hydroxide (MILK OF MAGNESIA) 400 mg/5 mL oral suspension 30 mL  30 mL Oral DAILY PRN    nicotine (NICODERM CQ) 21 mg/24 hr patch 1 Patch  1 Patch TransDERmal DAILY PRN      SCHEDULED MEDICATIONS  Current Facility-Administered Medications   Medication Dose Route Frequency    traZODone (DESYREL) tablet 50 mg  50 mg Oral QHS    lamoTRIgine (LaMICtal) tablet 50 mg  50 mg Oral BID    haloperidol (HALDOL) tablet 5 mg  5 mg Oral QHS    benztropine (COGENTIN) tablet 1 mg  1 mg Oral QHS    sertraline (ZOLOFT) tablet 50 mg  50 mg Oral DAILY                ASSESSMENT & PLAN        The patient, Sathya Jane, is a 47 y.o.  female who presents at this time for treatment of the following diagnoses:  Patient Active Hospital Problem List:   Depression (7/18/2018)    Assessment: depression and psychosis     Plan: collateral information and need to restart her medications    7/21/18 continue same medications            I will continue to monitor blood levels (Depakote, Tegretol, lithium, clozapine---a drug with a narrow therapeutic index= NTI) and associated labs for drug therapy implemented that require intense monitoring for toxicity as deemed appropriate based on current medication side effects and pharmacodynamically determined drug 1/2 lives.          A coordinated, multidisplinary treatment team (includes the nurse, unit pharmcist,  and writer) round was conducted for this initial evaluation with the patient present. The following regarding medications was addressed during rounds with patient:   the risks and benefits of the proposed medication. The patient was given the opportunity to ask questions. Informed consent given to the use of the above medications. I will continue to adjust psychiatric and non-psychiatric medications (see above \"medication\" section and orders section for details) as deemed appropriate & based upon diagnoses and response to treatment. I have reviewed admission (and previous/old) labs and medical tests in the EHR and or transferring hospital documents. I will continue to order blood tests/labs and diagnostic tests as deemed appropriate and review results as they become available (see orders for details). I have reviewed old psychiatric and medical records available in the EHR. I Will order additional psychiatric records from other institutions to further elucidate the nature of patient's psychopathology and review once available. I will gather additional collateral information from friends, family and o/p treatment team to further elucidate the nature of patient's psychopathology and baselline level of psychiatric functioning.       ESTIMATED LENGTH OF STAY:    3       STRENGTHS:  Exercising self-direction/Resourceful, Access to housing/residential stability and Interpersonal/supportive relationships (family, friends, peers)                                        SIGNED:    Anam Granda MD  7/21/2018

## 2018-07-21 NOTE — BH NOTES
GROUP THERAPY PROGRESS NOTE    Altagracia Richards  is participating in Leisure-Creative Group.      Group time: 30 minutes    Personal goal for participation: quiet time for personal reflection and relaxation    Goal orientation: relaxation    Group therapy participation: passive    Therapeutic interventions reviewed and discussed: relaxation    Impression of participation: in dining room seated quietly

## 2018-07-21 NOTE — PROGRESS NOTES
Problem: Depressed Mood (Adult/Pediatric)  Goal: *STG: Participates in treatment plan  Outcome: Progressing Towards Goal  Patient participates in treatment plan. Smiling, cooperative, thoughts organized. Patient denies auditory hallucinations. Treatment and medications discussed. Patient is out on the unit, med and meal compliant. 1306:  Hospitalist consult called. Dr. Prabha García is being paged. 1440:  Galina Bey NP will see patient for consult.

## 2018-07-22 LAB
GLUCOSE BLD STRIP.AUTO-MCNC: 120 MG/DL (ref 65–100)
GLUCOSE BLD STRIP.AUTO-MCNC: 182 MG/DL (ref 65–100)
GLUCOSE BLD STRIP.AUTO-MCNC: 260 MG/DL (ref 65–100)
SERVICE CMNT-IMP: ABNORMAL

## 2018-07-22 PROCEDURE — 74011250637 HC RX REV CODE- 250/637: Performed by: PSYCHIATRY & NEUROLOGY

## 2018-07-22 PROCEDURE — 74011250637 HC RX REV CODE- 250/637

## 2018-07-22 PROCEDURE — 65220000003 HC RM SEMIPRIVATE PSYCH

## 2018-07-22 PROCEDURE — 74011636637 HC RX REV CODE- 636/637: Performed by: NURSE PRACTITIONER

## 2018-07-22 PROCEDURE — 82962 GLUCOSE BLOOD TEST: CPT

## 2018-07-22 RX ADMIN — SERTRALINE HYDROCHLORIDE 50 MG: 50 TABLET ORAL at 08:28

## 2018-07-22 RX ADMIN — HALOPERIDOL 5 MG: 5 TABLET ORAL at 21:05

## 2018-07-22 RX ADMIN — LAMOTRIGINE 50 MG: 25 TABLET ORAL at 08:29

## 2018-07-22 RX ADMIN — INSULIN LISPRO 2 UNITS: 100 INJECTION, SOLUTION INTRAVENOUS; SUBCUTANEOUS at 08:08

## 2018-07-22 RX ADMIN — ACETAMINOPHEN 650 MG: 325 TABLET, FILM COATED ORAL at 16:39

## 2018-07-22 RX ADMIN — TRAZODONE HYDROCHLORIDE 50 MG: 50 TABLET ORAL at 21:35

## 2018-07-22 RX ADMIN — INSULIN LISPRO 5 UNITS: 100 INJECTION, SOLUTION INTRAVENOUS; SUBCUTANEOUS at 12:08

## 2018-07-22 RX ADMIN — BENZTROPINE MESYLATE 1 MG: 1 TABLET ORAL at 21:05

## 2018-07-22 RX ADMIN — LAMOTRIGINE 50 MG: 25 TABLET ORAL at 21:05

## 2018-07-22 NOTE — INTERDISCIPLINARY ROUNDS
Behavioral Health Interdisciplinary Rounds     Patient Name: Bela Amado  Age: 47 y.o.   Room/Bed:  731/  Primary Diagnosis: <principal problem not specified>   Admission Status: Voluntary     Readmission within 30 days: no  Power of  in place: no  Patient requires a blocked bed: no          Reason for blocked bed:     VTE Prophylaxis: No    Mobility needs/Fall risk: no    Nutritional Plan: no  Consults:          Labs/Testing due today?: yes    Sleep hours: 6.25       Participation in Care/Groups:  yes  Medication Compliant?: Yes  PRNS (last 24 hours): Pain    Restraints (last 24 hours):  no     CIWA (range last 24 hours):     COWS (range last 24 hours):      Alcohol screening (AUDIT) completed -   AUDIT Score: 0     If applicable, date SBIRT discussed in treatment team AND documented:     Tobacco - patient is a smoker: Have You Used Tobacco in the Past 30 Days: No  Illegal Drugs use: Have You Used Any Illegal Substances Over the Past 12 Months: No    24 hour chart check complete: yes     Patient goal(s) for today:   Treatment team focus/goals:   Progress note     LOS:  4  Expected LOS:     Financial concerns/prescription coverage:    Date of last family contact:       Family requesting physician contact today:  no  Discharge plan:   Guns in the home:         Outpatient provider(s):     Participating treatment team members: Bela Amado, * (assigned SW),

## 2018-07-22 NOTE — PROGRESS NOTES
Problem: Falls - Risk of  Goal: *Absence of Falls  Document Georgiana Fall Risk and appropriate interventions in the flowsheet. Outcome: Progressing Towards Goal  Fall Risk Interventions:  Mobility Interventions: Patient to call before getting Vazquez Deutscher bed asleep with respirations noted as even and unlabored as chest was rising and falling.  Will continue to monitor for safety and 15 minute checks throughout shift    Medication Interventions: Teach patient to arise slowly         History of Falls Interventions: Room close to nurse's station

## 2018-07-22 NOTE — PROGRESS NOTES
Problem: Depressed Mood (Adult/Pediatric)  Goal: *STG: Participates in treatment plan  Outcome: Progressing Towards Goal  Patient participates in treatment plan. Smiling, pleasant, cooperative. Treatment and medications discussed, patient goals discussed. Patient's thoughts are organized, denies auditory hallucinations, out on the unit, med and meal compliant, appropriate with staff and peers.

## 2018-07-22 NOTE — BH NOTES
GROUP THERAPY PROGRESS NOTE    Low Patterson is participating in Reflection group.      Group time: 15 minutes    Personal goal for participation: no goal stated    Goal orientation: personal    Group therapy participation: active    Therapeutic interventions reviewed and discussed:     Impression of participation:

## 2018-07-22 NOTE — BH NOTES
PSYCHIATRIC PROGRESS NOTE             IDENTIFICATION:    Patient Name  Lei Gallego   Date of Birth 1965   [facility] [id number]   Medical Record Number  [id number]      Age  47 y.o. PCP Tomi Linares MD   Admit date:  7/18/2018    Room Number  26/1  @ [facility]   Date of Service  7/22/2018            HISTORY         REASON FOR HOSPITALIZATION:  CC: \". Pt admitted under a voluntary basis for severe depression with suicidal ideations and  psychosis  proving to be an imminent danger to self and an inability to care for self. HISTORY OF PRESENT ILLNESS:    The patient, Lei Gallego, is a 47 y.o. BLACK OR  female with a past psychiatric history significant for depression and psychosis , who presents at this time with complaints of (and/or evidence of) the following emotional symptoms: depression, suicidal thoughts/threats, psychotic behavior, paranoid behavior and psychosis. Additional symptomatology include depression worse, difficulty sleeping, feeling depressed, feeling suicidal and \"I wanted to die\". The above symptoms have been present for months . These symptoms are of moderate  severity. These symptoms are constant  in nature. The patient's condition has been precipitated by her mental illness and psychosocial stressors (her sons are incarcerated  ).   She went to her therapist yesterday and she expressed suicidal thoughts and hearing voices and she came to ER .  7/20/18 she is feeling better and she has no thoughts about hurting herself but she still hear voices and she is not feeling angry or agitated and no self harm behavior and  She still feels depressed    7/21/18 she is doing better and no anger issues and no self harm behavior and she is not hearing voices and she is not suicidal like before    7/22/18 she is doing better and mood is better and no SI or HI and not hearing voices like before and no anger issues and no aggressive behavior     ALLERGIES: Allergies   Allergen Reactions    Aspirin Nausea and Vomiting    Morphine Hives      MEDICATIONS PRIOR TO ADMISSION:   Prescriptions Prior to Admission   Medication Sig    lamoTRIgine (LAMICTAL) 100 mg tablet Take  by mouth two (2) times a day.  sertraline (ZOLOFT) 50 mg tablet Take 50 mg by mouth daily.  benztropine (COGENTIN) 1 mg tablet Take 1 mg by mouth two (2) times daily as needed.  cetirizine (ZYRTEC) 10 mg tablet Take 10 mg by mouth daily.  fluticasone (FLONASE) 50 mcg/actuation nasal spray 1 [place] by Both Nostrils route daily.  traMADol (ULTRAM) 50 mg tablet Take 50 mg by mouth every eight (8) hours as needed for Pain.  traZODone (DESYREL) 50 mg tablet Take  by mouth nightly.  haloperidol (HALDOL) 5 mg tablet Take 5 mg by mouth.  lisinopril (PRINIVIL, ZESTRIL) 40 mg tablet Take 1 Tab by mouth daily. Indications: hypertension    MULTIVIT-MINS NO.7/FOLIC ACID (V-C FORTE PO) Take 1 Cap by mouth daily. PAST MEDICAL HISTORY:   Past Medical History:   Diagnosis Date    Diabetes ([facility] [address]     Hypertension     Psychiatric disorder      Past Surgical History:   Procedure Laterality Date    HX GYN       x2    HX OTHER SURGICAL      skin grafting    HX OTHER SURGICAL  2016    oral surgery      SOCIAL HISTORY: single    Social History     Social History    Marital status: SINGLE     Spouse name: N/A    Number of children: N/A    Years of education: N/A     Occupational History    Not on file. Social History Main Topics    Smoking status: Current Some Day Smoker    Smokeless tobacco: Never Used    Alcohol use No    Drug use: No    Sexual activity: Not on file     Other Topics Concern    Not on file     Social History Narrative      FAMILY HISTORY: History reviewed. No pertinent family history. No family history on file. REVIEW OF SYSTEMS:   History obtained from the patient  Pertinent items are noted in the History of Present Illness.   All other Systems reviewed and are considered negative. MENTAL STATUS EXAM & VITALS     MENTAL STATUS EXAM (MSE):    MSE FINDINGS ARE WITHIN NORMAL LIMITS (WNL) UNLESS OTHERWISE STATED BELOW. ( ALL OF THE BELOW CATEGORIES OF THE MSE HAVE BEEN REVIEWED (reviewed 7/22/2018) AND UPDATED AS DEEMED APPROPRIATE )  General Presentation age appropriate, cooperative   Orientation oriented to time, place and person   Vital Signs  See below (reviewed 7/22/2018); Vital Signs (BP, Pulse, & Temp) are within normal limits if not listed below.    Gait and Station Stable/steady, no ataxia   Musculoskeletal System No extrapyramidal symptoms (EPS); no abnormal muscular movements or Tardive Dyskinesia (TD); muscle strength and tone are within normal limits   Language No aphasia or dysarthria   Speech:  normal pitch and normal volume   Thought Processes logical; normal rate of thoughts; fair abstract reasoning/computation   Thought Associations goal directed   Thought Content paranoid delusions and auditory hallucinations   Suicidal Ideations intention and no plan    Homicidal Ideations no plan  and no intention   Mood:  depressed   Affect:  depressed   Memory recent  good   Memory remote:  good   Concentration/Attention:  good   Fund of Knowledge average   Insight:  limited   Reliability fair   Judgment:  limited          VITALS:     Patient Vitals for the past 24 hrs:   Temp Pulse Resp BP SpO2   07/22/18 0801 98.3 °F (36.8 °C) 93 16 95/64 97 %   07/21/18 2053 98.5 °F (36.9 °C) 69 16 134/79 98 %   07/21/18 1619 98.7 °F (37.1 °C) 82 16 109/78 98 %     Wt Readings from Last 3 Encounters:   07/18/18 71.7 kg (158 lb)   07/05/18 71.7 kg (158 lb)   10/04/17 70.3 kg (155 lb)     Temp Readings from Last 3 Encounters:   07/22/18 98.3 °F (36.8 °C)   07/05/18 98.5 °F (36.9 °C)   10/04/17 99.4 °F (37.4 °C)     BP Readings from Last 3 Encounters:   07/22/18 95/64   07/05/18 (!) 141/94   10/04/17 143/90     Pulse Readings from Last 3 Encounters: 07/22/18 93   07/05/18 (!) 101   10/04/17 98            DATA     LABORATORY DATA:  Labs Reviewed   CBC WITH AUTOMATED DIFF - Abnormal; Notable for the following:        Result Value    WBC 12.1 (*)     MCV 99.8 (*)     ABS. LYMPHOCYTES 4.1 (*)     All other components within normal limits   METABOLIC PANEL, COMPREHENSIVE - Abnormal; Notable for the following:     Glucose 289 (*)     Creatinine 1.09 (*)     BUN/Creatinine ratio 7 (*)     GFR est non-AA 53 (*)     AST (SGOT) 11 (*)     Alk.  phosphatase 172 (*)     Globulin 4.4 (*)     A-G Ratio 0.9 (*)     All other components within normal limits   ACETAMINOPHEN - Abnormal; Notable for the following:     Acetaminophen level <2 (*)     All other components within normal limits   URINALYSIS W/MICROSCOPIC - Abnormal; Notable for the following:     Glucose >1000 (*)     All other components within normal limits   GLUCOSE, POC - Abnormal; Notable for the following:     Glucose (POC) 189 (*)     All other components within normal limits   GLUCOSE, POC - Abnormal; Notable for the following:     Glucose (POC) 389 (*)     All other components within normal limits   GLUCOSE, POC - Abnormal; Notable for the following:     Glucose (POC) 239 (*)     All other components within normal limits   GLUCOSE, POC - Abnormal; Notable for the following:     Glucose (POC) 126 (*)     All other components within normal limits   GLUCOSE, POC - Abnormal; Notable for the following:     Glucose (POC) 182 (*)     All other components within normal limits   URINE CULTURE HOLD SAMPLE   ETHYL ALCOHOL   SALICYLATE   DRUG SCREEN, URINE   TSH 3RD GENERATION   SAMPLES BEING HELD   LIPID PANEL   HEMOGLOBIN A1C WITH EAG   HEMOGLOBIN A1C WITH EAG     Admission on 07/18/2018   Component Date Value Ref Range Status    WBC 07/18/2018 12.1* 3.6 - 11.0 K/uL Final    RBC 07/18/2018 4.20  3.80 - 5.20 M/uL Final    HGB 07/18/2018 14.0  11.5 - 16.0 g/dL Final    HCT 07/18/2018 41.9  35.0 - 47.0 % Final    MCV 07/18/2018 99.8* 80.0 - 99.0 FL Final    MCH 07/18/2018 33.3  26.0 - 34.0 PG Final    MCHC 07/18/2018 33.4  30.0 - 36.5 g/dL Final    RDW 07/18/2018 11.8  11.5 - 14.5 % Final    PLATELET 26/94/9413 511  150 - 400 K/uL Final    MPV 07/18/2018 10.5  8.9 - 12.9 FL Final    NRBC 07/18/2018 0.0  0  WBC Final    ABSOLUTE NRBC 07/18/2018 0.00  0.00 - 0.01 K/uL Final    NEUTROPHILS 07/18/2018 59  32 - 75 % Final    LYMPHOCYTES 07/18/2018 34  12 - 49 % Final    MONOCYTES 07/18/2018 7  5 - 13 % Final    EOSINOPHILS 07/18/2018 1  0 - 7 % Final    BASOPHILS 07/18/2018 0  0 - 1 % Final    IMMATURE GRANULOCYTES 07/18/2018 0  0.0 - 0.5 % Final    ABS. NEUTROPHILS 07/18/2018 7.1  1.8 - 8.0 K/UL Final    ABS. LYMPHOCYTES 07/18/2018 4.1* 0.8 - 3.5 K/UL Final    ABS. MONOCYTES 07/18/2018 0.8  0.0 - 1.0 K/UL Final    ABS. EOSINOPHILS 07/18/2018 0.1  0.0 - 0.4 K/UL Final    ABS. BASOPHILS 07/18/2018 0.0  0.0 - 0.1 K/UL Final    ABS. IMM. GRANS. 07/18/2018 0.0  0.00 - 0.04 K/UL Final    DF 07/18/2018 AUTOMATED    Final    Sodium 07/18/2018 139  136 - 145 mmol/L Final    Potassium 07/18/2018 4.0  3.5 - 5.1 mmol/L Final    Chloride 07/18/2018 103  97 - 108 mmol/L Final    CO2 07/18/2018 27  21 - 32 mmol/L Final    Anion gap 07/18/2018 9  5 - 15 mmol/L Final    Glucose 07/18/2018 289* 65 - 100 mg/dL Final    BUN 07/18/2018 8  6 - 20 MG/DL Final    Creatinine 07/18/2018 1.09* 0.55 - 1.02 MG/DL Final    BUN/Creatinine ratio 07/18/2018 7* 12 - 20   Final    GFR est AA 07/18/2018 >60  >60 ml/min/1.73m2 Final    GFR est non-AA 07/18/2018 53* >60 ml/min/1.73m2 Final    Calcium 07/18/2018 10.1  8.5 - 10.1 MG/DL Final    Bilirubin, total 07/18/2018 0.3  0.2 - 1.0 MG/DL Final    ALT (SGPT) 07/18/2018 19  12 - 78 U/L Final    AST (SGOT) 07/18/2018 11* 15 - 37 U/L Final    Alk.  phosphatase 07/18/2018 172* 45 - 117 U/L Final    Protein, total 07/18/2018 8.2  6.4 - 8.2 g/dL Final    Albumin 07/18/2018 3.8 3.5 - 5.0 g/dL Final    Globulin 07/18/2018 4.4* 2.0 - 4.0 g/dL Final    A-G Ratio 07/18/2018 0.9* 1.1 - 2.2   Final    ALCOHOL(ETHYL),SERUM 07/18/2018 <10  <10 MG/DL Final    Salicylate level 28/74/5430 5.0  2.8 - 20.0 MG/DL Final    Acetaminophen level 07/18/2018 <2* 10 - 30 ug/mL Final    Color 07/18/2018 YELLOW/STRAW    Final    Appearance 07/18/2018 CLEAR  CLEAR   Final    Specific gravity 07/18/2018 1.010  1.003 - 1.030   Final    pH (UA) 07/18/2018 5.5  5.0 - 8.0   Final    Protein 07/18/2018 NEGATIVE   NEG mg/dL Final    Glucose 07/18/2018 >1000* NEG mg/dL Final    Ketone 07/18/2018 NEGATIVE   NEG mg/dL Final    Bilirubin 07/18/2018 NEGATIVE   NEG   Final    Blood 07/18/2018 NEGATIVE   NEG   Final    Urobilinogen 07/18/2018 0.2  0.2 - 1.0 EU/dL Final    Nitrites 07/18/2018 NEGATIVE   NEG   Final    Leukocyte Esterase 07/18/2018 NEGATIVE   NEG   Final    WBC 07/18/2018 0-4  0 - 4 /hpf Final    RBC 07/18/2018 0-5  0 - 5 /hpf Final    Epithelial cells 07/18/2018 FEW  FEW /lpf Final    Bacteria 07/18/2018 NEGATIVE   NEG /hpf Final    Urine culture hold 07/18/2018 URINE ON HOLD IN MICROBIOLOGY DEPT FOR 3 DAYS. IF UNPRESERVED URINE IS SUBMITTED, IT CANNOT BE USED FOR ADDITIONAL TESTING AFTER 24 HRS, RECOLLECTION WILL BE REQUIRED.     Final    AMPHETAMINES 07/18/2018 NEGATIVE   NEG   Final    BARBITURATES 07/18/2018 NEGATIVE   NEG   Final    BENZODIAZEPINES 07/18/2018 NEGATIVE   NEG   Final    COCAINE 07/18/2018 NEGATIVE   NEG   Final    METHADONE 07/18/2018 NEGATIVE   NEG   Final    OPIATES 07/18/2018 NEGATIVE   NEG   Final    PCP(PHENCYCLIDINE) 07/18/2018 NEGATIVE   NEG   Final    THC (TH-CANNABINOL) 07/18/2018 NEGATIVE   NEG   Final    Drug screen comment 07/18/2018 (NOTE)   Final    TSH 07/19/2018 0.61  0.36 - 3.74 uIU/mL Final    Glucose (POC) 07/21/2018 189* 65 - 100 mg/dL Final    Performed by 07/21/2018 [facility] Jaredia   Final    Glucose (POC) 07/21/2018 389* 65 - 100 mg/dL Final    Performed by 07/21/2018 Moody Newell   Final    Glucose (POC) 07/21/2018 239* 65 - 100 mg/dL Final    Performed by 07/21/2018 GREY (Moon) MÓNICA   Final    Glucose (POC) 07/21/2018 126* 65 - 100 mg/dL Final    Performed by 07/21/2018 GREY (Moon) MÓNICA   Final    Glucose (POC) 07/22/2018 182* 65 - 100 mg/dL Final    Performed by 07/22/2018 Sierra Reynaga   Final        RADIOLOGY REPORTS:  No results found for this or any previous visit. No results found.            MEDICATIONS       ALL MEDICATIONS  Current Facility-Administered Medications   Medication Dose Route Frequency    insulin lispro (HUMALOG) injection   SubCUTAneous TIDAC    glucose chewable tablet 16 g  4 Tab Oral PRN    dextrose (D50W) injection syrg 12.5-25 g  12.5-25 g IntraVENous PRN    glucagon (GLUCAGEN) injection 1 mg  1 mg IntraMUSCular PRN    traZODone (DESYREL) tablet 50 mg  50 mg Oral QHS    lamoTRIgine (LaMICtal) tablet 50 mg  50 mg Oral BID    haloperidol (HALDOL) tablet 5 mg  5 mg Oral QHS    benztropine (COGENTIN) tablet 1 mg  1 mg Oral QHS    sertraline (ZOLOFT) tablet 50 mg  50 mg Oral DAILY    ziprasidone (GEODON) 20 mg in sterile water (preservative free) 1 mL injection  20 mg IntraMUSCular BID PRN    OLANZapine (ZyPREXA) tablet 5 mg  5 mg Oral Q6H PRN    benztropine (COGENTIN) tablet 2 mg  2 mg Oral BID PRN    benztropine (COGENTIN) injection 2 mg  2 mg IntraMUSCular BID PRN    LORazepam (ATIVAN) injection 2 mg  2 mg IntraMUSCular Q4H PRN    LORazepam (ATIVAN) tablet 1 mg  1 mg Oral Q4H PRN    acetaminophen (TYLENOL) tablet 650 mg  650 mg Oral Q4H PRN    ibuprofen (MOTRIN) tablet 400 mg  400 mg Oral Q8H PRN    magnesium hydroxide (MILK OF MAGNESIA) 400 mg/5 mL oral suspension 30 mL  30 mL Oral DAILY PRN    nicotine (NICODERM CQ) 21 mg/24 hr patch 1 Patch  1 Patch TransDERmal DAILY PRN      SCHEDULED MEDICATIONS  Current Facility-Administered Medications   Medication Dose Route Frequency    insulin lispro (HUMALOG) injection   SubCUTAneous TIDAC    traZODone (DESYREL) tablet 50 mg  50 mg Oral QHS    lamoTRIgine (LaMICtal) tablet 50 mg  50 mg Oral BID    haloperidol (HALDOL) tablet 5 mg  5 mg Oral QHS    benztropine (COGENTIN) tablet 1 mg  1 mg Oral QHS    sertraline (ZOLOFT) tablet 50 mg  50 mg Oral DAILY                ASSESSMENT & PLAN        The patient, Cricket Diaz, is a 47 y.o.  female who presents at this time for treatment of the following diagnoses:  Patient Active Hospital Problem List:   Depression (7/18/2018)    Assessment: depression and psychosis     Plan: collateral information and need to restart her medications    7/21/18 continue same medications    7/22/18 continue same medications           I will continue to monitor blood levels (Depakote, Tegretol, lithium, clozapine---a drug with a narrow therapeutic index= NTI) and associated labs for drug therapy implemented that require intense monitoring for toxicity as deemed appropriate based on current medication side effects and pharmacodynamically determined drug 1/2 lives. A coordinated, multidisplinary treatment team (includes the nurse, unit pharmcist,  and writer) round was conducted for this initial evaluation with the patient present. The following regarding medications was addressed during rounds with patient:   the risks and benefits of the proposed medication. The patient was given the opportunity to ask questions. Informed consent given to the use of the above medications. I will continue to adjust psychiatric and non-psychiatric medications (see above \"medication\" section and orders section for details) as deemed appropriate & based upon diagnoses and response to treatment. I have reviewed admission (and previous/old) labs and medical tests in the EHR and or transferring hospital documents.  I will continue to order blood tests/labs and diagnostic tests as deemed appropriate and review results as they become available (see orders for details). I have reviewed old psychiatric and medical records available in the EHR. I Will order additional psychiatric records from other institutions to further elucidate the nature of patient's psychopathology and review once available. I will gather additional collateral information from friends, family and o/p treatment team to further elucidate the nature of patient's psychopathology and baselline level of psychiatric functioning.       ESTIMATED LENGTH OF STAY:    3       STRENGTHS:  Exercising self-direction/Resourceful, Access to housing/residential stability and Interpersonal/supportive relationships (family, friends, peers)                                        SIGNED:    Cherelle Anguiano MD  7/22/2018

## 2018-07-22 NOTE — PROGRESS NOTES
Problem: Depressed Mood (Adult/Pediatric)  Goal: *STG: Verbalizes anger, guilt, and other feelings in a constructive manor  Outcome: Progressing Towards Goal  Pt smiles upon approach. Calm/cooperative, denies SI. Staff continue q15 checks, give emotional support and encouragment.

## 2018-07-22 NOTE — BH NOTES
GROUP THERAPY PROGRESS NOTE    Tristian Clarke is participating in Community/Wrap up.      Group time: 30 minutes    Personal goal for participation: share goals for the day    Goal orientation: community    Group therapy participation: active and passive    Therapeutic interventions reviewed and discussed: amarilys    Impression of participation: present

## 2018-07-22 NOTE — BH NOTES
GROUP THERAPY PROGRESS NOTE    Roxana Guardado is participating in Music.      Group time: 15 minutes    Personal goal for participation: enjoy music with peers and discuss    Goal orientation: community    Group therapy participation: active    Therapeutic interventions reviewed and discussed: music    Impression of participation: present

## 2018-07-23 VITALS
BODY MASS INDEX: 31.67 KG/M2 | WEIGHT: 161.3 LBS | HEART RATE: 69 BPM | RESPIRATION RATE: 16 BRPM | SYSTOLIC BLOOD PRESSURE: 123 MMHG | TEMPERATURE: 98.2 F | OXYGEN SATURATION: 100 % | HEIGHT: 60 IN | DIASTOLIC BLOOD PRESSURE: 82 MMHG

## 2018-07-23 LAB
GLUCOSE BLD STRIP.AUTO-MCNC: 194 MG/DL (ref 65–100)
GLUCOSE BLD STRIP.AUTO-MCNC: 201 MG/DL (ref 65–100)
GLUCOSE BLD STRIP.AUTO-MCNC: 236 MG/DL (ref 65–100)
SERVICE CMNT-IMP: ABNORMAL

## 2018-07-23 PROCEDURE — 74011636637 HC RX REV CODE- 636/637: Performed by: NURSE PRACTITIONER

## 2018-07-23 PROCEDURE — 74011250637 HC RX REV CODE- 250/637: Performed by: PSYCHIATRY & NEUROLOGY

## 2018-07-23 PROCEDURE — 82962 GLUCOSE BLOOD TEST: CPT

## 2018-07-23 RX ORDER — BENZTROPINE MESYLATE 1 MG/1
1 TABLET ORAL
Qty: 15 TAB | Refills: 1 | Status: ON HOLD | OUTPATIENT
Start: 2018-07-23 | End: 2019-04-19

## 2018-07-23 RX ORDER — HALOPERIDOL 5 MG/1
5 TABLET ORAL
Qty: 15 TAB | Refills: 1 | Status: SHIPPED | OUTPATIENT
Start: 2018-07-23

## 2018-07-23 RX ORDER — TRAZODONE HYDROCHLORIDE 50 MG/1
50 TABLET ORAL
Qty: 15 TAB | Refills: 0 | Status: SHIPPED | OUTPATIENT
Start: 2018-07-23

## 2018-07-23 RX ORDER — LAMOTRIGINE 25 MG/1
50 TABLET ORAL 2 TIMES DAILY
Qty: 30 TAB | Refills: 1 | Status: ON HOLD | OUTPATIENT
Start: 2018-07-23 | End: 2019-04-19

## 2018-07-23 RX ORDER — SERTRALINE HYDROCHLORIDE 50 MG/1
50 TABLET, FILM COATED ORAL DAILY
Qty: 15 TAB | Refills: 1 | Status: SHIPPED | OUTPATIENT
Start: 2018-07-24

## 2018-07-23 RX ADMIN — INSULIN LISPRO 3 UNITS: 100 INJECTION, SOLUTION INTRAVENOUS; SUBCUTANEOUS at 12:06

## 2018-07-23 RX ADMIN — INSULIN LISPRO 3 UNITS: 100 INJECTION, SOLUTION INTRAVENOUS; SUBCUTANEOUS at 16:45

## 2018-07-23 RX ADMIN — LAMOTRIGINE 50 MG: 25 TABLET ORAL at 08:18

## 2018-07-23 RX ADMIN — SERTRALINE HYDROCHLORIDE 50 MG: 50 TABLET ORAL at 08:18

## 2018-07-23 RX ADMIN — INSULIN LISPRO 2 UNITS: 100 INJECTION, SOLUTION INTRAVENOUS; SUBCUTANEOUS at 08:19

## 2018-07-23 NOTE — PROGRESS NOTES
Problem: Falls - Risk of  Goal: *Absence of Falls  Document Georgiana Fall Risk and appropriate interventions in the flowsheet. Outcome: Progressing Towards Goal  Fall Risk Interventions:  Mobility Interventions: Assess mobility with egress test  Medication Interventions: Teach patient to arise slowly  History of Falls Interventions: Door open when patient unattended    2315 Pt appears asleep in bed. Respirations even and unlabored. Will monitor with Q 15 safety checks.

## 2018-07-23 NOTE — DIABETES MGMT
DTC Progress Note    Recommendations/ Comments: Chart reviewed for hyperglycemia. Noted new A1c ordered. If appropriate, please consider:  - Adding Lantus 10 units daily     Current hospital DM medication: Humalog for correction, normal sensitivity scale     Chart reviewed on [address]. Patient is a 47 y.o. female with hx DM no meds for diabetes PTA noted     A1c:   Lab Results   Component Value Date/Time    Hemoglobin A1c 5.7 07/08/2017 09:10 AM       Recent Glucose Results:   Lab Results   Component Value Date/Time    GLUCPOC 236 (H) 07/23/2018 11:49 AM    GLUCPOC 194 (H) 07/23/2018 07:42 AM    GLUCPOC 120 (H) 07/22/2018 04:32 PM        Lab Results   Component Value Date/Time    Creatinine 1.09 (H) 07/18/2018 06:17 PM     Estimated Creatinine Clearance: 53.9 mL/min (based on Cr of 1.09). Active Orders   Diet    DIET DIABETIC CONSISTENT CARB Regular        PO intake: No data found. Will continue to follow as needed.     Thank you  Kalen Mesa RD  [facility]  Pager: [phone]

## 2018-07-23 NOTE — PROGRESS NOTES
Problem: Depressed Mood (Adult/Pediatric)  Goal: *STG: Participates in treatment plan  Outcome: Progressing Towards Goal  Calm, cooperative attitude. Demonstrates appropriate behavior. Up ad taylor interacting with other patients and listening to music.  Participated in treatment team  Goal: *STG: Verbalizes anger, guilt, and other feelings in a constructive manor  Outcome: Progressing Towards Goal  Denies feeling anger towards anyone

## 2018-07-23 NOTE — PROGRESS NOTES
Laboratory Monitoring for Antipsychotics: This patient is currently prescribed the following medication(s):   Current Facility-Administered Medications   Medication Dose Route Frequency    insulin lispro (HUMALOG) injection   SubCUTAneous TIDAC    traZODone (DESYREL) tablet 50 mg  50 mg Oral QHS    lamoTRIgine (LaMICtal) tablet 50 mg  50 mg Oral BID    haloperidol (HALDOL) tablet 5 mg  5 mg Oral QHS    benztropine (COGENTIN) tablet 1 mg  1 mg Oral QHS    sertraline (ZOLOFT) tablet 50 mg  50 mg Oral DAILY     The following labs have been completed for monitoring of antipsychotics and/or mood stabilizers:    Height, Weight, BMI Estimation  Estimated body mass index is 31.5 kg/(m^2) as calculated from the following:    Height as of this encounter: 152.4 cm (60\"). Weight as of this encounter: 73.2 kg (161 lb 4.8 oz). Vital Signs/Blood Pressure  Visit Vitals    /84    Pulse 70    Temp 98.4 °F (36.9 °C)    Resp 16    Ht 152.4 cm (60\")    Wt 73.2 kg (161 lb 4.8 oz)    SpO2 99%    Breastfeeding No    BMI 31.5 kg/m2     Renal Function, Hepatic Function and Chemistry  Estimated Creatinine Clearance: 53.9 mL/min (based on Cr of 1.09). Lab Results   Component Value Date/Time    Sodium 139 07/18/2018 06:17 PM    Potassium 4.0 07/18/2018 06:17 PM    Chloride 103 07/18/2018 06:17 PM    CO2 27 07/18/2018 06:17 PM    Anion gap 9 07/18/2018 06:17 PM    BUN 8 07/18/2018 06:17 PM    Creatinine 1.09 (H) 07/18/2018 06:17 PM    BUN/Creatinine ratio 7 (L) 07/18/2018 06:17 PM    Bilirubin, total 0.3 07/18/2018 06:17 PM    Protein, total 8.2 07/18/2018 06:17 PM    Albumin 3.8 07/18/2018 06:17 PM    Globulin 4.4 (H) 07/18/2018 06:17 PM    A-G Ratio 0.9 (L) 07/18/2018 06:17 PM    ALT (SGPT) 19 07/18/2018 06:17 PM    Alk.  phosphatase 172 (H) 07/18/2018 06:17 PM     Lab Results   Component Value Date/Time    Glucose 289 (H) 07/18/2018 06:17 PM    Glucose (POC) 194 (H) 07/23/2018 07:42 AM     Lab Results Component Value Date/Time    Hemoglobin A1c 5.7 07/08/2017 09:10 AM     Hematology  Lab Results   Component Value Date/Time    WBC 12.1 (H) 07/18/2018 06:17 PM    RBC 4.20 07/18/2018 06:17 PM    HGB 14.0 07/18/2018 06:17 PM    HCT 41.9 07/18/2018 06:17 PM    MCV 99.8 (H) 07/18/2018 06:17 PM    MCH 33.3 07/18/2018 06:17 PM    MCHC 33.4 07/18/2018 06:17 PM    RDW 11.8 07/18/2018 06:17 PM    PLATELET 553 63/54/4327 06:17 PM     Lipids  Lab Results   Component Value Date/Time    Cholesterol, total 224 (H) 07/08/2017 09:10 AM    HDL Cholesterol 54 07/08/2017 09:10 AM    LDL, calculated 150 (H) 07/08/2017 09:10 AM    Triglyceride 100 07/08/2017 09:10 AM    CHOL/HDL Ratio 4.1 07/08/2017 09:10 AM     Thyroid Function  Lab Results   Component Value Date/Time    TSH 0.61 07/19/2018 06:17 PM     Pregnancy Status  Lab Results   Component Value Date/Time    Pregnancy test,urine (POC) NEGATIVE  10/04/2017 03:43 PM     Assessment/Plan:  Will reorder lipid panel to complete the recommended baseline laboratory monitoring based on the patient's current medication regimen. Patient has refused labs 3 days (7/20, 7/21, and 7/23). Plan is for patient to discharge today - if patient continues to refuse labs, recommend completing lipid panel as outpatient.      Heath Hopper, PharmD, BCPP, [facility] Specialist, [address] [place] [address]

## 2018-07-23 NOTE — DISCHARGE INSTRUCTIONS
DISCHARGE SUMMARY    Lamont Monique  : 1965  MRN: [id number]    The patient Avery Castaneda exhibits the ability to control behavior in a less restrictive environment. Patient's level of functioning is improving. No assaultive/destructive behavior has been observed for the past 24 hours. No suicidal/homicidal threat or behavior has been observed for the past 24 hours. There is no evidence of serious medication side effects. Patient has not been in physical or protective restraints for at least the past 24 hours. If weapons involved, how are they secured? No weapons involved. Is patient aware of and in agreement with discharge plan? Yes    Arrangements for medication:  Prescriptions given to patient. Referral for substance abuse treatment? Patient is not using substances/Not applicable. Referral for smoking cessation needed? Patient is not a smoker/Not applicable. Copy of discharge instructions to  provider?:  Dr. King Reyes for transportation home:  Medicaid taxi    Keep all follow up appointments as scheduled, continue to take prescribed medications per physician instructions. Mental health crisis number:  213 or your local mental health crisis line number at [phone]. DISCHARGE SUMMARY from Nurse          PATIENT INSTRUCTIONS:        What to do at Home:  Recommended activity: Activity as tolerated    If you experience any of the following symptoms hearing voices, feeling hopeless and lonely, please follow up with your assigned providers and local crisis number. *  Please give a list of your current medications to your Primary Care Provider. *  Please update this list whenever your medications are discontinued, doses are      changed, or new medications (including over-the-counter products) are added. *  Please carry medication information at all times in case of emergency situations.     These are general instructions for a healthy lifestyle:    No smoking/ No tobacco products/ Avoid exposure to second hand smoke  Surgeon General's Warning:  Quitting smoking now greatly reduces serious risk to your health. Obesity, smoking, and sedentary lifestyle greatly increases your risk for illness    A healthy diet, regular physical exercise & weight monitoring are important for maintaining a healthy lifestyle    You may be retaining fluid if you have a history of heart failure or if you experience any of the following symptoms:  Weight gain of 3 pounds or more overnight or 5 pounds in a week, increased swelling in our hands or feet or shortness of breath while lying flat in bed. Please call your doctor as soon as you notice any of these symptoms; do not wait until your next office visit. Recognize signs and symptoms of STROKE:    F-face looks uneven    A-arms unable to move or move unevenly    S-speech slurred or non-existent    T-time-call 911 as soon as signs and symptoms begin-DO NOT go       Back to bed or wait to see if you get better-TIME IS BRAIN. Warning Signs of HEART ATTACK     Call 911 if you have these symptoms:   Chest discomfort. Most heart attacks involve discomfort in the center of the chest that lasts more than a few minutes, or that goes away and comes back. It can feel like uncomfortable pressure, squeezing, fullness, or pain.  Discomfort in other areas of the upper body. Symptoms can include pain or discomfort in one or both arms, the back, neck, jaw, or stomach.  Shortness of breath with or without chest discomfort.  Other signs may include breaking out in a cold sweat, nausea, or lightheadedness. Don't wait more than five minutes to call 911 - MINUTES MATTER! Fast action can save your life. Calling 911 is almost always the fastest way to get lifesaving treatment. Emergency Medical Services staff can begin treatment when they arrive -- up to an hour sooner than if someone gets to the hospital by car.      The discharge information has been reviewed with the patient. The patient verbalized understanding. Discharge medications reviewed with the patient and appropriate educational materials and side effects teaching were provided.   ___________________________________________________________________________________________________________________________________

## 2018-07-23 NOTE — PROGRESS NOTES
Problem: Depressed Mood (Adult/Pediatric)  Goal: *STG: Participates in treatment plan  Outcome: Progressing Towards Goal  Pt demonstrates appropriate behavior. Calm, cooperative attitude. Interacting with other patients and listening to music. Smiling and talking. Participated in treatment team.   Goal: *STG: Verbalizes anger, guilt, and other feelings in a constructive manor  Outcome: Progressing Towards Goal  Pt denies feeling angry. Expresses feelings in treatment team.  Goal: *STG: Attends activities and groups  Outcome: Progressing Towards Goal  Pt willingly attends groups  Goal: *STG: Demonstrates reduction in symptoms and increase in insight into coping skills/future focused  Outcome: Progressing Towards Goal  Focused on being discharged and returning home to roommate. Denies SI.   Goal: *STG: Complies with medication therapy  Outcome: Progressing Towards Goal  Medication compliant

## 2018-07-23 NOTE — DISCHARGE SUMMARY
PSYCHIATRIC DISCHARGE SUMMARY         IDENTIFICATION:    Patient Name  Cristofer Schreiber   Date of Birth 1965   [facility] [id number]   Medical Record Number  [id number]      Age  47 y.o. PCP Tatianna Blackburn MD   Admit date:  7/18/2018    Discharge date: 7/23/2018   Room Number  731/02  @ [facility]   Date of Service  7/23/2018            TYPE OF DISCHARGE: REGULAR               CONDITION AT DISCHARGE: improved       PROVISIONAL & DISCHARGE DIAGNOSES:    Problem List  Never Reviewed          Codes Class    * (Principal)Depression ICD-10-CM: F32.9  ICD-9-CM: 942         Schizoaffective disorder, bipolar type ([facility] [address] ICD-10-CM: F25.0  ICD-9-CM: 295.70         Vision impairment ICD-10-CM: H54.7  ICD-9-CM: 369.9         Essential hypertension ICD-10-CM: I10  ICD-9-CM: 401.9               Active Hospital Problems    *Depression        DISCHARGE DIAGNOSIS:   Axis I:  SEE ABOVE  Axis II: SEE ABOVE  Axis III: SEE ABOVE  Axis IV:  lack of structure  Axis V:  35 on admission, 55 on discharge 65(baseline)       CC & HISTORY OF PRESENT ILLNESS:  CC: \". Pt admitted under a voluntary basis for severe depression with suicidal ideations and  psychosis  proving to be an imminent danger to self and an inability to care for self. HISTORY OF PRESENT ILLNESS:    The patient, Cristofer Schreiber, is a 47 y.o. BLACK [place]  female with a past psychiatric history significant for depression and psychosis , who presents at this time with complaints of (and/or evidence of) the following emotional symptoms: depression, suicidal thoughts/threats, psychotic behavior, paranoid behavior and psychosis. Additional symptomatology include depression worse, difficulty sleeping, feeling depressed, feeling suicidal and \"I wanted to die\". The above symptoms have been present for months . These symptoms are of moderate  severity. These symptoms are constant  in nature.   The patient's condition has been precipitated by her mental illness and psychosocial stressors (her sons are incarcerated  ). She went to her therapist yesterday and she expressed suicidal thoughts and hearing voices and she came to ER .  7/20/18 she is feeling better and she has no thoughts about hurting herself but she still hear voices and she is not feeling angry or agitated and no self harm behavior and  She still feels depressed    7/21/18 she is doing better and no anger issues and no self harm behavior and she is not hearing voices and she is not suicidal like before    7/22/18 she is doing better and mood is better and no SI or HI and not hearing voices like before and no anger issues and no aggressive behavior    7/23/18 she is doing better and denied hearing voices and denied sad feeling and denied auditory hallucinations and she is compliant with her medications and no aggressive behavior , she is not having any self harm behavior         SOCIAL HISTORY:    Social History     Social History    Marital status: SINGLE     Spouse name: N/A    Number of children: N/A    Years of education: N/A     Occupational History    Not on file. Social History Main Topics    Smoking status: Current Some Day Smoker    Smokeless tobacco: Never Used    Alcohol use No    Drug use: No    Sexual activity: Not on file     Other Topics Concern    Not on file     Social History Narrative      FAMILY HISTORY:   No family history on file. HOSPITALIZATION COURSE:    Omkar Pearson was admitted to the inpatient psychiatric unit [facility] for acute psychiatric stabilization in regards to symptomatology as described in the HPI above. The differential diagnosis at time of admission included:  schizoaffective vs. Schizophrenia. While on the unit Omkar Pearson was involved in individual, group, occupational and milieu therapy.   Psychiatric medications were adjusted during this hospitalization including Haldol and Lamictal .   Rg Vanegas Kal Elias demonstrated a slow, but progressive improvement in overall condition. Much of patient's depression appeared to be related to situational stressors, and psychological factors. Please see individual progress notes for more specific details regarding patient's hospitalization course. At time of discharge, Coni Oconnor is without significant problems of depression psychosis  noy. Patient free of suicidal and homicidal ideations (appears to be at very low risk of suicide or homicide) and reports many positive predictive factors in terms of not attempting suicide or homicide. Overall presentation at time of discharge is most consistent with the diagnosis of schizoaffective disorder with depressed mood. Patient with request for discharge today. There are no grounds to seek a TDO. Patient has maximized benefit to be derived from acute inpatient psychiatric treatment. All members of the treatment team concur with each other in regards to plans for discharge today per patient's request.  Patient and family are aware and in agreement with discharge and discharge plan. Per my last note:          LABS AND IMAGAING:    Labs Reviewed   CBC WITH AUTOMATED DIFF - Abnormal; Notable for the following:        Result Value    WBC 12.1 (*)     MCV 99.8 (*)     ABS. LYMPHOCYTES 4.1 (*)     All other components within normal limits   METABOLIC PANEL, COMPREHENSIVE - Abnormal; Notable for the following:     Glucose 289 (*)     Creatinine 1.09 (*)     BUN/Creatinine ratio 7 (*)     GFR est non-AA 53 (*)     AST (SGOT) 11 (*)     Alk.  phosphatase 172 (*)     Globulin 4.4 (*)     A-G Ratio 0.9 (*)     All other components within normal limits   ACETAMINOPHEN - Abnormal; Notable for the following:     Acetaminophen level <2 (*)     All other components within normal limits   URINALYSIS W/MICROSCOPIC - Abnormal; Notable for the following:     Glucose >1000 (*)     All other components within normal limits   GLUCOSE, POC - Abnormal; Notable for the following:     Glucose (POC) 189 (*)     All other components within normal limits   GLUCOSE, POC - Abnormal; Notable for the following:     Glucose (POC) 389 (*)     All other components within normal limits   GLUCOSE, POC - Abnormal; Notable for the following:     Glucose (POC) 239 (*)     All other components within normal limits   GLUCOSE, POC - Abnormal; Notable for the following:     Glucose (POC) 126 (*)     All other components within normal limits   GLUCOSE, POC - Abnormal; Notable for the following:     Glucose (POC) 182 (*)     All other components within normal limits   GLUCOSE, POC - Abnormal; Notable for the following:     Glucose (POC) 260 (*)     All other components within normal limits   GLUCOSE, POC - Abnormal; Notable for the following:     Glucose (POC) 120 (*)     All other components within normal limits   GLUCOSE, POC - Abnormal; Notable for the following:     Glucose (POC) 194 (*)     All other components within normal limits   URINE CULTURE HOLD SAMPLE   ETHYL ALCOHOL   SALICYLATE   DRUG SCREEN, URINE   TSH 3RD GENERATION   SAMPLES BEING HELD   LIPID PANEL   HEMOGLOBIN A1C WITH EAG     No results found for: DS35, PHEN, PHENO, PHENT, DILF, DS39, PHENY, PTN, VALF2, VALAC, VALP, VALPR, DS6, CRBAM, CRBAMP, CARB2, XCRBAM  Admission on 07/18/2018   Component Date Value Ref Range Status    WBC 07/18/2018 12.1* 3.6 - 11.0 K/uL Final    RBC 07/18/2018 4.20  3.80 - 5.20 M/uL Final    HGB 07/18/2018 14.0  11.5 - 16.0 g/dL Final    HCT 07/18/2018 41.9  35.0 - 47.0 % Final    MCV 07/18/2018 99.8* 80.0 - 99.0 FL Final    MCH 07/18/2018 33.3  26.0 - 34.0 PG Final    MCHC 07/18/2018 33.4  30.0 - 36.5 g/dL Final    RDW 07/18/2018 11.8  11.5 - 14.5 % Final    PLATELET 26/66/3593 261  150 - 400 K/uL Final    MPV 07/18/2018 10.5  8.9 - 12.9 FL Final    NRBC 07/18/2018 0.0  0  WBC Final    ABSOLUTE NRBC 07/18/2018 0.00  0.00 - 0.01 K/uL Final    NEUTROPHILS 07/18/2018 59  32 - 75 % Final    LYMPHOCYTES 07/18/2018 34  12 - 49 % Final    MONOCYTES 07/18/2018 7  5 - 13 % Final    EOSINOPHILS 07/18/2018 1  0 - 7 % Final    BASOPHILS 07/18/2018 0  0 - 1 % Final    IMMATURE GRANULOCYTES 07/18/2018 0  0.0 - 0.5 % Final    ABS. NEUTROPHILS 07/18/2018 7.1  1.8 - 8.0 K/UL Final    ABS. LYMPHOCYTES 07/18/2018 4.1* 0.8 - 3.5 K/UL Final    ABS. MONOCYTES 07/18/2018 0.8  0.0 - 1.0 K/UL Final    ABS. EOSINOPHILS 07/18/2018 0.1  0.0 - 0.4 K/UL Final    ABS. BASOPHILS 07/18/2018 0.0  0.0 - 0.1 K/UL Final    ABS. IMM. GRANS. 07/18/2018 0.0  0.00 - 0.04 K/UL Final    DF 07/18/2018 AUTOMATED    Final    Sodium 07/18/2018 139  136 - 145 mmol/L Final    Potassium 07/18/2018 4.0  3.5 - 5.1 mmol/L Final    Chloride 07/18/2018 103  97 - 108 mmol/L Final    CO2 07/18/2018 27  21 - 32 mmol/L Final    Anion gap 07/18/2018 9  5 - 15 mmol/L Final    Glucose 07/18/2018 289* 65 - 100 mg/dL Final    BUN 07/18/2018 8  6 - 20 MG/DL Final    Creatinine 07/18/2018 1.09* 0.55 - 1.02 MG/DL Final    BUN/Creatinine ratio 07/18/2018 7* 12 - 20   Final    GFR est AA 07/18/2018 >60  >60 ml/min/1.73m2 Final    GFR est non-AA 07/18/2018 53* >60 ml/min/1.73m2 Final    Calcium 07/18/2018 10.1  8.5 - 10.1 MG/DL Final    Bilirubin, total 07/18/2018 0.3  0.2 - 1.0 MG/DL Final    ALT (SGPT) 07/18/2018 19  12 - 78 U/L Final    AST (SGOT) 07/18/2018 11* 15 - 37 U/L Final    Alk.  phosphatase 07/18/2018 172* 45 - 117 U/L Final    Protein, total 07/18/2018 8.2  6.4 - 8.2 g/dL Final    Albumin 07/18/2018 3.8  3.5 - 5.0 g/dL Final    Globulin 07/18/2018 4.4* 2.0 - 4.0 g/dL Final    A-G Ratio 07/18/2018 0.9* 1.1 - 2.2   Final    ALCOHOL(ETHYL),SERUM 07/18/2018 <10  <10 MG/DL Final    Salicylate level 88/99/2152 5.0  2.8 - 20.0 MG/DL Final    Acetaminophen level 07/18/2018 <2* 10 - 30 ug/mL Final    Color 07/18/2018 YELLOW/STRAW    Final    Appearance 07/18/2018 CLEAR  CLEAR   Final    Specific gravity 07/18/2018 1.010  1.003 - 1.030   Final    pH (UA) 07/18/2018 5.5  5.0 - 8.0   Final    Protein 07/18/2018 NEGATIVE   NEG mg/dL Final    Glucose 07/18/2018 >1000* NEG mg/dL Final    Ketone 07/18/2018 NEGATIVE   NEG mg/dL Final    Bilirubin 07/18/2018 NEGATIVE   NEG   Final    Blood 07/18/2018 NEGATIVE   NEG   Final    Urobilinogen 07/18/2018 0.2  0.2 - 1.0 EU/dL Final    Nitrites 07/18/2018 NEGATIVE   NEG   Final    Leukocyte Esterase 07/18/2018 NEGATIVE   NEG   Final    WBC 07/18/2018 0-4  0 - 4 /hpf Final    RBC 07/18/2018 0-5  0 - 5 /hpf Final    Epithelial cells 07/18/2018 FEW  FEW /lpf Final    Bacteria 07/18/2018 NEGATIVE   NEG /hpf Final    Urine culture hold 07/18/2018 URINE ON HOLD IN MICROBIOLOGY DEPT FOR 3 DAYS. IF UNPRESERVED URINE IS SUBMITTED, IT CANNOT BE USED FOR ADDITIONAL TESTING AFTER 24 HRS, RECOLLECTION WILL BE REQUIRED.     Final    AMPHETAMINES 07/18/2018 NEGATIVE   NEG   Final    BARBITURATES 07/18/2018 NEGATIVE   NEG   Final    BENZODIAZEPINES 07/18/2018 NEGATIVE   NEG   Final    COCAINE 07/18/2018 NEGATIVE   NEG   Final    METHADONE 07/18/2018 NEGATIVE   NEG   Final    OPIATES 07/18/2018 NEGATIVE   NEG   Final    PCP(PHENCYCLIDINE) 07/18/2018 NEGATIVE   NEG   Final    THC (TH-CANNABINOL) 07/18/2018 NEGATIVE   NEG   Final    Drug screen comment 07/18/2018 (NOTE)   Final    TSH 07/19/2018 0.61  0.36 - 3.74 uIU/mL Final    Glucose (POC) 07/21/2018 189* 65 - 100 mg/dL Final    Performed by 07/21/2018 [facility] Rickia   Final    Glucose (POC) 07/21/2018 389* 65 - 100 mg/dL Final    Performed by 07/21/2018 [facility] Rickia   Final    Glucose (POC) 07/21/2018 239* 65 - 100 mg/dL Final    Performed by 07/21/2018 GERY (Moon) MÓNICA   Final    Glucose (POC) 07/21/2018 126* 65 - 100 mg/dL Final    Performed by 07/21/2018 GREY (Moon) MÓNICA   Final    Glucose (POC) 07/22/2018 182* 65 - 100 mg/dL Final    Performed by 07/22/2018 Lindsey Dempsey Final    Glucose (POC) 07/22/2018 260* 65 - 100 mg/dL Final    Performed by 07/22/2018 John Alfaro   Final    Glucose (POC) 07/22/2018 120* 65 - 100 mg/dL Final    Performed by 07/22/2018 Bro Guerra   Final    Glucose (POC) 07/23/2018 194* 65 - 100 mg/dL Final    Performed by 07/23/2018 Eileen Hawkins   Final     No results found. DISPOSITION:    Home. Patient to f/u with psychiatric, and psychotherapy appointments. Patient is to f/u with internist as directed. FOLLOW-UP CARE:    Activity as tolerated  Diabetic Diet  Wound Care: none needed. Follow-up Information     Follow up With Details Comments Contact Info    Ziyad Wolfe On 7/24/2018 You have an 11:25am appointment with your psychiatrist for medication management. [facility]  [facility], [address] [place] [address]  [phone]    Kelly Ragsdale On 7/25/2018 You have a 4:00pm appointment for individual therapy. [facility]  [address], [address] [place] [address]  [phone]    [place] [phone]                   PROGNOSIS:    Nichols Greenhouse --- based on nature of patient's pathology/ies and treatment compliance issues. Prognosis is greatly dependent upon patient's ability to follow up with psychiatric/psychotherapy appointments as well as to comply with psychiatric medications as prescribed. DISCHARGE MEDICATIONS:    Informed consent given for the use of following psychotropic medications:  Current Discharge Medication List      START taking these medications    Details   !! benztropine (COGENTIN) 1 mg tablet Take 1 Tab by mouth nightly. Indications: drug-induced extrapyramidal reaction  Qty: 15 Tab, Refills: 1      !! haloperidol (HALDOL) 5 mg tablet Take 1 Tab by mouth nightly. Indications: psychotic disorder  Qty: 15 Tab, Refills: 1      !! sertraline (ZOLOFT) 50 mg tablet Take 1 Tab by mouth daily. Indications: major depressive disorder  Qty: 15 Tab, Refills: 1       !! - Potential duplicate medications found. Please discuss with provider. CONTINUE these medications which have CHANGED    Details   !! lamoTRIgine (LAMICTAL) 25 mg tablet Take 2 Tabs by mouth two (2) times a day. Indications: DEPRESSION ASSOCIATED WITH BIPOLAR DISORDER  Qty: 30 Tab, Refills: 1      traZODone (DESYREL) 50 mg tablet Take 1 Tab by mouth nightly. Indications: major depressive disorder  Qty: 15 Tab, Refills: 0       !! - Potential duplicate medications found. Please discuss with provider. CONTINUE these medications which have NOT CHANGED    Details   !! lamoTRIgine (LAMICTAL) 100 mg tablet Take  by mouth two (2) times a day. !! sertraline (ZOLOFT) 50 mg tablet Take 50 mg by mouth daily. !! benztropine (COGENTIN) 1 mg tablet Take 1 mg by mouth two (2) times daily as needed. cetirizine (ZYRTEC) 10 mg tablet Take 10 mg by mouth daily. fluticasone (FLONASE) 50 mcg/actuation nasal spray 1 [place] by Both Nostrils route daily. traMADol (ULTRAM) 50 mg tablet Take 50 mg by mouth every eight (8) hours as needed for Pain. !! haloperidol (HALDOL) 5 mg tablet Take 5 mg by mouth.      lisinopril (PRINIVIL, ZESTRIL) 40 mg tablet Take 1 Tab by mouth daily. Indications: hypertension  Qty: 15 Tab, Refills: 1      MULTIVIT-MINS NO.7/FOLIC ACID (V-C FORTE PO) Take 1 Cap by mouth daily. !! - Potential duplicate medications found. Please discuss with provider. A coordinated, multidisplinary treatment team round was conducted with Gary Brasher---this is done daily here at [facility]. This team consists of the nurse, psychiatric unit pharmcist,  and writer. I have spent greater than 35 minutes on discharge work.     Signed:  José Miguel Manzo MD  7/23/2018

## 2018-07-23 NOTE — PROGRESS NOTES
Pharmacist Discharge Medication Reconciliation    Discharging Provider: Dr. Wilson Parra PMH:   Past Medical History:   Diagnosis Date    Diabetes ([address]     Hypertension     Psychiatric disorder      Chief Complaint for this Admission:   Chief Complaint   Patient presents with    Mental Health Problem     Allergies: Aspirin and Morphine    Discharge Medications:   Current Discharge Medication List        CONTINUE these medications which have CHANGED    Details   benztropine (COGENTIN) 1 mg tablet Take 1 Tab by mouth nightly. Indications: drug-induced extrapyramidal reaction  Qty: 15 Tab, Refills: 1      haloperidol (HALDOL) 5 mg tablet Take 1 Tab by mouth nightly. Indications: psychotic disorder  Qty: 15 Tab, Refills: 1      lamoTRIgine (LAMICTAL) 25 mg tablet Take 2 Tabs by mouth two (2) times a day. Indications: DEPRESSION ASSOCIATED WITH BIPOLAR DISORDER  Qty: 30 Tab, Refills: 1      sertraline (ZOLOFT) 50 mg tablet Take 1 Tab by mouth daily. Indications: major depressive disorder  Qty: 15 Tab, Refills: 1      traZODone (DESYREL) 50 mg tablet Take 1 Tab by mouth nightly. Indications: major depressive disorder  Qty: 15 Tab, Refills: 0           CONTINUE these medications which have NOT CHANGED    Details   cetirizine (ZYRTEC) 10 mg tablet Take 10 mg by mouth daily. fluticasone (FLONASE) 50 mcg/actuation nasal spray 1 [place] by Both Nostrils route daily. traMADol (ULTRAM) 50 mg tablet Take 50 mg by mouth every eight (8) hours as needed for Pain. lisinopril (PRINIVIL, ZESTRIL) 40 mg tablet Take 1 Tab by mouth daily. Indications: hypertension  Qty: 15 Tab, Refills: 1      MULTIVIT-MINS NO.7/FOLIC ACID (V-C FORTE PO) Take 1 Cap by mouth daily.            The patient's chart, MAR and AVS were reviewed by Calderon Pollard PHARMD.

## 2018-07-23 NOTE — BH NOTES
Pt discharged with all belongings returned, denies SI, denies HI, verbalizes understanding of all discharge instructions, medications, and follow-up appointments.

## 2018-07-23 NOTE — BH NOTES
GROUP THERAPY PROGRESS NOTE    Coni Oconnor is participating in Goals Group.      Group time: 30 minutes    Personal goal for participation: Make a list of goals and tasks and follow up for accountability daily        Goal orientation: community    Group therapy participation: active    Therapeutic interventions reviewed and discussed: Make a list of goals and tasks and follow up for accountability daily        Impression of participation: asked/answered  questions

## 2018-07-23 NOTE — BH NOTES
GROUP THERAPY PROGRESS NOTE    Cricket Diaz did not participate in a 55 minute morning Process Group on the General Unit with a focus identifying feelings, planning for the day, and learning more about DBT concepts on \"Emotion Regulation. \"

## 2018-07-23 NOTE — BH NOTES
Behavioral Health Transition Record to Provider    Patient Name: Adela Garcia  YOB: 1965  Medical Record Number: [id number]  Date of Admission: 7/18/2018  Date of Discharge: 7/23/2018    Attending Provider: Miguel Dixon MD  Discharging Provider: Miguel Dixon MD  To contact this individual call [phone] and ask the  to page. If unavailable, ask to be transferred to [facility] Provider on call. [facility] Provider will be available on call 24/7 and during holidays. Primary Care Provider: Hosea Francis MD    Allergies   Allergen Reactions    Aspirin Nausea and Vomiting    Morphine Hives       Reason for Admission: Pt admitted under a voluntary basis for severe depression with suicidal ideations and  psychosis  proving to be an imminent danger to self and an inability to care for self. Admission Diagnosis: Depression    * No surgery found *    Results for orders placed or performed during the hospital encounter of 07/18/18   URINE CULTURE HOLD SAMPLE   Result Value Ref Range    Urine culture hold        URINE ON HOLD IN MICROBIOLOGY DEPT FOR 3 DAYS. IF UNPRESERVED URINE IS SUBMITTED, IT CANNOT BE USED FOR ADDITIONAL TESTING AFTER 24 HRS, RECOLLECTION WILL BE REQUIRED. CBC WITH AUTOMATED DIFF   Result Value Ref Range    WBC 12.1 (H) 3.6 - 11.0 K/uL    RBC 4.20 3.80 - 5.20 M/uL    HGB 14.0 11.5 - 16.0 g/dL    HCT 41.9 35.0 - 47.0 %    MCV 99.8 (H) 80.0 - 99.0 FL    MCH 33.3 26.0 - 34.0 PG    MCHC 33.4 30.0 - 36.5 g/dL    RDW 11.8 11.5 - 14.5 %    PLATELET 270 592 - 743 K/uL    MPV 10.5 8.9 - 12.9 FL    NRBC 0.0 0  WBC    ABSOLUTE NRBC 0.00 0.00 - 0.01 K/uL    NEUTROPHILS 59 32 - 75 %    LYMPHOCYTES 34 12 - 49 %    MONOCYTES 7 5 - 13 %    EOSINOPHILS 1 0 - 7 %    BASOPHILS 0 0 - 1 %    IMMATURE GRANULOCYTES 0 0.0 - 0.5 %    ABS. NEUTROPHILS 7.1 1.8 - 8.0 K/UL    ABS. LYMPHOCYTES 4.1 (H) 0.8 - 3.5 K/UL    ABS. MONOCYTES 0.8 0.0 - 1.0 K/UL    ABS. EOSINOPHILS 0.1 0.0 - 0.4 K/UL    ABS. BASOPHILS 0.0 0.0 - 0.1 K/UL    ABS. IMM. GRANS. 0.0 0.00 - 0.04 K/UL    DF AUTOMATED     METABOLIC PANEL, COMPREHENSIVE   Result Value Ref Range    Sodium 139 136 - 145 mmol/L    Potassium 4.0 3.5 - 5.1 mmol/L    Chloride 103 97 - 108 mmol/L    CO2 27 21 - 32 mmol/L    Anion gap 9 5 - 15 mmol/L    Glucose 289 (H) 65 - 100 mg/dL    BUN 8 6 - 20 MG/DL    Creatinine 1.09 (H) 0.55 - 1.02 MG/DL    BUN/Creatinine ratio 7 (L) 12 - 20      GFR est AA >60 >60 ml/min/1.73m2    GFR est non-AA 53 (L) >60 ml/min/1.73m2    Calcium 10.1 8.5 - 10.1 MG/DL    Bilirubin, total 0.3 0.2 - 1.0 MG/DL    ALT (SGPT) 19 12 - 78 U/L    AST (SGOT) 11 (L) 15 - 37 U/L    Alk.  phosphatase 172 (H) 45 - 117 U/L    Protein, total 8.2 6.4 - 8.2 g/dL    Albumin 3.8 3.5 - 5.0 g/dL    Globulin 4.4 (H) 2.0 - 4.0 g/dL    A-G Ratio 0.9 (L) 1.1 - 2.2     ETHYL ALCOHOL   Result Value Ref Range    ALCOHOL(ETHYL),SERUM <51 <43 MG/DL   SALICYLATE   Result Value Ref Range    Salicylate level 5.0 2.8 - 20.0 MG/DL   ACETAMINOPHEN   Result Value Ref Range    Acetaminophen level <2 (L) 10 - 30 ug/mL   URINALYSIS W/MICROSCOPIC   Result Value Ref Range    Color YELLOW/STRAW      Appearance CLEAR CLEAR      Specific gravity 1.010 1.003 - 1.030      pH (UA) 5.5 5.0 - 8.0      Protein NEGATIVE  NEG mg/dL    Glucose >1000 (A) NEG mg/dL    Ketone NEGATIVE  NEG mg/dL    Bilirubin NEGATIVE  NEG      Blood NEGATIVE  NEG      Urobilinogen 0.2 0.2 - 1.0 EU/dL    Nitrites NEGATIVE  NEG      Leukocyte Esterase NEGATIVE  NEG      WBC 0-4 0 - 4 /hpf    RBC 0-5 0 - 5 /hpf    Epithelial cells FEW FEW /lpf    Bacteria NEGATIVE  NEG /hpf   DRUG SCREEN, URINE   Result Value Ref Range    AMPHETAMINES NEGATIVE  NEG      BARBITURATES NEGATIVE  NEG      BENZODIAZEPINES NEGATIVE  NEG      COCAINE NEGATIVE  NEG      METHADONE NEGATIVE  NEG      OPIATES NEGATIVE  NEG      PCP(PHENCYCLIDINE) NEGATIVE  NEG      THC (TH-CANNABINOL) NEGATIVE  NEG      Drug screen comment (NOTE)    TSH 3RD GENERATION   Result Value Ref Range    TSH 0.61 0.36 - 3.74 uIU/mL   GLUCOSE, POC   Result Value Ref Range    Glucose (POC) 189 (H) 65 - 100 mg/dL    Performed by [facility] Rafy    GLUCOSE, POC   Result Value Ref Range    Glucose (POC) 389 (H) 65 - 100 mg/dL    Performed by [facility]    GLUCOSE, POC   Result Value Ref Range    Glucose (POC) 239 (H) 65 - 100 mg/dL    Performed by Iver Pool ([place]) MÓNICA    GLUCOSE, POC   Result Value Ref Range    Glucose (POC) 126 (H) 65 - 100 mg/dL    Performed by Iver Pool ([place]) MÓNICA    GLUCOSE, POC   Result Value Ref Range    Glucose (POC) 182 (H) 65 - 100 mg/dL    Performed by Angel Owens    GLUCOSE, POC   Result Value Ref Range    Glucose (POC) 260 (H) 65 - 100 mg/dL    Performed by Angel Owens    GLUCOSE, POC   Result Value Ref Range    Glucose (POC) 120 (H) 65 - 100 mg/dL    Performed by Prakash Medina    GLUCOSE, POC   Result Value Ref Range    Glucose (POC) 194 (H) 65 - 100 mg/dL    Performed by Richi Neg    GLUCOSE, POC   Result Value Ref Range    Glucose (POC) 236 (H) 65 - 100 mg/dL    Performed by Richi Negus        Immunizations administered during this encounter: There is no immunization history on file for this patient. Screening for Metabolic Disorders for Patients on Antipsychotic Medications  (Data obtained from the EMR)    Estimated Body Mass Index  Estimated body mass index is 31.5 kg/(m^2) as calculated from the following:    Height as of this encounter: 5' (1.524 m). Weight as of this encounter: 73.2 kg (161 lb 4.8 oz).      Vital Signs/Blood Pressure  Visit Vitals    /81 (BP Patient Position: Sitting)    Pulse 97    Temp 98.1 °F (36.7 °C)    Resp 16    Ht 5' (1.524 m)    Wt 73.2 kg (161 lb 4.8 oz)    SpO2 97%    Breastfeeding No    BMI 31.5 kg/m2       Blood Glucose/Hemoglobin A1c  Lab Results   Component Value Date/Time    Glucose 289 (H) 07/18/2018 06:17 PM    Glucose (POC) 236 (H) 2018 11:49 AM       Lab Results   Component Value Date/Time    Hemoglobin A1c 5.7 2017 09:10 AM        Lipid Panel  Lab Results   Component Value Date/Time    Cholesterol, total 224 (H) 2017 09:10 AM    HDL Cholesterol 54 2017 09:10 AM    LDL, calculated 150 (H) 2017 09:10 AM    Triglyceride 100 2017 09:10 AM    CHOL/HDL Ratio 4.1 2017 09:10 AM        Discharge Diagnosis: Depression (ICD-10-CM: F32.9)    Discharge Plan: Patient discharged home via Medicaid taxi. DISCHARGE SUMMARY    Stuart Tao  : 1965  MRN: [id number]    The patient Carolynn Nassar exhibits the ability to control behavior in a less restrictive environment. Patient's level of functioning is improving. No assaultive/destructive behavior has been observed for the past 24 hours. No suicidal/homicidal threat or behavior has been observed for the past 24 hours. There is no evidence of serious medication side effects. Patient has not been in physical or protective restraints for at least the past 24 hours. If weapons involved, how are they secured? No weapons involved. Is patient aware of and in agreement with discharge plan? Yes    Arrangements for medication:  Prescriptions given to patient. Referral for substance abuse treatment? Patient is not using substances/Not applicable. Referral for smoking cessation needed? Patient is not a smoker/Not applicable. Copy of discharge instructions to  provider?:  Dr. Tash Carter for transportation home:  Medicaid taxi    Keep all follow up appointments as scheduled, continue to take prescribed medications per physician instructions. Mental health crisis number:  729 or your local mental health crisis line number at [phone].     Discharge Medication List and Instructions:   Current Discharge Medication List      CONTINUE these medications which have CHANGED    Details   benztropine (COGENTIN) 1 mg tablet Take 1 Tab by mouth nightly. Indications: drug-induced extrapyramidal reaction  Qty: 15 Tab, Refills: 1      haloperidol (HALDOL) 5 mg tablet Take 1 Tab by mouth nightly. Indications: psychotic disorder  Qty: 15 Tab, Refills: 1      lamoTRIgine (LAMICTAL) 25 mg tablet Take 2 Tabs by mouth two (2) times a day. Indications: DEPRESSION ASSOCIATED WITH BIPOLAR DISORDER  Qty: 30 Tab, Refills: 1      sertraline (ZOLOFT) 50 mg tablet Take 1 Tab by mouth daily. Indications: major depressive disorder  Qty: 15 Tab, Refills: 1      traZODone (DESYREL) 50 mg tablet Take 1 Tab by mouth nightly. Indications: major depressive disorder  Qty: 15 Tab, Refills: 0         CONTINUE these medications which have NOT CHANGED    Details   cetirizine (ZYRTEC) 10 mg tablet Take 10 mg by mouth daily. fluticasone (FLONASE) 50 mcg/actuation nasal spray 1 Exchange by Both Nostrils route daily. traMADol (ULTRAM) 50 mg tablet Take 50 mg by mouth every eight (8) hours as needed for Pain. lisinopril (PRINIVIL, ZESTRIL) 40 mg tablet Take 1 Tab by mouth daily. Indications: hypertension  Qty: 15 Tab, Refills: 1      MULTIVIT-MINS NO.7/FOLIC ACID (V-C FORTE PO) Take 1 Cap by mouth daily. Unresulted Labs (24h ago through future)    Start     Ordered    07/23/18 1100  LIPID PANEL  ONE TIME,   R     Comments:  Routine monitoring - antipsychotic    07/23/18 0932    07/23/18 1100  HEMOGLOBIN A1C WITH EAG  ONE TIME,   R     Comments:  Routine monitoring - antipsychotic    07/23/18 0932        To obtain results of studies pending at discharge, please contact [phone]    Follow-up Information     Follow up With Details Comments Contact Info    Jerry Cool On 7/24/2018 You have an 11:25am appointment with your psychiatrist for medication management. [facility]  [address], [address] [address]  [phone]    Sherrell Tellez On 7/25/2018 You have a 4:00pm appointment for individual therapy. [facility]  [address], [address] [facility]  [phone]    Tomi Linares MD On 8/3/2018 You have an 11:15am appointment with your primary care provider. Please discuss diabetes management. [facility]  [phone]      Lipid panel/hemoglobin A1c  Please complete as outpatient since refused labs during admission (metabolic monitoring for antipsychotic) PCP office          Advanced Directive:   Does the patient have an appointed surrogate decision maker? No  Does the patient have a Medical Advance Directive? No  Does the patient have a Psychiatric Advance Directive? No  If the patient does not have a surrogate or Medical Advance Directive AND Psychiatric Advance Directive, the patient was offered information on these advance directives Yes and Patient declined to complete    Patient Instructions: Please continue all medications until otherwise directed by physician. Tobacco Cessation Discharge Plan:   Is the patient a smoker and needs referral for smoking cessation? No  Patient referred to the following for smoking cessation with an appointment? Refused     Patient was offered medication to assist with smoking cessation at discharge? Refused  Was education for smoking cessation added to the discharge instructions? Yes    Alcohol/Substance Abuse Discharge Plan:   Does the patient have a history of substance/alcohol abuse and requires a referral for treatment? No  Patient referred to the following for substance/alcohol abuse treatment with an appointment? Refused  Patient was offered medication to assist with alcohol cessation at discharge? Refused  Was education for substance/alcohol abuse added to discharge instructions? Yes    Patient discharged to Home; discussed with patient/caregiver and provided to the patient/caregiver either in hard copy or electronically.

## 2018-07-23 NOTE — INTERDISCIPLINARY ROUNDS
Behavioral Health Interdisciplinary Rounds     Patient Name: Maki Elliott  Age: 47 y.o. Room/Bed:  731/02  Primary Diagnosis: Depression   Admission Status: Voluntary     Readmission within 30 days: no  Power of  in place: no  Patient requires a blocked bed: no          Reason for blocked bed: n/a    VTE Prophylaxis: Not indicated    Mobility needs/Fall risk: no    Nutritional Plan: no  Consults: no         Labs/Testing due today?: yes: refused     Sleep hours: 8.0      Participation in Care/Groups:  yes  Medication Compliant?: Yes  PRNS (last 24 hours): Pain    Restraints (last 24 hours):  no     CIWA (range last 24 hours):     COWS (range last 24 hours):      Alcohol screening (AUDIT) completed -   AUDIT Score: 0     If applicable, date SBIRT discussed in treatment team AND documented:     Tobacco - patient is a smoker: Have You Used Tobacco in the Past 30 Days: No  Illegal Drugs use: Have You Used Any Illegal Substances Over the Past 12 Months: No    24 hour chart check complete: yes     Patient goal(s) for today: Discharge  Treatment team focus/goals: Schedule follow-up; discharge  Progress note: Pt denies depression, SI, and AH. States she is ready for discharge. LOS:  5  Expected LOS: 5    Financial concerns/prescription coverage:  Medicaid  Date of last family contact: None      Family requesting physician contact today:  No  Discharge plan: Return home  Guns in the home: No      Outpatient provider(s): Dr. Anshu Brown (psychiatrist);  Barb Ruano (therapist)    Participating treatment team members: Maki ElliottJae MSMATT; Dr. Karyna Romero MD; Dixon Cleveland, RN; Peterson Clifford, PharmD; Pharmacy student

## 2019-04-18 ENCOUNTER — HOSPITAL ENCOUNTER (INPATIENT)
Age: 54
LOS: 5 days | Discharge: HOME | DRG: 750 | End: 2019-04-23
Attending: EMERGENCY MEDICINE | Admitting: PSYCHIATRY & NEUROLOGY
Payer: MEDICAID

## 2019-04-18 DIAGNOSIS — R44.0 AUDITORY HALLUCINATIONS: Primary | ICD-10-CM

## 2019-04-18 PROBLEM — F20.9 SCHIZOPHRENIA (HCC): Status: ACTIVE | Noted: 2019-04-18

## 2019-04-18 LAB
ALBUMIN SERPL-MCNC: 3.8 G/DL (ref 3.5–5)
ALBUMIN/GLOB SERPL: 0.9 {RATIO} (ref 1.1–2.2)
ALP SERPL-CCNC: 173 U/L (ref 45–117)
ALT SERPL-CCNC: 22 U/L (ref 12–78)
AMPHET UR QL SCN: NEGATIVE
ANION GAP SERPL CALC-SCNC: 8 MMOL/L (ref 5–15)
APAP SERPL-MCNC: <2 UG/ML (ref 10–30)
APPEARANCE UR: ABNORMAL
AST SERPL-CCNC: 15 U/L (ref 15–37)
BACTERIA URNS QL MICRO: ABNORMAL /HPF
BARBITURATES UR QL SCN: NEGATIVE
BASOPHILS # BLD: 0 K/UL (ref 0–0.1)
BASOPHILS NFR BLD: 0 % (ref 0–1)
BENZODIAZ UR QL: NEGATIVE
BILIRUB SERPL-MCNC: 0.4 MG/DL (ref 0.2–1)
BILIRUB UR QL: NEGATIVE
BUN SERPL-MCNC: 5 MG/DL (ref 6–20)
BUN/CREAT SERPL: 6 (ref 12–20)
CALCIUM SERPL-MCNC: 10.4 MG/DL (ref 8.5–10.1)
CANNABINOIDS UR QL SCN: NEGATIVE
CHLORIDE SERPL-SCNC: 109 MMOL/L (ref 97–108)
CO2 SERPL-SCNC: 24 MMOL/L (ref 21–32)
COCAINE UR QL SCN: NEGATIVE
COLOR UR: ABNORMAL
CREAT SERPL-MCNC: 0.83 MG/DL (ref 0.55–1.02)
DIFFERENTIAL METHOD BLD: ABNORMAL
DRUG SCRN COMMENT,DRGCM: NORMAL
EOSINOPHIL # BLD: 0.1 K/UL (ref 0–0.4)
EOSINOPHIL NFR BLD: 1 % (ref 0–7)
EPITH CASTS URNS QL MICRO: ABNORMAL /LPF
ERYTHROCYTE [DISTWIDTH] IN BLOOD BY AUTOMATED COUNT: 12.7 % (ref 11.5–14.5)
ETHANOL SERPL-MCNC: <10 MG/DL
GLOBULIN SER CALC-MCNC: 4.4 G/DL (ref 2–4)
GLUCOSE SERPL-MCNC: 142 MG/DL (ref 65–100)
GLUCOSE UR STRIP.AUTO-MCNC: NEGATIVE MG/DL
HCT VFR BLD AUTO: 41.9 % (ref 35–47)
HGB BLD-MCNC: 13.6 G/DL (ref 11.5–16)
HGB UR QL STRIP: NEGATIVE
IMM GRANULOCYTES # BLD AUTO: 0.1 K/UL (ref 0–0.04)
IMM GRANULOCYTES NFR BLD AUTO: 1 % (ref 0–0.5)
KETONES UR QL STRIP.AUTO: NEGATIVE MG/DL
LEUKOCYTE ESTERASE UR QL STRIP.AUTO: NEGATIVE
LYMPHOCYTES # BLD: 4 K/UL (ref 0.8–3.5)
LYMPHOCYTES NFR BLD: 30 % (ref 12–49)
MCH RBC QN AUTO: 32.5 PG (ref 26–34)
MCHC RBC AUTO-ENTMCNC: 32.5 G/DL (ref 30–36.5)
MCV RBC AUTO: 100 FL (ref 80–99)
METHADONE UR QL: NEGATIVE
MONOCYTES # BLD: 0.7 K/UL (ref 0–1)
MONOCYTES NFR BLD: 5 % (ref 5–13)
NEUTS SEG # BLD: 8.4 K/UL (ref 1.8–8)
NEUTS SEG NFR BLD: 63 % (ref 32–75)
NITRITE UR QL STRIP.AUTO: NEGATIVE
NRBC # BLD: 0 K/UL (ref 0–0.01)
NRBC BLD-RTO: 0 PER 100 WBC
OPIATES UR QL: NEGATIVE
PCP UR QL: NEGATIVE
PH UR STRIP: 6 [PH] (ref 5–8)
PLATELET # BLD AUTO: 248 K/UL (ref 150–400)
PMV BLD AUTO: 10.1 FL (ref 8.9–12.9)
POTASSIUM SERPL-SCNC: 3.8 MMOL/L (ref 3.5–5.1)
PROT SERPL-MCNC: 8.2 G/DL (ref 6.4–8.2)
PROT UR STRIP-MCNC: NEGATIVE MG/DL
RBC # BLD AUTO: 4.19 M/UL (ref 3.8–5.2)
RBC #/AREA URNS HPF: ABNORMAL /HPF (ref 0–5)
SALICYLATES SERPL-MCNC: 6.8 MG/DL (ref 2.8–20)
SODIUM SERPL-SCNC: 141 MMOL/L (ref 136–145)
SP GR UR REFRACTOMETRY: 1.01 (ref 1–1.03)
UR CULT HOLD, URHOLD: NORMAL
UROBILINOGEN UR QL STRIP.AUTO: 1 EU/DL (ref 0.2–1)
WBC # BLD AUTO: 13.2 K/UL (ref 3.6–11)
WBC URNS QL MICRO: ABNORMAL /HPF (ref 0–4)

## 2019-04-18 PROCEDURE — 80053 COMPREHEN METABOLIC PANEL: CPT

## 2019-04-18 PROCEDURE — 65220000003 HC RM SEMIPRIVATE PSYCH

## 2019-04-18 PROCEDURE — 99285 EMERGENCY DEPT VISIT HI MDM: CPT

## 2019-04-18 PROCEDURE — 80307 DRUG TEST PRSMV CHEM ANLYZR: CPT

## 2019-04-18 PROCEDURE — 81001 URINALYSIS AUTO W/SCOPE: CPT

## 2019-04-18 PROCEDURE — 36415 COLL VENOUS BLD VENIPUNCTURE: CPT

## 2019-04-18 PROCEDURE — 85025 COMPLETE CBC W/AUTO DIFF WBC: CPT

## 2019-04-18 RX ORDER — HYDROXYZINE 50 MG/1
50 TABLET, FILM COATED ORAL
Status: DISCONTINUED | OUTPATIENT
Start: 2019-04-18 | End: 2019-04-22

## 2019-04-18 RX ORDER — OLANZAPINE 5 MG/1
5 TABLET ORAL
Status: DISCONTINUED | OUTPATIENT
Start: 2019-04-18 | End: 2019-04-23 | Stop reason: HOSPADM

## 2019-04-18 RX ORDER — BENZTROPINE MESYLATE 2 MG/1
2 TABLET ORAL
Status: DISCONTINUED | OUTPATIENT
Start: 2019-04-18 | End: 2019-04-23 | Stop reason: HOSPADM

## 2019-04-18 RX ORDER — BENZTROPINE MESYLATE 1 MG/ML
2 INJECTION INTRAMUSCULAR; INTRAVENOUS
Status: DISCONTINUED | OUTPATIENT
Start: 2019-04-18 | End: 2019-04-23 | Stop reason: HOSPADM

## 2019-04-18 RX ORDER — ACETAMINOPHEN 325 MG/1
650 TABLET ORAL
Status: DISCONTINUED | OUTPATIENT
Start: 2019-04-18 | End: 2019-04-23 | Stop reason: HOSPADM

## 2019-04-18 RX ORDER — IBUPROFEN 200 MG
1 TABLET ORAL
Status: DISCONTINUED | OUTPATIENT
Start: 2019-04-18 | End: 2019-04-23 | Stop reason: HOSPADM

## 2019-04-18 RX ORDER — ADHESIVE BANDAGE
30 BANDAGE TOPICAL DAILY PRN
Status: DISCONTINUED | OUTPATIENT
Start: 2019-04-18 | End: 2019-04-23 | Stop reason: HOSPADM

## 2019-04-18 RX ORDER — TRAZODONE HYDROCHLORIDE 50 MG/1
50 TABLET ORAL
Status: DISCONTINUED | OUTPATIENT
Start: 2019-04-18 | End: 2019-04-23 | Stop reason: HOSPADM

## 2019-04-18 RX ORDER — IBUPROFEN 400 MG/1
400 TABLET ORAL
Status: DISCONTINUED | OUTPATIENT
Start: 2019-04-18 | End: 2019-04-23 | Stop reason: HOSPADM

## 2019-04-18 NOTE — ED TRIAGE NOTES
Pt presents with hearing voices that are telling her \"to harm myself and others\" Pt states it has happened before and she has been admitted to the hospital with similar episodes.

## 2019-04-18 NOTE — BSMART NOTE
Comprehensive Assessment Form Part 1      Section I - Disposition    Axis I - Schizophrenia   R/O Malingering  Axis II - Deferred  Axis III -   Past Medical History:   Diagnosis Date    Diabetes ([address]     Hypertension     Psychiatric disorder      Axis IV - Housing  Axis V - 39      The Medical Doctor to Psychiatrist conference was not completed. The Medical Doctor is in agreement with Psychiatrist disposition because of (reason) patient is seeking admission. The plan is TBD. The on-call Psychiatrist consulted was Dr. Atilio Ansari. The admitting Psychiatrist will be Dr. Atilio Ansari. The admitting Diagnosis is TBD. The Payor source is Medicaid. Section II - Integrated Summary  Summary:  Patient is a 53yo female who presents to ER with auditory hallucinations telling her to kill herself by overdosing on her pills or cutting her arm. She reports the voices are telling her to hurt people but she has been ignoring those and has not heard how they want her to hurt people. She reports that she hears voices at baseline but they do not typically tell her to hurt herself. She reports she has been taking her medications as prescribed and that she was with her counselor today who dropped her off here. She reports living in a rooming house and the house is \"okay\" but she does not like one of the male boarders. She reports keeping to herself and avoiding the people there. Patient reports she has tried to commit suicide before by overdosing and cutting herself. She denies wanting to hurt anyone or herself but said the voices are very intense. Patient reports she is visually impaired and can not see more than 2 feet in front of her. She reports using a cane for walking and understands that if admitted she may be unable to use the cane but staff in the ER and hospital can assist her. She reports that she does not feel safe because the voices have been intense and will not stop.  Patient does not appear to be responding to internal stimuli and was listening to the TV when this writer entered. She reports decreased appetite and sleeping 4-5 hours per night. Per chart review the patient has been admitted in July 2017 and July 2018 for similar presentations. She reports when she hears the voices tell her to do things she comes to the hospital for observation and medication changes. Patient reports a bad past experience at [facility] and would prefer to be admitted to [facility] or [address] [place] [address]. She is calm and cooperative. The patienthas demonstrated mental capacity to provide informed consent. The information is given by the patient and past medical records. The Chief Complaint is mental health problem. The Precipitant Factors are unknown. Previous Hospitalizations: yes  The patient has not previously been in restraints. Current Psychiatrist and/or  is Dr Geno Pablo on [facility]. Lethality Assessment:    The potential for suicide noted by the following: previous history of attempts which occured in the form(s) of cutting and overdose, vague plan and ideation . The potential for homicide is noted by the following : ideation. The patient has not been a perpetrator of sexual or physical abuse. There are not pending charges. The patient is not felt to be at risk for self harm or harm to others. The attending nurse was advised that security has not been notified. Section III - Psychosocial  The patient's overall mood and attitude is calm and cooperative. Feelings of helplessness and hopelessness are not observed. Generalized anxiety is not observed. Panic is not observed. Phobias are not observed. Obsessive compulsive tendencies are not observed. Section IV - Mental Status Exam  The patient's appearance shows no evidence of impairment. The patient's behavior shows no evidence of impairment. The patient is oriented to time, place, person and situation. The patient's speech is soft. The patient's mood is euthymic.   The range of affect shows no evidence of impairment. The patient's thought content demonstrates no evidence of impairment. The thought process shows no evidence of impairment. The patient's perception demonstrated changes in the following:  auditory  hallucinations. The patient's memory shows no evidence of impairment. The patient's appetite is decreased. The patient's sleep has evidence of insomnia. The patient's insight shows no evidence of impairment. The patient's judgement shows no evidence of impairment. Section V - Substance Abuse  The patient is not using substances. Section VI - Living Arrangements  The patient is single. The patient lives alone. The patient does plan to return home upon discharge. The patient does not have legal issues pending. The patient's source of income comes from social security. Oriental orthodox and cultural practices have not been voiced at this time. The patient's greatest support comes from unknown and this person will not be involved with the treatment. The patient has not been in an event described as horrible or outside the realm of ordinary life experience either currently or in the past.  The patient has not been a victim of sexual/physical abuse. Section VII - Other Areas of Clinical Concern  The highest grade achieved is not assessed with the overall quality of school experience being described as not assessed. The patient is currently unemployed and speaks [place] as a primary language. The patient has no communication impairments affecting communication. The patient's preference for learning can be described as: has difficulty with reading and writing. The patient's hearing is normal.  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       [facility]

## 2019-04-18 NOTE — ED PROVIDER NOTES
49 y.o. female with past medical history significant for HTN, DM, and psychiatric disorder who presents from home via personal vehicle with chief complaint of mental health problem. Pt presents to the ED and reports auditory hallucinations which started around 4/15/19. PT reports that she has been hearing voices which are telling her to [facility] herself and others\". PT states that there is more than one voice speaking to her and that at times they tell her to \"take all of her medications\". PT reports that this happens frequently and notes that she has been hospitalized in the past for similar episodes. Pt has not tried harming herself or anyone else prior to ED arrival.     There are no other acute medical concerns at this time. Surgical hx - oral surgery, skin graft operation,  (2x)  Social hx - Tobacco use: some day smoker, Alcohol Use: non-drinker, Drug Use: denies recreational drug use    PCP: Lily Rader MD    Note written by Deepika Alston, as dictated by Riley Delgado MD 4:42 PM.    The history is provided by the patient. No  was used. Past Medical History:   Diagnosis Date    [address] [address]     Hypertension     Psychiatric disorder        Past Surgical History:   Procedure Laterality Date    HX GYN       x2    HX OTHER SURGICAL      skin grafting    HX OTHER SURGICAL      oral surgery         History reviewed. No pertinent family history.     Social History     Socioeconomic History    Marital status: SINGLE     Spouse name: Not on file    Number of children: Not on file    Years of education: Not on file    Highest education level: Not on file   Occupational History    Not on file   Social Needs    Financial resource strain: Not on file    Food insecurity:     Worry: Not on file     Inability: Not on file    Transportation needs:     Medical: Not on file     Non-medical: Not on file   Tobacco Use    Smoking status: Current Some Day Smoker    Smokeless tobacco: Never Used   Substance and Sexual Activity    Alcohol use: No    Drug use: No    Sexual activity: Not on file   Lifestyle    Physical activity:     Days per week: Not on file     Minutes per session: Not on file    Stress: Not on file   Relationships    Social connections:     Talks on phone: Not on file     Gets together: Not on file     Attends Orthodoxy service: Not on file     Active member of club or organization: Not on file     Attends meetings of clubs or organizations: Not on file     Relationship status: Not on file    Intimate partner violence:     Fear of current or ex partner: Not on file     Emotionally abused: Not on file     Physically abused: Not on file     Forced sexual activity: Not on file   Other Topics Concern    Not on file   Social History Narrative    Not on file         ALLERGIES: Aspirin and Morphine    Review of Systems   Psychiatric/Behavioral: Positive for hallucinations (auditory ). All other systems reviewed and are negative. Vitals:    04/18/19 1611   Pulse: 99   SpO2: 97%            Physical Exam   Constitutional: She appears well-developed and well-nourished. No distress. HENT:   Head: Normocephalic and atraumatic. Eyes: Conjunctivae are normal.   Neck: Neck supple. Cardiovascular: Normal rate and regular rhythm. Pulmonary/Chest: Effort normal. No respiratory distress. Abdominal: She exhibits no distension. Musculoskeletal: Normal range of motion. She exhibits no deformity. Neurological: She is alert. No cranial nerve deficit. Skin: Skin is warm and dry. Psychiatric: Her behavior is normal. Her affect is blunt. Nursing note and vitals reviewed. Note written by Deepika Richardson, as dictated by Ke Hernandez MD 4:42 PM     MDM     49 y.o. female presents with recurrent AH and command hallucinations which tell her to hurt herself or others.  I am concerned that she is seeking secondary gain a she is eating on my exam and is in no distress and has no active hallucinations. MEDICALLY CLEAR FOR TRANSFER OR PSYCHIATRIC ADMISSION.      Procedures

## 2019-04-19 LAB
CHOLEST SERPL-MCNC: 257 MG/DL
GLUCOSE BLD STRIP.AUTO-MCNC: 144 MG/DL (ref 65–100)
GLUCOSE P FAST SERPL-MCNC: 136 MG/DL (ref 65–100)
HDLC SERPL-MCNC: 35 MG/DL
HDLC SERPL: 7.3 {RATIO} (ref 0–5)
LDLC SERPL CALC-MCNC: 196.2 MG/DL (ref 0–100)
LIPID PROFILE,FLP: ABNORMAL
SERVICE CMNT-IMP: ABNORMAL
TRIGL SERPL-MCNC: 129 MG/DL (ref ?–150)
TSH SERPL DL<=0.05 MIU/L-ACNC: 2.21 UIU/ML (ref 0.36–3.74)
VLDLC SERPL CALC-MCNC: 25.8 MG/DL

## 2019-04-19 PROCEDURE — 82962 GLUCOSE BLOOD TEST: CPT

## 2019-04-19 PROCEDURE — 65220000003 HC RM SEMIPRIVATE PSYCH

## 2019-04-19 PROCEDURE — 82947 ASSAY GLUCOSE BLOOD QUANT: CPT

## 2019-04-19 PROCEDURE — 80061 LIPID PANEL: CPT

## 2019-04-19 PROCEDURE — 74011250637 HC RX REV CODE- 250/637: Performed by: PSYCHIATRY & NEUROLOGY

## 2019-04-19 PROCEDURE — 74011250637 HC RX REV CODE- 250/637: Performed by: NURSE PRACTITIONER

## 2019-04-19 PROCEDURE — 36415 COLL VENOUS BLD VENIPUNCTURE: CPT

## 2019-04-19 PROCEDURE — 84443 ASSAY THYROID STIM HORMONE: CPT

## 2019-04-19 RX ORDER — TRAMADOL HYDROCHLORIDE 50 MG/1
50 TABLET ORAL
Status: DISCONTINUED | OUTPATIENT
Start: 2019-04-19 | End: 2019-04-23 | Stop reason: HOSPADM

## 2019-04-19 RX ORDER — CETIRIZINE HCL 10 MG
10 TABLET ORAL DAILY
Status: DISCONTINUED | OUTPATIENT
Start: 2019-04-20 | End: 2019-04-22

## 2019-04-19 RX ORDER — LAMOTRIGINE 25 MG/1
50 TABLET ORAL DAILY
Status: DISCONTINUED | OUTPATIENT
Start: 2019-04-20 | End: 2019-04-19

## 2019-04-19 RX ORDER — BENZTROPINE MESYLATE 1 MG/1
1 TABLET ORAL 2 TIMES DAILY
COMMUNITY

## 2019-04-19 RX ORDER — THERA TABS 400 MCG
1 TAB ORAL DAILY
Status: DISCONTINUED | OUTPATIENT
Start: 2019-04-20 | End: 2019-04-23 | Stop reason: HOSPADM

## 2019-04-19 RX ORDER — FLUTICASONE PROPIONATE 50 MCG
1 SPRAY, SUSPENSION (ML) NASAL DAILY
Status: DISCONTINUED | OUTPATIENT
Start: 2019-04-20 | End: 2019-04-22

## 2019-04-19 RX ORDER — SERTRALINE HYDROCHLORIDE 50 MG/1
50 TABLET, FILM COATED ORAL DAILY
Status: DISCONTINUED | OUTPATIENT
Start: 2019-04-20 | End: 2019-04-19

## 2019-04-19 RX ORDER — BENZTROPINE MESYLATE 1 MG/1
1 TABLET ORAL 2 TIMES DAILY
Status: DISCONTINUED | OUTPATIENT
Start: 2019-04-19 | End: 2019-04-23 | Stop reason: HOSPADM

## 2019-04-19 RX ORDER — HALOPERIDOL 5 MG/1
5 TABLET ORAL
Status: DISCONTINUED | OUTPATIENT
Start: 2019-04-19 | End: 2019-04-23 | Stop reason: HOSPADM

## 2019-04-19 RX ORDER — LISINOPRIL 20 MG/1
40 TABLET ORAL DAILY
Status: DISCONTINUED | OUTPATIENT
Start: 2019-04-20 | End: 2019-04-22

## 2019-04-19 RX ORDER — LAMOTRIGINE 25 MG/1
50 TABLET ORAL DAILY
Status: DISCONTINUED | OUTPATIENT
Start: 2019-04-19 | End: 2019-04-22

## 2019-04-19 RX ORDER — SERTRALINE HYDROCHLORIDE 50 MG/1
50 TABLET, FILM COATED ORAL DAILY
Status: DISCONTINUED | OUTPATIENT
Start: 2019-04-19 | End: 2019-04-23 | Stop reason: HOSPADM

## 2019-04-19 RX ORDER — LAMOTRIGINE 25 MG/1
25 TABLET ORAL 2 TIMES DAILY
COMMUNITY
End: 2019-04-23

## 2019-04-19 RX ORDER — TRAZODONE HYDROCHLORIDE 50 MG/1
50 TABLET ORAL
Status: DISCONTINUED | OUTPATIENT
Start: 2019-04-19 | End: 2019-04-23 | Stop reason: HOSPADM

## 2019-04-19 RX ADMIN — BENZTROPINE MESYLATE 1 MG: 1 TABLET ORAL at 21:30

## 2019-04-19 RX ADMIN — SERTRALINE HYDROCHLORIDE 50 MG: 50 TABLET ORAL at 13:29

## 2019-04-19 RX ADMIN — HALOPERIDOL 5 MG: 5 TABLET ORAL at 21:30

## 2019-04-19 RX ADMIN — BENZTROPINE MESYLATE 1 MG: 1 TABLET ORAL at 13:30

## 2019-04-19 RX ADMIN — ACETAMINOPHEN 650 MG: 325 TABLET ORAL at 10:40

## 2019-04-19 RX ADMIN — LAMOTRIGINE 50 MG: 25 TABLET ORAL at 16:13

## 2019-04-19 RX ADMIN — TRAZODONE HYDROCHLORIDE 50 MG: 50 TABLET ORAL at 21:30

## 2019-04-19 NOTE — PROGRESS NOTES
Problem: Falls - Risk of  Goal: *Absence of Falls  Description  Outcome: Progressing Towards Goal  Note:   Fall Risk Interventions:  Medication Interventions: Teach patient to arise slowly  History of Falls Interventions: Door open when patient unattended    Received pt asleep in bed. NAD. Respirations even and unlabored. Will continue to monitor q15min for safety.     Labs completed

## 2019-04-19 NOTE — PROGRESS NOTES
Problem: Discharge Planning  Goal: *Discharge to safe environment  Outcome: Progressing Towards Goal  Note:   Patient identifies home as a safe environment. Patient will return home upon discharge. Goal: *Knowledge of medication management  Outcome: Progressing Towards Goal  Note:   Patient verbalizes understanding of medication regimen. Patient agrees to take medications as prescribed. Goal: *Knowledge of discharge instructions  Outcome: Progressing Towards Goal  Note:   Patient verbalizes understanding of goals for treatment and safe discharge.

## 2019-04-19 NOTE — BH NOTES
GROUP THERAPY PROGRESS NOTE Katey Brito is participating in Leisure-Creative Group. Group time: 15 minutes Personal goal for participation:   
 
Goal orientation: relaxation Group therapy participation: active Therapeutic interventions reviewed and discussed: Review of unit guidelines and socializing appropriately with peers. Impression of participation: active
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
## 2019-04-19 NOTE — PROGRESS NOTES
Admission Medication Reconciliation:    Information obtained from:  Communcation with [facility] pharmacy at [address] [place] [address] patient interview / [place]  / Sarwat Sol    Comments/Recommendations: Updated PTA meds/reviewed patient's allergies. 1)  Medication changes (since last review): Added  - none    Adjusted  - benztropine 1mg BID (from 1mg QHS)  - lamotrigine 25mg BID (from 50mg BID)    Removed  - lisinopril 40mg daily (last filled 10/2018)    2)  The [facility] () was assessed to determine fill history of any controlled medications. The patient fills tramadol frequently. Most recently filled tramadol 50mg #21 for 7 day supply on , 4/10, and 4/15/19. Prescriber is nEoc Caraballo. Allergies:  Aspirin and Morphine    Significant PMH/Disease States:   Past Medical History:   Diagnosis Date    Diabetes ([facility] [address]     Hypertension     Psychiatric disorder      Chief Complaint for this Admission:    Chief Complaint   Patient presents with    Mental Health Problem     Prior to Admission Medications:   Prior to Admission Medications   Prescriptions Last Dose Informant Patient Reported? Taking? MULTIVIT-MINS NO.7/FOLIC ACID (V-C FORTE PO)   Yes No   Sig: Take 1 Cap by mouth daily. benztropine (COGENTIN) 1 mg tablet 2019 at am  Yes Yes   Sig: Take 1 mg by mouth two (2) times a day. cetirizine (ZYRTEC) 10 mg tablet   Yes No   Sig: Take 10 mg by mouth daily. fluticasone (FLONASE) 50 mcg/actuation nasal spray   Yes No   Si Mershon by Both Nostrils route daily. haloperidol (HALDOL) 5 mg tablet 2019 at bedtime  No Yes   Sig: Take 1 Tab by mouth nightly. Indications: psychotic disorder   lamoTRIgine (LAMICTAL) 25 mg tablet   Yes Yes   Sig: Take 25 mg by mouth two (2) times a day. sAXagliptin (ONGLYZA) 2.5 mg tablet 2019 at morning  Yes Yes   Sig: Take 2.5 mg by mouth daily. sertraline (ZOLOFT) 50 mg tablet 2019 at morning  No Yes   Sig: Take 1 Tab by mouth daily. Indications: major depressive disorder   traMADol (ULTRAM) 50 mg tablet Not Taking at Unknown time  Yes No   Sig: Take 50 mg by mouth every eight (8) hours as needed for Pain. traZODone (DESYREL) 50 mg tablet 4/17/2019 at bedtime  No Yes   Sig: Take 1 Tab by mouth nightly.  Indications: major depressive disorder      Facility-Administered Medications: None     Heath Hopper, PharmD, BCPP, [facility] Specialist, [facility]

## 2019-04-19 NOTE — PROGRESS NOTES
[facility]  Master Treatment Plan for Carolynn Nassar    Date Treatment Plan Initiated: 4/19/19    Treatment Plan Modalities:  Type of Modality Amount  (x minutes) Frequency (x/week) Duration (x days) Name of Responsible Staff   [address] [facility] meetings to encourage peer interactions 15 7 [address] psychotherapy to assist in building coping skills and internal controls 60 7 1 Cheng Chand   Therapeutic activity groups to build coping skills 60 7 1 Cheng Chand   Psychoeducation in group setting to address:   Medication education   15 7 [address] skills         Relaxation techniques         Symptom management         Discharge planning   60 2 Persean Hunter 115   60 1 1 volunteer   Recovery/AA/NA      volunteer   Physician medication management   15 7 1 Dr. Renny Fay meeting/discharge planning   15 2 [address] [facility] and [place] [address] MET BY 4/22/19  Problem: Altered Thought Process (Adult/Pediatric)  Goal: *STG: Participates in treatment plan  Outcome: Progressing Towards Goal  Note:   Out on unit passively engaged. Cooperative and compliant with unit routine and nursing direction. Mood and affect \" a little afraid\" appears sad. Daily goal is to discuss medicaitons  Goal: *STG: Seeks staff when feelings of anxiety and fear arise  Outcome: Progressing Towards Goal  Goal: *STG: Complies with medication therapy  Outcome: Progressing Towards Goal  Goal: *STG: Attends activities and groups  Outcome: Progressing Towards Goal  Goal: *STG: Decreased hallucinations  Outcome: Progressing Towards Goal  Note:   Denies AH this am states last AH was at night when she woke up. Appears slightly preoccupied and delayed in response.    Goal: *STG: Demonstrates ability to understand and use improved judgment in daily activities and relationships  Outcome: Progressing Towards Goal  Note:   Thoughts organized, logical however slight process delay  Goal: Interventions  Outcome: Progressing Towards Goal

## 2019-04-19 NOTE — PROGRESS NOTES
Laboratory Monitoring for Antipsychotics: This patient is currently prescribed the following medication(s):   Current Facility-Administered Medications   Medication Dose Route Frequency    benztropine (COGENTIN) tablet 1 mg  1 mg Oral BID    [START ON 4/20/2019] cetirizine (ZYRTEC) tablet 10 mg  10 mg Oral DAILY    [START ON 4/20/2019] fluticasone propionate (FLONASE) 50 mcg/actuation nasal spray 1 Spray  1 Spray Both Nostrils DAILY    haloperidol (HALDOL) tablet 5 mg  5 mg Oral QHS    [START ON 4/20/2019] lisinopril (PRINIVIL, ZESTRIL) tablet 40 mg  40 mg Oral DAILY    traZODone (DESYREL) tablet 50 mg  50 mg Oral QHS    lamoTRIgine (LaMICtal) tablet 50 mg  50 mg Oral DAILY    sertraline (ZOLOFT) tablet 50 mg  50 mg Oral DAILY    [START ON 4/20/2019] therapeutic multivitamin (THERAGRAN) tablet 1 Tab  1 Tab Oral DAILY    [START ON 4/20/2019] SITagliptin (JANUVIA) tablet tab 50 mg  50 mg Oral DAILY     The following labs have been completed for monitoring of antipsychotics and/or mood stabilizers:    Height, Weight, BMI Estimation  Estimated body mass index is 30.66 kg/m² as calculated from the following:    Height as of this encounter: 152.4 cm (60\"). Weight as of this encounter: 71.2 kg (157 lb). Vital Signs/Blood Pressure  Visit Vitals  /76   Pulse 76   Temp 97.9 °F (36.6 °C)   Resp 16   Ht 152.4 cm (60\")   Wt 71.2 kg (157 lb)   SpO2 98%   BMI 30.66 kg/m²     Renal Function, Hepatic Function and Chemistry  Estimated Creatinine Clearance: 69 mL/min (based on SCr of 0.83 mg/dL).     Lab Results   Component Value Date/Time    Sodium 141 04/18/2019 05:48 PM    Potassium 3.8 04/18/2019 05:48 PM    Chloride 109 (H) 04/18/2019 05:48 PM    CO2 24 04/18/2019 05:48 PM    Anion gap 8 04/18/2019 05:48 PM    BUN 5 (L) 04/18/2019 05:48 PM    Creatinine 0.83 04/18/2019 05:48 PM    BUN/Creatinine ratio 6 (L) 04/18/2019 05:48 PM    Bilirubin, total 0.4 04/18/2019 05:48 PM    Protein, total 8.2 04/18/2019 05:48 PM Albumin 3.8 04/18/2019 05:48 PM    Globulin 4.4 (H) 04/18/2019 05:48 PM    A-G Ratio 0.9 (L) 04/18/2019 05:48 PM    ALT (SGPT) 22 04/18/2019 05:48 PM    Alk. phosphatase 173 (H) 04/18/2019 05:48 PM     Lab Results   Component Value Date/Time    Glucose 136 (H) 04/19/2019 06:40 AM    Glucose (POC) 144 (H) 04/19/2019 07:38 AM     Lab Results   Component Value Date/Time    Hemoglobin A1c 5.7 07/08/2017 09:10 AM     Hematology  Lab Results   Component Value Date/Time    WBC 13.2 (H) 04/18/2019 05:48 PM    RBC 4.19 04/18/2019 05:48 PM    HGB 13.6 04/18/2019 05:48 PM    HCT 41.9 04/18/2019 05:48 PM    .0 (H) 04/18/2019 05:48 PM    MCH 32.5 04/18/2019 05:48 PM    MCHC 32.5 04/18/2019 05:48 PM    RDW 12.7 04/18/2019 05:48 PM    PLATELET 582 09/05/5433 05:48 PM     Lipids  Lab Results   Component Value Date/Time    Cholesterol, total 257 (H) 04/19/2019 06:40 AM    HDL Cholesterol 35 04/19/2019 06:40 AM    LDL, calculated 196.2 (H) 04/19/2019 06:40 AM    Triglyceride 129 04/19/2019 06:40 AM    CHOL/HDL Ratio 7.3 (H) 04/19/2019 06:40 AM     Thyroid Function  Lab Results   Component Value Date/Time    TSH 2.21 04/19/2019 06:40 AM     Pregnancy Status  Lab Results   Component Value Date/Time    Pregnancy test,urine (POC) NEGATIVE  10/04/2017 03:43 PM     Assessment/Plan:  Recommended baseline laboratory monitoring has been completed based on this patient's current medication regimen.      Neno Blake, PharmD, BCPP, [facility] Specialist, [address] [place] [address]

## 2019-04-19 NOTE — PROGRESS NOTES
Problem: Altered Thought Process (Adult/Pediatric)  Goal: *STG: Decreased hallucinations  Outcome: Progressing Towards Goal  Pt denies c/o hallucinations. She is able to participate in activities without interruption from internal stimuli.

## 2019-04-19 NOTE — BH NOTES
TRANSFER - IN REPORT:    Verbal report received at 80 from [facility], Critical access hospital[facility] on [facility]  being received from [facility] for routine progression of care      Report consisted of patients Situation, Background, Assessment and   Recommendations(SBAR). Information from the following report(s) SBAR was reviewed with the receiving nurse. Opportunity for questions and clarification was provided. Assessment completed upon patients arrival to unit and care assumed.

## 2019-04-19 NOTE — BH NOTES
GROUP THERAPY PROGRESS NOTE    Coni Oconnor did not participate in the General Unit's 60 minute Process Group, with a focus identifying feelings and planning for the day.
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
## 2019-04-19 NOTE — INTERDISCIPLINARY ROUNDS
Behavioral Health Interdisciplinary Rounds     Patient Name: Wilver Wilson  Age: 49 y.o.   Room/Bed:  725/  Primary Diagnosis: <principal problem not specified>   Admission Status: Voluntary Commitment     Readmission within 30 days: no  Power of  in place: no  Patient requires a blocked bed: no          Reason for blocked bed:     VTE Prophylaxis: Not indicated    Mobility needs/Fall risk: no  Flu Vaccine : no   Nutritional Plan: no  Consults:          Labs/Testing due today?: yes (completed)    Sleep hours:  7      Participation in Care/Groups:  *New Admission*  Medication Compliant?: *New Admission*  PRNS (last 24 hours): None    Restraints (last 24 hours):  no     CIWA (range last 24 hours):     COWS (range last 24 hours):      Alcohol screening (AUDIT) completed -   AUDIT Score: 0     If applicable, date SBIRT discussed in treatment team AND documented: N/A  AUDIT Screen Score: AUDIT Score: 0    Tobacco - patient is a smoker: Have You Used Tobacco in the Past 30 Days: Yes  Illegal Drugs use: Have You Used Any Illegal Substances Over the Past 12 Months: No    24 hour chart check complete: yes     Patient goal(s) for today: meet with treatment team  Treatment team focus/goals: psychosocial and care plan  Progress note: Pt reports AH telling her to kill herself for 2 days     LOS:  1  Expected LOS: TBD    Financial concerns/prescription coverage: [place] [employer]  Date of last family contact: None      Family requesting physician contact today: No  Discharge plan: Return home  Guns in the home: No       Outpatient provider(s): Dr. Minal Roberts    Participating treatment team members: Wilverfrancisca Wilson, BARRETT Reed; Dr. Vijaya Bowling MD; Severiano Siren, RN; Siria Pérez, KathieD

## 2019-04-20 PROCEDURE — 65220000003 HC RM SEMIPRIVATE PSYCH

## 2019-04-20 PROCEDURE — 74011250637 HC RX REV CODE- 250/637: Performed by: NURSE PRACTITIONER

## 2019-04-20 PROCEDURE — 74011250637 HC RX REV CODE- 250/637: Performed by: PSYCHIATRY & NEUROLOGY

## 2019-04-20 RX ADMIN — LAMOTRIGINE 50 MG: 25 TABLET ORAL at 08:54

## 2019-04-20 RX ADMIN — ACETAMINOPHEN 650 MG: 325 TABLET ORAL at 16:07

## 2019-04-20 RX ADMIN — BENZTROPINE MESYLATE 1 MG: 1 TABLET ORAL at 20:55

## 2019-04-20 RX ADMIN — SERTRALINE HYDROCHLORIDE 50 MG: 50 TABLET ORAL at 08:54

## 2019-04-20 RX ADMIN — SITAGLIPTIN 50 MG: 25 TABLET, FILM COATED ORAL at 08:52

## 2019-04-20 RX ADMIN — TRAZODONE HYDROCHLORIDE 50 MG: 50 TABLET ORAL at 20:56

## 2019-04-20 RX ADMIN — BENZTROPINE MESYLATE 1 MG: 1 TABLET ORAL at 08:55

## 2019-04-20 RX ADMIN — HALOPERIDOL 5 MG: 5 TABLET ORAL at 20:56

## 2019-04-20 RX ADMIN — THERA TABS 1 TABLET: TAB at 08:55

## 2019-04-20 NOTE — BH NOTES
Chief Complaint:  \"I'm better. \"  Interval History:  Patient reports she feels better. Denies psychotic symptoms. Denies mood symptoms. Denies SI/HI. Tolerating medications      Mental Status Exam:  Behavior: Cooperative. Maintains eye contact. Psychomotor activity: WNL  Mood\": \"Good\"  Affect: Constricted  SI/HI: Denies: Perceptual disturbances: Denies  Thought content: Denies delusional thinking. Assessment and Plan:  Schizoaffective disorder, depressed type. Continue the current medication regimen   Disposition planning to continue.        Current Facility-Administered Medications   Medication Dose Route Frequency    benztropine (COGENTIN) tablet 1 mg  1 mg Oral BID    cetirizine (ZYRTEC) tablet 10 mg  10 mg Oral DAILY    fluticasone propionate (FLONASE) 50 mcg/actuation nasal spray 1 Spray  1 Spray Both Nostrils DAILY    haloperidol (HALDOL) tablet 5 mg  5 mg Oral QHS    lisinopril (PRINIVIL, ZESTRIL) tablet 40 mg  40 mg Oral DAILY    traZODone (DESYREL) tablet 50 mg  50 mg Oral QHS    lamoTRIgine (LaMICtal) tablet 50 mg  50 mg Oral DAILY    sertraline (ZOLOFT) tablet 50 mg  50 mg Oral DAILY    therapeutic multivitamin (THERAGRAN) tablet 1 Tab  1 Tab Oral DAILY    SITagliptin (JANUVIA) tablet tab 50 mg  50 mg Oral DAILY       Physical Exam:         Past Medical History:  Past Medical History:   Diagnosis Date    [facility]     Hypertension     Psychiatric disorder            Labs:  Lab Results   Component Value Date/Time    WBC 13.2 (H) 04/18/2019 05:48 PM    HGB 13.6 04/18/2019 05:48 PM    HCT 41.9 04/18/2019 05:48 PM    PLATELET 490 59/07/0481 05:48 PM    .0 (H) 04/18/2019 05:48 PM      Lab Results   Component Value Date/Time    Sodium 141 04/18/2019 05:48 PM    Potassium 3.8 04/18/2019 05:48 PM    Chloride 109 (H) 04/18/2019 05:48 PM    CO2 24 04/18/2019 05:48 PM    Anion gap 8 04/18/2019 05:48 PM    Glucose 136 (H) 04/19/2019 06:40 AM    BUN 5 (L) 04/18/2019 05:48 PM Creatinine 0.83 04/18/2019 05:48 PM    BUN/Creatinine ratio 6 (L) 04/18/2019 05:48 PM    GFR est AA >60 04/18/2019 05:48 PM    GFR est non-AA >60 04/18/2019 05:48 PM    Calcium 10.4 (H) 04/18/2019 05:48 PM    Bilirubin, total 0.4 04/18/2019 05:48 PM    AST (SGOT) 15 04/18/2019 05:48 PM    Alk.  phosphatase 173 (H) 04/18/2019 05:48 PM    Protein, total 8.2 04/18/2019 05:48 PM    Albumin 3.8 04/18/2019 05:48 PM    Globulin 4.4 (H) 04/18/2019 05:48 PM    A-G Ratio 0.9 (L) 04/18/2019 05:48 PM    ALT (SGPT) 22 04/18/2019 05:48 PM      Vitals:    04/19/19 1546 04/19/19 1954 04/20/19 0838 04/20/19 1147   BP: 110/76 129/85 132/86 128/80   Pulse: 76 66 83 73   Resp: 16 16 18 18   Temp: 97.9 °F (36.6 °C) 99 °F (37.2 °C) 98.5 °F (36.9 °C) 98.9 °F (37.2 °C)   SpO2:   99% 98%   Weight:       Height:

## 2019-04-20 NOTE — PROGRESS NOTES
Problem: Falls - Risk of  Goal: *Absence of Falls  Description  Document Darby Bonner Fall Risk and appropriate interventions in the flowsheet. Outcome: Progressing Towards Goal  Note:   Fall Risk Interventions:  Medication Interventions: Teach patient to arise slowly  History of Falls Interventions: Door open when patient unattended    Received pt lying in bed with eyes closed, appears to be asleep. NAD. Respirations even and unlabored. Will continue to monitor q15min for safety.

## 2019-04-20 NOTE — PROGRESS NOTES
GROUP THERAPY PROGRESS NOTE Natacha Bonner is participating in Self-care issues. Group time: 25 minutes Personal goal for participation: discuss healthy sleeping habits Goal orientation: personal 
 
Group therapy participation: active Therapeutic interventions reviewed and discussed: Staff provided worksheet and led discussion about do's and don'ts of getting a good night of sleep. Asked group to provide examples of things that hinder sleeping at night and things that help them personally get a good night of sleep. Shared facts about benefits of getting a good night of sleep and encouraged group to think about their environment because that plays a big factor in sleep quality. Impression of participation: Actively listened, positively contributed to group conversation

## 2019-04-20 NOTE — INTERDISCIPLINARY ROUNDS
Behavioral Health Interdisciplinary Rounds     Patient Name: Evita Chen  Age: 49 y.o. Room/Bed:  725/  Primary Diagnosis: <principal problem not specified>   Admission Status: Voluntary     Readmission within 30 days: no  Power of  in place: no  Patient requires a blocked bed: no          Reason for blocked bed:     VTE Prophylaxis: Not indicated    Mobility needs/Fall risk: no  Flu Vaccine : no   Nutritional Plan: no  Consults:          Labs/Testing due today?: no    Sleep hours:  7.5      Participation in Care/Groups:  no  Medication Compliant?: Yes  PRNS (last 24 hours): Pain    Restraints (last 24 hours):  no     CIWA (range last 24 hours):     COWS (range last 24 hours):      Alcohol screening (AUDIT) completed -   AUDIT Score: 0     If applicable, date SBIRT discussed in treatment team AND documented:   AUDIT Screen Score: AUDIT Score: 0      Document Brief Intervention (corresponds directly with the 5 A's, Ask, Advise, Assess, Assist, and Arrange): At- Risk Patients (Score 7-15 for women; 8-15 for men)  Discuss concern patient is drinking at unhealthy levels known to increase risk of alcohol-related health problems. Is Patient ready to commit to change? If No:   Encourage reflection   Discuss short term and long term health risks of consuming alcohol   Barriers to change   Reaffirm willingness to help / Educational materials provided  If Yes:   Set goal  [employer] provided    Harmful use or Dependence (Score 16 or greater)   Discuss short term and long term health risks of consuming alcohol   Recommendations   Negotiate drinking goal   Recommend addiction specialist/center   Arrange follow-up appointments.     Tobacco - patient is a smoker: Have You Used Tobacco in the Past 30 Days: Yes  Illegal Drugs use: Have You Used Any Illegal Substances Over the Past 12 Months: No    24 hour chart check complete: yes     Patient goal(s) for today: Treatment team focus/goals:   Progress note     LOS:  2  Expected LOS:     Financial concerns/prescription coverage:    Date of last family contact:       Family requesting physician contact today:    Discharge plan:   Guns in the home:         Outpatient provider(s):     Participating treatment team members: Luis Alfredo Tillman, * (assigned SW),

## 2019-04-20 NOTE — PROGRESS NOTES
Problem: Falls - Risk of  Goal: *Absence of Falls  Description  Document Brady Calvin Fall Risk and appropriate interventions in the flowsheet. Outcome: Progressing Towards Goal  Note:   Fall Risk Interventions:            Medication Interventions: Teach patient to arise slowly         History of Falls Interventions: Door open when patient unattended    Patient free from falls on current evening shift. Patient educated on fall precautions while taking sedative medications. Problem: Altered Thought Process (Adult/Pediatric)  Goal: *STG: Seeks staff when feelings of anxiety and fear arise  Outcome: Progressing Towards Goal  Note:   Patient encouraged to seek staff when feelings of anxiety become overwhelming.  Patient encouraged to participate in milieu activities and treatment team.

## 2019-04-20 NOTE — H&P
[address] [facility] PSYCH HISTORY AND PHYSICAL Name:  Ever Whitt 
MR#:  [id number] :  1965 ACCOUNT #:  [de-identified] ADMIT DATE:  2019 INITIAL PSYCHIATRIC INTERVIEW CHIEF COMPLAINT:  \"I suddenly felt worse. \" HISTORY OF PRESENT ILLNESS:  The patient is a 60-year-old -American female who is known to me from prior hospitalizations at [facility]. She is currently admitted with a history of having developed auditory hallucinations over the past two days. She has chronic auditory hallucinations and they became worse since Wednesday when she was having breakfast.  She is unable to identify a clear precipitating factor. Reports that the voices are telling her to end her life and walk into traffic or take an overdose. States that she started to feel depressed and hopeless after this and decided to come to the hospital yesterday for help. She reports that she has been compliant with all of her medications. Denies use of alcohol or other substances. Also reports that she has been regular in followup with her outpatient psychiatrist, Dr. Steffi Hightower. States that she has had trouble sleeping the past few days, has had little energy or motivation and feels depressed and hopeless all the time. Since arrival on the unit, she has been calm and cooperative and compliant with medications. Urine drug screen was negative. PAST MEDICAL HISTORY:  Reviewed as per the history and physical exam. 
 
Past Medical History:  
Diagnosis Date  Diabetes ([address]  Hypertension  Psychiatric disorder Prior to Admission medications Medication Sig Start Date End Date Taking? Authorizing Provider  
benztropine (COGENTIN) 1 mg tablet Take 1 mg by mouth two (2) times a day. Yes Provider, Historical  
lamoTRIgine (LAMICTAL) 25 mg tablet Take 25 mg by mouth two (2) times a day.    Yes Provider, Historical  
 sAXagliptin (ONGLYZA) 2.5 mg tablet Take 2.5 mg by mouth daily. Yes Provider, Historical  
haloperidol (HALDOL) 5 mg tablet Take 1 Tab by mouth nightly. Indications: psychotic disorder 7/23/18  Yes Marleny Tracey MD  
sertraline (ZOLOFT) 50 mg tablet Take 1 Tab by mouth daily. Indications: major depressive disorder 7/24/18  Yes Marleny Tracey MD  
traZODone (DESYREL) 50 mg tablet Take 1 Tab by mouth nightly. Indications: major depressive disorder 7/23/18  Yes Marleny Tracey MD  
cetirizine (ZYRTEC) 10 mg tablet Take 10 mg by mouth daily. Provider, Historical  
fluticasone (FLONASE) 50 mcg/actuation nasal spray 1 [place] by Both Nostrils route daily. Provider, Historical  
traMADol (ULTRAM) 50 mg tablet Take 50 mg by mouth every eight (8) hours as needed for Pain. Other, MD Marline  
MULTIVIT-MINS NO.7/FOLIC ACID (V-C FORTE PO) Take 1 Cap by mouth daily. Provider, Historical  
  
Vitals:  
 04/21/19 1146 04/21/19 1540 04/21/19 1930 04/22/19 2231 BP:  122/89 131/81 107/74 Pulse:  81 71 99 Resp:  16 16 16 Temp:  98.6 °F (37 °C) 98.4 °F (36.9 °C) 99 °F (37.2 °C) SpO2:  99% 99% 96% Weight: 72.1 kg (158 lb 14.4 oz) Height:      
  
Lab Results Component Value Date/Time WBC 13.2 (H) 04/18/2019 05:48 PM  
 HGB 13.6 04/18/2019 05:48 PM  
 HCT 41.9 04/18/2019 05:48 PM  
 PLATELET 232 97/16/1050 05:48 PM  
 .0 (H) 04/18/2019 05:48 PM  
 
Lab Results Component Value Date/Time  Sodium 141 04/18/2019 05:48 PM  
 Potassium 3.8 04/18/2019 05:48 PM  
 Chloride 109 (H) 04/18/2019 05:48 PM  
 CO2 24 04/18/2019 05:48 PM  
 Anion gap 8 04/18/2019 05:48 PM  
 Glucose 136 (H) 04/19/2019 06:40 AM  
 BUN 5 (L) 04/18/2019 05:48 PM  
 Creatinine 0.83 04/18/2019 05:48 PM  
 BUN/Creatinine ratio 6 (L) 04/18/2019 05:48 PM  
 GFR est AA >60 04/18/2019 05:48 PM  
 GFR est non-AA >60 04/18/2019 05:48 PM  
 Calcium 10.4 (H) 04/18/2019 05:48 PM  
 Bilirubin, total 0.4 04/18/2019 05:48 PM  
 AST (SGOT) 15 04/18/2019 05:48 PM  
 Alk. phosphatase 173 (H) 04/18/2019 05:48 PM  
 Protein, total 8.2 04/18/2019 05:48 PM  
 Albumin 3.8 04/18/2019 05:48 PM  
 Globulin 4.4 (H) 04/18/2019 05:48 PM  
 A-G Ratio 0.9 (L) 04/18/2019 05:48 PM  
 ALT (SGPT) 22 04/18/2019 05:48 PM  
 
 
PAST PSYCHIATRIC HISTORY:  The patient carries a longstanding diagnosis of schizoaffective disorder and has been hospitalized numerous times. Most recently, she was hospitalized at [facility], although she could not remember the exact month or date. States that she was hospitalized at [facility] in 07/2018, which was confirmed from the electronic medical record. As noted above, she sees Dr. Cecilia Coto as an outpatient and has reportedly been compliant with followup and treatment. There is no prior history of substance abuse. PSYCHOSOCIAL HISTORY:  The patient currently lives in a rooming house along with two other roommates. States that she enjoys living there and has not been troubled by her living situation at all. She has never been , but does have six children including three daughters and three sons. States that they are all in regular touch with her and she enjoys her interactions with them. States that she has been on social security disability income for as long as she can remember for her psychiatric condition and issues with her vision. Denies any major legal or financial stressors. She is currently not in a steady relationship. MENTAL STATUS EXAM:  The patient is a middle-aged -American female who is dressed in hospital apparel. She is calm and cooperative, although she makes limited eye contact because of her lack of vision. Her speech is spontaneous and coherent. Mood is reported as being fine. Affect is reactive and denies any active suicidal ideation or plan. Denies any perceptual abnormalities.   She notes auditory hallucinations of voices talking to her, but cannot make out what they are saying. Denies any command auditory hallucinations. Denies any suicidal ideation or plan. Her thought process is logical and goal-directed. Cognitively, she is awake and alert; oriented to time, place, and person. Intelligence is average, memory is intact, and fund of knowledge is adequate. Insight is poor. Judgment is poor. ASSESSMENT AND PLAN/DIAGNOSIS:  Schizoaffective disorder, depressed type. I will continue her inpatient stay. She will be provided with support and attend groups. Estimated length of stay is 5-7 days. Her strengths include her ability to seek help and support from her therapeutic providers. RACHEL Zhang MD 
 
 
[id number] 
D:  04/19/2019 12:07 
T:  04/19/2019 12:11 JOB #:  [id number]

## 2019-04-20 NOTE — PROGRESS NOTES
Problem: Falls - Risk of  Goal: *Absence of Falls  Description  Document Katina Meyer Fall Risk and appropriate interventions in the flowsheet.   Outcome: Progressing Towards Goal  Note:   Fall Risk Interventions:            Medication Interventions: Teach patient to arise slowly         History of Falls Interventions: Door open when patient unattended         Problem: Altered Thought Process (Adult/Pediatric)  Goal: *STG: Participates in treatment plan  Outcome: Progressing Towards Goal  Note:   Pt particiaptes in treatment   Mood is euthymic smiling   Verbalzies no AH or VH   Attends groups        Problem: Altered Thought Process (Adult/Pediatric)  Goal: *STG: Complies with medication therapy  Outcome: Progressing Towards Goal  Note:   Compliant

## 2019-04-21 PROCEDURE — 65220000003 HC RM SEMIPRIVATE PSYCH

## 2019-04-21 PROCEDURE — 74011250637 HC RX REV CODE- 250/637: Performed by: NURSE PRACTITIONER

## 2019-04-21 PROCEDURE — 74011250637 HC RX REV CODE- 250/637: Performed by: PSYCHIATRY & NEUROLOGY

## 2019-04-21 RX ADMIN — BENZTROPINE MESYLATE 1 MG: 1 TABLET ORAL at 21:05

## 2019-04-21 RX ADMIN — LAMOTRIGINE 50 MG: 25 TABLET ORAL at 09:08

## 2019-04-21 RX ADMIN — SITAGLIPTIN 50 MG: 25 TABLET, FILM COATED ORAL at 09:08

## 2019-04-21 RX ADMIN — TRAZODONE HYDROCHLORIDE 50 MG: 50 TABLET ORAL at 21:04

## 2019-04-21 RX ADMIN — BENZTROPINE MESYLATE 1 MG: 1 TABLET ORAL at 09:09

## 2019-04-21 RX ADMIN — HALOPERIDOL 5 MG: 5 TABLET ORAL at 21:04

## 2019-04-21 RX ADMIN — SERTRALINE HYDROCHLORIDE 50 MG: 50 TABLET ORAL at 09:09

## 2019-04-21 RX ADMIN — MAGNESIUM HYDROXIDE 30 ML: 400 SUSPENSION ORAL at 09:15

## 2019-04-21 RX ADMIN — THERA TABS 1 TABLET: TAB at 09:09

## 2019-04-21 NOTE — INTERDISCIPLINARY ROUNDS
Behavioral Health Interdisciplinary Rounds     Patient Name: Aric Diaz  Age: 49 y.o. Room/Bed:  725/  Primary Diagnosis: <principal problem not specified>   Admission Status: Voluntary     Readmission within 30 days: no  Power of  in place: no  Patient requires a blocked bed: no          Reason for blocked bed:     VTE Prophylaxis: No    Mobility needs/Fall risk: no  Flu Vaccine : no   Nutritional Plan: no  Consults:          Labs/Testing due today?: no    Sleep hours:  6.5      Participation in Care/Groups:    Medication Compliant?: Yes  PRNS (last 24 hours): Pain    Restraints (last 24 hours):  no     CIWA (range last 24 hours):     COWS (range last 24 hours):      Alcohol screening (AUDIT) completed -   AUDIT Score: 0     If applicable, date SBIRT discussed in treatment team AND documented:   AUDIT Screen Score: AUDIT Score: 0      Document Brief Intervention (corresponds directly with the 5 A's, Ask, Advise, Assess, Assist, and Arrange): At- Risk Patients (Score 7-15 for women; 8-15 for men)  Discuss concern patient is drinking at unhealthy levels known to increase risk of alcohol-related health problems. Is Patient ready to commit to change? If No:   Encourage reflection   Discuss short term and long term health risks of consuming alcohol   Barriers to change   Reaffirm willingness to help / Educational materials provided  If Yes:   Set goal  [employer] provided    Harmful use or Dependence (Score 16 or greater)   Discuss short term and long term health risks of consuming alcohol   Recommendations   Negotiate drinking goal   Recommend addiction specialist/center   Arrange follow-up appointments.     Tobacco - patient is a smoker: Have You Used Tobacco in the Past 30 Days: Yes  Illegal Drugs use: Have You Used Any Illegal Substances Over the Past 12 Months: No    24 hour chart check complete: yes     Patient goal(s) for today:   Treatment team focus/goals:   Progress note     LOS:  3  Expected LOS:     Financial concerns/prescription coverage:    Date of last family contact:       Family requesting physician contact today:    Discharge plan:   Guns in the home:         Outpatient provider(s):     Participating treatment team members: Dmitri Penn, * (assigned SW),

## 2019-04-21 NOTE — PROGRESS NOTES
1530: Greeted patient on unit in dining room sitting quietly. Appears in no acute distress. No voiced concerns at present. Will continue to monitor on Q 15 minute safety checks. 2115: Patient alert. Vitals stable, wnl.   Medication and meal compliant. Attended group. Out in milieu. Cooperative with staff. Patient reports feeling calmer.

## 2019-04-21 NOTE — PROGRESS NOTES
Problem: Falls - Risk of  Goal: *Absence of Falls  Description  Document Kandi Haro Fall Risk and appropriate interventions in the flowsheet. Outcome: Progressing Towards Goal  Note:   Fall Risk Interventions:            Medication Interventions: Teach patient to arise slowly         History of Falls Interventions: Door open when patient unattended         Problem: Altered Thought Process (Adult/Pediatric)  Goal: *STG: Participates in treatment plan  Outcome: Progressing Towards Goal  Note:   Pt is calm cooperative and appropriate with staff   States she was a little agitated this morning but feels better less anxious now   Staff encouraged coping skills pt verbalized understanding.       Problem: Altered Thought Process (Adult/Pediatric)  Goal: *STG: Complies with medication therapy  Outcome: Progressing Towards Goal  Note:   Compliant      Problem: Altered Thought Process (Adult/Pediatric)  Goal: *STG: Decreased hallucinations  Outcome: Progressing Towards Goal  Note:   No AH or VH

## 2019-04-21 NOTE — PROGRESS NOTES
Problem: Falls - Risk of  Goal: *Absence of Fall  Outcome: Progressing Towards Goal  Note:   Fall Risk Interventions:  Medication Interventions: Teach patient to arise slowly  History of Falls Interventions: Door open when patient unattended    Received pt in bed, appears to be asleep. NAD. Respirations even and unlabored. Will continue to monitor q15 min for safety.

## 2019-04-21 NOTE — BH NOTES
Chief Complaint:  \"I'm a bit agitated. \"    Interval History:  Patient reports she woke up feeling agitated and is unsure why. Denies psychotic symptoms. Denies SI/HI. Per staff, patient is doing well        Mental Status Exam:  Behavior: Cooperative. Maintains eye contact. Psychomotor activity: WNL  Mood\": \"Good\"  Affect: Constricted  SI/HI: Denies: Perceptual disturbances: Denies  Thought content: Denies delusional thinking.     Assessment and Plan:  Schizoaffective disorder, depressed type.     Continue the current medication regimen   Disposition planning to continue.           Current Facility-Administered Medications   Medication Dose Route Frequency    benztropine (COGENTIN) tablet 1 mg  1 mg Oral BID    cetirizine (ZYRTEC) tablet 10 mg  10 mg Oral DAILY    fluticasone propionate (FLONASE) 50 mcg/actuation nasal spray 1 Spray  1 Spray Both Nostrils DAILY    haloperidol (HALDOL) tablet 5 mg  5 mg Oral QHS    lisinopril (PRINIVIL, ZESTRIL) tablet 40 mg  40 mg Oral DAILY    traZODone (DESYREL) tablet 50 mg  50 mg Oral QHS    lamoTRIgine (LaMICtal) tablet 50 mg  50 mg Oral DAILY    sertraline (ZOLOFT) tablet 50 mg  50 mg Oral DAILY    therapeutic multivitamin (THERAGRAN) tablet 1 Tab  1 Tab Oral DAILY    SITagliptin (JANUVIA) tablet tab 50 mg  50 mg Oral DAILY       Physical Exam:         Past Medical History:  Past Medical History:   Diagnosis Date    [address] [address]     Hypertension     Psychiatric disorder            Labs:  Lab Results   Component Value Date/Time    WBC 13.2 (H) 04/18/2019 05:48 PM    HGB 13.6 04/18/2019 05:48 PM    HCT 41.9 04/18/2019 05:48 PM    PLATELET 233 09/47/5078 05:48 PM    .0 (H) 04/18/2019 05:48 PM      Lab Results   Component Value Date/Time    Sodium 141 04/18/2019 05:48 PM    Potassium 3.8 04/18/2019 05:48 PM    Chloride 109 (H) 04/18/2019 05:48 PM    CO2 24 04/18/2019 05:48 PM    Anion gap 8 04/18/2019 05:48 PM    Glucose 136 (H) 04/19/2019 06:40 AM    BUN 5 (L) 04/18/2019 05:48 PM    Creatinine 0.83 04/18/2019 05:48 PM    BUN/Creatinine ratio 6 (L) 04/18/2019 05:48 PM    GFR est AA >60 04/18/2019 05:48 PM    GFR est non-AA >60 04/18/2019 05:48 PM    Calcium 10.4 (H) 04/18/2019 05:48 PM    Bilirubin, total 0.4 04/18/2019 05:48 PM    AST (SGOT) 15 04/18/2019 05:48 PM    Alk.  phosphatase 173 (H) 04/18/2019 05:48 PM    Protein, total 8.2 04/18/2019 05:48 PM    Albumin 3.8 04/18/2019 05:48 PM    Globulin 4.4 (H) 04/18/2019 05:48 PM    A-G Ratio 0.9 (L) 04/18/2019 05:48 PM    ALT (SGPT) 22 04/18/2019 05:48 PM      Vitals:    04/20/19 1953 04/21/19 0824 04/21/19 0924 04/21/19 1146   BP: 117/78 114/79 135/87    Pulse: 76 86 99    Resp: 16 16 16    Temp: 98.8 °F (37.1 °C) 98.1 °F (36.7 °C)     SpO2: 99% 96% 99%    Weight:    72.1 kg (158 lb 14.4 oz)   Height:

## 2019-04-22 PROCEDURE — 74011250637 HC RX REV CODE- 250/637: Performed by: NURSE PRACTITIONER

## 2019-04-22 PROCEDURE — 74011250637 HC RX REV CODE- 250/637: Performed by: PSYCHIATRY & NEUROLOGY

## 2019-04-22 PROCEDURE — 65220000003 HC RM SEMIPRIVATE PSYCH

## 2019-04-22 RX ORDER — LAMOTRIGINE 100 MG/1
100 TABLET ORAL DAILY
Status: DISCONTINUED | OUTPATIENT
Start: 2019-04-23 | End: 2019-04-23

## 2019-04-22 RX ORDER — HYDROXYZINE 50 MG/1
50 TABLET, FILM COATED ORAL
Status: DISCONTINUED | OUTPATIENT
Start: 2019-04-22 | End: 2019-04-23 | Stop reason: HOSPADM

## 2019-04-22 RX ADMIN — BENZTROPINE MESYLATE 1 MG: 1 TABLET ORAL at 21:09

## 2019-04-22 RX ADMIN — SITAGLIPTIN 50 MG: 25 TABLET, FILM COATED ORAL at 08:10

## 2019-04-22 RX ADMIN — THERA TABS 1 TABLET: TAB at 08:10

## 2019-04-22 RX ADMIN — ACETAMINOPHEN 650 MG: 325 TABLET ORAL at 12:44

## 2019-04-22 RX ADMIN — TRAZODONE HYDROCHLORIDE 50 MG: 50 TABLET ORAL at 22:09

## 2019-04-22 RX ADMIN — LAMOTRIGINE 50 MG: 25 TABLET ORAL at 08:10

## 2019-04-22 RX ADMIN — BENZTROPINE MESYLATE 1 MG: 1 TABLET ORAL at 08:10

## 2019-04-22 RX ADMIN — HALOPERIDOL 5 MG: 5 TABLET ORAL at 21:08

## 2019-04-22 RX ADMIN — SERTRALINE HYDROCHLORIDE 50 MG: 50 TABLET ORAL at 08:10

## 2019-04-22 NOTE — PROGRESS NOTES
2300: Patient appears to be sleeping. No apparent distress. Respirations even and unlabored. Will continue to monitor with q15 minute checks throughout the shift. Problem: Falls - Risk of  Goal: *Absence of Falls  Description  Document Redgie Curet Fall Risk and appropriate interventions in the flowsheet.   Outcome: Progressing Towards Goal

## 2019-04-22 NOTE — BH NOTES
Behavioral Health Interdisciplinary Rounds     Patient Name: Wilfrido Freeman  Age: 49 y.o.   Room/Bed:  725/02  Primary Diagnosis: <principal problem not specified>   Admission Status: Voluntary     Readmission within 30 days: no  Power of  in place: yes  Patient requires a blocked bed: no          Reason for blocked bed:     VTE Prophylaxis: Not indicated    Mobility needs/Fall risk: no  Flu Vaccine : no   Nutritional Plan: no  Consults:          Labs/Testing due today?: no    Sleep hours:  7.5      Participation in Care/Groups:  yes  Medication Compliant?: Yes  PRNS (last 24 hours): None    Restraints (last 24 hours):  no     CIWA (range last 24 hours):     COWS (range last 24 hours):      Alcohol screening (AUDIT) completed -   AUDIT Score: 0     If applicable, date SBIRT discussed in treatment team AND documented:   AUDIT Screen Score: AUDIT Score: 0    Tobacco - patient is a smoker: Have You Used Tobacco in the Past 30 Days: Yes  Illegal Drugs use: Have You Used Any Illegal Substances Over the Past 12 Months: No    24 hour chart check complete: yes     Patient goal(s) for today: Continue taking medications as prescribed  Treatment team focus/goals: Adjust medications  Progress note: Pt reports feeling agitated and irritable     LOS:  4  Expected LOS: TBD    Financial concerns/prescription coverage: [place] [employer]  Date of last family contact: None      Family requesting physician contact today: No  Discharge plan: Return home  Guns in the home: No       Outpatient provider(s): Dr. Steffi Hightower    Participating treatment team members: Wilfrido Freeman, Nya Lopez MSMATT; Dr. Antonio Stevenson MD; Rosalind Guerra, RN; Carlee Gallo, PharmD; DENISE SinghW

## 2019-04-22 NOTE — BH NOTES
GROUP THERAPY PROGRESS NOTE    Lei Gallego partially participated in a Process Group on the General Unit with a focus identifying feelings, planning for the rest of the day, and reviewing DBT skills for managing emotional distress. Group time: 65 minutes. Personal goal for participation: To increase the capacity to improve ones mood, structure, and coping capacity. Goal orientation: The patient will be able to identify their feelings, develop a plan for structuring their day, and begin to appreciate the possibility of successfully managing distress and other forms of intense emotions. Group therapy participation: With prompting, this patient marginally participated in the group. Therapeutic interventions reviewed and discussed: The group members were asked to introduce themselves to each other and to see if they could identify an emotion they are having and/or let the group know what they want to focus on for the day as they continue to make discharge plans. The group members also reviewed five suggested areas of DBT options when experiencing intense emotions: distraction, improve the moment, self-soothe, pros and cons, and radical acceptance. Impression of participation: The patient spoke up in the group first and said she was feeling \"agitated. \"  She quickly added that she did not know what to do with herself on the unit and was having difficulty identifying coping skills that might help her deal with her \"agitation\" and/or boredom. She was asked if she initiated conversations with her peers on the unit, because she began the group's conversation in this group. She replied \"no\" and rejected all other suggestions, like coloring, playing a board game, starting a puzzle. Mlnellie Manriquez Phipps I did play some cards but don't feel like doing that anymore. She was alert, generally oriented, and not very engaged in the group. She expressed no current SI/HI and no overt psychotic symptoms.  Her affect was depressed and anxious. Her mood was irritable and negative. She refused to take a copy of the handout when it was offered to her.

## 2019-04-22 NOTE — BH NOTES
GROUP THERAPY PROGRESS NOTE    Dave Caicedo  did not participate in Substance abuse group.      Lana Naqvi [facility]

## 2019-04-22 NOTE — PROGRESS NOTES
Problem: Altered Thought Process (Adult/Pediatric)  Goal: *STG: Participates in treatment plan  Outcome: Progressing Towards Goal  Mood is subdued. No c/o agitation. Pt verbalizes her needs appropriately.

## 2019-04-22 NOTE — BH NOTES
GROUP THERAPY PROGRESS NOTE Adela Garcia is participating in reflection group Group time: 25 minutes Personal goal for participation: Daily progress Goal orientation: personal 
 
Group therapy participation: active Therapeutic interventions reviewed and discussed: Unit rules and regulations. 10 ways to be happy and more grateful. Impression of participation: active

## 2019-04-22 NOTE — PROGRESS NOTES
Problem: Altered Thought Process (Adult/Pediatric)  Goal: *STG: Participates in treatment plan  Outcome: Progressing Towards Goal  Note:   Out on unit slighlty isolative, when asked to attend groups pt states she does not \"do well in there\" describes hearing peers cause her to be agitated and frustrated to follow conversations. Mood \"I feel agitated\" and affect smiling and incongruent w mood. Daily goal is to go home this week. Denies AH however slight preoccupation.  Staff focus is on d/c planning and offering support

## 2019-04-23 VITALS
SYSTOLIC BLOOD PRESSURE: 117 MMHG | TEMPERATURE: 98.2 F | WEIGHT: 158.9 LBS | HEART RATE: 93 BPM | OXYGEN SATURATION: 94 % | HEIGHT: 60 IN | DIASTOLIC BLOOD PRESSURE: 87 MMHG | RESPIRATION RATE: 16 BRPM | BODY MASS INDEX: 31.2 KG/M2

## 2019-04-23 PROCEDURE — 74011250637 HC RX REV CODE- 250/637: Performed by: PSYCHIATRY & NEUROLOGY

## 2019-04-23 RX ORDER — LAMOTRIGINE 100 MG/1
50 TABLET ORAL 2 TIMES DAILY
Qty: 60 TAB | Refills: 0 | Status: SHIPPED | OUTPATIENT
Start: 2019-04-23

## 2019-04-23 RX ORDER — LAMOTRIGINE 100 MG/1
100 TABLET ORAL DAILY
Qty: 30 TAB | Refills: 0 | Status: SHIPPED | OUTPATIENT
Start: 2019-04-23 | End: 2019-04-23

## 2019-04-23 RX ORDER — LAMOTRIGINE 25 MG/1
50 TABLET ORAL 2 TIMES DAILY
Status: DISCONTINUED | OUTPATIENT
Start: 2019-04-23 | End: 2019-04-23 | Stop reason: HOSPADM

## 2019-04-23 RX ADMIN — THERA TABS 1 TABLET: TAB at 09:39

## 2019-04-23 RX ADMIN — LAMOTRIGINE 50 MG: 25 TABLET ORAL at 11:31

## 2019-04-23 RX ADMIN — SERTRALINE HYDROCHLORIDE 50 MG: 50 TABLET ORAL at 09:39

## 2019-04-23 RX ADMIN — SITAGLIPTIN 50 MG: 25 TABLET, FILM COATED ORAL at 09:39

## 2019-04-23 RX ADMIN — BENZTROPINE MESYLATE 1 MG: 1 TABLET ORAL at 09:37

## 2019-04-23 NOTE — DISCHARGE SUMMARY
Some parts of the discharge summary are from the initial Psychiatric interview that was done on admission by the admitting psychiatrist.     Kurt López:  \"I suddenly felt worse. \"     HISTORY OF PRESENT ILLNESS:  The patient is a 50-year-old -American female who is known to me from prior hospitalizations at [facility]. She is currently admitted with a history of having developed auditory hallucinations over the past two days. She has chronic auditory hallucinations and they became worse since Wednesday when she was having breakfast.  She is unable to identify a clear precipitating factor. Reports that the voices are telling her to end her life and walk into traffic or take an overdose. States that she started to feel depressed and hopeless after this and decided to come to the hospital yesterday for help. She reports that she has been compliant with all of her medications. Denies use of alcohol or other substances. Also reports that she has been regular in followup with her outpatient psychiatrist, Dr. Ruchi Morse. States that she has had trouble sleeping the past few days, has had little energy or motivation and feels depressed and hopeless all the time. Since arrival on the unit, she has been calm and cooperative and compliant with medications. Urine drug screen was negative.     PAST MEDICAL HISTORY:  Reviewed as per the history and physical exam.          Past Medical History:   Diagnosis Date    Diabetes ([address]      Hypertension      Psychiatric disorder                Prior to Admission medications    Medication Sig Start Date End Date Taking?  Authorizing Provider   benztropine (COGENTIN) 1 mg tablet Take 1 mg by mouth two (2) times a day.     Yes Provider, Historical   lamoTRIgine (LAMICTAL) 25 mg tablet Take 25 mg by mouth two (2) times a day.     Yes Provider, Historical   sAXagliptin (ONGLYZA) 2.5 mg tablet Take 2.5 mg by mouth daily.     Yes Provider, Historical   haloperidol (HALDOL) 5 mg tablet Take 1 Tab by mouth nightly. Indications: psychotic disorder 7/23/18   Yes Nila Shepherd MD   sertraline (ZOLOFT) 50 mg tablet Take 1 Tab by mouth daily. Indications: major depressive disorder 7/24/18   Yes Nila Shepherd MD   traZODone (DESYREL) 50 mg tablet Take 1 Tab by mouth nightly. Indications: major depressive disorder 7/23/18   Yes Nila Shepherd MD   cetirizine (ZYRTEC) 10 mg tablet Take 10 mg by mouth daily.       Provider, Historical   fluticasone (FLONASE) 50 mcg/actuation nasal spray 1 [place] by Both Nostrils route daily.       Provider, Historical   traMADol (ULTRAM) 50 mg tablet Take 50 mg by mouth every eight (8) hours as needed for Pain.       Other, MD Marline   MULTIVIT-MINS NO.7/FOLIC ACID (V-C FORTE PO) Take 1 Cap by mouth daily.       Provider, Historical             Vitals:     04/21/19 1146 04/21/19 1540 04/21/19 1930 04/22/19 0808   BP:   122/89 131/81 107/74   Pulse:   81 71 99   Resp:   16 16 16   Temp:   98.6 °F (37 °C) 98.4 °F (36.9 °C) 99 °F (37.2 °C)   SpO2:   99% 99% 96%   Weight: 72.1 kg (158 lb 14.4 oz)         Height:                    Lab Results   Component Value Date/Time     WBC 13.2 (H) 04/18/2019 05:48 PM     HGB 13.6 04/18/2019 05:48 PM     HCT 41.9 04/18/2019 05:48 PM     PLATELET 268 81/40/6187 05:48 PM     .0 (H) 04/18/2019 05:48 PM            Lab Results   Component Value Date/Time     Sodium 141 04/18/2019 05:48 PM     Potassium 3.8 04/18/2019 05:48 PM     Chloride 109 (H) 04/18/2019 05:48 PM     CO2 24 04/18/2019 05:48 PM     Anion gap 8 04/18/2019 05:48 PM     Glucose 136 (H) 04/19/2019 06:40 AM     BUN 5 (L) 04/18/2019 05:48 PM     Creatinine 0.83 04/18/2019 05:48 PM     BUN/Creatinine ratio 6 (L) 04/18/2019 05:48 PM     GFR est AA >60 04/18/2019 05:48 PM     GFR est non-AA >60 04/18/2019 05:48 PM     Calcium 10.4 (H) 04/18/2019 05:48 PM     Bilirubin, total 0.4 04/18/2019 05:48 PM     AST (SGOT) 15 04/18/2019 05:48 PM     Alk.  phosphatase 173 (H) 04/18/2019 05:48 PM     Protein, total 8.2 04/18/2019 05:48 PM     Albumin 3.8 04/18/2019 05:48 PM     Globulin 4.4 (H) 04/18/2019 05:48 PM     A-G Ratio 0.9 (L) 04/18/2019 05:48 PM     ALT (SGPT) 22 04/18/2019 05:48 PM         PAST PSYCHIATRIC HISTORY:  The patient carries a longstanding diagnosis of schizoaffective disorder and has been hospitalized numerous times. Most recently, she was hospitalized at [facility], although she could not remember the exact month or date. States that she was hospitalized at [address] [place] [address] in 07/2018, which was confirmed from the electronic medical record. As noted above, she sees Dr. Cori Dee as an outpatient and has reportedly been compliant with followup and treatment. There is no prior history of substance abuse.     PSYCHOSOCIAL HISTORY:  The patient currently lives in a rooming house along with two other roommates. States that she enjoys living there and has not been troubled by her living situation at all. She has never been , but does have six children including three daughters and three sons. States that they are all in regular touch with her and she enjoys her interactions with them. States that she has been on social security disability income for as long as she can remember for her psychiatric condition and issues with her vision. Denies any major legal or financial stressors. She is currently not in a steady relationship.     MENTAL STATUS EXAM:  The patient is a middle-aged -American female who is dressed in hospital apparel. She is calm and cooperative, although she makes limited eye contact because of her lack of vision. Her speech is spontaneous and coherent. Mood is reported as being fine. Affect is reactive and denies any active suicidal ideation or plan. Denies any perceptual abnormalities. She notes auditory hallucinations of voices talking to her, but cannot make out what they are saying.   Denies any command auditory hallucinations. Denies any suicidal ideation or plan. Her thought process is logical and goal-directed. Cognitively, she is awake and alert; oriented to time, place, and person. Intelligence is average, memory is intact, and fund of knowledge is adequate. Insight is poor. Judgment is poor.     ASSESSMENT AND PLAN/DIAGNOSIS:  Schizoaffective disorder, depressed type. TYPE OF DISCHARGE:   REGULAR -  Yes  AMA  RELEASED BY THE [facility]    COURSE IN THE HOSPITAL:    Patient was admitted to the inpatient psychiatry unit for acute psychiatric stabilization in regards to symptomatology as described in the HPI above and placed on Q15 minute checks and withdrawal precautions. While on the unit Matilda Davies was involved in individual, group, occupational and milieu therapy. She was started back on her usual medication regimen as well as PRN medications including Haldol, Sertraline, and dosage of Lamictal was increased. She improved gradually and was able to integrate into the milieu with help from the nursing staff. Patients symptoms improved gradually including SI and AH. She was quite on the unit, appropriate in her interactions, and cooperative with medications and the unit routine. Please see individual progress notes for more specific details regarding patient's hospitalization course. Patient was discharged as per the plan. She had been doing well on the unit as per the report of the nursing staff and my observations. No PRN medication for agitation, seclusion or restraints were required during the last 48 hours of her stay. Matilda Davies had improved progressively to the point of being stable for discharge and outpatient FU. At this time she did not offer any complaints. Patient denied any SI or HI. Denied any AH or VH. She denied any delusions. Was not considered a danger to self or to others and is safe for discharge. Will FU with her appointments and remains motivated to be in treatment.  The patient verbalized understanding of her discharge instructions. DISCHARGE DIAGNOSIS:  Schizoaffective Disorder    MENTAL STATUS EXAM ON DISCHARGE:    General appearance:   Maki Elliott is a 49 y.o. BLACK OR  female who is well groomed, psychomotor activity is WNL  Eye contact: makes good eye contact  Speech: Spontaneous and coherent  Affect : Euthymic  Mood: \"OK\"  Thought Process: Logical, goal directed  Perception: Denies any AH or VH. Thought Content: Denies any SI or Plan  Insight: Partial  Judgement: Fair  Cognition: Intact grossly. Current Discharge Medication List      CONTINUE these medications which have CHANGED    Details   lamoTRIgine (LAMICTAL) 100 mg tablet Take 1 Tab by mouth daily. Indications: Bipolar Depression  Qty: 30 Tab, Refills: 0         CONTINUE these medications which have NOT CHANGED    Details   benztropine (COGENTIN) 1 mg tablet Take 1 mg by mouth two (2) times a day. sAXagliptin (ONGLYZA) 2.5 mg tablet Take 2.5 mg by mouth daily. haloperidol (HALDOL) 5 mg tablet Take 1 Tab by mouth nightly. Indications: psychotic disorder  Qty: 15 Tab, Refills: 1      sertraline (ZOLOFT) 50 mg tablet Take 1 Tab by mouth daily. Indications: major depressive disorder  Qty: 15 Tab, Refills: 1      traZODone (DESYREL) 50 mg tablet Take 1 Tab by mouth nightly. Indications: major depressive disorder  Qty: 15 Tab, Refills: 0      cetirizine (ZYRTEC) 10 mg tablet Take 10 mg by mouth daily. fluticasone (FLONASE) 50 mcg/actuation nasal spray 1 [place] by Both Nostrils route daily. traMADol (ULTRAM) 50 mg tablet Take 50 mg by mouth every eight (8) hours as needed for Pain. MULTIVIT-MINS NO.7/FOLIC ACID (V-C FORTE PO) Take 1 Cap by mouth daily.               Follow-up Information     Follow up With Specialties Details Why Contact Info    Ishaan Medellin MD Internal Medicine   [address] [place] [address] Rd  [address] Esthela Rodriguez At [address] [place] [address]  [phone] WOUND CARE: none needed. PROGNOSIS:   Good / Fair based on nature of patient's pathology/ies and treatment compliance issues. Prognosis is greatly dependent upon patient's ability to  follow up on psychiatric/psychotherapy appointments as well as to comply with psychiatric medications as prescribed.

## 2019-04-23 NOTE — BH NOTES
All of Pt's belongings were returned to Pt. Pt received and signed discharge instructions. Pt received discharge folder which included discharge instructions and prescriptions. All questions were answered. Pt was escorted safely to her ride.

## 2019-04-23 NOTE — PROGRESS NOTES
Patient attended spirituality group focused on the topic of alon and actively participated. : Rev. Zulay Yoa.  Miguel; to contact [phone] [address] Flores call: [phone]

## 2019-04-23 NOTE — PROGRESS NOTES
Pharmacist Discharge Medication Reconciliation    Discharging Provider: Dr. Lizbeth Terrazas PMH:   Past Medical History:   Diagnosis Date    Diabetes [facility])     Hypertension     Psychiatric disorder      Chief Complaint for this Admission:   Chief Complaint   Patient presents with    Mental Health Problem     Allergies: Aspirin and Morphine    Discharge Medications:   Current Discharge Medication List        CONTINUE these medications which have CHANGED    Details   lamoTRIgine (LAMICTAL) 100 mg tablet Take 0.5 Tabs by mouth two (2) times a day. Indications: Bipolar Depression  Qty: 60 Tab, Refills: 0           CONTINUE these medications which have NOT CHANGED    Details   benztropine (COGENTIN) 1 mg tablet Take 1 mg by mouth two (2) times a day. sAXagliptin (ONGLYZA) 2.5 mg tablet Take 2.5 mg by mouth daily. haloperidol (HALDOL) 5 mg tablet Take 1 Tab by mouth nightly. Indications: psychotic disorder  Qty: 15 Tab, Refills: 1      sertraline (ZOLOFT) 50 mg tablet Take 1 Tab by mouth daily. Indications: major depressive disorder  Qty: 15 Tab, Refills: 1      traZODone (DESYREL) 50 mg tablet Take 1 Tab by mouth nightly. Indications: major depressive disorder  Qty: 15 Tab, Refills: 0      cetirizine (ZYRTEC) 10 mg tablet Take 10 mg by mouth daily. fluticasone (FLONASE) 50 mcg/actuation nasal spray 1 [place] by Both Nostrils route daily. traMADol (ULTRAM) 50 mg tablet Take 50 mg by mouth every eight (8) hours as needed for Pain. MULTIVIT-MINS NO.7/FOLIC ACID (V-C FORTE PO) Take 1 Cap by mouth daily.            The patient's chart, MAR and AVS were reviewed by Abby Bowen, CARMENZA

## 2019-04-23 NOTE — DISCHARGE INSTRUCTIONS
DISCHARGE SUMMARY    Sherita Florian  : 1965  MRN: [id number]    The patient Abel Mcgregor exhibits the ability to control behavior in a less restrictive environment. Patient's level of functioning is improving. No assaultive/destructive behavior has been observed for the past 24 hours. No suicidal/homicidal threat or behavior has been observed for the past 24 hours. There is no evidence of serious medication side effects. Patient has not been in physical or protective restraints for at least the past 24 hours. If weapons involved, how are they secured? No weapons involved. Is patient aware of and in agreement with discharge plan? Yes    Arrangements for medication:  Prescriptions given to patient. Copy of discharge instructions to provider?:  Dr. Eliz Riojas for transportation home:   to . Keep all follow up appointments as scheduled, continue to take prescribed medications per physician instructions. Mental health crisis number:  376 or your local mental health crisis line number at [phone]. DISCHARGE SUMMARY from Nurse    PATIENT INSTRUCTIONS:      What to do at Home:  Recommended activity: Activity as tolerated    If you experience any of the following symptoms thoughts of hurting your self, unable to sleep 5 hours a night for several days, thoughts of helplessness or hopelessness, racing thoughtsplease follow up with [phone]. *  Please give a list of your current medications to your Primary Care Provider. *  Please update this list whenever your medications are discontinued, doses are      changed, or new medications (including over-the-counter products) are added. *  Please carry medication information at all times in case of emergency situations.     These are general instructions for a healthy lifestyle:    No smoking/ No tobacco products/ Avoid exposure to second hand smoke  Surgeon General's Warning: Quitting smoking now greatly reduces serious risk to your health. Obesity, smoking, and sedentary lifestyle greatly increases your risk for illness    A healthy diet, regular physical exercise & weight monitoring are important for maintaining a healthy lifestyle    You may be retaining fluid if you have a history of heart failure or if you experience any of the following symptoms:  Weight gain of 3 pounds or more overnight or 5 pounds in a week, increased swelling in our hands or feet or shortness of breath while lying flat in bed. Please call your doctor as soon as you notice any of these symptoms; do not wait until your next office visit. Recognize signs and symptoms of STROKE:    F-face looks uneven    A-arms unable to move or move unevenly    S-speech slurred or non-existent    T-time-call 911 as soon as signs and symptoms begin-DO NOT go       Back to bed or wait to see if you get better-TIME IS BRAIN. Warning Signs of HEART ATTACK     Call 911 if you have these symptoms:   Chest discomfort. Most heart attacks involve discomfort in the center of the chest that lasts more than a few minutes, or that goes away and comes back. It can feel like uncomfortable pressure, squeezing, fullness, or pain.  Discomfort in other areas of the upper body. Symptoms can include pain or discomfort in one or both arms, the back, neck, jaw, or stomach.  Shortness of breath with or without chest discomfort.  Other signs may include breaking out in a cold sweat, nausea, or lightheadedness. Don't wait more than five minutes to call 911 - MINUTES MATTER! Fast action can save your life. Calling 911 is almost always the fastest way to get lifesaving treatment. Emergency Medical Services staff can begin treatment when they arrive -- up to an hour sooner than if someone gets to the hospital by car. The discharge information has been reviewed with the patient. The patient verbalized understanding.   Discharge medications reviewed with the patient and appropriate educational materials and side effects teaching were provided.   ___________________________________________________________________________________________________________________________________

## 2019-04-23 NOTE — PROGRESS NOTES
2300: Patient appears to be sleeping respirations even and unlabored. No apparent distress. Will continue to monitor with q15 minute checks throughout the shift. Problem: Falls - Risk of  Goal: *Absence of Falls  Description  Document Catherene Bunting Fall Risk and appropriate interventions in the flowsheet.   Outcome: Progressing Towards Goal

## 2019-04-23 NOTE — BH NOTES
Behavioral Health Transition Record to Provider    Patient Name: Adrienne Villalpando  YOB: 1965  Medical Record Number: [id number]  Date of Admission: 4/18/2019  Date of Discharge: 4/23/2019    Attending Provider: Gillian Miles MD  Discharging Provider: Gillian Miles MD  To contact this individual call [phone] and ask the  to page. If unavailable, ask to be transferred to [facility] Provider on call. [facility] Provider will be available on call 24/7 and during holidays. Primary Care Provider: Ezequiel Gibson MD    Allergies   Allergen Reactions    Aspirin Nausea and Vomiting    Morphine Hives       Reason for Admission: The patient is a 50-year-old -American female who is known to me from prior hospitalizations at [facility]. She is currently admitted with a history of having developed auditory hallucinations over the past two days. Admission Diagnosis: Schizophrenia ([facility] [address] [F20.9]    * No surgery found *    Results for orders placed or performed during the hospital encounter of 04/18/19   URINE CULTURE HOLD SAMPLE   Result Value Ref Range    Urine culture hold        URINE ON HOLD IN MICROBIOLOGY DEPT FOR 3 DAYS. IF UNPRESERVED URINE IS SUBMITTED, IT CANNOT BE USED FOR ADDITIONAL TESTING AFTER 24 HRS, RECOLLECTION WILL BE REQUIRED. CBC WITH AUTOMATED DIFF   Result Value Ref Range    WBC 13.2 (H) 3.6 - 11.0 K/uL    RBC 4.19 3.80 - 5.20 M/uL    HGB 13.6 11.5 - 16.0 g/dL    HCT 41.9 35.0 - 47.0 %    .0 (H) 80.0 - 99.0 FL    MCH 32.5 26.0 - 34.0 PG    MCHC 32.5 30.0 - 36.5 g/dL    RDW 12.7 11.5 - 14.5 %    PLATELET 166 832 - 763 K/uL    MPV 10.1 8.9 - 12.9 FL    NRBC 0.0 0  WBC    ABSOLUTE NRBC 0.00 0.00 - 0.01 K/uL    NEUTROPHILS 63 32 - 75 %    LYMPHOCYTES 30 12 - 49 %    MONOCYTES 5 5 - 13 %    EOSINOPHILS 1 0 - 7 %    BASOPHILS 0 0 - 1 %    IMMATURE GRANULOCYTES 1 (H) 0.0 - 0.5 %    ABS. NEUTROPHILS 8.4 (H) 1.8 - 8.0 K/UL    ABS. LYMPHOCYTES 4.0 (H) 0.8 - 3.5 K/UL    ABS. MONOCYTES 0.7 0.0 - 1.0 K/UL    ABS. EOSINOPHILS 0.1 0.0 - 0.4 K/UL    ABS. BASOPHILS 0.0 0.0 - 0.1 K/UL    ABS. IMM. GRANS. 0.1 (H) 0.00 - 0.04 K/UL    DF AUTOMATED     METABOLIC PANEL, COMPREHENSIVE   Result Value Ref Range    Sodium 141 136 - 145 mmol/L    Potassium 3.8 3.5 - 5.1 mmol/L    Chloride 109 (H) 97 - 108 mmol/L    CO2 24 21 - 32 mmol/L    Anion gap 8 5 - 15 mmol/L    Glucose 142 (H) 65 - 100 mg/dL    BUN 5 (L) 6 - 20 MG/DL    Creatinine 0.83 0.55 - 1.02 MG/DL    BUN/Creatinine ratio 6 (L) 12 - 20      GFR est AA >60 >60 ml/min/1.73m2    GFR est non-AA >60 >60 ml/min/1.73m2    Calcium 10.4 (H) 8.5 - 10.1 MG/DL    Bilirubin, total 0.4 0.2 - 1.0 MG/DL    ALT (SGPT) 22 12 - 78 U/L    AST (SGOT) 15 15 - 37 U/L    Alk.  phosphatase 173 (H) 45 - 117 U/L    Protein, total 8.2 6.4 - 8.2 g/dL    Albumin 3.8 3.5 - 5.0 g/dL    Globulin 4.4 (H) 2.0 - 4.0 g/dL    A-G Ratio 0.9 (L) 1.1 - 2.2     URINALYSIS W/MICROSCOPIC   Result Value Ref Range    Color YELLOW/STRAW      Appearance CLOUDY (A) CLEAR      Specific gravity 1.009 1.003 - 1.030      pH (UA) 6.0 5.0 - 8.0      Protein NEGATIVE  NEG mg/dL    Glucose NEGATIVE  NEG mg/dL    Ketone NEGATIVE  NEG mg/dL    Bilirubin NEGATIVE  NEG      Blood NEGATIVE  NEG      Urobilinogen 1.0 0.2 - 1.0 EU/dL    Nitrites NEGATIVE  NEG      Leukocyte Esterase NEGATIVE  NEG      WBC 0-4 0 - 4 /hpf    RBC 0-5 0 - 5 /hpf    Epithelial cells MANY (A) FEW /lpf    Bacteria 2+ (A) NEG /hpf   DRUG SCREEN, URINE   Result Value Ref Range    AMPHETAMINES NEGATIVE  NEG      BARBITURATES NEGATIVE  NEG      BENZODIAZEPINES NEGATIVE  NEG      COCAINE NEGATIVE  NEG      METHADONE NEGATIVE  NEG      OPIATES NEGATIVE  NEG      PCP(PHENCYCLIDINE) NEGATIVE  NEG      THC (TH-CANNABINOL) NEGATIVE  NEG      Drug screen comment (NOTE)    SALICYLATE   Result Value Ref Range    Salicylate level 6.8 2.8 - 20.0 MG/DL   ACETAMINOPHEN   Result Value Ref Range Acetaminophen level <2 (L) 10 - 30 ug/mL   ETHYL ALCOHOL   Result Value Ref Range    ALCOHOL(ETHYL),SERUM <10 <10 MG/DL   GLUCOSE, FASTING   Result Value Ref Range    Glucose 136 (H) 65 - 100 MG/DL   TSH 3RD GENERATION   Result Value Ref Range    TSH 2.21 0.36 - 3.74 uIU/mL   LIPID PANEL   Result Value Ref Range    LIPID PROFILE          Cholesterol, total 257 (H) <200 MG/DL    Triglyceride 129 <150 MG/DL    HDL Cholesterol 35 MG/DL    LDL, calculated 196.2 (H) 0 - 100 MG/DL    VLDL, calculated 25.8 MG/DL    CHOL/HDL Ratio 7.3 (H) 0.0 - 5.0     GLUCOSE, POC   Result Value Ref Range    Glucose (POC) 144 (H) 65 - 100 mg/dL    Performed by Cole Jones        Immunizations administered during this encounter: There is no immunization history on file for this patient. Screening for Metabolic Disorders for Patients on Antipsychotic Medications  (Data obtained from the EMR)    Estimated Body Mass Index  Estimated body mass index is 31.03 kg/m² as calculated from the following:    Height as of this encounter: 5' (1.524 m). Weight as of this encounter: 72.1 kg (158 lb 14.4 oz).      Vital Signs/Blood Pressure  Visit Vitals  /87   Pulse 93   Temp 98.2 °F (36.8 °C)   Resp 16   Ht 5' (1.524 m)   Wt 72.1 kg (158 lb 14.4 oz)   SpO2 94%   BMI 31.03 kg/m²       Blood Glucose/Hemoglobin A1c  Lab Results   Component Value Date/Time    Glucose 136 (H) 04/19/2019 06:40 AM    Glucose (POC) 144 (H) 04/19/2019 07:38 AM       Lab Results   Component Value Date/Time    Hemoglobin A1c 5.7 07/08/2017 09:10 AM        Lipid Panel  Lab Results   Component Value Date/Time    Cholesterol, total 257 (H) 04/19/2019 06:40 AM    HDL Cholesterol 35 04/19/2019 06:40 AM    LDL, calculated 196.2 (H) 04/19/2019 06:40 AM    Triglyceride 129 04/19/2019 06:40 AM    CHOL/HDL Ratio 7.3 (H) 04/19/2019 06:40 AM        Discharge Diagnosis: Schizophrenia (ICD-10-CM: F20.9)    Discharge Plan: Patient discharged home into the care of her case manager. DISCHARGE SUMMARY    Geno Garcia  : 1965  MRN: [id number]    The patient [address] exhibits the ability to control behavior in a less restrictive environment. Patient's level of functioning is improving. No assaultive/destructive behavior has been observed for the past 24 hours. No suicidal/homicidal threat or behavior has been observed for the past 24 hours. There is no evidence of serious medication side effects. Patient has not been in physical or protective restraints for at least the past 24 hours. If weapons involved, how are they secured? No weapons involved. Is patient aware of and in agreement with discharge plan? Yes    Arrangements for medication:  Prescriptions given to patient. Copy of discharge instructions to provider?:  Dr. Andie Fleming for transportation home:   to . Keep all follow up appointments as scheduled, continue to take prescribed medications per physician instructions. Mental health crisis number:  509 or your local mental health crisis line number at [phone]. Discharge Medication List and Instructions:   Discharge Medication List as of 2019 12:35 PM      CONTINUE these medications which have CHANGED    Details   lamoTRIgine (LAMICTAL) 100 mg tablet Take 0.5 Tabs by mouth two (2) times a day. Indications: Bipolar Depression, Print, Disp-60 Tab, R-0         CONTINUE these medications which have NOT CHANGED    Details   benztropine (COGENTIN) 1 mg tablet Take 1 mg by mouth two (2) times a day., Historical Med      sAXagliptin (ONGLYZA) 2.5 mg tablet Take 2.5 mg by mouth daily. , Historical Med      haloperidol (HALDOL) 5 mg tablet Take 1 Tab by mouth nightly. Indications: psychotic disorder, Print, Disp-15 Tab, R-1      sertraline (ZOLOFT) 50 mg tablet Take 1 Tab by mouth daily.  Indications: major depressive disorder, Print, Disp-15 Tab, R-1      traZODone (DESYREL) 50 mg tablet Take 1 Tab by mouth nightly. Indications: major depressive disorder, Print, Disp-15 Tab, R-0      cetirizine (ZYRTEC) 10 mg tablet Take 10 mg by mouth daily. , Historical Med      fluticasone (FLONASE) 50 mcg/actuation nasal spray 1 [place] by Both Nostrils route daily. , Historical Med      traMADol (ULTRAM) 50 mg tablet Take 50 mg by mouth every eight (8) hours as needed for Pain., Historical Med      MULTIVIT-MINS NO.7/FOLIC ACID (V-C FORTE PO) Take 1 Cap by mouth daily. , Historical Med             Unresulted Labs (24h ago, onward)    None        To obtain results of studies pending at discharge, please contact [phone]    Follow-up Information     Follow up With Specialties Details Why Contact Info    Ezequiel Gibson MD Internal Medicine   [address] [place] [address]  [phone]      [facility]  On 6/11/2019 You have a 5:00pm appointment with the psychiatrist for medication management. [facility]  [address], [facility], [facility]  [phone]    [facility]  On 4/30/2019 You have a 4:00pm appointment for individual therapy. [facility]  [facility], [address] [place] [address]  [phone]          Advanced Directive:   Does the patient have an appointed surrogate decision maker? No  Does the patient have a Medical Advance Directive? No  Does the patient have a Psychiatric Advance Directive? No  If the patient does not have a surrogate or Medical Advance Directive AND Psychiatric Advance Directive, the patient was offered information on these advance directives Yes and Patient declined to complete    Patient Instructions: Please continue all medications until otherwise directed by physician. Tobacco Cessation Discharge Plan:   Is the patient a smoker and needs referral for smoking cessation? Yes  Patient referred to the following for smoking cessation with an appointment?  Refused     Patient was offered medication to assist with smoking cessation at discharge? Refused  Was education for smoking cessation added to the discharge instructions? Yes    Alcohol/Substance Abuse Discharge Plan:   Does the patient have a history of substance/alcohol abuse and requires a referral for treatment? No  Patient referred to the following for substance/alcohol abuse treatment with an appointment? Not applicable  Patient was offered medication to assist with alcohol cessation at discharge? Not applicable  Was education for substance/alcohol abuse added to discharge instructions? No    Patient discharged to Home; discussed with patient/caregiver and provided to the patient/caregiver either in hard copy or electronically.

## 2019-04-23 NOTE — PROGRESS NOTES
Problem: Falls - Risk of  Goal: *Absence of Falls  Description  Document Soraya Hilario Fall Risk and appropriate interventions in the flowsheet. Outcome: Progressing Towards Goal  Note:   Fall Risk Interventions:        Pt is free from falls. Medication Interventions: Evaluate medications/consider consulting pharmacy         History of Falls Interventions: Door open when patient unattended         Problem: Altered Thought Process (Adult/Pediatric)  Goal: *STG: Complies with medication therapy  Outcome: Progressing Towards Goal  Note:   Pt is med compliant. Goal: *STG: Decreased hallucinations  Outcome: Progressing Towards Goal  Note:   Pt reports she is not experiencing any hallucinations. Goal: Interventions  Outcome: Progressing Towards Goal  Note:   Staff will continue to monitor and encourage positive thoughts and coping skills.

## 2019-04-23 NOTE — INTERDISCIPLINARY ROUNDS
Behavioral Health Interdisciplinary Rounds     Patient Name: Dmitri Penn  Age: 49 y.o.   Room/Bed:  725/  Primary Diagnosis: <principal problem not specified>   Admission Status: Voluntary     Readmission within 30 days: no  Power of  in place: no  Patient requires a blocked bed: no          Reason for blocked bed:     VTE Prophylaxis: Not indicated    Mobility needs/Fall risk: no  Flu Vaccine : no   Nutritional Plan: no  Consults:          Labs/Testing due today?: no    Sleep hours:  8.25      Participation in Care/Groups:  yes  Medication Compliant?: Yes  PRNS (last 24 hours): Pain    Restraints (last 24 hours):  no     CIWA (range last 24 hours):     COWS (range last 24 hours):      Alcohol screening (AUDIT) completed -   AUDIT Score: 0     If applicable, date SBIRT discussed in treatment team AND documented:   AUDIT Screen Score: AUDIT Score: 0    Tobacco - patient is a smoker: Have You Used Tobacco in the Past 30 Days: Yes  Illegal Drugs use: Have You Used Any Illegal Substances Over the Past 12 Months: No    24 hour chart check complete: yes     Patient goal(s) for today: Discharge  Treatment team focus/goals: Schedule follow-up; discharge  Progress note: Patient is stable and ready for discharge    LOS:  5  Expected LOS: 5    Financial concerns/prescription coverage: [employer]  Date of last family contact: None     Family requesting physician contact today: No  Discharge plan: Return home  Guns in the home: No        Outpatient provider(s): Dr. Negrita Bunn    Participating treatment team members: Dmitri Penn, BARRETT Denise; Dr. Falguni Castaneda MD; Fazal Caballero RN; Nneka Guthrie, KathieD

## 2019-04-23 NOTE — BH NOTES
GROUP THERAPY PROGRESS NOTE    Wilver Wilson is participating in Positive thoughts. Group time: 45 minutes    Personal goal for participation:The purpose of the group today was to examine the cognitive behavioral therapy model in terms of how you can negatively process your situation or positively process situation. The focus was on changing negative thoughts to positive ones to shift your life perspective. Goal orientation: personal    Group therapy participation: minimal    Therapeutic interventions reviewed and discussed: Cognitive Behavior [place], Positive self talk, and affirmations. Impression of participation: [address] attended group. She was alert and oriented. Speech was clear and coherent. Patient sat quietly for group and listened. Another group member assisted her when reading was required as she reported she is visually impaired.

## 2019-04-23 NOTE — BH NOTES
GROUP THERAPY PROGRESS NOTE    Alia Vila participated in a Process Group on the General Unit with a focus identifying feelings, planning for the day, and learning more about DBT concepts of \"Elf Help\" and the importance of self-care. Group time: 75 minutes. Personal goal for participation: To identify feelings and increase the capacity to take care of oneself first as a primary coping skill. Goal orientation: The patient will be able to introduce themselves to their peers, identify their feelings, and consider the importance of taking time for ones self-care as an important part of life planning and coping skill development. Group therapy participation: With minimal prompting, this patient actively participated in the group. Therapeutic interventions reviewed and discussed: The group members were asked to begin by introducing themselves and sharing about their feelings. They were then asked to  take turns reading from an outline of twenty-two behavioral suggestions from DBT, called the [address] [place] [address], were reviewed with the group members and discussed as a group. The suggestions came with drawings of elves engaged in the activities described. These suggestions included: 1) trusting yourself (wise mind); 2) take a day off to retreat (observe and describe); 3) talk and play with children or adults (participate); 4) when you cant stop thinking, feel (non-judgmental stance); 5) stay in the present by recognizing when anxiety gets you caught up in a future or a past (one mindfully); 6) take time to think before just jumping in to solve a problem (efficiently);  7) when angry, let yourself feel the anger (the function of emotion); 8) make time for play (adult pleasant activities); 9) when sad, think about what would be comforting (accepting and engaging the opposite); 10) put yourself first (self-soothe); 11) when uncomfortable being alone, think about being with others and decide whether to make it happen (improve imagery); 12) take time to wonder (improve meaning); 13) carve out time to be lazy (improve relaxation); 14) notice the sun and the moon as they rise and set (improve one thing at a time); 15) nothing is usually the hardest thing to do - but often it is the best (guidelines for accepting reality); 16) when things are in chaos and one is in a frenzy, ask yourself Nav[place] is right about now?  (turning your mind); 17) learn to stand back and let others take their turn as leaders (radical acceptance); 18) when someone turns you down, it usually has to do with them and not you - ask someone else for what you need (using objective effectiveness); 19) when wanting to talk with someone new and are scared, breathe - dont rehearse, just plunge and if it doesnt go well, you can stop (using relationship effectiveness); 20) when you see someone elses hurt face, breathe - you are not responsible for making other people happy (no apologies); 21) when you want something from someone else, ask - asking is being true to yourself (be truthful); 22) when you are hurt, tell the person who hurt you (situations for interpersonal effectiveness). The group members were provided a summary sheet of the DBT information discussed, which they could also review on their own time. Impression of participation: The patient said she was feeling, \"Pretty good. ..go home today. ..looking forward to seeing my [[employer] [place] puppies. \" She was alert, generally oriented, and admitted that her overall mood had improved. She expressed no current SI/HI and displayed no overt psychotic symptoms. Her affect was not as depressed as it was on admission. Her mood was more casual and open than in previous process groups with the undersigned. She seemed to be focused on finalizing her discharge plans with the help of her treatment team, especially nursing.

## 2020-02-11 ENCOUNTER — HOSPITAL ENCOUNTER (EMERGENCY)
Age: 55
Discharge: HOME | End: 2020-02-11
Attending: EMERGENCY MEDICINE
Payer: MEDICAID

## 2020-02-11 VITALS
TEMPERATURE: 99.3 F | OXYGEN SATURATION: 94 % | RESPIRATION RATE: 16 BRPM | HEART RATE: 114 BPM | WEIGHT: 151 LBS | SYSTOLIC BLOOD PRESSURE: 121 MMHG | BODY MASS INDEX: 29.64 KG/M2 | HEIGHT: 60 IN | DIASTOLIC BLOOD PRESSURE: 97 MMHG

## 2020-02-11 DIAGNOSIS — E11.65 TYPE 2 DIABETES MELLITUS WITH HYPERGLYCEMIA, WITHOUT LONG-TERM CURRENT USE OF INSULIN (HCC): ICD-10-CM

## 2020-02-11 DIAGNOSIS — R73.9 HYPERGLYCEMIA: Primary | ICD-10-CM

## 2020-02-11 DIAGNOSIS — N76.0 BV (BACTERIAL VAGINOSIS): ICD-10-CM

## 2020-02-11 DIAGNOSIS — B96.89 BV (BACTERIAL VAGINOSIS): ICD-10-CM

## 2020-02-11 LAB
APPEARANCE UR: ABNORMAL
BACTERIA URNS QL MICRO: ABNORMAL /HPF
BILIRUB UR QL: NEGATIVE
CLUE CELLS VAG QL WET PREP: NORMAL
COLOR UR: ABNORMAL
EPITH CASTS URNS QL MICRO: ABNORMAL /LPF
GLUCOSE BLD STRIP.AUTO-MCNC: 309 MG/DL (ref 65–100)
GLUCOSE BLD STRIP.AUTO-MCNC: 385 MG/DL (ref 65–100)
GLUCOSE BLD STRIP.AUTO-MCNC: 452 MG/DL (ref 65–100)
GLUCOSE UR STRIP.AUTO-MCNC: >1000 MG/DL
HGB UR QL STRIP: NEGATIVE
KETONES UR QL STRIP.AUTO: NEGATIVE MG/DL
KOH PREP SPEC: NORMAL
LEUKOCYTE ESTERASE UR QL STRIP.AUTO: ABNORMAL
NITRITE UR QL STRIP.AUTO: NEGATIVE
PH UR STRIP: 6 [PH] (ref 5–8)
PROT UR STRIP-MCNC: NEGATIVE MG/DL
RBC #/AREA URNS HPF: ABNORMAL /HPF (ref 0–5)
SERVICE CMNT-IMP: ABNORMAL
SERVICE CMNT-IMP: NORMAL
SP GR UR REFRACTOMETRY: 1.01 (ref 1–1.03)
T VAGINALIS VAG QL WET PREP: NORMAL
UA: UC IF INDICATED,UAUC: ABNORMAL
UROBILINOGEN UR QL STRIP.AUTO: 1 EU/DL (ref 0.2–1)
WBC URNS QL MICRO: ABNORMAL /HPF (ref 0–4)

## 2020-02-11 PROCEDURE — 74011250637 HC RX REV CODE- 250/637: Performed by: PHYSICIAN ASSISTANT

## 2020-02-11 PROCEDURE — 74011000250 HC RX REV CODE- 250: Performed by: PHYSICIAN ASSISTANT

## 2020-02-11 PROCEDURE — 74011636637 HC RX REV CODE- 636/637: Performed by: PHYSICIAN ASSISTANT

## 2020-02-11 PROCEDURE — 81001 URINALYSIS AUTO W/SCOPE: CPT

## 2020-02-11 PROCEDURE — 87210 SMEAR WET MOUNT SALINE/INK: CPT

## 2020-02-11 PROCEDURE — 96374 THER/PROPH/DIAG INJ IV PUSH: CPT

## 2020-02-11 PROCEDURE — 82962 GLUCOSE BLOOD TEST: CPT

## 2020-02-11 PROCEDURE — 87491 CHLMYD TRACH DNA AMP PROBE: CPT

## 2020-02-11 PROCEDURE — 87147 CULTURE TYPE IMMUNOLOGIC: CPT

## 2020-02-11 PROCEDURE — 74011250636 HC RX REV CODE- 250/636: Performed by: PHYSICIAN ASSISTANT

## 2020-02-11 PROCEDURE — 99284 EMERGENCY DEPT VISIT MOD MDM: CPT

## 2020-02-11 PROCEDURE — 87086 URINE CULTURE/COLONY COUNT: CPT

## 2020-02-11 PROCEDURE — 96372 THER/PROPH/DIAG INJ SC/IM: CPT

## 2020-02-11 RX ORDER — METRONIDAZOLE 500 MG/1
500 TABLET ORAL 2 TIMES DAILY
Qty: 14 TAB | Refills: 0 | Status: SHIPPED | OUTPATIENT
Start: 2020-02-11 | End: 2020-02-18

## 2020-02-11 RX ORDER — INSULIN PUMP SYRINGE, 3 ML
EACH MISCELLANEOUS
Qty: 1 KIT | Refills: 0 | Status: SHIPPED | OUTPATIENT
Start: 2020-02-11 | End: 2020-02-20 | Stop reason: SDUPTHER

## 2020-02-11 RX ORDER — AZITHROMYCIN 500 MG/1
1000 TABLET, FILM COATED ORAL
Status: COMPLETED | OUTPATIENT
Start: 2020-02-11 | End: 2020-02-11

## 2020-02-11 RX ADMIN — SODIUM CHLORIDE 1000 ML: 900 INJECTION, SOLUTION INTRAVENOUS at 20:45

## 2020-02-11 RX ADMIN — HUMAN INSULIN 10 UNITS: 100 INJECTION, SOLUTION SUBCUTANEOUS at 20:45

## 2020-02-11 RX ADMIN — LIDOCAINE HYDROCHLORIDE 250 MG: 10 INJECTION, SOLUTION EPIDURAL; INFILTRATION; INTRACAUDAL; PERINEURAL at 20:03

## 2020-02-11 RX ADMIN — AZITHROMYCIN MONOHYDRATE 1000 MG: 500 TABLET ORAL at 20:03

## 2020-02-12 NOTE — ED PROVIDER NOTES
EMERGENCY DEPARTMENT HISTORY AND PHYSICAL EXAM      Date: 2/11/2020  Patient Name: Cricket Diaz    Please note that this dictation was completed with Zscaler, the computer voice recognition software. Quite often unanticipated grammatical, syntax, homophones, and other interpretive errors are inadvertently transcribed by the computer software. Please disregard these errors. Please excuse any errors that have escaped final proofreading. History of Presenting Illness     Chief Complaint   Patient presents with    Vaginal Discharge       History Provided By: Patient    HPI: Cricket Diaz, 48 y.o. female with PMHx significant for tobacco abuse, diabetes, hypertension, psychiatric disorder, presents ambulatory to the ED with cc of mucousy, white vaginal discharge for the last 2 days. Patient reports that she has had similar symptoms that UTI in the past.  She reports that she had sex with her  but she is concerned that it he is being unfaithful and having sex with other people. She is concerned for STIs tonight. She denies any itching dysuria, abdominal pain, pelvic pain, diarrhea, fever, chills nausea, vomiting. PCP: Carlitos Trinidad MD    There are no other complaints, changes, or physical findings at this time. Current Outpatient Medications   Medication Sig Dispense Refill    Blood-Glucose Meter monitoring kit Check sugar in the AM and PM. 1 Kit 0    metroNIDAZOLE (FLAGYL) 500 mg tablet Take 1 Tab by mouth two (2) times a day for 7 days. 14 Tab 0    lamoTRIgine (LAMICTAL) 100 mg tablet Take 0.5 Tabs by mouth two (2) times a day. Indications: Bipolar Depression 60 Tab 0    benztropine (COGENTIN) 1 mg tablet Take 1 mg by mouth two (2) times a day.  sAXagliptin (ONGLYZA) 2.5 mg tablet Take 2.5 mg by mouth daily.  haloperidol (HALDOL) 5 mg tablet Take 1 Tab by mouth nightly.  Indications: psychotic disorder 15 Tab 1    sertraline (ZOLOFT) 50 mg tablet Take 1 Tab by mouth daily. Indications: major depressive disorder 15 Tab 1    traZODone (DESYREL) 50 mg tablet Take 1 Tab by mouth nightly. Indications: major depressive disorder 15 Tab 0    cetirizine (ZYRTEC) 10 mg tablet Take 10 mg by mouth daily.  fluticasone (FLONASE) 50 mcg/actuation nasal spray 1 [place] by Both Nostrils route daily.  traMADol (ULTRAM) 50 mg tablet Take 50 mg by mouth every eight (8) hours as needed for Pain.  MULTIVIT-MINS NO.7/FOLIC ACID (V-C FORTE PO) Take 1 Cap by mouth daily. Past History     Past Medical History:  Past Medical History:   Diagnosis Date    Diabetes ([address]     Hypertension     Psychiatric disorder        Past Surgical History:  Past Surgical History:   Procedure Laterality Date    HX GYN       x2    HX OTHER SURGICAL  1988    skin grafting    HX OTHER SURGICAL  2016    oral surgery       Family History:  History reviewed. No pertinent family history. Social History:  Social History     Tobacco Use    Smoking status: Current Some Day Smoker    Smokeless tobacco: Never Used   Substance Use Topics    Alcohol use: No    Drug use: No       Allergies: Allergies   Allergen Reactions    Aspirin Nausea and Vomiting    Morphine Hives         Review of Systems   Review of Systems   Constitutional: Negative. Negative for chills and fever. Cardiovascular: Negative for chest pain and palpitations. Gastrointestinal: Negative for abdominal pain, nausea and vomiting. Genitourinary: Positive for vaginal discharge. Negative for dysuria, hematuria, pelvic pain, vaginal bleeding and vaginal pain. Musculoskeletal: Negative for arthralgias and myalgias. Skin: Negative for color change, rash and wound. Neurological: Negative for numbness and headaches. All other systems reviewed and are negative. Physical Exam   Physical Exam  Vitals signs and nursing note reviewed. Constitutional:       General: She is not in acute distress.      Appearance: She is well-developed. She is not diaphoretic. Comments: 48 y.o. female in NAD  Communicates appropriately and in full sentences   HENT:      Head: Normocephalic and atraumatic. Eyes:      General:         Right eye: No discharge. Left eye: No discharge. Conjunctiva/sclera: Conjunctivae normal.      Pupils: Pupils are equal, round, and reactive to light. Neck:      Musculoskeletal: Normal range of motion and neck supple. Comments: No nuchal rigidity  Cardiovascular:      Rate and Rhythm: Normal rate and regular rhythm. Pulses: Normal pulses. Heart sounds: Normal heart sounds. Pulmonary:      Effort: Pulmonary effort is normal. No respiratory distress. Breath sounds: Normal breath sounds. No wheezing. Abdominal:      General: There is no distension. Palpations: Abdomen is soft. Tenderness: There is no abdominal tenderness. There is no right CVA tenderness or left CVA tenderness. Musculoskeletal: Normal range of motion. General: No tenderness or deformity. Comments: No neurologic or vascular compromise on exam.    Skin:     General: Skin is warm and dry. Coloration: Skin is not pale. Findings: No erythema or rash. Neurological:      Mental Status: She is alert and oriented to person, place, and time.       Coordination: Coordination normal.           Diagnostic Study Results     Labs -     Recent Results (from the past 12 hour(s))   MASON, OTHER SOURCES    Collection Time: 02/11/20  7:14 PM   Result Value Ref Range    Special Requests: NO SPECIAL REQUESTS      KOH NO YEAST SEEN     WET PREP    Collection Time: 02/11/20  7:14 PM   Result Value Ref Range    Clue cells CLUE CELLS PRESENT      Wet prep NO TRICHOMONAS SEEN     URINALYSIS W/ REFLEX CULTURE    Collection Time: 02/11/20  7:14 PM   Result Value Ref Range    Color YELLOW/STRAW      Appearance CLOUDY (A) CLEAR      Specific gravity 1.010 1.003 - 1.030      pH (UA) 6.0 5.0 - 8.0 Protein NEGATIVE  NEG mg/dL    Glucose >1,000 (A) NEG mg/dL    Ketone NEGATIVE  NEG mg/dL    Bilirubin NEGATIVE  NEG      Blood NEGATIVE  NEG      Urobilinogen 1.0 0.2 - 1.0 EU/dL    Nitrites NEGATIVE  NEG      Leukocyte Esterase MODERATE (A) NEG      WBC 20-50 0 - 4 /hpf    RBC 0-5 0 - 5 /hpf    Epithelial cells FEW FEW /lpf    Bacteria 1+ (A) NEG /hpf    UA:UC IF INDICATED URINE CULTURE ORDERED (A) CNI     GLUCOSE, POC    Collection Time: 02/11/20  7:56 PM   Result Value Ref Range    Glucose (POC) 452 (H) 65 - 100 mg/dL    Performed by Watsinbrian Silveira, POC    Collection Time: 02/11/20  8:42 PM   Result Value Ref Range    Glucose (POC) 385 (H) 65 - 100 mg/dL    Performed by Radha Silveira, POC    Collection Time: 02/11/20  9:44 PM   Result Value Ref Range    Glucose (POC) 309 (H) 65 - 100 mg/dL    Performed by Sandrine Allen        Radiologic Studies -   No orders to display     CT Results  (Last 48 hours)    None        CXR Results  (Last 48 hours)    None            Medical Decision Making   I am the first provider for this patient. I reviewed the vital signs, available nursing notes, past medical history, past surgical history, family history and social history. Vital Signs-Reviewed the patient's vital signs. Patient Vitals for the past 12 hrs:   Temp Pulse Resp BP SpO2   02/11/20 1841 99.3 °F (37.4 °C) (!) 114 16 (!) 121/97 94 %         Records Reviewed: Nursing Notes and Old Medical Records    Provider Notes (Medical Decision Making):   DDx: vulvovaginal vs. vaginal candidiasis, bacterial vaginosis, trichomoniasis, gonorrhea, chlamydia, pelvic inflammatory disease, urinary tract infection, cystitis, pyelonephritis. Very low suspicion TOA given clinical history, benign abdominal exam, and reassuring vital signs. Will obtain UA, hCG, and obtain KOH, GC/CT, wet prep. Will offer patient empiric STI treatment and she accepts. UA reveals the patient has glucosuria.   Point-of-care glucose is in the 400s. IV established. Patient given 10 units of insulin as well as IVF. She remained asymptomatic while she was here. Patient counseled at length that she needs to follow-up with her primary care doctor. Patient was also written for another glucometer that she can use in the morning and at night to monitor her blood glucose levels. ED Course:   Initial assessment performed. The patients presenting problems have been discussed, and they are in agreement with the care plan formulated and outlined with them. I have encouraged them to ask questions as they arise throughout their visit. ED Course as of Feb 11 2151   Tue Feb 11, 2020 1956 Patient is greater than 1000 glucose in her urine. Point-of-care glucose ordered. [GC]   2004 Reviewed point-of-care glucose results with patient. She reports that she has been taking her Onglyza regularly as prescribed. She reports that she has not checked her sugar at home and months because her glucometer broke. [GC]      ED Course User Index  [GC] Winnie Cooper., [place]          DISCHARGE NOTE:  Son Brasher's  results have been reviewed with her. She has been counseled regarding her diagnosis. She verbally conveys understanding and agreement of the signs, symptoms, diagnosis, treatment and prognosis and additionally agrees to follow up as recommended with Dr. Nasrin Beltran MD in 24 - 48 hours. She also agrees with the care-plan and conveys that all of her questions have been answered. I have also put together some discharge instructions for her that include: 1) educational information regarding their diagnosis, 2) how to care for their diagnosis at home, as well a 3) list of reasons why they would want to return to the ED prior to their follow-up appointment, should their condition change. She and/or family's questions have been answered.  I have encouraged them to see the official results in [facility]\" or to retrieve the specifics of their results from medical records. PLAN:  1. Return precautions as discussed  2. Follow-up with providers as directed  3. Medications as prescribed    Return to ED if worse     Diagnosis     Clinical Impression:   1. Hyperglycemia    2. BV (bacterial vaginosis)    3. Type 2 diabetes mellitus with hyperglycemia, without long-term current use of insulin ([facility]        Current Discharge Medication List      START taking these medications    Details   Blood-Glucose Meter monitoring kit Check sugar in the AM and PM.  Qty: 1 Kit, Refills: 0      metroNIDAZOLE (FLAGYL) 500 mg tablet Take 1 Tab by mouth two (2) times a day for 7 days. Qty: 14 Tab, Refills: 0             Follow-up Information     Follow up With Specialties Details Why Contact Info    Radha Olson MD Internal Medicine Call today To schedule an appointment as soon as possible., Possible further evaluation and treatment [address] [place] [address]  [phone]                This note will not be viewable in [address]

## 2020-02-12 NOTE — ED NOTES
Pt reports vaginal discharge with urinary frequency x 2 days. She has not been checking her BG because her machine is broken. Warm blanket offered, call bell within reach, safety precautions in place, bed locked and in the lowest position. Emergency Department Nursing Plan of Care       The Nursing Plan of Care is developed from the Nursing assessment and Emergency Department Attending provider initial evaluation. The plan of care may be reviewed in the ED Provider note.     The Plan of Care was developed with the following considerations:   Patient / Family readiness to learn indicated by:verbalized understanding  Persons(s) to be included in education: patient  Barriers to Learning/Limitations:No    Signed     Elana Mcadams RN    2/11/2020   9:54 PM

## 2020-02-12 NOTE — DISCHARGE INSTRUCTIONS
Patient Education        Bacterial Vaginosis: Care Instructions  Your Care Instructions    Bacterial vaginosis is a type of vaginal infection. It is caused by excess growth of certain bacteria that are normally found in the vagina. Symptoms can include itching, swelling, pain when you urinate or have sex, and a gray or yellow discharge with a \"fishy\" odor. It is not considered an infection that is spread through sexual contact. Although symptoms can be annoying and uncomfortable, bacterial vaginosis does not usually cause other health problems. However, if you have it while you are pregnant, it can cause complications. While the infection may go away on its own, most doctors use antibiotics to treat it. You may have been prescribed pills or vaginal cream. With treatment, bacterial vaginosis usually clears up in 5 to 7 days. Follow-up care is a key part of your treatment and safety. Be sure to make and go to all appointments, and call your doctor if you are having problems. It's also a good idea to know your test results and keep a list of the medicines you take. How can you care for yourself at home? · Take your antibiotics as directed. Do not stop taking them just because you feel better. You need to take the full course of antibiotics. · Do not eat or drink anything that contains alcohol if you are taking metronidazole (Flagyl). · Keep using your medicine if you start your period. Use pads instead of tampons while using a vaginal cream or suppository. Tampons can absorb the medicine. · Wear loose cotton clothing. Do not wear nylon and other materials that hold body heat and moisture close to the skin. · Do not scratch. Relieve itching with a cold pack or a cool bath. · Do not wash your vaginal area more than once a day. Use plain water or a mild, unscented soap. Do not douche. When should you call for help?   Watch closely for changes in your health, and be sure to contact your doctor if:    · You have unexpected vaginal bleeding.     · You have a fever.     · You have new or increased pain in your vagina or pelvis.     · You are not getting better after 1 week.     · Your symptoms return after you finish the course of your medicine. Where can you learn more? Go to http://kirill-ivonne.info/. Amelia Wynne in the search box to learn more about \"Bacterial Vaginosis: Care Instructions. \"  Current as of: February 19, 2019  Content Version: 12.2  © 9618-6528 [employer]. Care instructions adapted under license by [employer] (which disclaims liability or warranty for this information). If you have questions about a medical condition or this instruction, always ask your healthcare professional. [address] any warranty or liability for your use of this information. Patient Education        Learning About High Blood Sugar  What is high blood sugar? Your body turns the food you eat into glucose (sugar), which it uses for energy. But if your body isn't able to use the sugar right away, it can build up in your blood and lead to high blood sugar. When the amount of sugar in your blood stays too high for too much of the time, you may have diabetes. Diabetes is a disease that can cause serious health problems. The good news is that lifestyle changes may help you get your blood sugar back to normal and avoid or delay diabetes. What causes high blood sugar? Sugar (glucose) can build up in your blood if you:  · Are overweight. · Have a family history of diabetes. · Take certain medicines, such as steroids. What are the symptoms? Having high blood sugar may not cause any symptoms at all. Or it may make you feel very thirsty or very hungry. You may also urinate more often than usual, have blurry vision, or lose weight without trying. How is high blood sugar treated?   You can take steps to lower your blood sugar level if you understand what makes it get higher. Your doctor may want you to learn how to test your blood sugar level at home. Then you can see how illness, stress, or different kinds of food or medicine raise or lower your blood sugar level. Other tests may be needed to see if you have diabetes. How can you prevent high blood sugar? · Watch your weight. If you're overweight, losing just a small amount of weight may help. Reducing fat around your waist is most important. · Limit the amount of calories, sweets, and unhealthy fat you eat. Ask your doctor if a dietitian can help you. A registered dietitian can help you create meal plans that fit your lifestyle. · Get at least 30 minutes of exercise on most days of the week. Exercise helps control your blood sugar. It also helps you maintain a healthy weight. Walking is a good choice. You also may want to do other activities, such as running, swimming, cycling, or playing tennis or team sports. · If your doctor prescribed medicines, take them exactly as prescribed. Call your doctor if you think you are having a problem with your medicine. You will get more details on the specific medicines your doctor prescribes. Follow-up care is a key part of your treatment and safety. Be sure to make and go to all appointments, and call your doctor if you are having problems. It's also a good idea to know your test results and keep a list of the medicines you take. Where can you learn more? Go to http://kirill-ivonne.info/. Enter O108 in the search box to learn more about \"Learning About High Blood Sugar. \"  Current as of: April 16, 2019  Content Version: 12.2  © 9952-7014 [employer]. Care instructions adapted under license by [employer] (which disclaims liability or warranty for this information).  If you have questions about a medical condition or this instruction, always ask your healthcare professional. Byrd Mcardle disclaims any warranty or liability for your use of this information.

## 2020-02-13 LAB
BACTERIA SPEC CULT: ABNORMAL
CC UR VC: ABNORMAL
SERVICE CMNT-IMP: ABNORMAL

## 2020-02-14 RX ORDER — AMOXICILLIN 875 MG/1
875 TABLET, FILM COATED ORAL 2 TIMES DAILY
Qty: 14 TAB | Refills: 0 | Status: SHIPPED | OUTPATIENT
Start: 2020-02-14 | End: 2020-02-20 | Stop reason: ALTCHOICE

## 2020-02-14 NOTE — PROGRESS NOTES
Spoke with pt.rx amoxicillin.  She would like to try increasing water intake and if no improvement she will get the rx

## 2020-02-20 ENCOUNTER — OFFICE VISIT (OUTPATIENT)
Dept: INTERNAL MEDICINE CLINIC | Age: 55
End: 2020-02-20

## 2020-02-20 VITALS
RESPIRATION RATE: 17 BRPM | OXYGEN SATURATION: 98 % | SYSTOLIC BLOOD PRESSURE: 115 MMHG | TEMPERATURE: 98 F | BODY MASS INDEX: 28.66 KG/M2 | DIASTOLIC BLOOD PRESSURE: 82 MMHG | HEART RATE: 86 BPM | HEIGHT: 60 IN | WEIGHT: 146 LBS

## 2020-02-20 DIAGNOSIS — E55.9 VITAMIN D DEFICIENCY: ICD-10-CM

## 2020-02-20 DIAGNOSIS — R10.33 PERIUMBILICAL PAIN: ICD-10-CM

## 2020-02-20 DIAGNOSIS — G43.009 MIGRAINE WITHOUT AURA AND WITHOUT STATUS MIGRAINOSUS, NOT INTRACTABLE: ICD-10-CM

## 2020-02-20 DIAGNOSIS — R10.11 RUQ PAIN: ICD-10-CM

## 2020-02-20 DIAGNOSIS — E11.65 TYPE 2 DIABETES MELLITUS WITH HYPERGLYCEMIA, WITHOUT LONG-TERM CURRENT USE OF INSULIN (HCC): Primary | ICD-10-CM

## 2020-02-20 DIAGNOSIS — I10 ESSENTIAL HYPERTENSION: ICD-10-CM

## 2020-02-20 DIAGNOSIS — F31.60 BIPOLAR AFFECTIVE DISORDER, CURRENT EPISODE MIXED, CURRENT EPISODE SEVERITY UNSPECIFIED (HCC): ICD-10-CM

## 2020-02-20 DIAGNOSIS — M25.512 CHRONIC LEFT SHOULDER PAIN: ICD-10-CM

## 2020-02-20 DIAGNOSIS — Z12.31 SCREENING MAMMOGRAM, ENCOUNTER FOR: ICD-10-CM

## 2020-02-20 DIAGNOSIS — G89.29 CHRONIC LEFT SHOULDER PAIN: ICD-10-CM

## 2020-02-20 LAB
ALBUMIN UR QL STRIP: 80 MG/L
CREATININE, URINE POC: 300 MG/DL
GLUCOSE POC: 243 MG/DL
HBA1C MFR BLD HPLC: 10.1 %
MICROALBUMIN/CREAT RATIO POC: NORMAL MG/G

## 2020-02-20 RX ORDER — GLIMEPIRIDE 2 MG/1
2 TABLET ORAL
Qty: 90 TAB | Refills: 0 | Status: SHIPPED | OUTPATIENT
Start: 2020-02-20 | End: 2020-02-20 | Stop reason: SDUPTHER

## 2020-02-20 RX ORDER — INSULIN PUMP SYRINGE, 3 ML
EACH MISCELLANEOUS
Qty: 1 KIT | Refills: 0 | Status: SHIPPED | OUTPATIENT
Start: 2020-02-20 | End: 2020-03-06 | Stop reason: SDUPTHER

## 2020-02-20 RX ORDER — GLIMEPIRIDE 2 MG/1
2 TABLET ORAL
Qty: 90 TAB | Refills: 0 | Status: SHIPPED | OUTPATIENT
Start: 2020-02-20 | End: 2020-05-14

## 2020-02-20 RX ORDER — LANCETS
EACH MISCELLANEOUS
Qty: 1 EACH | Refills: 11 | Status: SHIPPED | OUTPATIENT
Start: 2020-02-20 | End: 2020-02-20 | Stop reason: SDUPTHER

## 2020-02-20 RX ORDER — LANCETS
EACH MISCELLANEOUS
Qty: 1 EACH | Refills: 11 | Status: SHIPPED | OUTPATIENT
Start: 2020-02-20 | End: 2020-10-29 | Stop reason: SDUPTHER

## 2020-02-20 RX ORDER — TRAMADOL HYDROCHLORIDE 50 MG/1
50 TABLET ORAL
Qty: 30 TAB | Refills: 0 | Status: SHIPPED | OUTPATIENT
Start: 2020-02-20 | End: 2020-03-19 | Stop reason: ALTCHOICE

## 2020-02-20 RX ORDER — OMEPRAZOLE 40 MG/1
40 CAPSULE, DELAYED RELEASE ORAL DAILY
Qty: 14 CAP | Refills: 0 | Status: SHIPPED | OUTPATIENT
Start: 2020-02-20 | End: 2020-03-05

## 2020-02-20 RX ORDER — INSULIN PUMP SYRINGE, 3 ML
EACH MISCELLANEOUS
Qty: 1 KIT | Refills: 0 | Status: SHIPPED | OUTPATIENT
Start: 2020-02-20 | End: 2020-02-20 | Stop reason: SDUPTHER

## 2020-02-20 RX ORDER — SUMATRIPTAN 100 MG/1
100 TABLET, FILM COATED ORAL
Qty: 9 TAB | Refills: 1 | Status: SHIPPED | OUTPATIENT
Start: 2020-02-20 | End: 2020-02-20 | Stop reason: SDUPTHER

## 2020-02-20 RX ORDER — SUMATRIPTAN 100 MG/1
100 TABLET, FILM COATED ORAL
Qty: 9 TAB | Refills: 1 | Status: SHIPPED | OUTPATIENT
Start: 2020-02-20 | End: 2020-03-23

## 2020-02-20 NOTE — PROGRESS NOTES
Subjective: (As above and below)     Chief Complaint   Patient presents with   [address] [facility] ED Follow-up     bacterial vaginosis    Weight Loss     poor appetite     Parul Damon is a 48y.o. year old female who presents to [facility]      Diabetic Review of Systems - medication compliance: compliant all of the time, diabetic diet compliance: noncompliant some of the time, home glucose monitoring: is not performed. Hypertension ROS:  taking medications as instructed, no medication side effects noted, no TIAs, no chest pain on exertion, no dyspnea on exertion, no swelling of ankles    Shoulder pain: chronic, L sided has been on tramadol for this - unkown when last imaging done. No known inciting injury/ pain w/ abduction    Migraines; reports being on tramdol for this as well, has not tried triptans. Reports unilateral , throbbing. No changes in loc, blurred vision, vomiting. Gets ha a few x per week. Unknown triggers    Colon: done 2016- [facility] and egd  Mammo: due      Stomach pain: x weeks, umbilical and RUQ. Nausea but no vomiting. +epiastric burning. tums help. Unknown if related to certain foods. She feels full quickly  No diarrhea, constipation. .    Wt Readings from Last 3 Encounters:   02/20/20 146 lb (66.2 kg)   02/11/20 151 lb (68.5 kg)   07/05/18 158 lb (71.7 kg)       Bipolar: reports stable on current meds      Reviewed PmHx, RxHx, FmHx, SocHx, AllgHx and updated in chart. History reviewed. No pertinent family history.     Past Medical History:   Diagnosis Date    Diabetes ([facility]     Hypertension     Psychiatric disorder       Social History     Socioeconomic History    Marital status: SINGLE     Spouse name: Not on file    Number of children: Not on file    Years of education: Not on file    Highest education level: Not on file   Tobacco Use    Smoking status: Current Some Day Smoker    Smokeless tobacco: Never Used   Substance and Sexual Activity    Alcohol use: No    Drug use: No          Current Outpatient Medications   Medication Sig    lamoTRIgine (LAMICTAL) 100 mg tablet Take 0.5 Tabs by mouth two (2) times a day. Indications: Bipolar Depression    benztropine (COGENTIN) 1 mg tablet Take 1 mg by mouth two (2) times a day.  sAXagliptin (ONGLYZA) 2.5 mg tablet Take 2.5 mg by mouth daily.  haloperidol (HALDOL) 5 mg tablet Take 1 Tab by mouth nightly. Indications: psychotic disorder    sertraline (ZOLOFT) 50 mg tablet Take 1 Tab by mouth daily. Indications: major depressive disorder    traZODone (DESYREL) 50 mg tablet Take 1 Tab by mouth nightly. Indications: major depressive disorder    amoxicillin (AMOXIL) 875 mg tablet Take 1 Tab by mouth two (2) times a day for 7 days.  Blood-Glucose Meter monitoring kit Check sugar in the AM and PM.    cetirizine (ZYRTEC) 10 mg tablet Take 10 mg by mouth daily.  fluticasone (FLONASE) 50 mcg/actuation nasal spray 1 [place] by Both Nostrils route daily.  traMADol (ULTRAM) 50 mg tablet Take 50 mg by mouth every eight (8) hours as needed for Pain.  MULTIVIT-MINS NO.7/FOLIC ACID (V-C FORTE PO) Take 1 Cap by mouth daily. No current facility-administered medications for this visit. Review of Systems:   Constitutional:    Negative for fever and chills, negative diaphoresis. HEENT:              Negative for neck pain and stiffness. Eyes:                  Negative for visual disturbance, itching, redness or discharge. Respiratory:        Negative for cough and shortness of breath. Cardiovascular:  Negative for chest pain and palpitations. Gastrointestinal: + nausea, abdominal pain  Genitourinary:     Negative for dysuria and frequency. Musculoskeletal: shoulder pain  Skin:                    Negative for rash, masses or lesions. Neurological:       Negative for dizzyness, seizure, loss of consciousness, weakness and numbness.      Objective:     Vitals:    02/20/20 1420   BP: 115/82   Pulse: 86   Resp: 17 Temp: 98 °F (36.7 °C)   TempSrc: Oral   SpO2: 98%   Weight: 146 lb (66.2 kg)   Height: 5' (1.524 m)       Results for orders placed or performed in visit on 02/20/20   AMB POC GLUCOSE BLOOD, BY GLUCOSE MONITORING DEVICE   Result Value Ref Range    Glucose  mg/dL   AMB POC HEMOGLOBIN A1C   Result Value Ref Range    Hemoglobin A1c (POC) 10.1 %   AMB POC URINE, MICROALBUMIN, SEMIQUANT (3 RESULTS)   Result Value Ref Range    ALBUMIN, URINE POC 80 Negative mg/L    CREATININE, URINE  mg/dL    Microalbumin/creat ratio (POC)  <30 MG/G         Physical Examination: General appearance - alert, well appearing, and in no distress  Chest - clear to auscultation, no wheezes, rales or rhonchi, symmetric air entry  Heart - normal rate, regular rhythm, normal S1, S2, no murmurs, rubs, clicks or gallops  Abdomen - soft,. + mild discomfort w/ palp to RUQ  Extremities - peripheral pulses normal, no pedal edema, no clubbing or cyanosis    Msk: ttp to anterior aspect of R shoulder, full abduction and neg drop arm test    Assessment/ Plan:       1. Type 2 diabetes mellitus with hyperglycemia, without long-term current use of insulin ([address]  Labs for kidney function, discussed diet. Will likely need 2nd agent  - AMB POC GLUCOSE BLOOD, BY GLUCOSE MONITORING DEVICE  - AMB POC HEMOGLOBIN A1C  - AMB POC URINE, MICROALBUMIN, SEMIQUANT (3 RESULTS)  - METABOLIC PANEL, COMPREHENSIVE  - CBC WITH AUTOMATED DIFF  - LIPID PANEL  - glimepiride (AMARYL) 2 mg tablet; Take 1 Tab by mouth every morning. With food  Dispense: 90 Tab; Refill: 0    2. Bipolar affective disorder, current episode mixed, current episode severity unspecified ([address]    - TSH 3RD GENERATION    3. Essential hypertension      4. Chronic left shoulder pain  Disc-ussed narcotics NOT for long term use, will refill but reduce to BID instead of her previous every 4-6 hours. - XR SHOULDER LT AP/LAT MIN 2 V; Future  - traMADol (ULTRAM) 50 mg tablet;  Take 1 Tab by mouth two (2) times daily as needed for Pain for up to 30 days. Max Daily Amount: 100 mg. Indications: neuropathic pain  Dispense: 30 Tab; Refill: 0  - [address] (MW)    5. Migraine without aura and without status migrainosus, not intractable  -imitrex trial    6. Periumbilical pain    - US ABD COMP; Future    7. RUQ pain  Ed if acute worsening  - US ABD COMP; Future    8. Vitamin D deficiency    - VITAMIN D, 25 HYDROXY; Future    9. Screening mammogram, encounter for    - [facility] MAMMO BI SCREENING INCL CAD; Future        I have discussed the diagnosis with the patient and the intended plan as seen in the above orders. The patient has received an after-visit summary and questions were answered concerning future plans. Pt conveyed understanding of plan. Medication Side Effects and Warnings were discussed with patient: yes  Patient Labs were reviewed: yes  Patient Past Records were reviewed:  yes    Kathryn Duke.  Avinash Mann NP

## 2020-02-20 NOTE — PROGRESS NOTES
Chief Complaint   Patient presents with   [facility] ED Follow-up     bacterial vaginosis    Weight Loss     poor appetite       1. Have you been to the ER, urgent care clinic since your last visit? Hospitalized since your last visit? Yes When: 02/11/2020 Where: [facility] Reason for visit: Vaginal Discharge     2. Have you seen or consulted any other health care providers outside of the [address] [place] [address] since your last visit? Include any pap smears or colon screening.  Yes When: 01/2020 Where: [facility]  Reason for visit: PCP      Pt is visually impaired

## 2020-02-20 NOTE — PATIENT INSTRUCTIONS

## 2020-02-26 LAB — DRUGS UR: NORMAL

## 2020-02-27 RX ORDER — TRAZODONE HYDROCHLORIDE 50 MG/1
50 TABLET ORAL
Qty: 15 TAB | Refills: 0 | OUTPATIENT
Start: 2020-02-27

## 2020-02-27 NOTE — TELEPHONE ENCOUNTER
----- Message from Avril Morgan sent at 2/26/2020  3:49 PM EST -----  Regarding: MAMIE Blackman/ REFILL  Contact: [phone]  Caller (if not patient): Hemanth Cuenca  Relationship of caller (if not patient): qmph  Best contact number(s): [phone]  Name of medication and dosage if known: \" trazadone 50mg\"  Is patient out of this medication (yes/no): yes  Pharmacy name: [address] listed in chart? (yes/no):yes  Pharmacy phone number: [phone]  Date of last visit: 2/20/2020  Details to clarify the request: mitzi

## 2020-03-05 ENCOUNTER — TELEPHONE (OUTPATIENT)
Dept: INTERNAL MEDICINE CLINIC | Age: 55
End: 2020-03-05

## 2020-03-05 NOTE — TELEPHONE ENCOUNTER
Momo Paz pt's mental Health supporter called stating  She called the ins co for this patient . The patient needs an order for a new glucometer for a visually impaired pt. She already has one touch but needs one for a visually impaired pt. Ms. Linda Albert # [phone] she states you can call her for clarification.   PT # [phone]

## 2020-03-06 RX ORDER — INSULIN PUMP SYRINGE, 3 ML
EACH MISCELLANEOUS
Qty: 1 KIT | Refills: 0 | Status: SHIPPED | OUTPATIENT
Start: 2020-03-06 | End: 2020-10-29 | Stop reason: SDUPTHER

## 2020-03-06 NOTE — TELEPHONE ENCOUNTER
03/06/2020 Left voicemail for Lucendia Frankel @[phone] letting her know that a new order for a visually impaired meter was sent to pt's pharmacy. Left call back number in case any further information is needed.

## 2020-03-06 NOTE — TELEPHONE ENCOUNTER
Spoke with patient  to try to get a [facility] that is closer to patient, and she couldn't til me, wanted me to locate one, has no ideal where to send meter, she will call back with a pharmacy

## 2020-03-12 ENCOUNTER — HOSPITAL ENCOUNTER (OUTPATIENT)
Dept: GENERAL RADIOLOGY | Age: 55
Discharge: HOME | End: 2020-03-12
Payer: MEDICAID

## 2020-03-12 DIAGNOSIS — G89.29 CHRONIC LEFT SHOULDER PAIN: ICD-10-CM

## 2020-03-12 DIAGNOSIS — M25.512 CHRONIC LEFT SHOULDER PAIN: ICD-10-CM

## 2020-03-12 PROCEDURE — 73030 X-RAY EXAM OF SHOULDER: CPT

## 2020-03-13 ENCOUNTER — TELEPHONE (OUTPATIENT)
Dept: INTERNAL MEDICINE CLINIC | Age: 55
End: 2020-03-13

## 2020-03-13 LAB
25(OH)D3+25(OH)D2 SERPL-MCNC: 9.6 NG/ML (ref 30–100)
ALBUMIN SERPL-MCNC: 4.3 G/DL (ref 3.8–4.9)
ALBUMIN/GLOB SERPL: 1.6 {RATIO} (ref 1.2–2.2)
ALP SERPL-CCNC: 170 IU/L (ref 39–117)
ALT SERPL-CCNC: 20 IU/L (ref 0–32)
AST SERPL-CCNC: 18 IU/L (ref 0–40)
BASOPHILS # BLD AUTO: 0 X10E3/UL (ref 0–0.2)
BASOPHILS NFR BLD AUTO: 0 %
BILIRUB SERPL-MCNC: <0.2 MG/DL (ref 0–1.2)
BUN SERPL-MCNC: 4 MG/DL (ref 6–24)
BUN/CREAT SERPL: 5 (ref 9–23)
CALCIUM SERPL-MCNC: 10.2 MG/DL (ref 8.7–10.2)
CHLORIDE SERPL-SCNC: 97 MMOL/L (ref 96–106)
CHOLEST SERPL-MCNC: 244 MG/DL (ref 100–199)
CO2 SERPL-SCNC: 23 MMOL/L (ref 20–29)
CREAT SERPL-MCNC: 0.8 MG/DL (ref 0.57–1)
EOSINOPHIL # BLD AUTO: 0.2 X10E3/UL (ref 0–0.4)
EOSINOPHIL NFR BLD AUTO: 1 %
ERYTHROCYTE [DISTWIDTH] IN BLOOD BY AUTOMATED COUNT: 13.1 % (ref 11.7–15.4)
GLOBULIN SER CALC-MCNC: 2.7 G/DL (ref 1.5–4.5)
GLUCOSE SERPL-MCNC: 421 MG/DL (ref 65–99)
HCT VFR BLD AUTO: 40.7 % (ref 34–46.6)
HDLC SERPL-MCNC: 37 MG/DL
HGB BLD-MCNC: 13.2 G/DL (ref 11.1–15.9)
IMM GRANULOCYTES # BLD AUTO: 0 X10E3/UL (ref 0–0.1)
IMM GRANULOCYTES NFR BLD AUTO: 0 %
INTERPRETATION, 910389: NORMAL
LDLC SERPL CALC-MCNC: 171 MG/DL (ref 0–99)
LYMPHOCYTES # BLD AUTO: 3.3 X10E3/UL (ref 0.7–3.1)
LYMPHOCYTES NFR BLD AUTO: 27 %
Lab: NORMAL
MCH RBC QN AUTO: 32.8 PG (ref 26.6–33)
MCHC RBC AUTO-ENTMCNC: 32.4 G/DL (ref 31.5–35.7)
MCV RBC AUTO: 101 FL (ref 79–97)
MONOCYTES # BLD AUTO: 0.7 X10E3/UL (ref 0.1–0.9)
MONOCYTES NFR BLD AUTO: 5 %
NEUTROPHILS # BLD AUTO: 8.2 X10E3/UL (ref 1.4–7)
NEUTROPHILS NFR BLD AUTO: 67 %
PLATELET # BLD AUTO: 276 X10E3/UL (ref 150–450)
POTASSIUM SERPL-SCNC: 4.9 MMOL/L (ref 3.5–5.2)
PROT SERPL-MCNC: 7 G/DL (ref 6–8.5)
RBC # BLD AUTO: 4.03 X10E6/UL (ref 3.77–5.28)
SODIUM SERPL-SCNC: 137 MMOL/L (ref 134–144)
TRIGL SERPL-MCNC: 181 MG/DL (ref 0–149)
TSH SERPL DL<=0.005 MIU/L-ACNC: 1.4 UIU/ML (ref 0.45–4.5)
VLDLC SERPL CALC-MCNC: 36 MG/DL (ref 5–40)
WBC # BLD AUTO: 12.3 X10E3/UL (ref 3.4–10.8)

## 2020-03-13 RX ORDER — ERGOCALCIFEROL 1.25 MG/1
50000 CAPSULE ORAL
Qty: 8 CAP | Refills: 0 | Status: SHIPPED | OUTPATIENT
Start: 2020-03-13 | End: 2020-05-02

## 2020-03-13 NOTE — TELEPHONE ENCOUNTER
----- Message from Carito Mayo. Naun Iglesias NP sent at 3/13/2020 12:52 PM EDT -----  Please let her know shoulder xray is normal... Check on shoulder symptoms and please ask if she ever had an MRI or seen ortho for this  Also, did we hear anything about her sight impaired glucometer? A  ----- Message -----  From: Rich, Rad Results In  Sent: 3/12/2020   1:57 PM EDT  To: Carito Mayo.  Naun Iglesias NP

## 2020-03-13 NOTE — TELEPHONE ENCOUNTER
03/13/2020 Informed pt that shoulder xray is normal. Pt states shoulder is sore and swollen. Pt also stated she has had an MRI done before but not recent. No ortho visit. Informed pt I am still looking for a company that has visually impaired glucometers.

## 2020-03-19 ENCOUNTER — OFFICE VISIT (OUTPATIENT)
Dept: INTERNAL MEDICINE CLINIC | Age: 55
End: 2020-03-19

## 2020-03-19 VITALS
BODY MASS INDEX: 28.07 KG/M2 | TEMPERATURE: 99.3 F | HEIGHT: 60 IN | HEART RATE: 98 BPM | RESPIRATION RATE: 17 BRPM | OXYGEN SATURATION: 96 % | SYSTOLIC BLOOD PRESSURE: 123 MMHG | WEIGHT: 143 LBS | DIASTOLIC BLOOD PRESSURE: 78 MMHG

## 2020-03-19 DIAGNOSIS — M19.012 PRIMARY OSTEOARTHRITIS OF LEFT SHOULDER: ICD-10-CM

## 2020-03-19 DIAGNOSIS — E11.65 TYPE 2 DIABETES MELLITUS WITH HYPERGLYCEMIA, WITHOUT LONG-TERM CURRENT USE OF INSULIN (HCC): Primary | ICD-10-CM

## 2020-03-19 LAB — GLUCOSE POC: NORMAL MG/DL

## 2020-03-19 RX ORDER — CETIRIZINE HCL 10 MG
10 TABLET ORAL DAILY
Qty: 90 TAB | Refills: 0 | Status: SHIPPED | OUTPATIENT
Start: 2020-03-19 | End: 2020-07-23

## 2020-03-19 RX ORDER — BUTALBITAL, ACETAMINOPHEN AND CAFFEINE 50; 325; 40 MG/1; MG/1; MG/1
1 TABLET ORAL
Qty: 20 TAB | Refills: 0 | Status: SHIPPED | OUTPATIENT
Start: 2020-03-19 | End: 2021-11-04 | Stop reason: ALTCHOICE

## 2020-03-19 RX ORDER — LIDOCAINE 50 MG/G
1 PATCH TOPICAL EVERY 12 HOURS
Qty: 30 EACH | Refills: 1 | Status: SHIPPED | OUTPATIENT
Start: 2020-03-19 | End: 2022-03-10

## 2020-03-19 RX ORDER — FLUTICASONE PROPIONATE 50 MCG
1 SPRAY, SUSPENSION (ML) NASAL DAILY
Qty: 1 BOTTLE | Refills: 0 | Status: SHIPPED | OUTPATIENT
Start: 2020-03-19 | End: 2020-07-23

## 2020-03-19 NOTE — PROGRESS NOTES
Chief Complaint   Patient presents with    Blood Pressure Check    Results     xray and labs        1. Have you been to the ER, urgent care clinic since your last visit? Hospitalized since your last visit? No    2. Have you seen or consulted any other health care providers outside of the [address] [place] [address] since your last visit? Include any pap smears or colon screening.  No

## 2020-03-19 NOTE — PROGRESS NOTES
Subjective: (As above and below)     Chief Complaint   Patient presents with    Blood Pressure Check    Results     xray and labs      Parul Damon is a 48y.o. year old female who presents for     Diabetic Review of Systems - medication compliance: compliant most of the time, diabetic diet compliance: noncompliant - drinks soda, home glucose monitoring: she is still not able to check sugars due to vision impairment- our office is trying to get vision impaired meter. Hypertension ROS:  taking medications as instructed, no medication side effects noted, no TIAs, no chest pain on exertion, no dyspnea on exertion, no swelling of ankles    L shoulder pain; continues. Pain w/ abduction. Xray normal. She is asking for tramadol        Reviewed PmHx, RxHx, FmHx, SocHx, AllgHx and updated in chart. Family History   Problem Relation Age of Onset    Cancer Maternal Aunt     Cancer Maternal Uncle        Past Medical History:   Diagnosis Date    Bipolar 1 disorder ([facility]     Diabetes ([facility]     Hypertension     Psychiatric disorder     [facility]       Social History     Socioeconomic History    Marital status: SINGLE     Spouse name: Not on file    Number of children: Not on file    Years of education: Not on file    Highest education level: Not on file   Tobacco Use    Smoking status: Current Some Day Smoker    Smokeless tobacco: Never Used    Tobacco comment: 2-3 per day   Substance and Sexual Activity    Alcohol use: No    Drug use: No          Current Outpatient Medications   Medication Sig    fluticasone propionate (FLONASE) 50 mcg/actuation nasal spray 1 [place] by Both Nostrils route daily. Indications: inflammation of the nose due to an allergy    cetirizine (ZYRTEC) 10 mg tablet Take 1 Tab by mouth daily. Indications: non-seasonal allergic stuffy and runny nose    SITagliptin (JANUVIA) 100 mg tablet Take 1 Tab by mouth daily.     glimepiride (AMARYL) 2 mg tablet Take 1 Tab by mouth every morning. With food    lamoTRIgine (LAMICTAL) 100 mg tablet Take 0.5 Tabs by mouth two (2) times a day. Indications: Bipolar Depression    benztropine (COGENTIN) 1 mg tablet Take 1 mg by mouth two (2) times a day.  haloperidol (HALDOL) 5 mg tablet Take 1 Tab by mouth nightly. Indications: psychotic disorder    sertraline (ZOLOFT) 50 mg tablet Take 1 Tab by mouth daily. Indications: major depressive disorder    traZODone (DESYREL) 50 mg tablet Take 1 Tab by mouth nightly. Indications: major depressive disorder    ergocalciferol (ERGOCALCIFEROL) 1,250 mcg (50,000 unit) capsule Take 1 Cap by mouth every seven (7) days for 8 doses.  Blood-Glucose Meter monitoring kit Check sugar in the AM and PM.    traMADol (ULTRAM) 50 mg tablet Take 1 Tab by mouth two (2) times daily as needed for Pain for up to 30 days. Max Daily Amount: 100 mg. Indications: neuropathic pain    lancets misc Use as directed. Dx: e11.65. check sugar once daily    glucose blood VI test strips (ASCENSIA AUTODISC VI, ONE TOUCH ULTRA TEST VI) strip E11.65 check sugar once daily fasting    MULTIVIT-MINS NO.7/FOLIC ACID (V-C FORTE PO) Take 1 Cap by mouth daily. No current facility-administered medications for this visit. Review of Systems:   Constitutional:    Negative for fever and chills, negative diaphoresis. HEENT:              Negative for neck pain and stiffness. Eyes:                  Negative for visual disturbance, itching, redness or discharge. Respiratory:        Negative for cough and shortness of breath. Cardiovascular:  Negative for chest pain and palpitations. Gastrointestinal: Negative for nausea, vomiting, abdominal pain, diarrhea or constipation. Genitourinary:     Negative for dysuria and frequency. Musculoskeletal: Negative for falls, tenderness and swelling. Skin:                    Negative for rash, masses or lesions.    Neurological:       Negative for dizzyness, seizure, loss of consciousness, weakness and numbness. Objective:     Vitals:    03/19/20 1340   BP: 123/78   Pulse: 98   Resp: 17   Temp: 99.3 °F (37.4 °C)   TempSrc: Oral   SpO2: 96%   Weight: 143 lb (64.9 kg)   Height: 5' (1.524 m)       Results for orders placed or performed in visit on 03/19/20   AMB POC GLUCOSE BLOOD, BY GLUCOSE MONITORING DEVICE   Result Value Ref Range    Glucose POC [facility] mg/dL         Physical Examination: General appearance - alert, well appearing, and in no distress  Chest - clear to auscultation, no wheezes, rales or rhonchi, symmetric air entry  Heart - normal rate, regular rhythm, normal S1, S2, no murmurs, rubs, clicks or gallops  Musculoskeletal - ttp to posterior shoulder cuff. Full ROM, neg drop arm test    Assessment/ Plan:       1. Type 2 diabetes mellitus with hyperglycemia, without long-term current use of insulin (HCC)  Needs meter! Discussed diet  Asymptomatic w/ sugar this high  - AMB POC GLUCOSE BLOOD, BY GLUCOSE MONITORING DEVICE  - REFERRAL TO DIABETIC EDUCATION; Standing    2. Primary osteoarthritis of left shoulder    - REFERRAL TO PHYSICAL THERAPY        I have discussed the diagnosis with the patient and the intended plan as seen in the above orders. The patient has received an after-visit summary and questions were answered concerning future plans. Pt conveyed understanding of plan. Medication Side Effects and Warnings were discussed with patient: yes  Patient Labs were reviewed: yes  Patient Past Records were reviewed:  yes    [place].  Trinity Da Silva NP

## 2020-03-20 ENCOUNTER — TELEPHONE (OUTPATIENT)
Dept: INTERNAL MEDICINE CLINIC | Age: 55
End: 2020-03-20

## 2020-03-20 RX ORDER — ATORVASTATIN CALCIUM 20 MG/1
20 TABLET, FILM COATED ORAL
Qty: 90 TAB | Refills: 0 | Status: SHIPPED | OUTPATIENT
Start: 2020-03-20 | End: 2020-07-23

## 2020-03-20 NOTE — TELEPHONE ENCOUNTER
----- Message from Jayant Ackerman NP sent at 3/20/2020 10:31 AM EDT -----  Pls let her know her cholesterol is high - this with the diabetes increases her risk for heart attack/stroke  I have sent atorvastatin to take at bedtime for this  Work on diet improvements and limit alcohol    ----- Message -----  From: Rollene Ormond  Sent: 2/20/2020   2:46 PM EDT  To: Jayant Ackerman NP

## 2020-03-23 RX ORDER — SUMATRIPTAN 100 MG/1
TABLET, FILM COATED ORAL
Qty: 9 TAB | Refills: 1 | Status: SHIPPED | OUTPATIENT
Start: 2020-03-23 | End: 2020-04-13

## 2020-03-24 NOTE — TELEPHONE ENCOUNTER
[facility] [place] Delivered 03/20/2020. Pt and  informed.

## 2020-03-30 NOTE — TELEPHONE ENCOUNTER
Informed pt that imaging is on hold due to covid-19. Pt understood. Pt states her shoulder is doing fine, informed pt is she is unable to raise her arm, any hand weakness/numbess, or any worsening shoulder symptoms to call us. Pt understood. Pt received glucometer Friday, asked pt to start recording glucose readings as I will be calling to check them to help limit office visits. Informed pt she is considered high risk and should stay home as much as possible. Pt understood and agreed.

## 2020-04-13 RX ORDER — SUMATRIPTAN 100 MG/1
TABLET, FILM COATED ORAL
Qty: 9 TAB | Refills: 1 | Status: SHIPPED | OUTPATIENT
Start: 2020-04-13 | End: 2020-05-11

## 2020-05-05 RX ORDER — MELATONIN
1000 DAILY
Qty: 90 TAB | Refills: 0 | Status: SHIPPED | OUTPATIENT
Start: 2020-05-05 | End: 2020-07-29

## 2020-05-11 RX ORDER — SUMATRIPTAN 100 MG/1
TABLET, FILM COATED ORAL
Qty: 9 TAB | Refills: 1 | Status: SHIPPED | OUTPATIENT
Start: 2020-05-11 | End: 2020-09-15

## 2020-05-14 DIAGNOSIS — E11.65 TYPE 2 DIABETES MELLITUS WITH HYPERGLYCEMIA, WITHOUT LONG-TERM CURRENT USE OF INSULIN (HCC): ICD-10-CM

## 2020-05-14 RX ORDER — GLIMEPIRIDE 2 MG/1
TABLET ORAL
Qty: 90 TAB | Refills: 0 | Status: SHIPPED | OUTPATIENT
Start: 2020-05-14 | End: 2020-07-30

## 2020-07-23 RX ORDER — SITAGLIPTIN 100 MG/1
TABLET, FILM COATED ORAL
Qty: 90 TAB | Refills: 0 | Status: SHIPPED | OUTPATIENT
Start: 2020-07-23 | End: 2020-10-09

## 2020-07-23 RX ORDER — CETIRIZINE HCL 10 MG
TABLET ORAL
Qty: 90 TAB | Refills: 0 | Status: SHIPPED | OUTPATIENT
Start: 2020-07-23 | End: 2021-11-04 | Stop reason: ALTCHOICE

## 2020-07-23 RX ORDER — ATORVASTATIN CALCIUM 20 MG/1
TABLET, FILM COATED ORAL
Qty: 90 TAB | Refills: 0 | Status: SHIPPED | OUTPATIENT
Start: 2020-07-23 | End: 2020-10-09

## 2020-07-23 RX ORDER — FLUTICASONE PROPIONATE 50 MCG
SPRAY, SUSPENSION (ML) NASAL
Qty: 16 G | Refills: 1 | Status: SHIPPED | OUTPATIENT
Start: 2020-07-23 | End: 2021-11-04 | Stop reason: ALTCHOICE

## 2020-07-29 RX ORDER — CHOLECALCIFEROL (VITAMIN D3) 25 MCG
TABLET ORAL
Qty: 90 TAB | Refills: 0 | Status: SHIPPED | OUTPATIENT
Start: 2020-07-29 | End: 2020-10-15

## 2020-07-30 DIAGNOSIS — E11.65 TYPE 2 DIABETES MELLITUS WITH HYPERGLYCEMIA, WITHOUT LONG-TERM CURRENT USE OF INSULIN (HCC): ICD-10-CM

## 2020-07-30 RX ORDER — GLIMEPIRIDE 2 MG/1
TABLET ORAL
Qty: 90 TAB | Refills: 0 | Status: SHIPPED | OUTPATIENT
Start: 2020-07-30 | End: 2020-10-22

## 2020-08-05 ENCOUNTER — APPOINTMENT (OUTPATIENT)
Dept: CT IMAGING | Age: 55
End: 2020-08-05
Attending: PHYSICIAN ASSISTANT
Payer: MEDICAID

## 2020-08-05 ENCOUNTER — HOSPITAL ENCOUNTER (EMERGENCY)
Age: 55
Discharge: HOME | End: 2020-08-05
Attending: EMERGENCY MEDICINE
Payer: MEDICAID

## 2020-08-05 VITALS
SYSTOLIC BLOOD PRESSURE: 127 MMHG | OXYGEN SATURATION: 96 % | HEART RATE: 83 BPM | HEIGHT: 60 IN | BODY MASS INDEX: 26.4 KG/M2 | RESPIRATION RATE: 16 BRPM | WEIGHT: 134.48 LBS | TEMPERATURE: 99 F | DIASTOLIC BLOOD PRESSURE: 91 MMHG

## 2020-08-05 DIAGNOSIS — R10.9 ABDOMINAL CRAMPING: ICD-10-CM

## 2020-08-05 DIAGNOSIS — K76.0 HEPATIC STEATOSIS: ICD-10-CM

## 2020-08-05 DIAGNOSIS — B37.49 UROGENITAL CANDIDIASIS: ICD-10-CM

## 2020-08-05 DIAGNOSIS — E11.65 TYPE 2 DIABETES MELLITUS WITH HYPERGLYCEMIA, WITHOUT LONG-TERM CURRENT USE OF INSULIN (HCC): Primary | ICD-10-CM

## 2020-08-05 DIAGNOSIS — Z72.0 TOBACCO ABUSE: ICD-10-CM

## 2020-08-05 LAB
ALBUMIN SERPL-MCNC: 3.8 G/DL (ref 3.5–5)
ALBUMIN/GLOB SERPL: 0.9 {RATIO} (ref 1.1–2.2)
ALP SERPL-CCNC: 171 U/L (ref 45–117)
ALT SERPL-CCNC: 20 U/L (ref 12–78)
ANION GAP SERPL CALC-SCNC: 8 MMOL/L (ref 5–15)
APPEARANCE UR: CLEAR
AST SERPL-CCNC: 15 U/L (ref 15–37)
BACTERIA URNS QL MICRO: NEGATIVE /HPF
BASOPHILS # BLD: 0 K/UL (ref 0–0.1)
BASOPHILS NFR BLD: 0 % (ref 0–1)
BILIRUB SERPL-MCNC: 0.3 MG/DL (ref 0.2–1)
BILIRUB UR QL: NEGATIVE
BUN SERPL-MCNC: 5 MG/DL (ref 6–20)
BUN/CREAT SERPL: 5 (ref 12–20)
CALCIUM SERPL-MCNC: 9.9 MG/DL (ref 8.5–10.1)
CHLORIDE SERPL-SCNC: 99 MMOL/L (ref 97–108)
CO2 SERPL-SCNC: 29 MMOL/L (ref 21–32)
COLOR UR: ABNORMAL
CREAT SERPL-MCNC: 1.04 MG/DL (ref 0.55–1.02)
DIFFERENTIAL METHOD BLD: ABNORMAL
EOSINOPHIL # BLD: 0.1 K/UL (ref 0–0.4)
EOSINOPHIL NFR BLD: 1 % (ref 0–7)
EPITH CASTS URNS QL MICRO: ABNORMAL /LPF
ERYTHROCYTE [DISTWIDTH] IN BLOOD BY AUTOMATED COUNT: 12.3 % (ref 11.5–14.5)
GLOBULIN SER CALC-MCNC: 4.4 G/DL (ref 2–4)
GLUCOSE SERPL-MCNC: 356 MG/DL (ref 65–100)
GLUCOSE UR STRIP.AUTO-MCNC: >1000 MG/DL
HCT VFR BLD AUTO: 41 % (ref 35–47)
HGB BLD-MCNC: 14.1 G/DL (ref 11.5–16)
HGB UR QL STRIP: NEGATIVE
IMM GRANULOCYTES # BLD AUTO: 0 K/UL (ref 0–0.04)
IMM GRANULOCYTES NFR BLD AUTO: 0 % (ref 0–0.5)
KETONES UR QL STRIP.AUTO: NEGATIVE MG/DL
LEUKOCYTE ESTERASE UR QL STRIP.AUTO: NEGATIVE
LIPASE SERPL-CCNC: 80 U/L (ref 73–393)
LYMPHOCYTES # BLD: 4.1 K/UL (ref 0.8–3.5)
LYMPHOCYTES NFR BLD: 33 % (ref 12–49)
MCH RBC QN AUTO: 32.5 PG (ref 26–34)
MCHC RBC AUTO-ENTMCNC: 34.4 G/DL (ref 30–36.5)
MCV RBC AUTO: 94.5 FL (ref 80–99)
MONOCYTES # BLD: 0.6 K/UL (ref 0–1)
MONOCYTES NFR BLD: 5 % (ref 5–13)
NEUTS SEG # BLD: 7.8 K/UL (ref 1.8–8)
NEUTS SEG NFR BLD: 61 % (ref 32–75)
NITRITE UR QL STRIP.AUTO: NEGATIVE
NRBC # BLD: 0 K/UL (ref 0–0.01)
NRBC BLD-RTO: 0 PER 100 WBC
PH UR STRIP: 5.5 [PH] (ref 5–8)
PLATELET # BLD AUTO: 265 K/UL (ref 150–400)
PMV BLD AUTO: 10.3 FL (ref 8.9–12.9)
POTASSIUM SERPL-SCNC: 3.9 MMOL/L (ref 3.5–5.1)
PROT SERPL-MCNC: 8.2 G/DL (ref 6.4–8.2)
PROT UR STRIP-MCNC: NEGATIVE MG/DL
RBC # BLD AUTO: 4.34 M/UL (ref 3.8–5.2)
RBC #/AREA URNS HPF: ABNORMAL /HPF (ref 0–5)
SODIUM SERPL-SCNC: 136 MMOL/L (ref 136–145)
SP GR UR REFRACTOMETRY: 1.02 (ref 1–1.03)
UA: UC IF INDICATED,UAUC: ABNORMAL
UROBILINOGEN UR QL STRIP.AUTO: 1 EU/DL (ref 0.2–1)
WBC # BLD AUTO: 12.6 K/UL (ref 3.6–11)
WBC URNS QL MICRO: ABNORMAL /HPF (ref 0–4)
YEAST URNS QL MICRO: PRESENT

## 2020-08-05 PROCEDURE — 96361 HYDRATE IV INFUSION ADD-ON: CPT

## 2020-08-05 PROCEDURE — 99284 EMERGENCY DEPT VISIT MOD MDM: CPT

## 2020-08-05 PROCEDURE — 74011636320 HC RX REV CODE- 636/320: Performed by: EMERGENCY MEDICINE

## 2020-08-05 PROCEDURE — 81001 URINALYSIS AUTO W/SCOPE: CPT

## 2020-08-05 PROCEDURE — 83690 ASSAY OF LIPASE: CPT

## 2020-08-05 PROCEDURE — 85025 COMPLETE CBC W/AUTO DIFF WBC: CPT

## 2020-08-05 PROCEDURE — 80053 COMPREHEN METABOLIC PANEL: CPT

## 2020-08-05 PROCEDURE — 74011250636 HC RX REV CODE- 250/636: Performed by: PHYSICIAN ASSISTANT

## 2020-08-05 PROCEDURE — 74011250637 HC RX REV CODE- 250/637: Performed by: PHYSICIAN ASSISTANT

## 2020-08-05 PROCEDURE — 36415 COLL VENOUS BLD VENIPUNCTURE: CPT

## 2020-08-05 PROCEDURE — 74177 CT ABD & PELVIS W/CONTRAST: CPT

## 2020-08-05 PROCEDURE — 96374 THER/PROPH/DIAG INJ IV PUSH: CPT

## 2020-08-05 RX ORDER — ONDANSETRON 2 MG/ML
4 INJECTION INTRAMUSCULAR; INTRAVENOUS
Status: COMPLETED | OUTPATIENT
Start: 2020-08-05 | End: 2020-08-05

## 2020-08-05 RX ORDER — ONDANSETRON 4 MG/1
4 TABLET, ORALLY DISINTEGRATING ORAL
Qty: 8 TAB | Refills: 0 | Status: SHIPPED | OUTPATIENT
Start: 2020-08-05 | End: 2021-03-22

## 2020-08-05 RX ORDER — FLUCONAZOLE 150 MG/1
150 TABLET ORAL
Qty: 1 TAB | Refills: 0 | Status: SHIPPED | OUTPATIENT
Start: 2020-08-05 | End: 2020-08-05

## 2020-08-05 RX ORDER — DICYCLOMINE HYDROCHLORIDE 10 MG/1
10 CAPSULE ORAL
Status: COMPLETED | OUTPATIENT
Start: 2020-08-05 | End: 2020-08-05

## 2020-08-05 RX ORDER — DICYCLOMINE HYDROCHLORIDE 10 MG/1
10 CAPSULE ORAL 4 TIMES DAILY
Qty: 20 CAP | Refills: 0 | Status: SHIPPED | OUTPATIENT
Start: 2020-08-05 | End: 2020-08-10

## 2020-08-05 RX ADMIN — IOPAMIDOL 100 ML: 755 INJECTION, SOLUTION INTRAVENOUS at 20:01

## 2020-08-05 RX ADMIN — ONDANSETRON HYDROCHLORIDE 4 MG: 2 INJECTION, SOLUTION INTRAMUSCULAR; INTRAVENOUS at 19:18

## 2020-08-05 RX ADMIN — SODIUM CHLORIDE 1000 ML: 900 INJECTION, SOLUTION INTRAVENOUS at 19:52

## 2020-08-05 RX ADMIN — DICYCLOMINE HYDROCHLORIDE 10 MG: 10 CAPSULE ORAL at 19:29

## 2020-08-05 NOTE — ED PROVIDER NOTES
EMERGENCY DEPARTMENT HISTORY AND PHYSICAL EXAM      Date: 8/5/2020  Patient Name: Raghav St    History of Presenting Illness     Chief Complaint   Patient presents with    Abdominal Pain     History Provided By: Patient    HPI: Raghav St, 48 y.o. female with medical history significant for diabetes, hypertension, bipolar disorder, schizophrenia, tobacco abuse who presents via private vehicle to the ED with cc of acute moderate intermittent sharp and cramping abdominal pain X 4 days. States that she is also had associated nausea and vomiting X 1 week. Stated she feels like her abdomen is swollen. No medications or alleviating factors. History of similar symptoms a couple years ago but states that she knows never diagnosed with anything. Denies any history of abdominal surgeries. Denies fever, chills, chest pain, shortness of breath, lightheadedness, dizziness, syncope, seizure, numbness, tingling, focal weakness, hematemesis, cough, URI symptoms, dysuria, frequency, urgency, hematuria, constipation, diarrhea, melena, hematochezia. Last bowel movement was yesterday and normal.      PCP: Roman Richards., NP    There are no other complaints, changes, or physical findings at this time. No current facility-administered medications on file prior to encounter. Current Outpatient Medications on File Prior to Encounter   Medication Sig Dispense Refill    Vitamin D3 25 mcg (1,000 unit) tablet take 1 tablet by mouth once daily 90 Tab 0    benztropine (COGENTIN) 1 mg tablet Take 1 mg by mouth two (2) times a day.  haloperidol (HALDOL) 5 mg tablet Take 1 Tab by mouth nightly. Indications: psychotic disorder 15 Tab 1    sertraline (ZOLOFT) 50 mg tablet Take 1 Tab by mouth daily. Indications: major depressive disorder 15 Tab 1    traZODone (DESYREL) 50 mg tablet Take 1 Tab by mouth nightly.  Indications: major depressive disorder 15 Tab 0    glimepiride (AMARYL) 2 mg tablet take 1 tablet by mouth every morning with food 90 Tab 0    Januvia 100 mg tablet take 1 tablet by mouth once daily 90 Tab 0    atorvastatin (LIPITOR) 20 mg tablet take 1 tablet by mouth nightly 90 Tab 0    fluticasone propionate (FLONASE) 50 mcg/actuation nasal spray instill 1 spray into each nostril once daily 16 g 1    cetirizine (ZYRTEC) 10 mg tablet take 1 tablet by mouth once daily 90 Tab 0    SUMAtriptan (IMITREX) 100 mg tablet take 1 tablet by mouth ONCE AS NEEDED FOR MIGRAINE FOR UP TO 1 DOSE 9 Tab 1    butalbital-acetaminophen-caffeine (FIORICET, ESGIC) -40 mg per tablet Take 1 Tab by mouth every six (6) hours as needed for Headache or Migraine. 20 Tab 0    lidocaine (LIDODERM) 5 % 1 Patch by TransDERmal route every twelve (12) hours. Apply patch to the affected area for 12 hours a day and remove for 12 hours a day. 30 Each 1    Blood-Glucose Meter monitoring kit Check sugar in the AM and PM. 1 Kit 0    lancets misc Use as directed. Dx: e11.65. check sugar once daily 1 Each 11    glucose blood VI test strips (ASCENSIA AUTODISC VI, ONE TOUCH ULTRA TEST VI) strip E11.65 check sugar once daily fasting 100 Strip 1    lamoTRIgine (LAMICTAL) 100 mg tablet Take 0.5 Tabs by mouth two (2) times a day. Indications: Bipolar Depression 60 Tab 0    MULTIVIT-MINS NO.7/FOLIC ACID (V-C FORTE PO) Take 1 Cap by mouth daily.        Past History     Past Medical History:  Past Medical History:   Diagnosis Date    B[facility] disorder [facility] [address]     Diabetes ([facility] [address]     Hypertension     Psychiatric disorder     Schizophrenia ([facility] [address]      Past Surgical History:  Past Surgical History:   Procedure Laterality Date    HX GYN       x2    HX OTHER SURGICAL      skin grafting    HX OTHER SURGICAL  2016    oral surgery     Family History:  Family History   Problem Relation Age of Onset    Cancer Maternal Aunt     Cancer Maternal Uncle      Social History:  Social History     Tobacco Use    Smoking status: Current Some Day Smoker     Packs/day: 0.25    Smokeless tobacco: Never Used    Tobacco comment: 2-3 per day   Substance Use Topics    Alcohol use: No    Drug use: No     Allergies: Allergies   Allergen Reactions    Aspirin Nausea and Vomiting    Metformin Other (comments)    Morphine Hives     Review of Systems   Review of Systems   Constitutional: Negative for activity change, appetite change, chills, fatigue, fever and unexpected weight change. HENT: Negative. Negative for congestion, postnasal drip, rhinorrhea, sore throat, trouble swallowing and voice change. Eyes: Negative for pain. Respiratory: Negative for cough and shortness of breath. Cardiovascular: Negative for chest pain and palpitations. Gastrointestinal: Positive for abdominal pain, nausea and vomiting. Negative for abdominal distention, blood in stool, constipation and diarrhea. Genitourinary: Negative for decreased urine volume, difficulty urinating, dysuria, flank pain, frequency, hematuria, pelvic pain, urgency, vaginal bleeding, vaginal discharge and vaginal pain. Musculoskeletal: Negative. Negative for arthralgias, back pain and myalgias. Skin: Negative. Negative for rash and wound. Neurological: Negative for light-headedness and headaches. Psychiatric/Behavioral: Negative. Negative for confusion. Physical Exam   Physical Exam  Vitals signs and nursing note reviewed. Constitutional:       General: She is not in acute distress. Appearance: She is well-developed. She is not diaphoretic. HENT:      Head: Normocephalic and atraumatic. Mouth/Throat:      Mouth: Mucous membranes are moist.   Eyes:      Conjunctiva/sclera: Conjunctivae normal.      Pupils: Pupils are equal, round, and reactive to light. Neck:      Musculoskeletal: Normal range of motion. Cardiovascular:      Rate and Rhythm: Normal rate and regular rhythm. Heart sounds: Normal heart sounds.    Pulmonary:      Effort: Pulmonary effort is normal. No respiratory distress. Breath sounds: Normal breath sounds. No wheezing or rales. Abdominal:      General: Bowel sounds are normal. There is no distension. Palpations: Abdomen is soft. Abdomen is not rigid. There is no mass. Tenderness: There is generalized abdominal tenderness and tenderness in the right lower quadrant. There is no right CVA tenderness, left CVA tenderness, guarding or rebound. Negative signs include Cardona's sign and McBurney's sign. Musculoskeletal: Normal range of motion. Skin:     General: Skin is warm and dry. Neurological:      Mental Status: She is alert and oriented to person, place, and time. Psychiatric:         Behavior: Behavior normal.         Thought Content: Thought content normal.         Judgment: Judgment normal.       Diagnostic Study Results   Labs -     Recent Results (from the past 12 hour(s))   CBC WITH AUTOMATED DIFF    Collection Time: 08/05/20  7:00 PM   Result Value Ref Range    WBC 12.6 (H) 3.6 - 11.0 K/uL    RBC 4.34 3.80 - 5.20 M/uL    HGB 14.1 11.5 - 16.0 g/dL    HCT 41.0 35.0 - 47.0 %    MCV 94.5 80.0 - 99.0 FL    MCH 32.5 26.0 - 34.0 PG    MCHC 34.4 30.0 - 36.5 g/dL    RDW 12.3 11.5 - 14.5 %    PLATELET 981 981 - 382 K/uL    MPV 10.3 8.9 - 12.9 FL    NRBC 0.0 0  WBC    ABSOLUTE NRBC 0.00 0.00 - 0.01 K/uL    NEUTROPHILS 61 32 - 75 %    LYMPHOCYTES 33 12 - 49 %    MONOCYTES 5 5 - 13 %    EOSINOPHILS 1 0 - 7 %    BASOPHILS 0 0 - 1 %    IMMATURE GRANULOCYTES 0 0.0 - 0.5 %    ABS. NEUTROPHILS 7.8 1.8 - 8.0 K/UL    ABS. LYMPHOCYTES 4.1 (H) 0.8 - 3.5 K/UL    ABS. MONOCYTES 0.6 0.0 - 1.0 K/UL    ABS. EOSINOPHILS 0.1 0.0 - 0.4 K/UL    ABS. BASOPHILS 0.0 0.0 - 0.1 K/UL    ABS. IMM.  GRANS. 0.0 0.00 - 0.04 K/UL    DF AUTOMATED     METABOLIC PANEL, COMPREHENSIVE    Collection Time: 08/05/20  7:00 PM   Result Value Ref Range    Sodium 136 136 - 145 mmol/L    Potassium 3.9 3.5 - 5.1 mmol/L    Chloride 99 97 - 108 mmol/L    CO2 29 21 - 32 mmol/L    Anion gap 8 5 - 15 mmol/L    Glucose 356 (H) 65 - 100 mg/dL    BUN 5 (L) 6 - 20 MG/DL    Creatinine 1.04 (H) 0.55 - 1.02 MG/DL    BUN/Creatinine ratio 5 (L) 12 - 20      GFR est AA >60 >60 ml/min/1.73m2    GFR est non-AA 55 (L) >60 ml/min/1.73m2    Calcium 9.9 8.5 - 10.1 MG/DL    Bilirubin, total 0.3 0.2 - 1.0 MG/DL    ALT (SGPT) 20 12 - 78 U/L    AST (SGOT) 15 15 - 37 U/L    Alk. phosphatase 171 (H) 45 - 117 U/L    Protein, total 8.2 6.4 - 8.2 g/dL    Albumin 3.8 3.5 - 5.0 g/dL    Globulin 4.4 (H) 2.0 - 4.0 g/dL    A-G Ratio 0.9 (L) 1.1 - 2.2     LIPASE    Collection Time: 08/05/20  7:00 PM   Result Value Ref Range    Lipase 80 73 - 393 U/L   URINALYSIS W/ REFLEX CULTURE    Collection Time: 08/05/20  7:00 PM    Specimen: Urine   Result Value Ref Range    Color YELLOW/STRAW      Appearance CLEAR CLEAR      Specific gravity 1.025 1.003 - 1.030      pH (UA) 5.5 5.0 - 8.0      Protein Negative NEG mg/dL    Glucose >1,000 (A) NEG mg/dL    Ketone Negative NEG mg/dL    Bilirubin Negative NEG      Blood Negative NEG      Urobilinogen 1.0 0.2 - 1.0 EU/dL    Nitrites Negative NEG      Leukocyte Esterase Negative NEG      WBC 0-4 0 - 4 /hpf    RBC 0-5 0 - 5 /hpf    Epithelial cells MODERATE (A) FEW /lpf    Bacteria Negative NEG /hpf    UA:UC IF INDICATED CULTURE NOT INDICATED BY UA RESULT CNI      Yeast PRESENT (A) NEG         Radiologic Studies -   CT ABD PELV W CONT   Final Result   IMPRESSION: No acute findings. Hepatic steatosis and incidentals as above. Ct Abd Pelv W Cont    Result Date: 8/5/2020  IMPRESSION: No acute findings. Hepatic steatosis and incidentals as above. Medical Decision Making   I am the first provider for this patient. I reviewed the vital signs, available nursing notes, past medical history, past surgical history, family history and social history. Vital Signs-Reviewed the patient's vital signs.   Patient Vitals for the past 24 hrs:   Temp Pulse Resp BP SpO2   08/05/20 2000 -- -- -- (!) 127/91 96 %   08/05/20 1945 -- -- -- (!) 130/98 97 %   08/05/20 1917 -- -- -- 132/88 96 %   08/05/20 1843 -- -- 16 -- --   08/05/20 1816 99 °F (37.2 °C) 83 -- 125/85 98 %     Pulse Oximetry Analysis - 96% on RA and normal    Records Reviewed: Nursing Notes, Old Medical Records, Previous Radiology Studies and Previous Laboratory Studies    Provider Notes (Medical Decision Making):   Patient presents with abdominal pain. DDx: Gastroenteritis, SBO, appendicitis, colitis, IBD, diverticulitis, mesenteric ischemia, AAA or descending dissection, ACS, ureteral stone. Will get labs and CT Abdomen. I have recommended the following steps for improving diabetic care and outcome to her: home glucose monitoring emphasized, all medications, side effects and compliance discussed carefully, foot care discussed and Podiatry visits discussed, annual eye examinations at Ophthalmology discussed, long term diabetic complications discussed and patient urged in the strongest terms to quit smoking. A followup visit will be scheduled in the near future with patients PCP or endocrinologist to review and reinforce the importance of careful diabetic control to improve long term outcomes. TOBACCO COUNSELING:  Upon evaluation, pt expressed that they are a current tobacco user. Pt has been counseled on the dangers of smoking and was encouraged to quit as soon as possible in order to decrease further risks to their health. Pt has conveyed their understanding of the risks involved should they continue to use tobacco products. 5 min disuccion    ED Course:   Initial assessment performed. The patients presenting problems have been discussed, and they are in agreement with the care plan formulated and outlined with them. I have encouraged them to ask questions as they arise throughout their visit. ED Course as of Aug 05 2043   Wed Aug 05, 2020   2038 Pt resting comfortably in room in NAD.  No new symptoms or complaints at this time. Available labs/ results reviewed with pt. plan to p.o. challenge prior to discharge. Patient to follow-up with PCP. [SM]      ED Course User Index  [SM] Nuria Coto PA-C       Progress Note:   Updated pt on all returned results and findings. Discussed the importance of proper follow up as referred below along with return precautions. Pt in agreement with the care plan and expresses agreement with and understanding of all items discussed. Disposition:  8:42 PM  I have discussed with patient their diagnosis, treatment, and follow up plan. The patient agrees to follow up as outlined in discharge paperwork and also to return to the ED with any worsening. Aurora Olson PA-C      PLAN:  1. Current Discharge Medication List      START taking these medications    Details   fluconazole (Diflucan) 150 mg tablet Take 1 Tab by mouth now for 1 dose. FDA advises cautious prescribing of oral fluconazole in pregnancy. Qty: 1 Tab, Refills: 0      dicyclomine (BentyL) 10 mg capsule Take 1 Cap by mouth four (4) times daily for 5 days. Qty: 20 Cap, Refills: 0      ondansetron (ZOFRAN ODT) 4 mg disintegrating tablet Take 1 Tab by mouth every eight (8) hours as needed for Nausea. Qty: 8 Tab, Refills: 0         CONTINUE these medications which have NOT CHANGED    Details   Vitamin D3 25 mcg (1,000 unit) tablet take 1 tablet by mouth once daily  Qty: 90 Tab, Refills: 0      benztropine (COGENTIN) 1 mg tablet Take 1 mg by mouth two (2) times a day.      haloperidol (HALDOL) 5 mg tablet Take 1 Tab by mouth nightly. Indications: psychotic disorder  Qty: 15 Tab, Refills: 1      sertraline (ZOLOFT) 50 mg tablet Take 1 Tab by mouth daily. Indications: major depressive disorder  Qty: 15 Tab, Refills: 1      traZODone (DESYREL) 50 mg tablet Take 1 Tab by mouth nightly.  Indications: major depressive disorder  Qty: 15 Tab, Refills: 0      glimepiride (AMARYL) 2 mg tablet take 1 tablet by mouth every morning with food  Qty: 90 Tab, Refills: 0    Associated Diagnoses: Type 2 diabetes mellitus with hyperglycemia, without long-term current use of insulin (HCC)      Januvia 100 mg tablet take 1 tablet by mouth once daily  Qty: 90 Tab, Refills: 0      atorvastatin (LIPITOR) 20 mg tablet take 1 tablet by mouth nightly  Qty: 90 Tab, Refills: 0      fluticasone propionate (FLONASE) 50 mcg/actuation nasal spray instill 1 spray into each nostril once daily  Qty: 16 g, Refills: 1      cetirizine (ZYRTEC) 10 mg tablet take 1 tablet by mouth once daily  Qty: 90 Tab, Refills: 0      SUMAtriptan (IMITREX) 100 mg tablet take 1 tablet by mouth ONCE AS NEEDED FOR MIGRAINE FOR UP TO 1 DOSE  Qty: 9 Tab, Refills: 1      butalbital-acetaminophen-caffeine (FIORICET, ESGIC) -40 mg per tablet Take 1 Tab by mouth every six (6) hours as needed for Headache or Migraine. Qty: 20 Tab, Refills: 0      lidocaine (LIDODERM) 5 % 1 Patch by TransDERmal route every twelve (12) hours. Apply patch to the affected area for 12 hours a day and remove for 12 hours a day. Qty: 30 Each, Refills: 1      Blood-Glucose Meter monitoring kit Check sugar in the AM and PM.  Qty: 1 Kit, Refills: 0    Comments: She needs a meter for the visually impaired. lancets misc Use as directed. Dx: e11.65. check sugar once daily  Qty: 1 Each, Refills: 11      glucose blood VI test strips (ASCENSIA AUTODISC VI, ONE TOUCH ULTRA TEST VI) strip E11.65 check sugar once daily fasting  Qty: 100 Strip, Refills: 1      lamoTRIgine (LAMICTAL) 100 mg tablet Take 0.5 Tabs by mouth two (2) times a day. Indications: Bipolar Depression  Qty: 60 Tab, Refills: 0      MULTIVIT-MINS NO.7/FOLIC ACID (V-C FORTE PO) Take 1 Cap by mouth daily.            2.   Follow-up Information     Follow up With Specialties Details Why Contact Info    Herlinda Ross., NP Nurse Practitioner Schedule an appointment as soon as possible for a visit in 2 days As needed [facility]  [address] [phone] [id number]  [phone]          Return to ED if worse     Diagnosis     Clinical Impression:   1. Type 2 diabetes mellitus with hyperglycemia, without long-term current use of insulin ([facility] [address]    2. Urogenital candidiasis    3. Hepatic steatosis    4. Tobacco abuse    5. Abdominal cramping            Please note that this dictation was completed with Dragon, computer voice recognition software. Quite often unanticipated grammatical, syntax, homophones, and other interpretive errors are inadvertently transcribed by the computer software. Please disregard these errors. Additionally, please excuse any errors that have escaped final proofreading.

## 2020-08-06 ENCOUNTER — PATIENT OUTREACH (OUTPATIENT)
Dept: CASE MANAGEMENT | Age: 55
End: 2020-08-06

## 2020-08-06 NOTE — ED NOTES
Pt. Reminded to keep arm straight for IVF at this time. Pt. On phone not keeping arm straight. Diet gingerale and crackers provided to patient as PO challenge at this time.

## 2020-08-06 NOTE — DISCHARGE INSTRUCTIONS
Patient Education        Candidiasis: Care Instructions  Your Care Instructions  Candidiasis (say \"uaz-cdt-YL-uh-rosina\") is a yeast infection. Yeast normally lives in your body. But it can cause problems if your body's defenses don't work as they should. Some medicines can increase your chance of getting a yeast infection. These include antibiotics, steroids, and cancer drugs. And some diseases like AIDS and diabetes can make you more likely to get yeast infections. There are different types of yeast infections. Lenice Estimable is a yeast infection in the mouth. It usually occurs in people with weak immune systems. It causes white patches inside the mouth and throat. Yeast infections of the skin usually occur in skin folds where the skin stays moist. They cause red, oozing patches on your skin. Babies can get these infections under the diaper. People who often wear gloves can get them on their hands. Many women get vaginal yeast infections. They are most common when women take antibiotics. These infections can cause the vagina to itch and burn. They also cause white discharge that looks like cottage cheese. In rare cases, yeast infects the blood. This can cause serious disease. This kind of infection is treated with medicine given through a needle into a vein (IV). After you start treatment, a yeast infection usually goes away quickly. But if your immune system is weak, the infection may come back. Tell your doctor if you get yeast infections often. Follow-up care is a key part of your treatment and safety. Be sure to make and go to all appointments, and call your doctor if you are having problems. It's also a good idea to know your test results and keep a list of the medicines you take. How can you care for yourself at home? · Take your medicines exactly as prescribed. Call your doctor if you think you are having a problem with your medicine. · Use antibiotics only as directed by your doctor.   · Eat yogurt with live cultures. It has bacteria called lactobacillus. It may help prevent some types of yeast infections. · Keep your skin clean and dry. Put powder on moist places. · If you are using a cream or suppository to treat a vaginal yeast infection, don't use condoms or a diaphragm. Use a different type of birth control. · Eat a healthy diet and get regular exercise. This will help keep your immune system strong. When should you call for help? Watch closely for changes in your health, and be sure to contact your doctor if:  · You do not get better as expected. Where can you learn more? Go to http://kirill-ivonne.info/  Enter V432 in the search box to learn more about \"Candidiasis: Care Instructions. \"  Current as of: November 8, 2019               Content Version: 12.5  © 7927-5984 [employer] Care instructions adapted under license by [employer] (which disclaims liability or warranty for this information). If you have questions about a medical condition or this instruction, always ask your healthcare professional. [place] [address] any warranty or liability for your use of this information. Patient Education        Learning About High Blood Sugar  What is high blood sugar? Your body turns the food you eat into glucose (sugar), which it uses for energy. But if your body isn't able to use the sugar right away, it can build up in your blood and lead to high blood sugar. When the amount of sugar in your blood stays too high for too much of the time, you may have diabetes. Diabetes is a disease that can cause serious health problems. The good news is that lifestyle changes may help you get your blood sugar back to normal and avoid or delay diabetes. What causes high blood sugar? Sugar (glucose) can build up in your blood if you:  · Are overweight. · Have a family history of diabetes. · Take certain medicines, such as steroids.   What are the symptoms? Having high blood sugar may not cause any symptoms at all. Or it may make you feel very thirsty or very hungry. You may also urinate more often than usual, have blurry vision, or lose weight without trying. How is high blood sugar treated? You can take steps to lower your blood sugar level if you understand what makes it get higher. Your doctor may want you to learn how to test your blood sugar level at home. Then you can see how illness, stress, or different kinds of food or medicine raise or lower your blood sugar level. Other tests may be needed to see if you have diabetes. How can you prevent high blood sugar? · Watch your weight. If you're overweight, losing just a small amount of weight may help. Reducing fat around your waist is most important. · Limit the amount of calories, sweets, and unhealthy fat you eat. Ask your doctor if a dietitian can help you. A registered dietitian can help you create meal plans that fit your lifestyle. · Get at least 30 minutes of exercise on most days of the week. Exercise helps control your blood sugar. It also helps you maintain a healthy weight. Walking is a good choice. You also may want to do other activities, such as running, swimming, cycling, or playing tennis or team sports. · If your doctor prescribed medicines, take them exactly as prescribed. Call your doctor if you think you are having a problem with your medicine. You will get more details on the specific medicines your doctor prescribes. Follow-up care is a key part of your treatment and safety. Be sure to make and go to all appointments, and call your doctor if you are having problems. It's also a good idea to know your test results and keep a list of the medicines you take. Where can you learn more? Go to http://kirill-ivonne.info/  Enter O108 in the search box to learn more about \"Learning About High Blood Sugar. \"  Current as of: December 20, 2019               Content Version: 12.5  © 9909-3106 [employer]. Care instructions adapted under license by [employer] (which disclaims liability or warranty for this information). If you have questions about a medical condition or this instruction, always ask your healthcare professional. [address] any warranty or liability for your use of this information.

## 2020-08-06 NOTE — PROGRESS NOTES
Patient contacted regarding recent discharge and COVID-19 risk. Discussed COVID-19 related testing which was not done at this time. Care Transition Nurse/ Ambulatory Care Manager contacted the patient by telephone to perform post discharge assessment. Verified name and  with patient as identifiers. Patient has following risk factors of: diabetes. CTN/ACM reviewed discharge instructions, medical action plan and red flags related to discharge diagnosis. Reviewed and educated them on any new and changed medications related to discharge diagnosis. Advised obtaining a 90-day supply of all daily and as-needed medications. Advance Care Planning:   Does patient have an Advance Directive: not on file     Education provided regarding infection prevention, and signs and symptoms of COVID-19 and when to seek medical attention with patient who verbalized understanding. Discussed exposure protocols and quarantine from [address] you at higher risk for severe illness  and given an opportunity for questions and concerns. The patient agrees to contact the  PCP office for questions related to their healthcare. CTN/ACM provided contact information for future reference. Patient declined contact information for the Covid-19 hotline  From [facility]: Are you at higher risk for severe illness?  Wash your hands often.  Avoid close contact (6 feet, which is about two arm lengths) with people who are sick.  Put distance between yourself and other people if COVID-19 is spreading in your community.  Clean and disinfect frequently touched surfaces.  Avoid all cruise travel and non-essential air travel.  Call your healthcare professional if you have concerns about COVID-19 and your underlying condition or if you are sick.     For more information on steps you can take to protect yourself, see CDC's How to Protect Yourself      Patient/family/caregiver given information for [facility] and agrees to enroll no       Plan for follow-up call in 7-14 days based on severity of symptoms and risk factors.

## 2020-08-20 ENCOUNTER — PATIENT OUTREACH (OUTPATIENT)
Dept: CASE MANAGEMENT | Age: 55
End: 2020-08-20

## 2020-08-20 NOTE — PROGRESS NOTES
Patient resolved from Transition of Care episode on 8/20/2020   Discussed COVID-19 related testing which was not done at this time. Patient/family has been provided the following resources and education related to COVID-19:                         Signs, symptoms and red flags related to COVID-19            CDC exposure and quarantine guidelines            [facility] exposure contact - [phone]            Contact for their local Department of Health                 Patient currently reports that the following symptoms have improved:  no new symptoms. No further outreach scheduled with this CTN/ACM/LPN/HC/ MA. Episode of Care resolved. Patient has this CTN/ACM/LPN/HC/MA contact information if future needs arise.

## 2020-09-15 RX ORDER — SUMATRIPTAN 100 MG/1
TABLET, FILM COATED ORAL
Qty: 9 TAB | Refills: 1 | Status: SHIPPED | OUTPATIENT
Start: 2020-09-15 | End: 2020-11-04

## 2020-10-09 RX ORDER — ATORVASTATIN CALCIUM 20 MG/1
TABLET, FILM COATED ORAL
Qty: 90 TAB | Refills: 0 | Status: SHIPPED | OUTPATIENT
Start: 2020-10-09 | End: 2021-01-05

## 2020-10-09 RX ORDER — SITAGLIPTIN 100 MG/1
TABLET, FILM COATED ORAL
Qty: 90 TAB | Refills: 0 | Status: SHIPPED | OUTPATIENT
Start: 2020-10-09 | End: 2021-01-13

## 2020-10-15 RX ORDER — MELATONIN
Qty: 90 TAB | Refills: 0 | Status: SHIPPED | OUTPATIENT
Start: 2020-10-15 | End: 2021-01-06

## 2020-10-21 DIAGNOSIS — E11.65 TYPE 2 DIABETES MELLITUS WITH HYPERGLYCEMIA, WITHOUT LONG-TERM CURRENT USE OF INSULIN (HCC): ICD-10-CM

## 2020-10-22 ENCOUNTER — OFFICE VISIT (OUTPATIENT)
Dept: INTERNAL MEDICINE CLINIC | Age: 55
End: 2020-10-22
Payer: MEDICAID

## 2020-10-22 VITALS
BODY MASS INDEX: 25.91 KG/M2 | SYSTOLIC BLOOD PRESSURE: 120 MMHG | HEIGHT: 60 IN | RESPIRATION RATE: 16 BRPM | OXYGEN SATURATION: 97 % | HEART RATE: 94 BPM | TEMPERATURE: 97.7 F | WEIGHT: 132 LBS | DIASTOLIC BLOOD PRESSURE: 85 MMHG

## 2020-10-22 DIAGNOSIS — E11.65 UNCONTROLLED TYPE 2 DIABETES MELLITUS WITH HYPERGLYCEMIA (HCC): Primary | ICD-10-CM

## 2020-10-22 DIAGNOSIS — R00.0 TACHYCARDIA: ICD-10-CM

## 2020-10-22 DIAGNOSIS — N76.0 ACUTE VAGINITIS: ICD-10-CM

## 2020-10-22 DIAGNOSIS — Z12.31 SCREENING MAMMOGRAM, ENCOUNTER FOR: ICD-10-CM

## 2020-10-22 LAB — HBA1C MFR BLD HPLC: 14.4 %

## 2020-10-22 PROCEDURE — 83036 HEMOGLOBIN GLYCOSYLATED A1C: CPT | Performed by: NURSE PRACTITIONER

## 2020-10-22 PROCEDURE — 99214 OFFICE O/P EST MOD 30 MIN: CPT | Performed by: NURSE PRACTITIONER

## 2020-10-22 RX ORDER — GLIMEPIRIDE 2 MG/1
TABLET ORAL
Qty: 90 TAB | Refills: 0 | Status: SHIPPED | OUTPATIENT
Start: 2020-10-22 | End: 2020-10-22

## 2020-10-22 RX ORDER — FLUCONAZOLE 150 MG/1
150 TABLET ORAL DAILY
Qty: 1 TAB | Refills: 0 | Status: SHIPPED | OUTPATIENT
Start: 2020-10-22 | End: 2020-10-23

## 2020-10-22 RX ORDER — GLIPIZIDE 10 MG/1
10 TABLET ORAL 2 TIMES DAILY
Qty: 180 TAB | Refills: 0 | Status: SHIPPED | OUTPATIENT
Start: 2020-10-22 | End: 2021-01-15

## 2020-10-22 NOTE — PROGRESS NOTES
Subjective: (As above and below)     Chief Complaint   Patient presents with    Diabetes     follow up     Vaginal Itching     x 1 day     Parul Damon is a 48y.o. year old female who presents for     Diabetic Review of Systems - medication compliance: probably noncompliant though I cannot elicit that specific history, diabetic diet compliance: noncompliant much of the time, home glucose monitoring: is performed sporadically. She drinks pepsi, eats a lot of high carb foods      Bipolar and schizophrenia: is working w/ cw to get new psychiatrist as hers is leaving- current meds working per pt    Hyperlipid: tolerating statin    S/s c/w yeast vaginitis- itching, white discharge        Reviewed PmHx, RxHx, FmHx, SocHx, AllgHx and updated in chart. Family History   Problem Relation Age of Onset    Cancer Maternal Aunt     Cancer Maternal Uncle        Past Medical History:   Diagnosis Date    Bipolar 1 disorder ([facility] [address]     Diabetes ([facility] [address]     Hypertension     Psychiatric disorder     Schizophrenia ([facility] [address]       Social History     Socioeconomic History    Marital status: SINGLE     Spouse name: Not on file    Number of children: Not on file    Years of education: Not on file    Highest education level: Not on file   Tobacco Use    Smoking status: Current Some Day Smoker     Packs/day: 0.25    Smokeless tobacco: Never Used    Tobacco comment: 2-3 per day   Substance and Sexual Activity    Alcohol use: No    Drug use: No    Sexual activity: Not Currently          Current Outpatient Medications   Medication Sig    glipiZIDE (GLUCOTROL) 10 mg tablet Take 1 Tab by mouth two (2) times a day. With food    fluconazole (DIFLUCAN) 150 mg tablet Take 1 Tab by mouth daily for 1 day. FDA advises cautious prescribing of oral fluconazole in pregnancy.     cholecalciferol (VITAMIN D3) (1000 Units /25 mcg) tablet take 1 tablet by mouth once daily    Januvia 100 mg tablet take 1 tablet by mouth once daily    atorvastatin (LIPITOR) 20 mg tablet take 1 tablet by mouth nightly    SUMAtriptan (IMITREX) 100 mg tablet TAKE 1 tablet by mouth ONCE AS NEEDED FOR MIGRAINE FOR UP TO 1 DOSE    ondansetron (ZOFRAN ODT) 4 mg disintegrating tablet Take 1 Tab by mouth every eight (8) hours as needed for Nausea.  fluticasone propionate (FLONASE) 50 mcg/actuation nasal spray instill 1 spray into each nostril once daily    cetirizine (ZYRTEC) 10 mg tablet take 1 tablet by mouth once daily    butalbital-acetaminophen-caffeine (FIORICET, ESGIC) -40 mg per tablet Take 1 Tab by mouth every six (6) hours as needed for Headache or Migraine.  lidocaine (LIDODERM) 5 % 1 Patch by TransDERmal route every twelve (12) hours. Apply patch to the affected area for 12 hours a day and remove for 12 hours a day.  lamoTRIgine (LAMICTAL) 100 mg tablet Take 0.5 Tabs by mouth two (2) times a day. Indications: Bipolar Depression    benztropine (COGENTIN) 1 mg tablet Take 1 mg by mouth two (2) times a day.  haloperidol (HALDOL) 5 mg tablet Take 1 Tab by mouth nightly. Indications: psychotic disorder    sertraline (ZOLOFT) 50 mg tablet Take 1 Tab by mouth daily. Indications: major depressive disorder    traZODone (DESYREL) 50 mg tablet Take 1 Tab by mouth nightly. Indications: major depressive disorder    MULTIVIT-MINS NO.7/FOLIC ACID (V-C FORTE PO) Take 1 Cap by mouth daily.  Blood-Glucose Meter monitoring kit Check sugar in the AM and PM.    lancets misc Use as directed. Dx: e11.65. check sugar once daily    glucose blood VI test strips (ASCENSIA AUTODISC VI, ONE TOUCH ULTRA TEST VI) strip E11.65 check sugar once daily fasting     No current facility-administered medications for this visit. Review of Systems:   Constitutional:    Negative for fever and chills, negative diaphoresis. HEENT:              Negative for neck pain and stiffness.   Eyes:                  Negative for visual disturbance, itching, redness or discharge. Respiratory:        Negative for cough and shortness of breath. Cardiovascular:  Negative for chest pain and palpitations. Gastrointestinal: Negative for nausea, vomiting, abdominal pain, diarrhea or constipation. Genitourinary:     Negative for dysuria and frequency. Musculoskeletal: Negative for falls, tenderness and swelling. Skin:                    Negative for rash, masses or lesions. Neurological:       Negative for dizzyness, seizure, loss of consciousness, weakness and numbness. Objective:     Vitals:    10/22/20 1323 10/22/20 1403   BP: 120/85    Pulse: (!) 115 94   Resp: 16    Temp: 97.7 °F (36.5 °C)    TempSrc: Temporal    SpO2: 97%    Weight: 132 lb (59.9 kg)    Height: 5' (1.524 m)        Results for orders placed or performed in visit on 10/22/20   AMB POC HEMOGLOBIN A1C   Result Value Ref Range    Hemoglobin A1c (POC) 14.4 %       Gen: Oriented to person, place and time and well-developed, well-nourished and in no distress. HEENT:    Head: normocephalic and atraumatic. Eyes:  EOM are normal. Pupils equal and round. Neck:  Normal range of motion. Neck supple. Cardiovascular: normal rate, regular rhythm and normal heart sounds. Pulmonary/Chest:  Effort normal and breath sounds normal.  No respiratory distress. No wheezes, no rales. Abdominal: soft, normal  bowel sounds. Musculoskeletal:  No edema, no tenderness. No calf tenderness or edema. Neurological:  Alert, oriented to person, place and time. Skin: skin is warm and dry. Diabetic foot exam:     Left Foot:   Visual Exam: normal    Pulse DP: 2+ (normal)   Filament test: normal sensation    Vibratory sensation: normal      Right Foot:   Visual Exam: normal    Pulse DP: 2+ (normal)   Filament test: normal sensation    Vibratory sensation: normal      Assessment/ Plan:     Follow-up and Dispositions    · Return in about 2 weeks (around 11/5/2020) for pap smear . 1.  Uncontrolled type 2 diabetes mellitus with hyperglycemia ([facility] [address]  Discussed starting insulin which she has never been on, she feels like she can make the needed diet changes, discussed this in detail. Pt needs to check sugars daily  Change to glipizide- with food  Close fu  -  DIABETES FOOT EXAM  - AMB POC HEMOGLOBIN A1C  - REFERRAL TO DIABETIC EDUCATION; Standing  - METABOLIC PANEL, COMPREHENSIVE  - CBC W/O DIFF  - LIPID PANEL  - glipiZIDE (GLUCOTROL) 10 mg tablet; Take 1 Tab by mouth two (2) times a day. With food  Dispense: 180 Tab; Refill: 0    2. Tachycardia    - TSH 3RD GENERATION    3. Screening mammogram, encounter for    - [facility] MAMMO BI SCREENING INCL CAD; Future    4. Acute vaginitis    - fluconazole (DIFLUCAN) 150 mg tablet; Take 1 Tab by mouth daily for 1 day. FDA advises cautious prescribing of oral fluconazole in pregnancy. Dispense: 1 Tab; Refill: 0          I have discussed the diagnosis with the patient and the intended plan as seen in the above orders. The patient has received an after-visit summary and questions were answered concerning future plans. Pt conveyed understanding of plan. Medication Side Effects and Warnings were discussed with patient: yes  Patient Labs were reviewed: yes  Patient Past Records were reviewed:  yes    Mitchell Caceres.  Jonah Parks NP

## 2020-10-22 NOTE — PROGRESS NOTES
Chief Complaint   Patient presents with    Diabetes     follow up        1. Have you been to the ER, urgent care clinic since your last visit? Hospitalized since your last visit? No    2. Have you seen or consulted any other health care providers outside of the [address] [place] [address] since your last visit? Include any pap smears or colon screening.  No

## 2020-10-23 ENCOUNTER — TELEPHONE (OUTPATIENT)
Dept: INTERNAL MEDICINE CLINIC | Age: 55
End: 2020-10-23

## 2020-10-23 LAB
ALBUMIN SERPL-MCNC: 4.3 G/DL (ref 3.8–4.9)
ALBUMIN/GLOB SERPL: 1.7 {RATIO} (ref 1.2–2.2)
ALP SERPL-CCNC: 159 IU/L (ref 39–117)
ALT SERPL-CCNC: 18 IU/L (ref 0–32)
AST SERPL-CCNC: 14 IU/L (ref 0–40)
BILIRUB SERPL-MCNC: 0.3 MG/DL (ref 0–1.2)
BUN SERPL-MCNC: 7 MG/DL (ref 6–24)
BUN/CREAT SERPL: 7 (ref 9–23)
CALCIUM SERPL-MCNC: 10 MG/DL (ref 8.7–10.2)
CHLORIDE SERPL-SCNC: 91 MMOL/L (ref 96–106)
CHOLEST SERPL-MCNC: 256 MG/DL (ref 100–199)
CO2 SERPL-SCNC: 24 MMOL/L (ref 20–29)
CREAT SERPL-MCNC: 1 MG/DL (ref 0.57–1)
ERYTHROCYTE [DISTWIDTH] IN BLOOD BY AUTOMATED COUNT: 11.4 % (ref 11.7–15.4)
GLOBULIN SER CALC-MCNC: 2.5 G/DL (ref 1.5–4.5)
GLUCOSE SERPL-MCNC: 753 MG/DL (ref 65–99)
HCT VFR BLD AUTO: 44.3 % (ref 34–46.6)
HDLC SERPL-MCNC: 39 MG/DL
HGB BLD-MCNC: 14 G/DL (ref 11.1–15.9)
INTERPRETATION, 910389: NORMAL
LDLC SERPL CALC-MCNC: 178 MG/DL (ref 0–99)
Lab: NORMAL
MCH RBC QN AUTO: 32.5 PG (ref 26.6–33)
MCHC RBC AUTO-ENTMCNC: 31.6 G/DL (ref 31.5–35.7)
MCV RBC AUTO: 103 FL (ref 79–97)
PLATELET # BLD AUTO: 204 X10E3/UL (ref 150–450)
POTASSIUM SERPL-SCNC: 4.5 MMOL/L (ref 3.5–5.2)
PROT SERPL-MCNC: 6.8 G/DL (ref 6–8.5)
RBC # BLD AUTO: 4.31 X10E6/UL (ref 3.77–5.28)
SODIUM SERPL-SCNC: 128 MMOL/L (ref 134–144)
TRIGL SERPL-MCNC: 207 MG/DL (ref 0–149)
TSH SERPL DL<=0.005 MIU/L-ACNC: 0.52 UIU/ML (ref 0.45–4.5)
VLDLC SERPL CALC-MCNC: 39 MG/DL (ref 5–40)
WBC # BLD AUTO: 9.9 X10E3/UL (ref 3.4–10.8)

## 2020-10-23 NOTE — TELEPHONE ENCOUNTER
Pt states she feels good and she is not home now but she will check glucose when she gets home. Pt originally stated her glucose this morning was 140, I informed pt glucose was 753 yesterday, pt then stated she did not check her glucose and will not be able to at this time.

## 2020-10-23 NOTE — TELEPHONE ENCOUNTER
Pt states her glucose lowered 3 points. Informed pt provider states if glucose is over 500 she needs to go to ED. Pt states she will go.

## 2020-10-26 ENCOUNTER — TELEPHONE (OUTPATIENT)
Dept: INTERNAL MEDICINE CLINIC | Age: 55
End: 2020-10-26

## 2020-10-26 NOTE — TELEPHONE ENCOUNTER
chang webb  Called to check sugars  448, 475, 274  Did not check yet today  Taking DM meds but admits to not taking statin regularly  Discussed importance of this  Cont checking sugar daily, she has fu on  but advised to call if sugars increase back up to 400s  Pt verb understanding

## 2020-10-29 ENCOUNTER — TELEPHONE (OUTPATIENT)
Dept: INTERNAL MEDICINE CLINIC | Age: 55
End: 2020-10-29

## 2020-10-29 RX ORDER — LANCETS
EACH MISCELLANEOUS
Qty: 1 EACH | Refills: 11 | Status: SHIPPED | OUTPATIENT
Start: 2020-10-29

## 2020-10-29 RX ORDER — INSULIN PUMP SYRINGE, 3 ML
EACH MISCELLANEOUS
Qty: 1 KIT | Refills: 0 | Status: SHIPPED | OUTPATIENT
Start: 2020-10-29 | End: 2022-09-07 | Stop reason: SDUPTHER

## 2020-10-29 NOTE — TELEPHONE ENCOUNTER
chang webb  Called to check on sugars  Now she states she can't find her meter  She reports taking meds  She has appt w/ DM ed tomorrow  Diet is poor  Cocoa puff cereal and raisin bread today  Asked that she get her meter asap, discussed will likely need insulin if diet/sugars don't improve

## 2020-11-04 ENCOUNTER — TELEPHONE (OUTPATIENT)
Dept: INTERNAL MEDICINE CLINIC | Age: 55
End: 2020-11-04

## 2020-11-04 RX ORDER — SUMATRIPTAN 100 MG/1
TABLET, FILM COATED ORAL
Qty: 9 TAB | Refills: 1 | Status: SHIPPED | OUTPATIENT
Start: 2020-11-04 | End: 2020-12-31

## 2020-11-04 NOTE — TELEPHONE ENCOUNTER
----- Message from Radha Prakash.  Jonnathan Gonzalez NP sent at 11/4/2020  9:15 AM EST -----  She is coming in tomorrow- can you call her to ask that she brings her meter in  thanks

## 2020-11-04 NOTE — TELEPHONE ENCOUNTER
Informed pt provider would like for pt to bring in meter to OV tomorrow.  Pt understood and states she will

## 2020-11-05 ENCOUNTER — OFFICE VISIT (OUTPATIENT)
Dept: INTERNAL MEDICINE CLINIC | Age: 55
End: 2020-11-05

## 2020-11-05 ENCOUNTER — CLINICAL SUPPORT (OUTPATIENT)
Dept: DIABETES SERVICES | Age: 55
End: 2020-11-05
Payer: COMMERCIAL

## 2020-11-05 VITALS
SYSTOLIC BLOOD PRESSURE: 120 MMHG | HEART RATE: 91 BPM | TEMPERATURE: 98.2 F | WEIGHT: 134 LBS | RESPIRATION RATE: 16 BRPM | HEIGHT: 60 IN | BODY MASS INDEX: 26.31 KG/M2 | DIASTOLIC BLOOD PRESSURE: 85 MMHG | OXYGEN SATURATION: 96 %

## 2020-11-05 DIAGNOSIS — E11.65 UNCONTROLLED TYPE 2 DIABETES MELLITUS WITH HYPERGLYCEMIA (HCC): Primary | ICD-10-CM

## 2020-11-05 PROCEDURE — G0108 DIAB MANAGE TRN  PER INDIV: HCPCS

## 2020-11-05 RX ORDER — MULTIVIT-MINS NO.7/FOLIC ACID 1 MG
CAPSULE ORAL DAILY
Status: CANCELLED | OUTPATIENT
Start: 2020-11-05

## 2020-11-05 NOTE — PROGRESS NOTES
[facility] Program for Diabetes Health  Diabetes Self-Management Education   Pre-program Assessment    Reason for Referral: E11.65 Uncontrolled Type 2 DM with hyperglycemia  Referral Source: Lula Walsh, *    Metric Patient responses (11/5/2020)   A1c  (Goal: 7%)   Recent value:   Lab Results   Component Value Date/Time    Hemoglobin A1c 5.7 07/08/2017 09:10 AM    Hemoglobin A1c (POC) 14.4 10/22/2020 01:43 PM        Healthy Eating     24-hour Dietary Recall:  Breakfast: 2 scrambled eggs with cheese, 2 turkey sausage links, 4 oz container of unsweetened applesauce  Lunch: 2 pieces of breaded, fried fish, 4 oz container unsweetened applesauce  Dinner: 1 hamburger ivette with cheese, lettuce, tomato and mayonnaise on a white hamburger bun, 4 oz unsweetened applesauce, 4 oz mixed fruit in syrup   Snacks: HS snack-3 full lexii crackers  Beverages: water only add day per pt   Alcohol: none    Stage of change: Contemplation     Being Active     Physical Activity Vital Sign:  How many days during the past week have you performed physical activity where your heart beats faster and your breathing is harder than normal for 30 minutes or more? 2 days    How many days in a typical week do you perform activity such as this?  2 days    Stage of change: Action     Patient is sight impaired and cannot drive, she says she enjoys walking and typically walks 30 min or more around her neighborhood on Mondays and Fridays, she also reports doing leg lifts in her chair for an hour 2 times a week. Monitoring Do you monitor your blood sugar? Yes    How often do you monitor? 3x/day    What are the range of readings? 155-high mg/dL  Patient states that she frequently gets over 250 on her meter and also gets frequent readings of \"high\", she has been checking before breakfast and then right after lunch and dinner, briefly discussed checking before meals only and looking for a target number of 130.     Do you know your last A1c measurement? Yes    Do you know the meaning of the A1c? No    Stage of change: Contemplation     Taking Medications Medication Management:  Do you understand what your diabetes medications do? No    Can you afford your diabetes medications? [employer] insurance    How often do you miss doses of your diabetes medications? Never    Current dosing:   Key Antihyperglycemic Medications             glipiZIDE (GLUCOTROL) 10 mg tablet Take 1 Tab by mouth two (2) times a day. With food    Januvia 100 mg tablet take 1 tablet by mouth once daily        Patient is confused about medications and does not think she is taking Januvia, pt is going to f/u with NP to clarify meds    Blood Pressure Management:  Key ACE/ARB Medications     Patient is on no ACE or ARB meds. Lipid Management:  Key Antihyperlipidemia Meds             atorvastatin (LIPITOR) 20 mg tablet take 1 tablet by mouth nightly          Clot Prevention:  Key Anti-Platelet Anticoagulant Meds     The patient is on no antiplatelet meds or anticoagulants. Stage of change: Contemplation     Healthy Coping Diabetes Skills, Confidence and Preparedness Index:   Total score: 4.4  Skills: 2.7  Confidence: 5.3  Preparedness: 6.0    Stage of change: Contemplation     Reducing Risks Vaccines:  Influenza: Patient states that she does not get the flu shot    Pneumococcal: Patient states that she thinks she had the pneumonia shot a few years ago    Hepatitis: Patient states that she does not recall getting the Hep B series    Examinations:   Eye Exam:  Patient had an eye exam in April 2020 and sees the eye  regularly    Dental exam: patient does not have a dentist    Foot exam: patient reports this was done at her last NP appt on 10/22/2020    Heart Protection:  BP Readings from Last 2 Encounters:   11/05/20 120/85   10/22/20 120/85        Lab Results   Component Value Date/Time    LDL, calculated 171 (H) 03/12/2020 01:47 PM    LDL Chol Calc (NIH) 178 (H) 10/22/2020 02:44 PM        Kidney Protection:  No results found for: MCACR, MCA1, MCA2, MCA3, MCAU, MCAU2, MCALPOCT    Patient-reported diabetes complications:  High cholesterol    Stage of change: Contemplation     Problem Solving Hypoglycemia Management:  What are signs and symptoms of hypoglycemia that you experience? Pt reported being unaware of s/s of hypoglycemia    How do you prevent hypoglycemia? Pt reported being unaware of how to prevent hypoglycemia    How do you treat hypoglycemia? Pt reported being unaware of how to treat hypoglycemia    Hyperglycemia Management:  What are signs and symptoms of hyperglycemia that you experience? Fatigue, Dizziness    How can you prevent hyperglycemia? \"Don't drink soda and juice\"    Sick Day Management:  What do you do differently on sick days? Pt reported being unaware of self-management on sick days    Pattern Management:  Do you notice blood glucose patterns when you look at the readings in your meter or logbook? No    How do you use the blood glucose readings from your meter or logbook?  Pt reported being unaware of pattern management skills    Stage of change: Contemplation   Note: Content derived from the American Association of Diabetes Educators' Diabetes Education Curriculum: A Guide to Successful Self-Management (2nd edition)      Summary of Visit:  Patient is a 48year old  female that reports having DM x 5years, pt is aware that she has Type 2 DM, pt is also aware that her A1C is very high and her target is 7, she is beginning to contemplate the changes she needs to [facility] in her eating, to get to her target number, she reports trying to reduce the amount of soda and juice she consumes but wants to know more about healthy eating, patient is checking her BG 3x/day, she reports checking a fasting BG each morning but is checking after she eats lunch and dinner, encouraged patient to check her BG before each meal, pt verbalizes understanding, patient is also unclear about her diabetic medications, she is going to f/u with NP to clarify that she is taking the correct medications, patient appeared anxious during the assessment and asked multiple times if we were almost done, I believe this patient will benefit from individual sessions that are no more than 30-45 in length, she does not feel that she can come weekly and I assured her that I can work with her schedule, she was agreeable to return on 11/19/2020 to begin diabetes education. Diabetes Educator Recommendations:   - Address diabetes self-care behaviors through education and support using AADE7 Curriculum. Pt to attend individual sessions on reducing risks, healthy eating, being active, monitoring, taking medications, healthy coping and problem solving.     - Patient intends to focus on these diabetes self-care practices: Healthy eating      - Pt to follow up with provider on the following: pt will clarify diabetes medications with NP as she seems to be confused about which meds she should be taking       Diabetes Self-Management Education Follow-up Visit: 11/19/2020 @ 2pm           Diabetes Skills, Confidence & Preparedness Index (SCPI) ©      Overall SCPI score: 4.4 Skills Score: 2.7  Low: Taking Medication(Q2),Blood Sugar Monitoring(Q3),Blood Sugar Monitoring(Q4),Reducing Risks(Q5),Problem Solving(Q6),Healthy Coping(Q7),Blood Sugar Monitoring(Q8) Confidence Score: 5.3  Low: Reducing Risks(Q3) Preparedness Score: 6.0  Low: Reducing Risks(Q4)   Healthy Eating Score: 5.5  Low: Skills(Q1),Confidence(Q4) Taking Medication Score: 2.0  Low: Skills(Q2) Blood Sugar Monitoring Score: 3.6  Low: Skills(Q3),Skills(Q4),Skills(Q8) Reducing Risks Score: 3.5  Low: Skills(Q5),Confidence(Q3)   Problem Solving Score: 4.7  Low: Skills(Q6) Healthy Coping Score: 4.7  Low: MSCIWA(K1) Being Active Score: 6.5  Low: Confidence(Q5)     Skills/Knowledge Questions   1.  I know how to plan meals that have the best balance between carbohydrates, proteins and vegetables. 5   2. I know how my diabetes medications (pills, injectables and/or insulin) work in my body. 2   3. I know when to check my blood sugar if I want to see how my body responded to a meal. 2   4. I know when to check my blood sugars to determine if my medication or insulin doses are correct. 2   5. I know what to do to prevent a low blood sugar when I exercise (either before, during, or after). 2   6. When I am sick, I know what to do differently with my diabetes management. 2   7. I know how stress can affect my diabetes management. 2   8. When I look at my blood sugars over a given week, I can explain what my blood sugar pattern is. 2   9. I know what my target levels are for A1c, blood pressure and cholesterol. 5   Confidence Questions   1. I am confident that I can plan balanced meals and snacks. 6   2. I am confident that I can manage my stress. 6   3. I am confident that I can prevent a low blood sugar during or after exercise. 2   4. I am confident that the next time I eat out, I will be able to choose foods that best keep my blood sugars in target. 5   5. I am confident I can include exercise into my schedule. 6   6. I am confident that I can use my daily blood sugars to adjust my diet, my activity, and/or my insulin. 6   7. When something out of my normal routine happens, I am confident that I can problem-solve and keep my diabetes on track. 6   Preparedness Questions   1. Within the next month, I will begin to eat more balanced meals and snacks. 6   2. Within the next month, I will choose an exercise activity and I will start fitting it into my schedule. 8   3. Within the next month, I will make a list of stress management options that work for me. 6   4. Within the next month, I will consistently plan ahead to prevent low blood sugars. 5   5. Within the next month, I will start adjusting my insulin doses on my own. 0   6.  Within the next month, I will begin making changes to my diabetes management based on my daily blood sugars (eg - eating, activity and/or insulin). 6   7. Within the next month, I will begin making changes to my diabetes management to meet my overall goals (eg - eating, activity and/or insulin). 6                    --More Rivas RN on 11/5/2020 at 2:15 PM    An electronic signature was used to authenticate this note.  I was in the office for the appointment and time spent: 35 minutes

## 2020-11-05 NOTE — PROGRESS NOTES
Chief Complaint   Patient presents with    Complete Physical       1. Have you been to the ER, urgent care clinic since your last visit? Hospitalized since your last visit? No    2. Have you seen or consulted any other health care providers outside of the [address] [place] [address] since your last visit? Include any pap smears or colon screening.  No

## 2020-11-09 NOTE — PROGRESS NOTES
Pt had DM ed visit today, is declining pap - will r/s visit for 2 weeks when she has DM visit next  Pt agrees

## 2020-11-11 ENCOUNTER — TELEPHONE (OUTPATIENT)
Dept: INTERNAL MEDICINE CLINIC | Age: 55
End: 2020-11-11

## 2020-11-19 ENCOUNTER — OFFICE VISIT (OUTPATIENT)
Dept: INTERNAL MEDICINE CLINIC | Age: 55
End: 2020-11-19
Payer: COMMERCIAL

## 2020-11-19 ENCOUNTER — CLINICAL SUPPORT (OUTPATIENT)
Dept: DIABETES SERVICES | Age: 55
End: 2020-11-19
Payer: COMMERCIAL

## 2020-11-19 ENCOUNTER — HOSPITAL ENCOUNTER (OUTPATIENT)
Dept: LAB | Age: 55
Discharge: HOME | End: 2020-11-19
Payer: COMMERCIAL

## 2020-11-19 VITALS
BODY MASS INDEX: 27.09 KG/M2 | SYSTOLIC BLOOD PRESSURE: 125 MMHG | HEART RATE: 101 BPM | OXYGEN SATURATION: 96 % | TEMPERATURE: 98.2 F | DIASTOLIC BLOOD PRESSURE: 80 MMHG | HEIGHT: 60 IN | RESPIRATION RATE: 16 BRPM | WEIGHT: 138 LBS

## 2020-11-19 DIAGNOSIS — E11.65 UNCONTROLLED TYPE 2 DIABETES MELLITUS WITH HYPERGLYCEMIA (HCC): Primary | ICD-10-CM

## 2020-11-19 DIAGNOSIS — Z12.4 CERVICAL CANCER SCREENING: ICD-10-CM

## 2020-11-19 LAB — GLUCOSE POC: 305 MG/DL

## 2020-11-19 PROCEDURE — 99214 OFFICE O/P EST MOD 30 MIN: CPT | Performed by: NURSE PRACTITIONER

## 2020-11-19 PROCEDURE — 82962 GLUCOSE BLOOD TEST: CPT | Performed by: NURSE PRACTITIONER

## 2020-11-19 PROCEDURE — G0108 DIAB MANAGE TRN  PER INDIV: HCPCS

## 2020-11-19 PROCEDURE — 88175 CYTOPATH C/V AUTO FLUID REDO: CPT

## 2020-11-19 PROCEDURE — 87624 HPV HI-RISK TYP POOLED RSLT: CPT

## 2020-11-19 NOTE — PROGRESS NOTES
[facility]  Diabetes Self-Management Education   Education and Goal Plan for Session #1    AADE7 Self-Care Behaviors:  Behavior Education Completed (11/19/2020)   Healthy Eating   - Meal and snack timing     Being Active   Not addressed during this session. Monitoring     - ADA blood glucose targets  - A1c interpretation  - Logging blood glucose results   Taking Medications - Medication schedule   Healthy Coping Not addressed during this session. Reducing Risks - Hgb A1c target  - discussed how there are some long term risks related especially to the eyes, teeth, heart and kidneys and how these risks can be decreased by getting the A1C down and getting some yearly exams and bloodwork done, we will discuss more in detail next visit   Problem Solving Not addressed during this session.    Note: Content derived from the American Association of Diabetes Educators' Diabetes Education Curriculum: A Guide to Successful Self-Management (2nd edition)       Summary of Visit:  Patient came to the office today to see the NP followed by a short diabetes education session with me, she has a limited attention capacity and is only able to retain small amounts of information, she is also site impaired so information must be written out in large print for her, she declined the patient education handbook as the print is too small, I am going to work on getting her some large print material for our next visit,  The NP discovered that the patient has not been taking her meds as prescribed, the NP reviewed and wrote down the correct meds and schedule for the patient and then I went in and reviewed it several times again,  Pt verbalizes understanding after multiple reviewing, I asked patient to discard the Glimeperide so she does not confuse it with the Glipizide, pt agrees to do this at home, patient states that she is checking her BG with her meter at home, she did not bring her meter with her today, she states that her morning fasting BG has ranged between  but she has seen a number as high as 344 in the afternoon/evening, we talked about BG targets and I wrote them out for her in large print, pt verbalizes understanding,  The office nurse checked the pt's BG during the NP visit today and it was 300, it is known that she has not been taking her medications correctly, the patient also shared that she is snacking during the day between meals, we talked briefly about trying to eat a well balanced meal every 4-5 hours and not snacking in between, patient is reluctant to give up her snacks, we will talk more about food at the next visit, patient asked several times if we were done yet and put on her coat, we will most likely do multiple short visits as this seems to be the way she will best absorb the information, I will see her again the week after Thanksgiving. Diabetes Educator Recommendations:     - Continue addressing diabetes self-care behaviors through education and support in [address] individual sessions on reducing risks, healthy eating, being active, monitoring, taking medications, healthy coping and problem solving.     - Continue practicing knowledge and skills relating to monitoring and medications to improve diabetes self-management. - Patient's Identified SMART Goal(s): Patient will follow written instructions for medications daily which includes taking Glipizide BID and Januvia every day and DO NOT take Glimeperide.     - Pt to follow up with provider on the following: no f/u items with provider at this time     Diabetes Self-Management Education Follow-up Visit: 12/03/2020      --Doris Robertson RN on 11/19/2020 at 2:24 PM    An electronic signature was used to authenticate this note.  I was in the office for the appointment and time spent: 20 minutes

## 2020-11-19 NOTE — PATIENT INSTRUCTIONS
Stop the glimeperide    Take glipizide and geoffrey Pratt has been resent to your pharmacy)    You should not be on glimeperide and glipizide together

## 2020-11-19 NOTE — PROGRESS NOTES
Chief Complaint   Patient presents with   [place] Exam       1. Have you been to the ER, urgent care clinic since your last visit? Hospitalized since your last visit? No    2. Have you seen or consulted any other health care providers outside of the [address] [place] [address] since your last visit? Include any pap smears or colon screening.  No

## 2020-11-19 NOTE — PROGRESS NOTES
Subjective:   48 y.o. female for Well Woman Check. No LMP recorded (lmp unknown). Patient is postmenopausal.    Social History: not sexually active. Pertinent past medical hstory: current smoker. Patient Active Problem List   Diagnosis Code    Schizoaffective disorder, bipolar type ([facility] [address] F25.0    Vision impairment H54.7    Essential hypertension I10    Depression F32.9    [facility] F20.9        Review of Systems:   Constitutional:    Negative for fever and chills, negative diaphoresis. HEENT:              Negative for neck pain and stiffness. Eyes:                  Negative for visual disturbance, itching, redness or discharge. Respiratory:        Negative for cough and shortness of breath. Cardiovascular:  Negative for chest pain and palpitations. Gastrointestinal: Negative for nausea, vomiting, abdominal pain, diarrhea and             constipation. Genitourinary:     Negative for dysuria and frequency. Musculoskeletal: Negative for falls, tenderness and swelling. Skin:                    Negative for rash, masses or lesions. Neurological:       Negative for dizzyness, seizure, loss of consciousness, weakness and numbness. Diabetic Review of Systems - medication compliance: she has been taking glimeperide and glipizide together. She is not taking [place] [facility] [place], diabetic diet compliance: probably noncompliant though I cannot elicit that specific history, home glucose monitoring: she claims to be checking sugars and getting values between ? She did not bring her meter. She has appt w/ DM education today    Mammo: ordered but not done yet      Objective:     Visit Vitals  /80 (BP 1 Location: Left arm, BP Patient Position: Sitting)   Pulse (!) 101   Temp 98.2 °F (36.8 °C) (Temporal)   Resp 16   Ht 5' (1.524 m)   Wt 138 lb (62.6 kg)   LMP  (LMP Unknown)   SpO2 96%   BMI 26.95 kg/m²     The patient appears well, alert, oriented x 3, in no distress.   ENT normal.  Neck supple. No adenopathy or thyromegaly. AZALIA. Lungs are clear, good air entry, no wheezes, rhonchi or rales. S1 and S2 normal, no murmurs, regular rate and rhythm. Abdomen soft without tenderness, guarding, mass or organomegaly. Extremities show no edema, normal peripheral pulses. Neurological is normal, no focal findings. PELVIC EXAM: normal external genitalia, vulva, vagina, cervix, uterus and adnexa    Assessment/Plan:   well woman  mammogram  pap smear  Follow-up and Dispositions    · Return in about 2 months (around 1/22/2021) for dm. .    1. Uncontrolled type 2 diabetes mellitus with hyperglycemia ([facility] [address]  Written (large print-- pt w vision impairment) and verbal instructions on meds- glipizide and januva only  - AMB POC GLUCOSE BLOOD, BY GLUCOSE MONITORING DEVICE    2. Cervical cancer screening    - PAP IG, HPV AND RFX HPV 24/32,57([id number]);  Future

## 2020-12-03 ENCOUNTER — CLINICAL SUPPORT (OUTPATIENT)
Dept: DIABETES SERVICES | Age: 55
End: 2020-12-03
Payer: COMMERCIAL

## 2020-12-03 DIAGNOSIS — E11.65 UNCONTROLLED TYPE 2 DIABETES MELLITUS WITH HYPERGLYCEMIA (HCC): Primary | ICD-10-CM

## 2020-12-03 PROCEDURE — G0108 DIAB MANAGE TRN  PER INDIV: HCPCS

## 2020-12-03 NOTE — PROGRESS NOTES
[facility]  Diabetes Self-Management Education   Education and Goal Plan for Session #2    AADE7 Self-Care Behaviors:  Behavior Education Completed (12/3/2020)   Healthy Eating   - Impact of food on blood glucose levels  - Food sources of carbohydrates  - Meal size and portions  - Reading food labels  - Balanced plate  - Meal and snack timing  - Carbohydrate counting   - Importance of a variety of foods  - Importance of not skipping meals  - Eating breakfast  - Controlling portions of carbohydrates  - Reducing sugar-sweetened beverages  - Improving water intake  - Comparing food choices  - Alcohol and diabetes  - Meal consistency     Being Active   Not addressed during this session. Monitoring     - ADA blood glucose targets  - Frequency of monitoring  - Blood glucose schedule  - A1c interpretation  - Monitoring blood glucose trends per meter  - Glucose monitoring technique  - Disposal of used lancets or needles  - Logging blood glucose results   Taking Medications - Medication schedule   Healthy Coping Not addressed during this session. Reducing Risks Education delivered in previous session. Problem Solving Not addressed during this session.    Note: Content derived from the American Association of Diabetes Educators' Diabetes Education Curriculum: A Guide to Successful Self-Management (2nd edition)      Summary of Visit:  Patient came to office today for her second session of diabetes education, she brought all of her meds with her and was able to verbally state which meds she is taking for DM and when she takes them,  She also reports that she discarded the Glimepiride so that she does not get confused about which med to take, she brought her meter to the appt today, it is a special meter for site impaired and I could not figure out how to access any stored BGs, she reports that her morning BGs have been running in the 80s to 170s, she ate lunch at 12 noon today, while in the office I had her check her BG at 2pm and it was 277, while this is high, it is better than her previous visit, we reviewed the BG targets, pt had forgotten them but does have them written down at home from her last visit with me, I asked her to look at the targets each time she checks her BG to see if she is in target or not,  patient demonstrates a good technique for checking her BG although I demonstrated how she could prick her finger a little more on the side instead of in the middle of her fingertip, pt verbalizes understanding, pt reports that she eats 3 meals/day and they are spaced about 4-5 hours apart, pt was able to verbalize examples of carbs, proteins and non starchy vegetables, we talked about trying to include portions of each food group on her plate, according to patient she is already doing this, she does like corn a lot and we talked about how corn is a healthy food but is a starchy vegetable so it will affect her BG so she needs to limit her portions of corn, pt states that she has cut back on juice, soda, sweet tea and cereal, I congratulated her on those big changes, she does not want to meet again until after the holidays, she wants to continue her goal of taking her meds as prescribed, I have also encouraged her to have her friend write down her BGs to bring in to me next time we meet so that we can look at patterns, she is agreeable, I will see her again on 1/14/2021 to continue her diabetes education. Diabetes Educator Recommendations:     - Continue addressing diabetes self-care behaviors through education and support in [address] individual sessions on reducing risks, healthy eating, being active, monitoring, taking medications, healthy coping and problem solving.     - Continue practicing knowledge and skills relating to healthy eating, monitoring and medications to improve diabetes self-management.      - Patient's Identified SMART Goal(s): - Patient will continue to take DM meds exactly as prescribed at the same time each day     - Pt to follow up with provider on the following: no f/u items at this time     Diabetes Self-Management Education Follow-up Visit: 01/14/2021      --Milagro Mueller RN on 12/3/2020 at 3:31 PM    An electronic signature was used to authenticate this note.  I was in the office for the appointment and time spent: 45 minutes

## 2020-12-31 RX ORDER — SUMATRIPTAN 100 MG/1
TABLET, FILM COATED ORAL
Qty: 9 TAB | Refills: 1 | Status: SHIPPED | OUTPATIENT
Start: 2020-12-31 | End: 2021-03-01

## 2021-01-05 RX ORDER — ATORVASTATIN CALCIUM 20 MG/1
TABLET, FILM COATED ORAL
Qty: 90 TAB | Refills: 0 | Status: SHIPPED | OUTPATIENT
Start: 2021-01-05 | End: 2021-03-22 | Stop reason: SDUPTHER

## 2021-01-06 RX ORDER — MELATONIN
Qty: 90 TAB | Refills: 0 | Status: SHIPPED | OUTPATIENT
Start: 2021-01-06 | End: 2021-04-14

## 2021-01-13 RX ORDER — SITAGLIPTIN 100 MG/1
TABLET, FILM COATED ORAL
Qty: 90 TAB | Refills: 0 | Status: SHIPPED | OUTPATIENT
Start: 2021-01-13 | End: 2021-04-15 | Stop reason: SDUPTHER

## 2021-01-14 DIAGNOSIS — E11.65 UNCONTROLLED TYPE 2 DIABETES MELLITUS WITH HYPERGLYCEMIA (HCC): ICD-10-CM

## 2021-01-15 RX ORDER — GLIPIZIDE 10 MG/1
TABLET ORAL
Qty: 180 TAB | Refills: 0 | Status: SHIPPED | OUTPATIENT
Start: 2021-01-15 | End: 2021-03-22 | Stop reason: SINTOL

## 2021-03-01 RX ORDER — SUMATRIPTAN 100 MG/1
TABLET, FILM COATED ORAL
Qty: 9 TAB | Refills: 1 | Status: SHIPPED | OUTPATIENT
Start: 2021-03-01 | End: 2021-11-04 | Stop reason: ALTCHOICE

## 2021-03-04 ENCOUNTER — HOSPITAL ENCOUNTER (EMERGENCY)
Age: 56
Discharge: HOME | End: 2021-03-04
Attending: EMERGENCY MEDICINE
Payer: MEDICAID

## 2021-03-04 VITALS
RESPIRATION RATE: 16 BRPM | BODY MASS INDEX: 27.09 KG/M2 | OXYGEN SATURATION: 100 % | HEART RATE: 93 BPM | SYSTOLIC BLOOD PRESSURE: 103 MMHG | HEIGHT: 60 IN | WEIGHT: 138 LBS | TEMPERATURE: 98.5 F | DIASTOLIC BLOOD PRESSURE: 70 MMHG

## 2021-03-04 DIAGNOSIS — R53.83 FATIGUE, UNSPECIFIED TYPE: Primary | ICD-10-CM

## 2021-03-04 LAB
ALBUMIN SERPL-MCNC: 3.4 G/DL (ref 3.5–5)
ALBUMIN/GLOB SERPL: 0.8 {RATIO} (ref 1.1–2.2)
ALP SERPL-CCNC: 153 U/L (ref 45–117)
ALT SERPL-CCNC: 18 U/L (ref 12–78)
ANION GAP SERPL CALC-SCNC: 8 MMOL/L (ref 5–15)
APPEARANCE UR: ABNORMAL
AST SERPL-CCNC: 14 U/L (ref 15–37)
BACTERIA URNS QL MICRO: NEGATIVE /HPF
BASOPHILS # BLD: 0 K/UL (ref 0–0.1)
BASOPHILS NFR BLD: 0 % (ref 0–1)
BILIRUB SERPL-MCNC: 0.2 MG/DL (ref 0.2–1)
BILIRUB UR QL: NEGATIVE
BUN SERPL-MCNC: 3 MG/DL (ref 6–20)
BUN/CREAT SERPL: 3 (ref 12–20)
CALCIUM SERPL-MCNC: 9.4 MG/DL (ref 8.5–10.1)
CHLORIDE SERPL-SCNC: 106 MMOL/L (ref 97–108)
CO2 SERPL-SCNC: 30 MMOL/L (ref 21–32)
COLOR UR: ABNORMAL
CREAT SERPL-MCNC: 0.97 MG/DL (ref 0.55–1.02)
DIFFERENTIAL METHOD BLD: NORMAL
EOSINOPHIL # BLD: 0.1 K/UL (ref 0–0.4)
EOSINOPHIL NFR BLD: 1 % (ref 0–7)
EPITH CASTS URNS QL MICRO: ABNORMAL /LPF
ERYTHROCYTE [DISTWIDTH] IN BLOOD BY AUTOMATED COUNT: 13.2 % (ref 11.5–14.5)
GLOBULIN SER CALC-MCNC: 4.1 G/DL (ref 2–4)
GLUCOSE BLD STRIP.AUTO-MCNC: 137 MG/DL (ref 65–100)
GLUCOSE SERPL-MCNC: 143 MG/DL (ref 65–100)
GLUCOSE UR STRIP.AUTO-MCNC: NEGATIVE MG/DL
HCT VFR BLD AUTO: 37.2 % (ref 35–47)
HGB BLD-MCNC: 12.4 G/DL (ref 11.5–16)
HGB UR QL STRIP: NEGATIVE
IMM GRANULOCYTES # BLD AUTO: 0 K/UL (ref 0–0.04)
IMM GRANULOCYTES NFR BLD AUTO: 0 % (ref 0–0.5)
KETONES UR QL STRIP.AUTO: NEGATIVE MG/DL
LEUKOCYTE ESTERASE UR QL STRIP.AUTO: NEGATIVE
LYMPHOCYTES # BLD: 2.8 K/UL (ref 0.8–3.5)
LYMPHOCYTES NFR BLD: 28 % (ref 12–49)
MCH RBC QN AUTO: 32.4 PG (ref 26–34)
MCHC RBC AUTO-ENTMCNC: 33.3 G/DL (ref 30–36.5)
MCV RBC AUTO: 97.1 FL (ref 80–99)
MONOCYTES # BLD: 0.5 K/UL (ref 0–1)
MONOCYTES NFR BLD: 5 % (ref 5–13)
NEUTS SEG # BLD: 6.6 K/UL (ref 1.8–8)
NEUTS SEG NFR BLD: 66 % (ref 32–75)
NITRITE UR QL STRIP.AUTO: NEGATIVE
NRBC # BLD: 0 K/UL (ref 0–0.01)
NRBC BLD-RTO: 0 PER 100 WBC
PH UR STRIP: 6 [PH] (ref 5–8)
PLATELET # BLD AUTO: 263 K/UL (ref 150–400)
PMV BLD AUTO: 9.3 FL (ref 8.9–12.9)
POTASSIUM SERPL-SCNC: 4 MMOL/L (ref 3.5–5.1)
PROT SERPL-MCNC: 7.5 G/DL (ref 6.4–8.2)
PROT UR STRIP-MCNC: NEGATIVE MG/DL
RBC # BLD AUTO: 3.83 M/UL (ref 3.8–5.2)
RBC #/AREA URNS HPF: ABNORMAL /HPF (ref 0–5)
SERVICE CMNT-IMP: ABNORMAL
SODIUM SERPL-SCNC: 144 MMOL/L (ref 136–145)
SP GR UR REFRACTOMETRY: 1.01 (ref 1–1.03)
UA: UC IF INDICATED,UAUC: ABNORMAL
UROBILINOGEN UR QL STRIP.AUTO: 2 EU/DL (ref 0.2–1)
WBC # BLD AUTO: 10 K/UL (ref 3.6–11)
WBC URNS QL MICRO: ABNORMAL /HPF (ref 0–4)

## 2021-03-04 PROCEDURE — 80053 COMPREHEN METABOLIC PANEL: CPT

## 2021-03-04 PROCEDURE — 85025 COMPLETE CBC W/AUTO DIFF WBC: CPT

## 2021-03-04 PROCEDURE — 82962 GLUCOSE BLOOD TEST: CPT

## 2021-03-04 PROCEDURE — 81001 URINALYSIS AUTO W/SCOPE: CPT

## 2021-03-04 PROCEDURE — 36415 COLL VENOUS BLD VENIPUNCTURE: CPT

## 2021-03-04 PROCEDURE — 99285 EMERGENCY DEPT VISIT HI MDM: CPT

## 2021-03-04 NOTE — ED NOTES
Discharge instructions provided. Pt was given copy of discharge instructions with 0 paper script(s) and 0 electronic script(s). Pt verbalized understanding of the medication instructions, and the importance of following up as recommended by EDP. Pt has no further questions at this time. Wheelchair offered from treatment area to hospital entrance, pt declined. Pt leaving ED ambulatory and in company of her caregiver stable condition.

## 2021-03-04 NOTE — ED NOTES
Pt arrives to the ED AAOX4 with a c/c of fatigue and left lower back pain. Pt denies any injury to her back. Pt's mental health caregiver is at bedside and she states pt's mentation has not been the same as always and she was been intermittently confused as evidenced by not remembering specific dates for follow up appointments or when she last measured her blood glucose. Pt's caregiver states pt has a \"fruity smell\" and that she is not eating as usual. Pt is noted in stable condition, now in ED room with side rail up, bed to lowest position and call light within reach. Will continue to monitor and wait for ED provider evaluation. Emergency Department Nursing Plan of Care       The Nursing Plan of Care is developed from the Nursing assessment and Emergency Department Attending provider initial evaluation. The plan of care may be reviewed in the ED Provider note.     The Plan of Care was developed with the following considerations:   Patient / Family readiness to learn indicated by:verbalized understanding  Persons(s) to be included in education: patient  Barriers to Learning/Limitations:No    Signed     [address]    3/4/2021   1:19 PM

## 2021-03-05 NOTE — ED PROVIDER NOTES
EMERGENCY DEPARTMENT HISTORY AND PHYSICAL EXAM      Date: 3/4/2021  Patient Name: Richi Prescott    History of Presenting Illness     Chief Complaint   Patient presents with    Fatigue       History Provided By: Patient and Caregiver    HPI: Richi Prescott, 55 y.o. female with PMHx significant for bipolar disorder, schizophrenia, hypertension, diabetes, who presents with her  due to concerns for increased confusion and \"a fruity smell. \"   accompanies the patient states that she has some baseline confusion but seemed more confused today.  is unable to identify exactly what this means. She also states the patient does not eat regularly but this is not a change. She also reports that the patient smelled \"fruity\" today and given her history of diabetes she was concerned. Patient herself complains of some left lower back pain that is atraumatic. Patient has no other complaints. PCP: Adalberto Snowden., NP    There are no other complaints, changes, or physical findings at this time. Current Outpatient Medications   Medication Sig Dispense Refill    SUMAtriptan (IMITREX) 100 mg tablet TAKE 1 tablet by mouth ONCE AS NEEDED FOR MIGRAINE FOR UP TO 1 DOSE 9 Tab 1    glipiZIDE (GLUCOTROL) 10 mg tablet take 1 tablet by mouth twice a day with food 180 Tab 0    Januvia 100 mg tablet take 1 tablet by mouth once daily 90 Tab 0    cholecalciferol (VITAMIN D3) (1000 Units /25 mcg) tablet take 1 tablet by mouth once daily 90 Tab 0    atorvastatin (LIPITOR) 20 mg tablet take 1 tablet by mouth nightly 90 Tab 0    Blood-Glucose Meter monitoring kit Check sugar in the AM and PM. 1 Kit 0    lancets misc Use as directed.  Dx: e11.65. check sugar once daily 1 Each 11    glucose blood VI test strips (ASCENSIA AUTODISC VI, ONE TOUCH ULTRA TEST VI) strip E11.65 check sugar once daily fasting 100 Strip 1    ondansetron (ZOFRAN ODT) 4 mg disintegrating tablet Take 1 Tab by mouth every eight (8) hours as needed for Nausea. 8 Tab 0    fluticasone propionate (FLONASE) 50 mcg/actuation nasal spray instill 1 spray into each nostril once daily 16 g 1    cetirizine (ZYRTEC) 10 mg tablet take 1 tablet by mouth once daily 90 Tab 0    butalbital-acetaminophen-caffeine (FIORICET, ESGIC) -40 mg per tablet Take 1 Tab by mouth every six (6) hours as needed for Headache or Migraine. 20 Tab 0    lidocaine (LIDODERM) 5 % 1 Patch by TransDERmal route every twelve (12) hours. Apply patch to the affected area for 12 hours a day and remove for 12 hours a day. 30 Each 1    lamoTRIgine (LAMICTAL) 100 mg tablet Take 0.5 Tabs by mouth two (2) times a day. Indications: Bipolar Depression 60 Tab 0    benztropine (COGENTIN) 1 mg tablet Take 1 mg by mouth two (2) times a day.  haloperidol (HALDOL) 5 mg tablet Take 1 Tab by mouth nightly. Indications: psychotic disorder 15 Tab 1    sertraline (ZOLOFT) 50 mg tablet Take 1 Tab by mouth daily. Indications: major depressive disorder 15 Tab 1    traZODone (DESYREL) 50 mg tablet Take 1 Tab by mouth nightly. Indications: major depressive disorder 15 Tab 0    MULTIVIT-MINS NO.7/FOLIC ACID (V-C FORTE PO) Take 1 Cap by mouth daily.        Past History     Past Medical History:  Past Medical History:   Diagnosis Date    [facility]     Diabetes ([facility] [facility]     Hypertension     Psychiatric disorder     Schizophrenia ([facility] [address]      Past Surgical History:  Past Surgical History:   Procedure Laterality Date    HX GYN       x2    HX OTHER SURGICAL      skin grafting    HX OTHER SURGICAL  2016    oral surgery     Family History:  Family History   Problem Relation Age of Onset    Cancer Maternal Aunt     Cancer Maternal Uncle      Social History:  Social History     Tobacco Use    Smoking status: Current Some Day Smoker     Packs/day: 0.25    Smokeless tobacco: Never Used    Tobacco comment: 2-3 per day   Substance Use Topics    Alcohol use: No    Drug use: No     Allergies: Allergies   Allergen Reactions    Aspirin Nausea and Vomiting    Metformin Other (comments)    Morphine Hives     Review of Systems   Review of Systems   Constitutional: Positive for fatigue. Negative for chills and fever. HENT: Negative for congestion, rhinorrhea and sore throat. Respiratory: Negative for cough and shortness of breath. Cardiovascular: Negative for chest pain. Gastrointestinal: Negative for abdominal pain, nausea and vomiting. Genitourinary: Negative for dysuria and urgency. Skin: Negative for rash. Neurological: Negative for dizziness, light-headedness and headaches. All other systems reviewed and are negative. Physical Exam   Physical Exam  Vitals signs and nursing note reviewed. Constitutional:       General: She is not in acute distress. Appearance: She is well-developed. HENT:      Head: Normocephalic and atraumatic. Eyes:      Conjunctiva/sclera: Conjunctivae normal.      Pupils: Pupils are equal, round, and reactive to light. Neck:      Musculoskeletal: Normal range of motion. Cardiovascular:      Rate and Rhythm: Normal rate and regular rhythm. Pulmonary:      Effort: Pulmonary effort is normal. No respiratory distress. Breath sounds: Normal breath sounds. No stridor. Abdominal:      General: There is no distension. Palpations: Abdomen is soft. Tenderness: There is no abdominal tenderness. Musculoskeletal: Normal range of motion. Comments: Left lumbar tenderness to palpation, no midline tenderness   Skin:     General: Skin is warm and dry. Neurological:      General: No focal deficit present. Mental Status: She is alert and oriented to person, place, and time.        Diagnostic Study Results   Labs -     Recent Results (from the past 12 hour(s))   GLUCOSE, POC    Collection Time: 03/04/21  1:00 PM   Result Value Ref Range    Glucose (POC) 137 (H) 65 - 100 mg/dL    Performed by Damaris Mclain ()    CBC WITH AUTOMATED DIFF    Collection Time: 03/04/21  1:23 PM   Result Value Ref Range    WBC 10.0 3.6 - 11.0 K/uL    RBC 3.83 3.80 - 5.20 M/uL    HGB 12.4 11.5 - 16.0 g/dL    HCT 37.2 35.0 - 47.0 %    MCV 97.1 80.0 - 99.0 FL    MCH 32.4 26.0 - 34.0 PG    MCHC 33.3 30.0 - 36.5 g/dL    RDW 13.2 11.5 - 14.5 %    PLATELET 145 249 - 836 K/uL    MPV 9.3 8.9 - 12.9 FL    NRBC 0.0 0  WBC    ABSOLUTE NRBC 0.00 0.00 - 0.01 K/uL    NEUTROPHILS 66 32 - 75 %    LYMPHOCYTES 28 12 - 49 %    MONOCYTES 5 5 - 13 %    EOSINOPHILS 1 0 - 7 %    BASOPHILS 0 0 - 1 %    IMMATURE GRANULOCYTES 0 0.0 - 0.5 %    ABS. NEUTROPHILS 6.6 1.8 - 8.0 K/UL    ABS. LYMPHOCYTES 2.8 0.8 - 3.5 K/UL    ABS. MONOCYTES 0.5 0.0 - 1.0 K/UL    ABS. EOSINOPHILS 0.1 0.0 - 0.4 K/UL    ABS. BASOPHILS 0.0 0.0 - 0.1 K/UL    ABS. IMM. GRANS. 0.0 0.00 - 0.04 K/UL    DF AUTOMATED     METABOLIC PANEL, COMPREHENSIVE    Collection Time: 03/04/21  1:23 PM   Result Value Ref Range    Sodium 144 136 - 145 mmol/L    Potassium 4.0 3.5 - 5.1 mmol/L    Chloride 106 97 - 108 mmol/L    CO2 30 21 - 32 mmol/L    Anion gap 8 5 - 15 mmol/L    Glucose 143 (H) 65 - 100 mg/dL    BUN 3 (L) 6 - 20 MG/DL    Creatinine 0.97 0.55 - 1.02 MG/DL    BUN/Creatinine ratio 3 (L) 12 - 20      GFR est AA >60 >60 ml/min/1.73m2    GFR est non-AA 60 (L) >60 ml/min/1.73m2    Calcium 9.4 8.5 - 10.1 MG/DL    Bilirubin, total 0.2 0.2 - 1.0 MG/DL    ALT (SGPT) 18 12 - 78 U/L    AST (SGOT) 14 (L) 15 - 37 U/L    Alk.  phosphatase 153 (H) 45 - 117 U/L    Protein, total 7.5 6.4 - 8.2 g/dL    Albumin 3.4 (L) 3.5 - 5.0 g/dL    Globulin 4.1 (H) 2.0 - 4.0 g/dL    A-G Ratio 0.8 (L) 1.1 - 2.2     URINALYSIS W/ REFLEX CULTURE    Collection Time: 03/04/21  2:01 PM    Specimen: Urine   Result Value Ref Range    Color YELLOW/STRAW      Appearance CLOUDY (A) CLEAR      Specific gravity 1.010 1.003 - 1.030      pH (UA) 6.0 5.0 - 8.0      Protein Negative NEG mg/dL    Glucose Negative NEG mg/dL Ketone Negative NEG mg/dL    Bilirubin Negative NEG      Blood Negative NEG      Urobilinogen 2.0 (H) 0.2 - 1.0 EU/dL    Nitrites Negative NEG      Leukocyte Esterase Negative NEG      WBC 0-4 0 - 4 /hpf    RBC 0-5 0 - 5 /hpf    Epithelial cells MODERATE (A) FEW /lpf    Bacteria Negative NEG /hpf    UA:UC IF INDICATED CULTURE NOT INDICATED BY UA RESULT CNI         Radiologic Studies -   No orders to display     No results found. Medical Decision Making   I am the first provider for this patient. I reviewed the vital signs, available nursing notes, past medical history, past surgical history, family history and social history. Vital Signs-Reviewed the patient's vital signs. Patient Vitals for the past 12 hrs:   Temp Pulse Resp BP SpO2   03/04/21 1445 -- 93 16 103/70 100 %   03/04/21 1400 -- 95 16 100/72 99 %   03/04/21 1301 98.5 °F (36.9 °C) 97 18 98/79 98 %       Pulse Oximetry Analysis - 100% on ra      Records Reviewed: Nursing Notes and Old Medical Records    Provider Notes (Medical Decision Making):   Patient presents due to concerns for confusion and a \"fruity smell. \"  On arrival her blood glucose was within normal limits. Patient is alert and oriented and able to answer questions appropriately without focal deficits. Will check basic lab work to rule out significant electrolyte balance. Also check urinalysis. ED Course:   Initial assessment performed. The patients presenting problems have been discussed, and they are in agreement with the care plan formulated and outlined with them. I have encouraged them to ask questions as they arise throughout their visit. Lab work and UA unremarkable. Advised that they call the patient's primary care for a follow-up appointment. Return precautions discussed. Procedures:  Procedures    Critical Care:  none    Disposition:  Discharge Note:  The patient has been re-evaluated and is ready for discharge. Reviewed available results with patient.  Counseled patient on diagnosis and care plan. Patient has expressed understanding, and all questions have been answered. Patient agrees with plan and agrees to follow up as recommended, or to return to the ED if their symptoms worsen. Discharge instructions have been provided and explained to the patient, along with reasons to return to the ED. PLAN:  1. Discharge Medication List as of 3/4/2021  2:35 PM        2. Follow-up Information     Follow up With Specialties Details Why Contact Juan Carlos Milligan, NP Nurse Practitioner Schedule an appointment as soon as possible for a visit   [place] [address]  [phone]      [facility] EMERGENCY DEPT Emergency Medicine  As needed, If symptoms worsen [address] [facility]  [phone]        Return to ED if worse     Diagnosis     Clinical Impression:   1. Fatigue, unspecified type            Please note that this dictation was completed with [employer], the computer voice recognition software. Quite often unanticipated grammatical, syntax, homophones, and other interpretive errors are inadvertently transcribed by the computer software. Please disregard these errors.   Please excuse any errors that have escaped final proofreading

## 2021-03-22 ENCOUNTER — OFFICE VISIT (OUTPATIENT)
Dept: INTERNAL MEDICINE CLINIC | Age: 56
End: 2021-03-22
Payer: MEDICAID

## 2021-03-22 ENCOUNTER — TRANSCRIBE ORDER (OUTPATIENT)
Dept: INTERNAL MEDICINE CLINIC | Age: 56
End: 2021-03-22

## 2021-03-22 VITALS
TEMPERATURE: 98.3 F | HEART RATE: 99 BPM | OXYGEN SATURATION: 97 % | HEIGHT: 60 IN | SYSTOLIC BLOOD PRESSURE: 114 MMHG | BODY MASS INDEX: 26.11 KG/M2 | DIASTOLIC BLOOD PRESSURE: 85 MMHG | RESPIRATION RATE: 16 BRPM | WEIGHT: 133 LBS

## 2021-03-22 DIAGNOSIS — E11.649 CONTROLLED TYPE 2 DIABETES MELLITUS WITH HYPOGLYCEMIA, WITHOUT LONG-TERM CURRENT USE OF INSULIN (HCC): Primary | ICD-10-CM

## 2021-03-22 DIAGNOSIS — E78.2 MIXED HYPERLIPIDEMIA: ICD-10-CM

## 2021-03-22 DIAGNOSIS — F20.1 DISORGANIZED SCHIZOPHRENIA (HCC): ICD-10-CM
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
## 2021-03-22 LAB
ALBUMIN UR QL STRIP: 30 MG/L
CREATININE, URINE POC: 50 MG/DL
GLUCOSE POC: 72 MG/DL
HBA1C MFR BLD HPLC: 5.9 %
MICROALBUMIN/CREAT RATIO POC: NORMAL MG/G

## 2021-03-22 PROCEDURE — 82962 GLUCOSE BLOOD TEST: CPT | Performed by: NURSE PRACTITIONER

## 2021-03-22 PROCEDURE — 83036 HEMOGLOBIN GLYCOSYLATED A1C: CPT | Performed by: NURSE PRACTITIONER

## 2021-03-22 PROCEDURE — 99214 OFFICE O/P EST MOD 30 MIN: CPT | Performed by: NURSE PRACTITIONER

## 2021-03-22 PROCEDURE — 82044 UR ALBUMIN SEMIQUANTITATIVE: CPT | Performed by: NURSE PRACTITIONER

## 2021-03-22 RX ORDER — ATORVASTATIN CALCIUM 20 MG/1
TABLET, FILM COATED ORAL
Qty: 90 TAB | Refills: 1 | Status: SHIPPED | OUTPATIENT
Start: 2021-03-22 | End: 2021-09-27

## 2021-03-22 NOTE — PROGRESS NOTES
Subjective: (As above and below)     Chief Complaint   Patient presents with    Follow-up     DM, pt states she has been falling more d/t low blood sugar in 60's    Referral Request     Nadir Damon is a 55y.o. year old female who presents for     Diabetic Review of Systems -   A1c in 10/22 was 14.4 now it is down to 5. 9! She has been checking sugars and has had some low values at 60 w/ symptoms  She is adherent w/ meds and is vague about diet but seems that she has decreased amount she is eating  She has poor dietary recall but states she will eat a hamburger, corn and fried chicken today most likely  Eye exam: utd    Falls: she fell yesterday but can't tell why- denies seizure activity, pain, mechanical fall, dizziness  She thinks her sugar was low but did not check  She fell backwards- she denies pain now      Hypertension ROS:  taking medications as instructed, no medication side effects noted, no TIAs, no chest pain on exertion, no dyspnea on exertion, no swelling of ankles    Bipolar disorder and schizophrenia: followed by psychiatry, reports doing fairly well. She is finding it difficult to manage all of her ADL's cooking, cleaning, seems that she is forgetting to take her cholesterol medication    Mammo: ordered, not done yet    Weight:    Wt Readings from Last 3 Encounters:   03/22/21 133 lb (60.3 kg)   03/04/21 138 lb (62.6 kg)   11/19/20 138 lb (62.6 kg)       Smoking: continues: a few per day    Migraines: stable, imitrex helps        Reviewed PmHx, RxHx, FmHx, SocHx, AllgHx and updated in chart.   Family History   Problem Relation Age of Onset    Cancer Maternal Aunt     Cancer Maternal Uncle        Past Medical History:   Diagnosis Date    B[facility] 1 disorder ([facility] [address]     Diabetes ([facility] [address]     Hypertension     Psychiatric disorder     Schizophrenia ([facility] [address]       Social History     Socioeconomic History    Marital status: SINGLE     Spouse name: Not on file    Number of children: Not on file    Years of education: Not on file    Highest education level: Not on file   Tobacco Use    Smoking status: Current Some Day Smoker     Packs/day: 0.25    Smokeless tobacco: Never Used    Tobacco comment: 2-3 per day   Substance and Sexual Activity    Alcohol use: No    Drug use: No    Sexual activity: Not Currently          Current Outpatient Medications   Medication Sig    atorvastatin (LIPITOR) 20 mg tablet take 1 tablet by mouth nightly    SUMAtriptan (IMITREX) 100 mg tablet TAKE 1 tablet by mouth ONCE AS NEEDED FOR MIGRAINE FOR UP TO 1 DOSE    Januvia 100 mg tablet take 1 tablet by mouth once daily    cholecalciferol (VITAMIN D3) (1000 Units /25 mcg) tablet take 1 tablet by mouth once daily    fluticasone propionate (FLONASE) 50 mcg/actuation nasal spray instill 1 spray into each nostril once daily    cetirizine (ZYRTEC) 10 mg tablet take 1 tablet by mouth once daily    butalbital-acetaminophen-caffeine (FIORICET, ESGIC) -40 mg per tablet Take 1 Tab by mouth every six (6) hours as needed for Headache or Migraine.  lidocaine (LIDODERM) 5 % 1 Patch by TransDERmal route every twelve (12) hours. Apply patch to the affected area for 12 hours a day and remove for 12 hours a day.  lamoTRIgine (LAMICTAL) 100 mg tablet Take 0.5 Tabs by mouth two (2) times a day. Indications: Bipolar Depression    benztropine (COGENTIN) 1 mg tablet Take 1 mg by mouth two (2) times a day.  haloperidol (HALDOL) 5 mg tablet Take 1 Tab by mouth nightly. Indications: psychotic disorder    sertraline (ZOLOFT) 50 mg tablet Take 1 Tab by mouth daily. Indications: major depressive disorder    traZODone (DESYREL) 50 mg tablet Take 1 Tab by mouth nightly. Indications: major depressive disorder    MULTIVIT-MINS NO.7/FOLIC ACID (V-C FORTE PO) Take 1 Cap by mouth daily.  Blood-Glucose Meter monitoring kit Check sugar in the AM and PM.    lancets misc Use as directed.  Dx: e11.65. check sugar once daily    glucose blood VI test strips (ASCENSIA AUTODISC VI, ONE TOUCH ULTRA TEST VI) strip E11.65 check sugar once daily fasting     No current facility-administered medications for this visit. Review of Systems:   Constitutional:    Negative for fever and chills, negative diaphoresis. HEENT:              Negative for neck pain and stiffness. Eyes:                  Negative for visual disturbance, itching, redness or discharge. Respiratory:        Negative for cough and shortness of breath. Cardiovascular:  Negative for chest pain and palpitations. Gastrointestinal: Negative for nausea, vomiting, abdominal pain, diarrhea or constipation. Genitourinary:     Negative for dysuria and frequency. Musculoskeletal: Negative for falls, tenderness and swelling. Skin:                    Negative for rash, masses or lesions. Neurological:       Negative for dizzyness, seizure, loss of consciousness, weakness and numbness.      Objective:     Vitals:    03/22/21 0934   BP: 114/85   Pulse: 99   Resp: 16   Temp: 98.3 °F (36.8 °C)   TempSrc: Temporal   SpO2: 97%   Weight: 133 lb (60.3 kg)   Height: 5' (1.524 m)       Results for orders placed or performed in visit on 03/22/21   AMB POC HEMOGLOBIN A1C   Result Value Ref Range    Hemoglobin A1c (POC) 5.9 %   AMB POC GLUCOSE BLOOD, BY GLUCOSE MONITORING DEVICE   Result Value Ref Range    Glucose POC 72 MG/DL   AMB POC URINE, MICROALBUMIN, SEMIQUANT (3 RESULTS)   Result Value Ref Range    ALBUMIN, URINE POC 30 Negative mg/L    CREATININE, URINE POC 50 mg/dL    Microalbumin/creat ratio (POC)  <30 MG/G             Physical Examination: General appearance - alert, well appearing, and in no distress  Mental status - poor eye contact, giggling during exam, pleasant, poor dietary recall  Chest - clear to auscultation, no wheezes, rales or rhonchi, symmetric air entry  Heart - normal rate, regular rhythm, normal S1, S2, no murmurs, rubs, clicks or gallops  Extremities - no pedal edema noted  Skin - dry skin    Assessment/ Plan:     Follow-up and Dispositions    · Return in about 3 months (around 6/22/2021) for dm. Reprinted mammo  Discussed covid-19 vax-  Declines at this time    1. Controlled type 2 diabetes mellitus with hypoglycemia, without long-term current use of insulin (HCC)  d'c glipizide  - AMB POC HEMOGLOBIN A1C  - AMB POC GLUCOSE BLOOD, BY GLUCOSE MONITORING DEVICE  - AMB POC URINE, MICROALBUMIN, SEMIQUANT (3 RESULTS)  - REFERRAL TO HOME HEALTH    2. Mixed hyperlipidemia  Resume statin, check at next visit    3. Disorganized schizophrenia ([facility] [address]  - [address] [place]      4. Fall, subsequent encounter  ?r/t low sugars    I  have discussed the diagnosis with the patient and/or gaurdian and the intended treatment plan as seen in the above orders. Patient and/or gaurdian has provided input and agrees with goals. The patient has received an after-visit summary and questions were answered concerning future plans. I have discussed medication side effects and warnings with the patient and/or gaurdian as well. D/c glipizide check glucose if future fall, fu [facility]      I have discussed the diagnosis with the patient and the intended plan as seen in the above orders. The patient has received an after-visit summary and questions were answered concerning future plans. Pt conveyed understanding of plan. Medication Side Effects and Warnings were discussed with patient: yes  Patient Labs were reviewed: yes  Patient Past Records were reviewed:  yes    Myke Edwards.  Izaiah Mendez NP

## 2021-03-22 NOTE — PATIENT INSTRUCTIONS
Stop glipizide, check your sugars every morning FASTING.  Goal is between   If you fall- check your sugar

## 2021-03-22 NOTE — PROGRESS NOTES
Chief Complaint   Patient presents with    Follow-up     DM, pt states she has been falling more d/t low blood sugar in 60's    Referral Request             1. Have you been to the ER, urgent care clinic since your last visit? Hospitalized since your last visit? Yes When: 03/04/2021 Where: [facility] Reason for visit: fatigue    2. Have you seen or consulted any other health care providers outside of the [address] [place] [address] since your last visit? Include any pap smears or colon screening.  No

## 2021-04-14 RX ORDER — MELATONIN
Qty: 90 TAB | Refills: 0 | Status: SHIPPED | OUTPATIENT
Start: 2021-04-14 | End: 2021-06-24 | Stop reason: SDUPTHER

## 2021-04-16 ENCOUNTER — TELEPHONE (OUTPATIENT)
Dept: INTERNAL MEDICINE CLINIC | Age: 56
End: 2021-04-16

## 2021-04-16 NOTE — TELEPHONE ENCOUNTER
Hiral/Refill  Received: Yesterday  Message Contents   Leia Campbell [facility] [place] Office Pool             Medication Refill     Caller (if not patient):Vicki HAGAN       Relationship of caller (if not patient): [address] contact number(s): [phone]       Name of medication and dosage if known: replacement for approval for \"januvia 100mg tabs\" per pharmacy and insurance co request       Is patient out of this medication (yes/no): yes       Pharmacy name: [address] [place] [address] listed in chart? (yes/no):yes     Pharmacy phone number:[phone]         Details to clarify the request: n/a       Obdulio Snider

## 2021-06-24 ENCOUNTER — OFFICE VISIT (OUTPATIENT)
Dept: INTERNAL MEDICINE CLINIC | Age: 56
End: 2021-06-24
Payer: MEDICAID

## 2021-06-24 VITALS
HEART RATE: 79 BPM | OXYGEN SATURATION: 98 % | HEIGHT: 60 IN | TEMPERATURE: 98.2 F | SYSTOLIC BLOOD PRESSURE: 124 MMHG | DIASTOLIC BLOOD PRESSURE: 85 MMHG | WEIGHT: 129 LBS | BODY MASS INDEX: 25.32 KG/M2 | RESPIRATION RATE: 16 BRPM

## 2021-06-24 DIAGNOSIS — Z12.31 ENCOUNTER FOR SCREENING MAMMOGRAM FOR MALIGNANT NEOPLASM OF BREAST: Primary | ICD-10-CM

## 2021-06-24 DIAGNOSIS — E11.65 TYPE 2 DIABETES MELLITUS WITH HYPERGLYCEMIA, WITHOUT LONG-TERM CURRENT USE OF INSULIN (HCC): ICD-10-CM

## 2021-06-24 DIAGNOSIS — N76.0 ACUTE VAGINITIS: ICD-10-CM

## 2021-06-24 DIAGNOSIS — K59.00 CONSTIPATION, UNSPECIFIED CONSTIPATION TYPE: ICD-10-CM

## 2021-06-24 DIAGNOSIS — E78.2 MIXED HYPERLIPIDEMIA: ICD-10-CM

## 2021-06-24 LAB
GLUCOSE POC: >450 MG/DL
HBA1C MFR BLD HPLC: 12.7 %

## 2021-06-24 PROCEDURE — 99214 OFFICE O/P EST MOD 30 MIN: CPT | Performed by: NURSE PRACTITIONER

## 2021-06-24 PROCEDURE — 83036 HEMOGLOBIN GLYCOSYLATED A1C: CPT | Performed by: NURSE PRACTITIONER

## 2021-06-24 PROCEDURE — 82962 GLUCOSE BLOOD TEST: CPT | Performed by: NURSE PRACTITIONER

## 2021-06-24 RX ORDER — GLIPIZIDE 5 MG/1
5 TABLET ORAL 2 TIMES DAILY
Qty: 180 TABLET | Refills: 0 | Status: SHIPPED | OUTPATIENT
Start: 2021-06-24 | End: 2021-08-05 | Stop reason: SDUPTHER

## 2021-06-24 RX ORDER — FLUCONAZOLE 150 MG/1
150 TABLET ORAL DAILY
Qty: 1 TABLET | Refills: 0 | Status: SHIPPED | OUTPATIENT
Start: 2021-06-24 | End: 2021-06-25

## 2021-06-24 RX ORDER — POLYETHYLENE GLYCOL 3350 17 G/17G
17 POWDER, FOR SOLUTION ORAL
Qty: 30 PACKET | Refills: 1 | Status: SHIPPED | OUTPATIENT
Start: 2021-06-24 | End: 2022-08-24

## 2021-06-24 RX ORDER — LOSARTAN POTASSIUM 25 MG/1
25 TABLET ORAL DAILY
Qty: 90 TABLET | Refills: 0 | Status: SHIPPED | OUTPATIENT
Start: 2021-06-24 | End: 2021-09-24

## 2021-06-24 RX ORDER — LAMOTRIGINE 25 MG/1
TABLET ORAL
COMMUNITY
Start: 2021-05-31 | End: 2021-08-05

## 2021-06-24 RX ORDER — TRAZODONE HYDROCHLORIDE 50 MG/1
50 TABLET ORAL
Qty: 15 TABLET | Refills: 0 | Status: CANCELLED | OUTPATIENT
Start: 2021-06-24

## 2021-06-24 RX ORDER — LISINOPRIL 2.5 MG/1
2.5 TABLET ORAL DAILY
Qty: 90 TABLET | Refills: 0 | Status: SHIPPED | OUTPATIENT
Start: 2021-06-24 | End: 2021-06-24 | Stop reason: ALTCHOICE

## 2021-06-24 RX ORDER — MELATONIN
Qty: 90 TABLET | Refills: 0 | Status: SHIPPED | OUTPATIENT
Start: 2021-06-24 | End: 2021-07-16

## 2021-06-24 RX ORDER — BUTALBITAL, ACETAMINOPHEN AND CAFFEINE 50; 325; 40 MG/1; MG/1; MG/1
1 TABLET ORAL
Qty: 20 TABLET | Refills: 0 | Status: CANCELLED | OUTPATIENT
Start: 2021-06-24

## 2021-06-24 NOTE — PROGRESS NOTES
Subjective: (As above and below)     Chief Complaint   Patient presents with    Follow-up     DM    Request For New Medication     constipation, yeast infection     Head Pain     pt reports sharp left sided head pain that started on right side of head x 1 week     Weight Loss     Parul Damon is a 55y.o. year old female who presents for     Diabetic Review of Systems - medication compliance: compliant all of the time, diabetic diet compliance: noncompliant much of the time, home glucose monitoring: is performed sporadically. Hyperlipidemia: tolerating statin    Bipolar/schizophrenia: followed by psychiatry    Smoker: not ready to quit    Headache: resolved now but has a L sided sharp pain that was intermittent x 1 week  No change in loc, vision changes    Weight loss: diet is variable    Wt Readings from Last 3 Encounters:   06/24/21 129 lb (58.5 kg)   03/22/21 133 lb (60.3 kg)   03/04/21 138 lb (62.6 kg)     Constipation: no nausea, vomiting, blood in stool, has not used anything otc    Possible yeast infxn: itchy, white discharge      Reviewed PmHx, RxHx, FmHx, SocHx, AllgHx and updated in chart.   Family History   Problem Relation Age of Onset    Cancer Maternal Aunt     Cancer Maternal Uncle        Past Medical History:   Diagnosis Date    [facility]ipolar [facility] disorder [facility] [address] [facility]     Diabetes ([facility] [address] [facility]     Hypertension     Psychiatric disorder     Schizophrenia ([facility]       Social History     Socioeconomic History    Marital status: SINGLE     Spouse name: Not on file    Number of children: Not on file    Years of education: Not on file    Highest education level: Not on file   Tobacco Use    Smoking status: Current Some Day Smoker     Packs/day: 0.25    Smokeless tobacco: Never Used    Tobacco comment: 2-3 per day   Vaping Use    Vaping Use: Never used   Substance and Sexual Activity    Alcohol use: No    Drug use: No    Sexual activity: Not Currently     Social Determinants of Health     Financial Resource Strain:     Difficulty of Paying Living Expenses:    Food Insecurity:     Worried About Running Out of Food in the Last Year:     [address] in the Last Year:    Transportation Needs:     Lack of Transportation (Medical):  Lack of Transportation (Non-Medical):    Physical Activity:     Days of Exercise per Week:     Minutes of Exercise per Session:    Stress:     Feeling of Stress :    Social Connections:     Frequency of Communication with Friends and Family:     Frequency of Social Gatherings with Friends and Family:     Attends Jew Services:     Active Member of Clubs or Organizations:     Attends Club or Organization Meetings:     Marital Status:           Current Outpatient Medications   Medication Sig    cholecalciferol (VITAMIN D3) (1000 Units /25 mcg) tablet take 1 tablet by mouth once daily    SITagliptin (Januvia) 100 mg tablet Take 1 Tablet by mouth daily.  polyethylene glycol (MIRALAX) 17 gram packet Take 1 Packet by mouth daily as needed for Constipation.  fluconazole (DIFLUCAN) 150 mg tablet Take 1 Tablet by mouth daily for 1 day. FDA advises cautious prescribing of oral fluconazole in pregnancy.  glipiZIDE (GLUCOTROL) 5 mg tablet Take 1 Tablet by mouth two (2) times a day.  losartan (COZAAR) 25 mg tablet Take 1 Tablet by mouth daily.  atorvastatin (LIPITOR) 20 mg tablet take 1 tablet by mouth nightly    fluticasone propionate (FLONASE) 50 mcg/actuation nasal spray instill 1 spray into each nostril once daily    cetirizine (ZYRTEC) 10 mg tablet take 1 tablet by mouth once daily    butalbital-acetaminophen-caffeine (FIORICET, ESGIC) -40 mg per tablet Take 1 Tab by mouth every six (6) hours as needed for Headache or Migraine.  lidocaine (LIDODERM) 5 % 1 Patch by TransDERmal route every twelve (12) hours. Apply patch to the affected area for 12 hours a day and remove for 12 hours a day.     lamoTRIgine (LAMICTAL) 100 mg tablet Take 0.5 Tabs by mouth two (2) times a day. Indications: Bipolar Depression    benztropine (COGENTIN) 1 mg tablet Take 1 mg by mouth two (2) times a day.  haloperidol (HALDOL) 5 mg tablet Take 1 Tab by mouth nightly. Indications: psychotic disorder    traZODone (DESYREL) 50 mg tablet Take 1 Tab by mouth nightly. Indications: major depressive disorder    lamoTRIgine (LaMICtal) 25 mg tablet take 1 tablet by mouth twice a day    SUMAtriptan (IMITREX) 100 mg tablet TAKE 1 tablet by mouth ONCE AS NEEDED FOR MIGRAINE FOR UP TO 1 DOSE    Blood-Glucose Meter monitoring kit Check sugar in the AM and PM.    lancets misc Use as directed. Dx: e11.65. check sugar once daily    glucose blood VI test strips (ASCENSIA AUTODISC VI, ONE TOUCH ULTRA TEST VI) strip E11.65 check sugar once daily fasting    sertraline (ZOLOFT) 50 mg tablet Take 1 Tab by mouth daily. Indications: major depressive disorder    MULTIVIT-MINS NO.7/FOLIC ACID (V-C FORTE PO) Take 1 Cap by mouth daily. No current facility-administered medications for this visit. Review of Systems:   Constitutional:    Negative for fever and chills, negative diaphoresis. HEENT:              Negative for neck pain and stiffness. Eyes:                  Negative for visual disturbance, itching, redness or discharge. Respiratory:        Negative for cough and shortness of breath. Cardiovascular:  Negative for chest pain and palpitations. Gastrointestinal: Negative for nausea, vomiting, abdominal pain, diarrhea or + constipation. Genitourinary:     Negative for dysuria and frequency. Musculoskeletal: Negative for falls, tenderness and swelling. Skin:                    Negative for rash, masses or lesions. Neurological:       Negative for dizzyness, seizure, loss of consciousness, weakness and numbness.      Objective:     Vitals:    06/24/21 1336   BP: 124/85   Pulse: 79   Resp: 16   Temp: 98.2 °F (36.8 °C)   TempSrc: Temporal   SpO2: 98%   Weight: 129 lb (58.5 kg)   Height: 5' (1.524 m)       Results for orders placed or performed in visit on 06/24/21   AMB POC HEMOGLOBIN A1C   Result Value Ref Range    Hemoglobin A1c (POC) 12.7 %   AMB POC GLUCOSE BLOOD, BY GLUCOSE MONITORING DEVICE   Result Value Ref Range    Glucose POC >450 MG/DL         Gen: Oriented to person, place and time and well-developed, well-nourished and in no distress. HEENT:    Head: normocephalic and atraumatic. Eyes:  EOM are normal. Pupils equal and round. Neck:  Normal range of motion. Neck supple. Cardiovascular: normal rate, regular rhythm and normal heart sounds. Pulmonary/Chest:  Effort normal and breath sounds normal.  No respiratory distress. No wheezes, no rales. Abdominal: soft, normal  bowel sounds. Musculoskeletal:  No edema, no tenderness. No calf tenderness or edema. Neurological:  Alert, oriented to person, place and time. Skin: skin is warm and dry. Assessment/ Plan:     Follow-up and Dispositions    · Return in about 6 weeks (around 8/5/2021) for sugar check. 1. Encounter for screening mammogram for malignant neoplasm of breast    - [facility] MAMMO BI SCREENING INCL CAD; Future    2. Mixed hyperlipidemia  *    3. Acute vaginitis    - NUSWAB VAGINITIS PLUS  - fluconazole (DIFLUCAN) 150 mg tablet; Take 1 Tablet by mouth daily for 1 day. FDA advises cautious prescribing of oral fluconazole in pregnancy. Dispense: 1 Tablet; Refill: 0    4. Constipation, unspecified constipation type    - polyethylene glycol (MIRALAX) 17 gram packet; Take 1 Packet by mouth daily as needed for Constipation. Dispense: 30 Packet; Refill: 1    5. Type 2 diabetes mellitus with hyperglycemia, without long-term current use of insulin (HCC)  Dm- worsened- previously had hypoglycemia w/ glipizide but was not taking w/ food  Discussed need to take w/ meals    - AMB POC HEMOGLOBIN A1C  - AMB POC GLUCOSE BLOOD, BY GLUCOSE MONITORING DEVICE  - glipiZIDE (GLUCOTROL) 5 mg tablet;  Take 1 Tablet by mouth two (2) times a day. Dispense: 180 Tablet; Refill: 0  - losartan (COZAAR) 25 mg tablet; Take 1 Tablet by mouth daily. Dispense: 90 Tablet; Refill: 0        I have discussed the diagnosis with the patient and the intended plan as seen in the above orders. The patient has received an after-visit summary and questions were answered concerning future plans. Pt conveyed understanding of plan. Medication Side Effects and Warnings were discussed with patient: yes  Patient Labs were reviewed: yes  Patient Past Records were reviewed:  yes    Lia Almonte.  Naif Odell NP

## 2021-06-24 NOTE — PATIENT INSTRUCTIONS
1. Medicine for yeast sent  2. mirilax - daily as needed for constipation  3. Please schedule your mammogram  4. Your diabetes was really good but now it is back high again- restart the glipizide but you must take it with food  5.  New med to protect your kidneys= lisinopril

## 2021-06-24 NOTE — PROGRESS NOTES
Chief Complaint   Patient presents with    Follow-up     DM    Request For New Medication     constipation, yeast infection     Head Pain     pt reports sharp left sided head pain that started on right side of head x 1 week     Weight Loss       1. Have you been to the ER, urgent care clinic since your last visit? Hospitalized since your last visit? No    2. Have you seen or consulted any other health care providers outside of the [address] [place] [address] since your last visit? Include any pap smears or colon screening.  No

## 2021-06-29 LAB
A VAGINAE DNA VAG QL NAA+PROBE: ABNORMAL SCORE
BVAB2 DNA VAG QL NAA+PROBE: ABNORMAL SCORE
C ALBICANS DNA VAG QL NAA+PROBE: POSITIVE
C GLABRATA DNA VAG QL NAA+PROBE: POSITIVE
C TRACH DNA VAG QL NAA+PROBE: NEGATIVE
MEGA1 DNA VAG QL NAA+PROBE: ABNORMAL SCORE
N GONORRHOEA DNA VAG QL NAA+PROBE: NEGATIVE
T VAGINALIS DNA VAG QL NAA+PROBE: NEGATIVE

## 2021-07-06 ENCOUNTER — TELEPHONE (OUTPATIENT)
Dept: INTERNAL MEDICINE CLINIC | Age: 56
End: 2021-07-06

## 2021-07-06 RX ORDER — METRONIDAZOLE 7.5 MG/G
1 GEL VAGINAL
Qty: 25 G | Refills: 0 | Status: SHIPPED | OUTPATIENT
Start: 2021-07-06 | End: 2021-07-11

## 2021-07-06 NOTE — TELEPHONE ENCOUNTER
----- Message from Elie Avitia. Christin Adams NP sent at 7/6/2021  8:47 AM EDT -----  Please let her know that she did have yeast infection which the diflucan should have taken care of but she also has BV so a vaginal gel was sent for this.  ----- Message -----  From: Pamella Sharif  Sent: 6/24/2021   4:00 PM EDT  To: Elie Avitia.  Christin Adams NP

## 2021-07-16 RX ORDER — MELATONIN
Qty: 90 TABLET | Refills: 0 | Status: SHIPPED | OUTPATIENT
Start: 2021-07-16 | End: 2021-12-27

## 2021-08-05 ENCOUNTER — OFFICE VISIT (OUTPATIENT)
Dept: INTERNAL MEDICINE CLINIC | Age: 56
End: 2021-08-05
Payer: MEDICAID

## 2021-08-05 VITALS
HEIGHT: 60 IN | SYSTOLIC BLOOD PRESSURE: 113 MMHG | RESPIRATION RATE: 16 BRPM | OXYGEN SATURATION: 97 % | DIASTOLIC BLOOD PRESSURE: 74 MMHG | TEMPERATURE: 98.4 F | HEART RATE: 96 BPM | BODY MASS INDEX: 24.94 KG/M2 | WEIGHT: 127 LBS

## 2021-08-05 DIAGNOSIS — E11.65 TYPE 2 DIABETES MELLITUS WITH HYPERGLYCEMIA, WITHOUT LONG-TERM CURRENT USE OF INSULIN (HCC): Primary | ICD-10-CM

## 2021-08-05 DIAGNOSIS — I10 ESSENTIAL HYPERTENSION: ICD-10-CM

## 2021-08-05 DIAGNOSIS — Z12.11 COLON CANCER SCREENING: ICD-10-CM

## 2021-08-05 DIAGNOSIS — R63.4 WEIGHT LOSS: ICD-10-CM

## 2021-08-05 DIAGNOSIS — H54.7 VISION IMPAIRMENT: ICD-10-CM

## 2021-08-05 LAB
GLUCOSE POC: >450 MG/DL
HBA1C MFR BLD HPLC: 13.5 %

## 2021-08-05 PROCEDURE — 82962 GLUCOSE BLOOD TEST: CPT | Performed by: NURSE PRACTITIONER

## 2021-08-05 PROCEDURE — 83036 HEMOGLOBIN GLYCOSYLATED A1C: CPT | Performed by: NURSE PRACTITIONER

## 2021-08-05 PROCEDURE — 99214 OFFICE O/P EST MOD 30 MIN: CPT | Performed by: NURSE PRACTITIONER

## 2021-08-05 RX ORDER — ISOPROPYL ALCOHOL 70 ML/100ML
1 SWAB TOPICAL DAILY
Qty: 100 PAD | Refills: 11 | Status: SHIPPED | OUTPATIENT
Start: 2021-08-05

## 2021-08-05 RX ORDER — INSULIN GLARGINE 100 [IU]/ML
10 INJECTION, SOLUTION SUBCUTANEOUS
Qty: 5 ADJUSTABLE DOSE PRE-FILLED PEN SYRINGE | Refills: 1 | Status: SHIPPED | OUTPATIENT
Start: 2021-08-05 | End: 2021-11-04

## 2021-08-05 RX ORDER — ISOPROPYL ALCOHOL 70 ML/100ML
1 SWAB TOPICAL DAILY
Qty: 100 PAD | Refills: 11 | Status: CANCELLED | OUTPATIENT
Start: 2021-08-05

## 2021-08-05 RX ORDER — GLIPIZIDE 10 MG/1
10 TABLET ORAL 2 TIMES DAILY
Qty: 180 TABLET | Refills: 0 | Status: SHIPPED | OUTPATIENT
Start: 2021-08-05 | End: 2021-09-24 | Stop reason: SDUPTHER

## 2021-08-05 RX ORDER — PEN NEEDLE, DIABETIC 31 GX3/16"
1 NEEDLE, DISPOSABLE MISCELLANEOUS DAILY
Qty: 100 PEN NEEDLE | Refills: 11 | Status: SHIPPED | OUTPATIENT
Start: 2021-08-05 | End: 2022-08-17

## 2021-08-05 RX ORDER — SYRINGE AND NEEDLE,INSULIN,1ML 30 G X1/2"
1 SYRINGE, EMPTY DISPOSABLE MISCELLANEOUS DAILY
Qty: 100 EACH | Refills: 11 | Status: CANCELLED | OUTPATIENT
Start: 2021-08-05

## 2021-08-05 RX ORDER — INSULIN GLARGINE 100 [IU]/ML
10 INJECTION, SOLUTION SUBCUTANEOUS
Qty: 1 VIAL | Refills: 1 | Status: CANCELLED | OUTPATIENT
Start: 2021-08-05

## 2021-08-05 NOTE — PROGRESS NOTES
Subjective: (As above and below)     Chief Complaint   Patient presents with    Follow-up    Weight Loss     pt states she is losing weight and does not know why- pt would like to be referred to nutritionist      Sofie TorresJuana Damon is a 55y.o. year old female who presents for     Diabetic Review of Systems - medication compliance: compliant all of the time, diabetic diet compliance: noncompliant much of the time, home glucose monitoring: is performed sporadically. Hyperlipidemia taking statin    Depression    Hypertension ROS:  taking medications as instructed, no medication side effects noted, no TIAs, no chest pain on exertion, no dyspnea on exertion, no swelling of ankles            Reviewed PmHx, RxHx, FmHx, SocHx, AllgHx and updated in chart. Family History   Problem Relation Age of Onset    Cancer Maternal Aunt     Cancer Maternal Uncle        Past Medical History:   Diagnosis Date    [facility]     Diabetes ([facility]     Hypertension     Psychiatric disorder     Schizophrenia ([facility]       Social History     Socioeconomic History    Marital status: SINGLE     Spouse name: Not on file    Number of children: Not on file    Years of education: Not on file    Highest education level: Not on file   Tobacco Use    Smoking status: Current Some Day Smoker     Packs/day: 0.25    Smokeless tobacco: Never Used    Tobacco comment: 2-3 per day   Vaping Use    Vaping Use: Never used   Substance and Sexual Activity    Alcohol use: No    Drug use: No    Sexual activity: Not Currently     Social Determinants of Health     Financial Resource Strain:     Difficulty of Paying Living Expenses:    Food Insecurity:     Worried About Running Out of Food in the Last Year:     Ran Out of Food in the Last Year:    Transportation Needs:     Lack of Transportation (Medical):      Lack of Transportation (Non-Medical):    Physical Activity:     Days of Exercise per Week:     Minutes of Exercise per Session:    Stress:     Feeling of Stress :    Social Connections:     Frequency of Communication with Friends and Family:     Frequency of Social Gatherings with Friends and Family:     Attends Evangelical Services:     Active Member of Clubs or Organizations:     Attends Club or Organization Meetings:     Marital Status:           Current Outpatient Medications   Medication Sig    glipiZIDE (GLUCOTROL) 10 mg tablet Take 1 Tablet by mouth two (2) times a day.  empagliflozin (Jardiance) 25 mg tablet Take 1 Tablet by mouth daily.  insulin glargine (LANTUS,BASAGLAR) 100 unit/mL (3 mL) inpn 10 Units by SubCUTAneous route nightly. With snack    Insulin Needles, Disposable, 32 gauge x 5/32\" ndle 1 Each by Does Not Apply route daily.  alcohol swabs padm 1 Each by Apply Externally route daily.  cholecalciferol (VITAMIN D3) (1000 Units /25 mcg) tablet take 1 tablet by mouth once daily    polyethylene glycol (MIRALAX) 17 gram packet Take 1 Packet by mouth daily as needed for Constipation.  atorvastatin (LIPITOR) 20 mg tablet take 1 tablet by mouth nightly    SUMAtriptan (IMITREX) 100 mg tablet TAKE 1 tablet by mouth ONCE AS NEEDED FOR MIGRAINE FOR UP TO 1 DOSE    fluticasone propionate (FLONASE) 50 mcg/actuation nasal spray instill 1 spray into each nostril once daily    butalbital-acetaminophen-caffeine (FIORICET, ESGIC) -40 mg per tablet Take 1 Tab by mouth every six (6) hours as needed for Headache or Migraine.  lidocaine (LIDODERM) 5 % 1 Patch by TransDERmal route every twelve (12) hours. Apply patch to the affected area for 12 hours a day and remove for 12 hours a day.  lamoTRIgine (LAMICTAL) 100 mg tablet Take 0.5 Tabs by mouth two (2) times a day. Indications: Bipolar Depression    haloperidol (HALDOL) 5 mg tablet Take 1 Tab by mouth nightly. Indications: psychotic disorder    sertraline (ZOLOFT) 50 mg tablet Take 1 Tab by mouth daily.  Indications: major depressive disorder    traZODone (DESYREL) 50 mg tablet Take 1 Tab by mouth nightly. Indications: major depressive disorder    losartan (COZAAR) 25 mg tablet Take 1 Tablet by mouth daily. (Patient not taking: Reported on 8/5/2021)    Blood-Glucose Meter monitoring kit Check sugar in the AM and PM.    lancets misc Use as directed. Dx: e11.65. check sugar once daily    glucose blood VI test strips (ASCENSIA AUTODISC VI, ONE TOUCH ULTRA TEST VI) strip E11.65 check sugar once daily fasting    cetirizine (ZYRTEC) 10 mg tablet take 1 tablet by mouth once daily    benztropine (COGENTIN) 1 mg tablet Take 1 mg by mouth two (2) times a day. No current facility-administered medications for this visit. Review of Systems:   Constitutional:    Negative for fever and chills, negative diaphoresis. HEENT:              Negative for neck pain and stiffness. Eyes:                  Negative for visual disturbance, itching, redness or discharge. Respiratory:        Negative for cough and shortness of breath. Cardiovascular:  Negative for chest pain and palpitations. Gastrointestinal: Negative for nausea, vomiting, abdominal pain, diarrhea or constipation. Genitourinary:     Negative for dysuria and frequency. Musculoskeletal: Negative for falls, tenderness and swelling. Skin:                    Negative for rash, masses or lesions. Neurological:       Negative for dizzyness, seizure, loss of consciousness, weakness and numbness.      Objective:     Vitals:    08/05/21 1427   BP: 113/74   Pulse: 96   Resp: 16   Temp: 98.4 °F (36.9 °C)   TempSrc: Temporal   SpO2: 97%   Weight: 127 lb (57.6 kg)   Height: 5' (1.524 m)       Results for orders placed or performed in visit on 08/05/21   AMB POC HEMOGLOBIN A1C   Result Value Ref Range    Hemoglobin A1c (POC) 13.5 %   AMB POC GLUCOSE BLOOD, BY GLUCOSE MONITORING DEVICE   Result Value Ref Range    Glucose POC >450 MG/DL         Physical Examination: General appearance - alert, well appearing, and in no distress  Mental status - alert, oriented to person, place, and time, normal mood, behavior, speech, dress, motor activity, and thought processes  Chest - clear to auscultation, no wheezes, rales or rhonchi, symmetric air entry  Heart - normal rate, regular rhythm, normal S1, S2, no murmurs, rubs, clicks or gallops  Extremities - no pedal edema noted      Assessment/ Plan:     Follow-up and Dispositions    · Return in about 4 days (around 8/9/2021) for nv insulin teaching. She initially is adamant about no insulin, she states bc she \"just does not want to\"  Denies fear of needle, believe her vision impairment is a concern for her  Discussed danger of uncontrolled DM  Will increase glipizide, start jardiance for now   She states she will consider insulin. Elmer Fly send supplies, needs to rtc for teaching  Possible home health aide would be of help      1. Type 2 diabetes mellitus with hyperglycemia, without long-term current use of insulin (HCC)    - AMB POC HEMOGLOBIN A1C  - AMB POC GLUCOSE BLOOD, BY GLUCOSE MONITORING DEVICE  - REFERRAL TO GASTROENTEROLOGY; Future  - METABOLIC PANEL, BASIC; Future  - glipiZIDE (GLUCOTROL) 10 mg tablet; Take 1 Tablet by mouth two (2) times a day. Dispense: 180 Tablet; Refill: 0  - REFERRAL TO DIABETIC EDUCATION  - insulin glargine (LANTUS,BASAGLAR) 100 unit/mL (3 mL) inpn; 10 Units by SubCUTAneous route nightly. With snack  Dispense: 5 Adjustable Dose Pre-filled Pen Syringe; Refill: 1  - alcohol swabs padm; 1 Each by Apply Externally route daily. Dispense: 100 Pad; Refill: 11    2. Essential hypertension    - REFERRAL TO GASTROENTEROLOGY; Future    3. Weight loss    - TSH 3RD GENERATION; Future  - REFERRAL TO GASTROENTEROLOGY; Future    4. Colon cancer screening    - REFERRAL TO GASTROENTEROLOGY; Future    5. Vision impairment        I have discussed the diagnosis with the patient and the intended plan as seen in the above orders.   The patient has received an after-visit summary and questions were answered concerning future plans. Pt conveyed understanding of plan. Medication Side Effects and Warnings were discussed with patient: yes  Patient Labs were reviewed: yes  Patient Past Records were reviewed:  yes    Kayce Wick.  Bharath Lei, MAMIE

## 2021-08-05 NOTE — PROGRESS NOTES
Chief Complaint   Patient presents with    Follow-up    Weight Loss     pt states she is losing weight and does not know why- pt would like to be referred to nutritionist        1. Have you been to the ER, urgent care clinic since your last visit? Hospitalized since your last visit? No    2. Have you seen or consulted any other health care providers outside of the [address] [place] [address] since your last visit? Include any pap smears or colon screening.  No

## 2021-08-20 ENCOUNTER — CLINICAL SUPPORT (OUTPATIENT)
Dept: DIABETES SERVICES | Age: 56
End: 2021-08-20
Payer: MEDICAID

## 2021-08-20 DIAGNOSIS — E11.65 UNCONTROLLED TYPE 2 DIABETES MELLITUS WITH HYPERGLYCEMIA (HCC): Primary | ICD-10-CM

## 2021-08-20 PROCEDURE — G0108 DIAB MANAGE TRN  PER INDIV: HCPCS

## 2021-08-20 NOTE — PROGRESS NOTES
[facility] Program for Diabetes Health  Diabetes Self-Management Education & Support Program  Encounter note    SUMMARY  Diabetes self-care management training was completed related to Taking medications. The participant will return on September 3 to continue DSMES related to Healthy eating. The participant did identify SMART Goal(s): - Give insulin shot to self at 3pm every day, and will practice knowledge and skills related to medications to improve diabetes self-management. EVALUATION:  Participant started diabetes education in the fall of 2020 but had some barriers related to transportation and did not come back after her visit in December 2020, her BGs have been elevated and her provider has started her on insulin, she is visually impaired and came in today to learn how to give herself the injection, her  came with her to observe, she demonstrated good technique related to washing her hands, applying the needle to the insulin pen, clicking the dial until it reaches her prescribed dose of 10 units and holding it up close to her eye to double check that it says 10, feeling her stomach for an appropriate spot for administration and then injecting the insulin, we celebrated how well she did with her first self injection and she is committed to doing this at home every day on her own, she verbalizes a good understanding of how basal insulin works and wants to give it at 3pm every day, she also verbalizes a good understanding of how to store opened and unopened insulin and how to dispose of needles. RECOMMENDATIONS:  Continue to give herself an insulin injection every day at the same time which is currently 3pm, call if she has any problems or questions. Next provider visit is scheduled for: no f/u provider visit scheduled currently       SMART GOAL(S)  1.  Give herself an insulin shot every day at 3pm (goal set today)             DATE DSMES TOPIC EVALUATION     8/20/2021 HOW DO MY DIABETES MEDICATIONS WORK?   a. Type 1 medications & methods    Insulin injections    Injection sites   c. Hypoglycemia symptoms & treatment    Glucagon emergency kits   d. General guidance regarding insulin use whether Type 1, 2 or gestational diabetes    Storage of insulin    Disposal    e. Barriers to medication adherence      The participant    Can describe the expected action & side effects of prescribed diabetes medications Yes.  Can demonstrate injection technique (if applicable) Yes. The participant needs to address : give self insulin injection at the same time every day.      Cristopher Ma RN on 8/20/2021 at 3:04 PM      Encounter date: 8/20/2021  Time in appointment: 60 minutes

## 2021-09-03 ENCOUNTER — CLINICAL SUPPORT (OUTPATIENT)
Dept: DIABETES SERVICES | Age: 56
End: 2021-09-03
Payer: MEDICAID

## 2021-09-03 DIAGNOSIS — E11.65 UNCONTROLLED TYPE 2 DIABETES MELLITUS WITH HYPERGLYCEMIA (HCC): Primary | ICD-10-CM

## 2021-09-03 PROCEDURE — G0108 DIAB MANAGE TRN  PER INDIV: HCPCS

## 2021-09-03 NOTE — PROGRESS NOTES
[address] [place] [address] for Diabetes Health  Diabetes Self-Management Education & Support Program  Encounter note    SUMMARY  Diabetes self-care management training was completed related to What is Diabetes and  Physical activity. The participant will return on September 17 to continue DSMES related to Problem solving. The participant did not identify a new SMART Goal(s): , but will continue to practice knowledge and skills related to healthy eating, monitoring, being active and medications to improve diabetes self-management.     EVALUATION:  Participant reports that she has been giving herself her insulin injection every day at 3pm, she is rotating between her stomach and her outer upper thighs, she also reports that her BGs have been mainly between 114 and 150 in the mornings as far as she can remember but she did have one time that it was 256, she thinks this is because she ate cookies the night before, I have asked her to bring in her meter for her next visit so that we can look at all the numbers together, she verbalizes that she is trying not to eat as many sweets now or drink as much soda and she is limiting the amount of french fries and mashed potatoes and bread she is eating with each meal, she seems to have a basic understanding of a carb controlled diet and is trying to adhere to this, after hearing about what is happening physiologically in her body, the participant is able to understand that she has T2DM, we talked about the role of physical activity along with her carb controlled eating and taking medications to help control her DM and get her A1C closer to 7, she has Glipizide and Januvia listed in her chart as active medications but she is not sure if she is taking these pills or the schedule, I have asked her to check on these at home and call her provider if she does not think she is taking them, I have also asked her to bring her medications with her to her next visit so we can review them again, she has a limited attention span and can only tolerate short visits with limited amount of information, she will return in 2 weeks for another brief diabetes education session. RECOMMENDATIONS:  Continue to give insulin injection every day, determine if BG is in target each morning, eat a carb controlled diet, increase physical activity    Next provider visit is scheduled for 11/4/21       SMART GOAL(S)  1. Give Insulin injection to self every day at 3pm  ACHIEVEMENT OF GOAL(S)  [] 0-24%     [] 25-49%     [] 50-74%     [x] %       DATE DSMES TOPIC EVALUATION     9/3/2021 WHAT IS DIABETES?   a. Role of the normal pancreas in energy balance and blood glucose control   b. The defect seen in diabetes   c. Signs & symptoms of diabetes   d. Diagnosis of diabetes   e. Types of diabetes        The participant knows   Their type of diabetes she is able to state that she has DMT2 at today's visit after hearing the explanation    The basic physiologic defect Yes   Blood glucose targets Yes         DATE [facility] TOPIC EVALUATION     9/3/2021 HOW DOES PHYSICAL ACTIVITY AFFECT MY DIABETES?   a. Benefits of physical activity   b.  Beginning a program of physical activity    Walking    c. Structured physical activity program    Aerobic activity    Resistance    Flexibility    Balance   Safety related to being visually impaired       The participant has established a regular physical activity plan Participant reports that she likes to walk and tries to walk around her house and her neighborhood as much as she can    The participant would benefit from : try to move more in general during the day, add in chair exercises, she is visually impaired so we talked about safety with walking     Lexii Pandey RN on 9/3/2021 at 2:12 PM    Encounter date: 9/3/2021  Time in appointment: 40 minutes

## 2021-09-07 NOTE — BH NOTES
Chief Complaint:  I am alright. Interval History:  She is slowly improving. Her sleep remains an issue although she is sleeping better than on admission. Says she woke up feeling \"agitated\" and says things are still bothering her. Denies any active AH or Vh. Feels her moods have been \"up and down\". Pleasant and calm. Past Medical History:  Past Medical History:   Diagnosis Date    Diabetes ([facility] [address]     Hypertension     Psychiatric disorder            Labs:  Lab Results   Component Value Date/Time    WBC 13.2 (H) 04/18/2019 05:48 PM    HGB 13.6 04/18/2019 05:48 PM    HCT 41.9 04/18/2019 05:48 PM    PLATELET 833 29/94/2425 05:48 PM    .0 (H) 04/18/2019 05:48 PM      Lab Results   Component Value Date/Time    Sodium 141 04/18/2019 05:48 PM    Potassium 3.8 04/18/2019 05:48 PM    Chloride 109 (H) 04/18/2019 05:48 PM    CO2 24 04/18/2019 05:48 PM    Anion gap 8 04/18/2019 05:48 PM    Glucose 136 (H) 04/19/2019 06:40 AM    BUN 5 (L) 04/18/2019 05:48 PM    Creatinine 0.83 04/18/2019 05:48 PM    BUN/Creatinine ratio 6 (L) 04/18/2019 05:48 PM    GFR est AA >60 04/18/2019 05:48 PM    GFR est non-AA >60 04/18/2019 05:48 PM    Calcium 10.4 (H) 04/18/2019 05:48 PM    Bilirubin, total 0.4 04/18/2019 05:48 PM    AST (SGOT) 15 04/18/2019 05:48 PM    Alk.  phosphatase 173 (H) 04/18/2019 05:48 PM    Protein, total 8.2 04/18/2019 05:48 PM    Albumin 3.8 04/18/2019 05:48 PM    Globulin 4.4 (H) 04/18/2019 05:48 PM    A-G Ratio 0.9 (L) 04/18/2019 05:48 PM    ALT (SGPT) 22 04/18/2019 05:48 PM      Vitals:    04/21/19 1146 04/21/19 1540 04/21/19 1930 04/22/19 0808   BP:  122/89 131/81 107/74   Pulse:  81 71 99   Resp:  16 16 16   Temp:  98.6 °F (37 °C) 98.4 °F (36.9 °C) 99 °F (37.2 °C)   SpO2:  99% 99% 96%   Weight: 72.1 kg (158 lb 14.4 oz)      Height:               Mental Status Exam:  Eye contact: limited due to her lack of vision  Psychomotor activity: WNL  Speech is spontaneous  Mood is \"okay\"  Affect: Duplicate request.   restricted  Perception: Denies any Ah or VH. Suicidal ideation: Denies any SI or plan. Cognition is grossly intact      Physical Exam:  Body habitus: is normal  Musculoskeletal system: gait is normal  Tremor is not present  Cog wheeling is not present. Assessment and Plan:  Felipe Corey meets criteria for a diagnosis of Schizoaffective Disorder  Increased her dosage of Lamictal to 100mg daily. Continue the medication regimen as prescribed  Disposition planning to continue. I certify that this patients inpatient psychiatric hospital services furnished since the previous certification were, and continue to be, required for treatment that could reasonably be expected to improve the patient's condition, or for diagnostic study, and that the patient continues to need, on a daily basis, active treatment furnished directly by or requiring the supervision of inpatient psychiatric facility personnel. In addition, the hospital records show that services furnished were intensive treatment services, admission or related services, or equivalent services.

## 2021-09-17 ENCOUNTER — CLINICAL SUPPORT (OUTPATIENT)
Dept: DIABETES SERVICES | Age: 56
End: 2021-09-17
Payer: MEDICAID

## 2021-09-17 DIAGNOSIS — E11.65 UNCONTROLLED TYPE 2 DIABETES MELLITUS WITH HYPERGLYCEMIA (HCC): Primary | ICD-10-CM

## 2021-09-17 PROCEDURE — G0108 DIAB MANAGE TRN  PER INDIV: HCPCS

## 2021-09-17 NOTE — PROGRESS NOTES
[address] [place] [address] for Diabetes Health  Diabetes Self-Management Education & Support Program  Encounter note    SUMMARY  Diabetes self-care management training was completed related to Reducing risks. The participant will return on October 1 to continue DSMES related to Problem solving. The participant did not identify a new SMART Goal(s): - but will keep same goal of giving self insulin every day at the same time, and will practice knowledge and skills related to reducing risks, healthy eating and monitoring and being active and medications to improve diabetes self-management. EVALUATION:  Participant was able to verbalize a fair understanding of recommended exams, vaccines and labs to help reduce risks in DM, she came in around 2:30 today and knew that her insulin was due at 3pm so she was very concerned with the time throughout the visit, she asked about the time many times throughout the lesson, at 3pm she pulled out her insulin and self administered her injection with proper technique, she was very proud of herself for accomplishing this goal and I praised her for this, she has not seen a dentist for a while and I encouraged her to consider making an appointment, she is reluctant but has a fair understanding of why it is important to take care of her teeth and gums, the participant started to get distracted easily after giving her injection and asked several times when we would be done so we concluded the lesson for the day, 30 minutes is generally her threshold,  I brought in another educator at the very end and introduced her as she will be taking over my caseload when I move next week, the participant thanked me for my time with her and I encouraged her to keep up the good work. RECOMMENDATIONS:  Consider making a dentis appointment     Next provider visit is scheduled for 11/4/21       SMART GOAL(S)  1.  Give insulin at the same time every day  ACHIEVEMENT OF GOAL(S)  [] 0-24%     [] 25-49%     [] 50-74%     [x] %           DATE DSMES TOPIC EVALUATION     9/17/2021 HOW DO I STAY HEALTHY?   a. Prevention    Vaccinations    Preconception care (if applicable)  b. Examinations    Eye     Dental   Foot   c. Diabetic complications' prevention    Heart health    Kidney Health    Nerve health    Sleep health      The participant has a personal diabetes care record to keep abreast of diabetes health participant made aware of personal care record    The participant would benefit from : make a dentist appt.            Joel Nelson RN on 9/17/2021 at 3:43 PM    Encounter date: 9/17/2021  Time in appointment: 40 minutes

## 2021-09-24 DIAGNOSIS — E11.65 TYPE 2 DIABETES MELLITUS WITH HYPERGLYCEMIA, WITHOUT LONG-TERM CURRENT USE OF INSULIN (HCC): ICD-10-CM

## 2021-09-24 RX ORDER — GLIPIZIDE 10 MG/1
10 TABLET ORAL 2 TIMES DAILY
Qty: 180 TABLET | Refills: 0 | Status: SHIPPED | OUTPATIENT
Start: 2021-09-24 | End: 2021-11-04 | Stop reason: ALTCHOICE

## 2021-09-27 RX ORDER — ATORVASTATIN CALCIUM 20 MG/1
TABLET, FILM COATED ORAL
Qty: 90 TABLET | Refills: 1 | Status: SHIPPED | OUTPATIENT
Start: 2021-09-27 | End: 2022-03-10 | Stop reason: SDUPTHER

## 2021-10-01 ENCOUNTER — CLINICAL SUPPORT (OUTPATIENT)
Dept: DIABETES SERVICES | Age: 56
End: 2021-10-01
Payer: MEDICAID

## 2021-10-01 DIAGNOSIS — E11.65 UNCONTROLLED TYPE 2 DIABETES MELLITUS WITH HYPERGLYCEMIA (HCC): Primary | ICD-10-CM

## 2021-10-01 PROCEDURE — G0108 DIAB MANAGE TRN  PER INDIV: HCPCS | Performed by: DIETITIAN, REGISTERED

## 2021-10-01 NOTE — PROGRESS NOTES
[address] [place] [address] for Diabetes Health  Diabetes Self-Management Education & Support Program  Encounter note    SUMMARY  Diabetes self-care management training was completed related to Healthy coping and Problem solving. The participant will return on November 4 for DSMES post-program follow-up. The participant did identify SMART Goal(s): - Eat one more serving of non-starchy vegetables each day. , and will practice knowledge and skills related to healthy eating to improve diabetes self-management. EVALUATION:  Participant attended visit with her  for support. Participant shared she initially felt upset about having diabetes, and she has found praying as a helpful way to cope. Participant identified feeling \"good\" about her diabetes now, and is focusing on what she can do now like taking her diabetes medications. Participant shared she practices stress management techniques like listening to music, praying, and cleaning. Participant also shared she eats as a response to stress, and educator reviewed with participant recognizing when this happens and how this may impact blood glucose. Participant practiced walking through problem-solving skills with her SMART goals. Participant shared she feels that her family member provides positive support for her diabetes health. Participant shared she wants to continue focusing on her SMART goals after completing the program, and participant's  shared a home health nurse will be providing support for taking diabetes medication consistently. Participant shared she has been taking her diabetes medication consistently over the past week. Participant expressed understanding of hypoglycemia s/s, prevention, and treatment using teach-back. Participant shared she has experienced s/s of hyperglycemia and shared strategies for hyperglycemia prevention.  Participant shared she had a blood sugar in the 200s one morning a few weeks ago and did not know why, and expressed understanding of identifying how her self-care practices relate to readings after review. Participant expressed understanding of sick day management and disaster preparedness, and shared she already practices some strategies. Participant asked about how some meal choices influence blood glucose readings, educator reviewed with participant CHO in meal and the value of postprandial SMBG, participant expressed understanding and shared she monitors blood glucose 2 hours after lunch and it is often close to 155 mg/dL. RECOMMENDATIONS:  Practice identifying how diabetes self-care practices relate to SMBG readings, recognizing response to stress and focusing on healthy strategies, continuing communication with provider about diabetes self-care practices    Next provider visit is scheduled for 11/04/21       SMART GOAL(S)  1. Give insulin at the same time every day. ACHIEVEMENT OF GOAL(S)  [] 0-24%     [] 25-49%     [] 50-74%     [x] %      DATE DSMES TOPIC EVALUATION     10/1/2021 HOW DO I FIND SUPPORT TO TACKLE THIS CONDITION?   a. Normal responses to diabetes diagnosis or complication    Shock    Anger & resentment    Guilt/self-blame    Sadness & worry    Depression     Anxiety  b. Constructive strategies to normal responses     Exploring feelings & attitudes    Motivation: Cost versus benefits analysis    Problem-solving: Chain analysis    Obtaining support: Communicating   i. Family & friends   ii. Health team   iii. Community   c. Stress    Symptoms    Managing stress    Fill your tool kit        The participant can identify people that support them in caring for their diabetes health:  Participant identified her family member.     The participant would like to pursue the following in keeping themselves healthy after completing the program:  Participant shared she wants to continue focusing on her SMART goals by taking diabetes medication consistently, and participant's  shared a home health nurse will be providing support for this. The participant may benefit from: continuing to communicate with provider about diabetes self-care practices, recognizing response to stress and practicing healthy strategies     DATE DSMES TOPIC EVALUATION     10/1/2021 HOW DO I FIGURE OUT WHAT'S INFLUENCING MY BLOOD GLUCOSES?   a. Problem solving using SOAR    Set goals    Sort options    Arrive at a plan    Reaffirm plan   b. Common problems in diabetes self-care    Hypoglycemia    Hyperglycemia    Sick days   c. Pattern management   d. Disaster preparedness plan        The participant has an action plan for    Hypoglycemia Yes   Hyperglycemia Yes   Sick days Yes   During disasters Yes    The participant may benefit from: identifying how self-care practices relate to blood glucose readings.      Ngozi Soriano MS, RDN on 10/1/2021 at 12:09 PM    Encounter date: 10/1/2021  Time in appointment: 53 minutes

## 2021-11-02 RX ORDER — EMPAGLIFLOZIN 25 MG/1
TABLET, FILM COATED ORAL
Qty: 90 TABLET | Refills: 0 | Status: SHIPPED | OUTPATIENT
Start: 2021-11-02 | End: 2022-01-24

## 2021-11-04 ENCOUNTER — OFFICE VISIT (OUTPATIENT)
Dept: INTERNAL MEDICINE CLINIC | Age: 56
End: 2021-11-04

## 2021-11-04 ENCOUNTER — CLINICAL SUPPORT (OUTPATIENT)
Dept: DIABETES SERVICES | Age: 56
End: 2021-11-04
Payer: MEDICAID

## 2021-11-04 VITALS
SYSTOLIC BLOOD PRESSURE: 115 MMHG | TEMPERATURE: 97.6 F | OXYGEN SATURATION: 99 % | HEIGHT: 60 IN | WEIGHT: 141 LBS | DIASTOLIC BLOOD PRESSURE: 70 MMHG | BODY MASS INDEX: 27.68 KG/M2 | RESPIRATION RATE: 17 BRPM | HEART RATE: 102 BPM

## 2021-11-04 DIAGNOSIS — E11.65 TYPE 2 DIABETES MELLITUS WITH HYPERGLYCEMIA, WITHOUT LONG-TERM CURRENT USE OF INSULIN (HCC): ICD-10-CM

## 2021-11-04 DIAGNOSIS — E11.65 UNCONTROLLED TYPE 2 DIABETES MELLITUS WITH HYPERGLYCEMIA (HCC): Primary | ICD-10-CM

## 2021-11-04 DIAGNOSIS — E78.2 MIXED HYPERLIPIDEMIA: Primary | ICD-10-CM

## 2021-11-04 DIAGNOSIS — H54.7 IMPAIRED VISION: ICD-10-CM

## 2021-11-04 DIAGNOSIS — I10 ESSENTIAL HYPERTENSION: ICD-10-CM

## 2021-11-04 LAB
GLUCOSE POC: NORMAL MG/DL
HBA1C MFR BLD HPLC: 10.7 %

## 2021-11-04 PROCEDURE — 83036 HEMOGLOBIN GLYCOSYLATED A1C: CPT | Performed by: NURSE PRACTITIONER

## 2021-11-04 PROCEDURE — 82962 GLUCOSE BLOOD TEST: CPT | Performed by: NURSE PRACTITIONER

## 2021-11-04 PROCEDURE — G0108 DIAB MANAGE TRN  PER INDIV: HCPCS | Performed by: DIETITIAN, REGISTERED

## 2021-11-04 PROCEDURE — 99214 OFFICE O/P EST MOD 30 MIN: CPT | Performed by: NURSE PRACTITIONER

## 2021-11-04 RX ORDER — INSULIN GLARGINE 100 [IU]/ML
15 INJECTION, SOLUTION SUBCUTANEOUS
Qty: 5 ADJUSTABLE DOSE PRE-FILLED PEN SYRINGE | Refills: 1 | Status: SHIPPED | OUTPATIENT
Start: 2021-11-04 | End: 2022-02-10 | Stop reason: SDUPTHER

## 2021-11-04 NOTE — PROGRESS NOTES
[employer] [facility]  Diabetes Self-Management Education & Support Program  Post-program Evaluation    EVALUATION @ COMPLETION OF THE PROGRAM    Kiesha Riggs completed 4 hours of diabetes self-management education. The participant acquired new knowledge and demonstrated new skills during the program, and focused on healthy eating and taking medications. Participant shared she has been having one additional serving of non-starchy vegetables 3 days/week, choosing spinach, broccoli, green beans, cabbage, and mushrooms, and she plans to continue focusing on this. Participant shared she has been taking her lantus at the same time every day, and she has not missed any doses of insulin. Participant shared she wants to continue focusing on healthy eating and physical activity after completing the program, using what she learned in classes to continue building skills. Participant shared she feels supported by her daughters and her counselor to continue her progress with diabetes self-management, and she plans to schedule follow-up [facility] visits as needed. Participant attended visit with her counselor. The participant worked on the following SMART GOAL(s) to improve their diabetes self-management:    1. Eat one more serving of non-starchy vegetables each day  ACHIEVEMENT OF GOAL(S)  [] 0-24%     [x] 25-49%     [] 50-74%     [] %   2. Give insulin at the same time every day. ACHIEVEMENT OF GOAL(S)  [] 0-24%     [] 25-49%     [] 50-74%     [x] %    The participant's pre-program A1c was: 14.4% (10/22/2020). The post-evaluation A1c is: 10.7% (11/04/21). The participant's Diabetes Skills, Confidence and Preparedness Index (scored out of 7) is below:     Total score: 5.8 (improved)  Skills: 5.6 (improved)  Confidence: 6.0 (improved)  Preparedness: 6.0 (maintained)    FINAL RECOMMENDATIONS:  Encouraged participant to continue practicing self-care behaviors learned in classes, practice building balanced meals using the healthy plate as a guide, and schedule follow-up [facility] visits as needed and as able to continue focusing on healthy eating    Next provider visit is scheduled for: 2/10/2022       Abdi Marie MS, RDN on 11/4/2021 at 12:43 PM    Metric Patient's responses (11/4/2021) Areas for improvement     Healthy Eating       The participant is using the Healthy Plate to control carbohydrate intake No, Participant shared she is managing portions of CHO though has not focused on building balanced meals. Participant shared meals are similar to sausage, eggs, orange juice, and hash browns for breakfast, baked chicken for lunch, chips as a snack, steak and mashed potatoes for dinner, and she is drinking mostly water. The participant reads food labels accurately Participant shared she does not read food labels d/t vision impairment.   - Building balanced meals  - Variety of foods    Reviewed topics with participant, participant expressed understanding. Being Active       The participant performs physical activity where your heart beats faster and your breathing is harder than normal for 30 minutes or more?  0 days    In a typical week, the participant performs physical activity 0 days     *Participant shared she walks for 20-30 minutes at a leisurely pace 3-4x/week. Participant shared she regularly engages in physical activity and plans to continue this. Monitoring   The participant is monitoring their blood sugars? Yes    The participant is monitoring 4x/day    Blood glucoses are ranging:   Breakfast: 135-140 mg/dL (preprandial)  Lunch: 145 mg/dL (preprandial)  Dinner: 147 mg/dL (preprandial)  Bedtime: 165 mg/dL (1 hour postprandial- snack)    The participant improved their A1c Yes    The participant understands the meaning of the A1c No   - Meaning of A1c    Reviewed topic with participant, participant expressed understanding.      Taking Medications   The participant understands what their diabetes medications do No    The participant can afford your diabetes medications Yes    The participant does not miss doses of their diabetes medications Participant shared she takes her lantus every day, and she is not sure if she is taking her jardiance. Participant shared this with her provider and plans to follow-up with her provider after checking the medications she is taking.   - Action of medications    Reviewed topic with participant, participant expressed understanding. Healthy Coping     The participant feels supported by family, friends and others related to their diabetes self-care practices Yes, Participant identified her daughters and counselor. The participant plans on utilizing the following community resources after completion of the program: Participant shared she wants to continue her progress with healthy eating and physical activity, and plans to use what she learned during classes to continue building skills. Participant shared she feels supported by her daughters and counselor to continue her progress with DM self-management. Participant shared she plans to continue using what she learned in classes to focus on healthy eating and physical activity after completing the program, with support from her daughters and counselor. Reducing Risks   Vaccines:  Influenza: There is no immunization history on file for this patient. Pneumococcal:   There is no immunization history on file for this patient. Hepatitis:   There is no immunization history on file for this patient. Examinations:  Dilated eye exam: Last appointment was: 10/27/21 per participant report. Dental exam: Last appointment was: a few years ago per participant report. Foot exam: Last appointment was: 11/04/21 per participant report.     Heart Protection:  BP Readings from Last 2 Encounters:   11/04/21 115/70   08/05/21 113/74        Lab Results   Component Value Date/Time    LDL, calculated 178 (H) 10/22/2020 02:44 PM    LDL, calculated 171 (H) 03/12/2020 01:47 PM        Key Anti-Platelet Anticoagulant Meds     The patient is on no antiplatelet meds or anticoagulants. Kidney Protection:  Microalbumin/creatinine ratio: (3/22/2021)  mg/g Participant expressed understanding of reducing risks for long-term complications, the role of preventive vaccines and exams, and has completed her dilated eye exam and foot exam.     Problem Solving   Hypoglycemia Management:  The participant knows the signs and symptoms of hypoglycemia Dizziness/light-headedness, Sleepiness, Sweat/clammy skin    The participant knows how to prevent hypoglycemia? Participant shared she would not eat bread or potatoes. The participant knows how to treat hypoglycemia? Participant shared she would drink 10 ounces of juice. Hyperglycemia Management:  The participant knows their signs and symptoms of hyperglycemia Participant reported feeling dizzy. The participant knows how to prevent hyperglycemia? Participant shared she would stay hydrated by drinking water. Sick Day Management:  The participant knows what to do differently on sick days? Pt reported being unaware of self-management on sick days    Pattern Management:  The participant can notice blood glucose patterns when looking at their blood glucose readings No   - Hypoglycemia prevention and treatment  - Additional strategies for hyperglycemia prevention  - Sick day management  - Pattern management    Reviewed topics with participant, participant expressed understanding. Note: Content derived from the American Association of Diabetes Educators' Diabetes Education Curriculum: A Guide to Successful Self-Management (3rd edition)      Total time spent: 40 minutes  Encounter date: 11/4/2021     Additional Data for SCPI:  Diabetes Skills, Confidence & Preparedness Index (SCPI) ©  All scales and questions are out of 7.     Overall SCPI score: 5.8 Skills Score: 5.6  Low: Blood Sugar Monitoring(Q8) Confidence Score: 6.0  Low: Healthy Eating(Q1),Healthy Coping(Q2),Reducing Risks(Q3),Healthy Eating(Q4),Being Active(Q5),Blood Sugar Monitoring(Q6),Problem JWBNFQA(Q2) Preparedness Score: 6.0  Low: Healthy Eating(Q1),Being Active(Q2),Healthy Coping(Q3),Reducing Risks(Q4),Taking Medication(Q5),Blood Sugar Monitoring(Q6),Problem Solving(Q7)   Healthy Eating Score: 6.0  Low: Skills(Q1),Confidence(Q1),Confidence(Q4),Preparedness(Q1) Taking Medication Score: 6.0  Low: Skills(Q2),Preparedness(Q5) Blood Sugar Monitoring Score: 5.2  Low: BAZIFL(P2) Reducing Risks Score: 6.0  Low: Skills(Q5),Skills(Q9),Confidence(Q3),Preparedness(Q4)   Problem Solving Score: 6.0  Low: Skills(Q6),Confidence(Q7),Preparedness(Q7) Healthy Coping Score: 6.0  Low: Skills(Q7),Confidence(Q2),Preparedness(Q3) Being Active Score: 6.0  Low: Confidence(Q5),Preparedness(Q2)     Skills/Knowledge Questions   1. I know how to plan meals that have the best balance between carbohydrates, proteins and vegetables. 6   2. I know how my diabetes medications (pills, injectables and/or insulin) work in my body. 6   3. I know when to check my blood sugar if I want to see how my body responded to a meal. 6   4. I know when to check my blood sugars to determine if my medication or insulin doses are correct. 6   5. I know what to do to prevent a low blood sugar when I exercise (either before, during, or after). 6   6. When I am sick, I know what to do differently with my diabetes management. 6   7. I know how stress can affect my diabetes management. 6   8. When I look at my blood sugars over a given week, I can explain what my blood sugar pattern is. 2   9. I know what my target levels are for A1c, blood pressure and cholesterol. 6   Confidence Questions   1. I am confident that I can plan balanced meals and snacks. 6   2. I am confident that I can manage my stress. 6   3.  I am confident that I can prevent a low blood sugar during or after exercise. 6   4. I am confident that the next time I eat out, I will be able to choose foods that best keep my blood sugars in target. 6   5. I am confident I can include exercise into my schedule. 6   6. I am confident that I can use my daily blood sugars to adjust my diet, my activity, and/or my insulin. 6   7. When something out of my normal routine happens, I am confident that I can problem-solve and keep my diabetes on track. 6   Preparedness Questions   1. Within the next month, I will begin to eat more balanced meals and snacks. 6   2. Within the next month, I will choose an exercise activity and I will start fitting it into my schedule. 6   3. Within the next month, I will make a list of stress management options that work for me. 6   4. Within the next month, I will consistently plan ahead to prevent low blood sugars. 6   5. Within the next month, I will start adjusting my insulin doses on my own. 6   6. Within the next month, I will begin making changes to my diabetes management based on my daily blood sugars (eg - eating, activity and/or insulin). 6   7. Within the next month, I will begin making changes to my diabetes management to meet my overall goals (eg - eating, activity and/or insulin).  6

## 2021-11-04 NOTE — PROGRESS NOTES
Subjective: (As above and below)     Chief Complaint   Patient presents with    Follow-up     DM     Parul Damon is a 55y.o. year old female who presents for     Hypertension ROS:   no medication side effects noted, no TIAs, no chest pain on exertion, no dyspnea on exertion, no swelling of ankles  Nurse documented not taking losartan, pt states she thinks she is  Did not bring meds in today    Diabetic Review of Systems - medication compliance: compliant all of the time, diabetic diet compliance: compliant most of the time, home glucose monitoring: is performed regularly. Reports sugars 120-140s  She is mostly blind, she is able to see to adjust lantus pen    Migraines; stable    Schizophrenia/bipolar: stable      Reviewed PmHx, RxHx, FmHx, SocHx, AllgHx and updated in chart. Family History   Problem Relation Age of Onset    Cancer Maternal Aunt     Cancer Maternal Uncle        Past Medical History:   Diagnosis Date    [facility]     Diabetes ([facility]     Hypertension     Psychiatric disorder     Schizophrenia ([facility]       Social History     Socioeconomic History    Marital status: SINGLE   Tobacco Use    Smoking status: Current Some Day Smoker     Packs/day: 0.25    Smokeless tobacco: Never Used    Tobacco comment: 2-3 per day   Vaping Use    Vaping Use: Never used   Substance and Sexual Activity    Alcohol use: No    Drug use: No    Sexual activity: Not Currently          Current Outpatient Medications   Medication Sig    insulin glargine (LANTUS,BASAGLAR) 100 unit/mL (3 mL) inpn 15 Units by SubCUTAneous route nightly. With snack    cholecalciferol (VITAMIN D3) (1000 Units /25 mcg) tablet take 1 tablet by mouth once daily    polyethylene glycol (MIRALAX) 17 gram packet Take 1 Packet by mouth daily as needed for Constipation.  lidocaine (LIDODERM) 5 % 1 Patch by TransDERmal route every twelve (12) hours.  Apply patch to the affected area for 12 hours a day and remove for 12 hours a day.  lamoTRIgine (LAMICTAL) 100 mg tablet Take 0.5 Tabs by mouth two (2) times a day. Indications: Bipolar Depression    haloperidol (HALDOL) 5 mg tablet Take 1 Tab by mouth nightly. Indications: psychotic disorder    sertraline (ZOLOFT) 50 mg tablet Take 1 Tab by mouth daily. Indications: major depressive disorder    traZODone (DESYREL) 50 mg tablet Take 1 Tab by mouth nightly. Indications: major depressive disorder    Jardiance 25 mg tablet take 1 tablet by mouth once daily (Patient not taking: Reported on 11/4/2021)    atorvastatin (LIPITOR) 20 mg tablet take 1 tablet by mouth nightly (Patient not taking: Reported on 11/4/2021)    losartan (COZAAR) 25 mg tablet take 1 tablet by mouth once daily (Patient not taking: Reported on 11/4/2021)    Insulin Needles, Disposable, 32 gauge x 5/32\" ndle 1 Each by Does Not Apply route daily.  alcohol swabs padm 1 Each by Apply Externally route daily.  Blood-Glucose Meter monitoring kit Check sugar in the AM and PM.    lancets misc Use as directed. Dx: e11.65. check sugar once daily    glucose blood VI test strips (ASCENSIA AUTODISC VI, ONE TOUCH ULTRA TEST VI) strip E11.65 check sugar once daily fasting    benztropine (COGENTIN) 1 mg tablet Take 1 mg by mouth two (2) times a day. No current facility-administered medications for this visit. Review of Systems:   Constitutional:    Negative for fever and chills, negative diaphoresis. HEENT:              Negative for neck pain and stiffness. Eyes:                  Negative for visual disturbance, itching, redness or discharge. Respiratory:        Negative for cough and shortness of breath. Cardiovascular:  Negative for chest pain and palpitations. Gastrointestinal: Negative for nausea, vomiting, abdominal pain, diarrhea or constipation. Genitourinary:     Negative for dysuria and frequency. Musculoskeletal: Negative for falls, tenderness and swelling.   Skin: Negative for rash, masses or lesions. Neurological:       Negative for dizzyness, seizure, loss of consciousness, weakness and numbness. Objective:     Vitals:    11/04/21 1103   BP: 115/70   Pulse: (!) 102   Resp: 17   Temp: 97.6 °F (36.4 °C)   TempSrc: Temporal   SpO2: 99%   Weight: 141 lb (64 kg)   Height: 5' (1.524 m)       Results for orders placed or performed in visit on 11/04/21   AMB POC HEMOGLOBIN A1C   Result Value Ref Range    Hemoglobin A1c (POC) 10.7 %   AMB POC GLUCOSE BLOOD, BY GLUCOSE MONITORING DEVICE   Result Value Ref Range    Glucose POC [facility] MG/DL         Physical Examination: General appearance - alert, well appearing, and in no distress  Mental status - alert, oriented to person, place, and time  Chest - clear to auscultation, no wheezes, rales or rhonchi, symmetric air entry  Heart - normal rate, regular rhythm, normal S1, S2, no murmurs, rubs, clicks or gallops  Extremities - no pedal edema noted      Assessment/ Plan:     Follow-up and Dispositions    · Return in about 3 months (around 2/4/2022). Discussed w/  who is here today to review med list and ensure she has correct meds at home    1. Mixed hyperlipidemia      2. Essential hypertension      3. Type 2 diabetes mellitus with hyperglycemia, without long-term current use of insulin (HCC)  Increase insulin  She has DM education today  She is improving    - AMB POC HEMOGLOBIN A1C  - AMB POC GLUCOSE BLOOD, BY GLUCOSE MONITORING DEVICE  -  DIABETES FOOT EXAM  - insulin glargine (LANTUS,BASAGLAR) 100 unit/mL (3 mL) inpn; 15 Units by SubCUTAneous route nightly. With snack  Dispense: 5 Adjustable Dose Pre-filled Pen Syringe; Refill: 1  - REFERRAL TO PODIATRY    4. Impaired vision            I have discussed the diagnosis with the patient and the intended plan as seen in the above orders. The patient has received an after-visit summary and questions were answered concerning future plans.   Pt conveyed understanding of plan.      Medication Side Effects and Warnings were discussed with patient: yes  Patient Labs were reviewed: yes  Patient Past Records were reviewed:  yes    Margo Isaacs.  Meghan Ray NP

## 2021-11-04 NOTE — PROGRESS NOTES
Chief Complaint   Patient presents with    Follow-up     DM       1. Have you been to the ER, urgent care clinic since your last visit? Hospitalized since your last visit? No    2. Have you seen or consulted any other health care providers outside of the [address] [place] [address] since your last visit? Include any pap smears or colon screening.  No

## 2021-12-08 ENCOUNTER — HOSPITAL ENCOUNTER (OUTPATIENT)
Dept: MAMMOGRAPHY | Age: 56
Discharge: HOME | End: 2021-12-08
Attending: NURSE PRACTITIONER
Payer: MEDICAID

## 2021-12-08 DIAGNOSIS — Z12.31 ENCOUNTER FOR SCREENING MAMMOGRAM FOR MALIGNANT NEOPLASM OF BREAST: ICD-10-CM

## 2021-12-08 PROCEDURE — 77067 SCR MAMMO BI INCL CAD: CPT

## 2021-12-20 DIAGNOSIS — E11.65 TYPE 2 DIABETES MELLITUS WITH HYPERGLYCEMIA, WITHOUT LONG-TERM CURRENT USE OF INSULIN (HCC): ICD-10-CM

## 2021-12-20 RX ORDER — LOSARTAN POTASSIUM 25 MG/1
TABLET ORAL
Qty: 90 TABLET | Refills: 0 | Status: SHIPPED | OUTPATIENT
Start: 2021-12-20 | End: 2022-03-21

## 2021-12-27 RX ORDER — MELATONIN
Qty: 90 TABLET | Refills: 0 | Status: SHIPPED | OUTPATIENT
Start: 2021-12-27 | End: 2022-03-24

## 2022-01-24 RX ORDER — EMPAGLIFLOZIN 25 MG/1
TABLET, FILM COATED ORAL
Qty: 90 TABLET | Refills: 0 | Status: SHIPPED | OUTPATIENT
Start: 2022-01-24

## 2022-01-26 NOTE — ED TRIAGE NOTES
Pt accompanied by caregiver with c/o of fatigue. Her caregiver reports that she is Mrs. Brasher's mental health coordinator and has been caring for her for about 1-2 months. Pt's caregiver reports altered mental status, fruity smell, ongoing fatigue, and not eating. Caregiver reports that this has been occurring since she starting caring for Mrs. Damon. BG obtained in triage and was 137. Posterior Auricular Interpolation Flap Text: A decision was made to reconstruct the defect utilizing an interpolation axial flap and a staged reconstruction.  A telfa template was made of the defect.  This telfa template was then used to outline the posterior auricular interpolation flap.  The donor area for the pedicle flap was then injected with anesthesia.  The flap was excised through the skin and subcutaneous tissue down to the layer of the underlying musculature.  The pedicle flap was carefully excised within this deep plane to maintain its blood supply.  The edges of the donor site were undermined.   The donor site was closed in a primary fashion.  The pedicle was then rotated into position and sutured.  Once the tube was sutured into place, adequate blood supply was confirmed with blanching and refill.  The pedicle was then wrapped with xeroform gauze and dressed appropriately with a telfa and gauze bandage to ensure continued blood supply and protect the attached pedicle.

## 2022-02-10 ENCOUNTER — OFFICE VISIT (OUTPATIENT)
Dept: INTERNAL MEDICINE CLINIC | Age: 57
End: 2022-02-10
Payer: MEDICAID

## 2022-02-10 VITALS
HEIGHT: 60 IN | HEART RATE: 105 BPM | SYSTOLIC BLOOD PRESSURE: 109 MMHG | WEIGHT: 138 LBS | BODY MASS INDEX: 27.09 KG/M2 | TEMPERATURE: 97.9 F | RESPIRATION RATE: 18 BRPM | OXYGEN SATURATION: 99 % | DIASTOLIC BLOOD PRESSURE: 80 MMHG

## 2022-02-10 DIAGNOSIS — E11.65 TYPE 2 DIABETES MELLITUS WITH HYPERGLYCEMIA, WITHOUT LONG-TERM CURRENT USE OF INSULIN (HCC): Primary | ICD-10-CM

## 2022-02-10 DIAGNOSIS — F20.9 SCHIZOPHRENIA, UNSPECIFIED TYPE (HCC): ICD-10-CM

## 2022-02-10 DIAGNOSIS — I10 ESSENTIAL HYPERTENSION: ICD-10-CM

## 2022-02-10 LAB
GLUCOSE POC: 366 MG/DL
HBA1C MFR BLD HPLC: 11.3 %

## 2022-02-10 PROCEDURE — 82962 GLUCOSE BLOOD TEST: CPT | Performed by: NURSE PRACTITIONER

## 2022-02-10 PROCEDURE — 83036 HEMOGLOBIN GLYCOSYLATED A1C: CPT | Performed by: NURSE PRACTITIONER

## 2022-02-10 PROCEDURE — 99213 OFFICE O/P EST LOW 20 MIN: CPT | Performed by: NURSE PRACTITIONER

## 2022-02-10 RX ORDER — INSULIN GLARGINE 100 [IU]/ML
20 INJECTION, SOLUTION SUBCUTANEOUS
Qty: 5 ADJUSTABLE DOSE PRE-FILLED PEN SYRINGE | Refills: 1 | Status: SHIPPED | OUTPATIENT
Start: 2022-02-10 | End: 2022-06-28 | Stop reason: SDUPTHER

## 2022-02-10 NOTE — PROGRESS NOTES
Chief Complaint   Patient presents with    Follow-up     DM       1. Have you been to the ER, urgent care clinic since your last visit? Hospitalized since your last visit? No    2. Have you seen or consulted any other health care providers outside of the [address] [place] [address] since your last visit? Include any pap smears or colon screening.  No

## 2022-02-10 NOTE — PROGRESS NOTES
Subjective: (As above and below)     Chief Complaint   Patient presents with    Follow-up     DM     Parul OCHOA Mitra Chapin is a 65y.o. year old female who presents for     Hypertension ROS:  taking medications as instructed, no medication side effects noted, no TIAs, no chest pain on exertion, no dyspnea on exertion, no swelling of ankles    Diabetic Review of Systems - medication compliance: compliant all of the time, diabetic diet compliance: noncompliant some of the time, home glucose monitoring: is performed sporadically. Eats microwave meals- had waffles and syrup this morning, fried chicken and mashed potatoes yesterday    Schizophrenia/bipolar disorder: follows w/ psych, doing well    Reviewed PmHx, RxHx, FmHx, SocHx, AllgHx and updated in chart. Family History   Problem Relation Age of Onset    Cancer Maternal Aunt     Breast Cancer Maternal Aunt     Cancer Maternal Uncle     Breast Cancer Maternal Aunt        Past Medical History:   Diagnosis Date    [facility]     Diabetes ([facility]     Hypertension     Psychiatric disorder     Schizophrenia ([facility]       Social History     Socioeconomic History    Marital status: SINGLE   Tobacco Use    Smoking status: Current Some Day Smoker     Packs/day: 0.25    Smokeless tobacco: Never Used    Tobacco comment: 2-3 per day   Vaping Use    Vaping Use: Never used   Substance and Sexual Activity    Alcohol use: No    Drug use: No    Sexual activity: Not Currently          Current Outpatient Medications   Medication Sig    insulin glargine (LANTUS,BASAGLAR) 100 unit/mL (3 mL) inpn 20 Units by SubCUTAneous route nightly.  With snack    Jardiance 25 mg tablet take 1 tablet by mouth once daily    cholecalciferol (VITAMIN D3) (1000 Units /25 mcg) tablet take 1 tablet by mouth once daily    losartan (COZAAR) 25 mg tablet take 1 tablet by mouth once daily    atorvastatin (LIPITOR) 20 mg tablet take 1 tablet by mouth nightly    polyethylene glycol (MIRALAX) 17 gram packet Take 1 Packet by mouth daily as needed for Constipation.  lamoTRIgine (LAMICTAL) 100 mg tablet Take 0.5 Tabs by mouth two (2) times a day. Indications: Bipolar Depression    benztropine (COGENTIN) 1 mg tablet Take 1 mg by mouth two (2) times a day.  haloperidol (HALDOL) 5 mg tablet Take 1 Tab by mouth nightly. Indications: psychotic disorder    sertraline (ZOLOFT) 50 mg tablet Take 1 Tab by mouth daily. Indications: major depressive disorder    traZODone (DESYREL) 50 mg tablet Take 1 Tab by mouth nightly. Indications: major depressive disorder    Insulin Needles, Disposable, 32 gauge x 5/32\" ndle 1 Each by Does Not Apply route daily.  alcohol swabs padm 1 Each by Apply Externally route daily.  Blood-Glucose Meter monitoring kit Check sugar in the AM and PM.    lancets misc Use as directed. Dx: e11.65. check sugar once daily    glucose blood VI test strips (ASCENSIA AUTODISC VI, ONE TOUCH ULTRA TEST VI) strip E11.65 check sugar once daily fasting    lidocaine (LIDODERM) 5 % 1 Patch by TransDERmal route every twelve (12) hours. Apply patch to the affected area for 12 hours a day and remove for 12 hours a day. (Patient not taking: Reported on 2/10/2022)     No current facility-administered medications for this visit. Review of Systems:   Constitutional:    Negative for fever and chills, negative diaphoresis. HEENT:              Negative for neck pain and stiffness. Eyes:                  Negative for visual disturbance, itching, redness or discharge. Respiratory:        Negative for cough and shortness of breath. Cardiovascular:  Negative for chest pain and palpitations. Gastrointestinal: Negative for nausea, vomiting, abdominal pain, diarrhea or constipation. Genitourinary:     Negative for dysuria and frequency. Musculoskeletal: Negative for falls, tenderness and swelling. Skin:                    Negative for rash, masses or lesions.    Neurological: Negative for dizzyness, seizure, loss of consciousness, weakness and numbness. Objective:     Vitals:    02/10/22 1048   BP: 109/80   Pulse: (!) 105   Resp: 18   Temp: 97.9 °F (36.6 °C)   TempSrc: Temporal   SpO2: 99%   Weight: 138 lb (62.6 kg)   Height: 5' (1.524 m)     Results for orders placed or performed in visit on 02/10/22   AMB POC HEMOGLOBIN A1C   Result Value Ref Range    Hemoglobin A1c (POC) 11.3 %   AMB POC GLUCOSE BLOOD, BY GLUCOSE MONITORING DEVICE   Result Value Ref Range    Glucose  MG/DL         Gen: Oriented to person, place and time and well-developed, well-nourished and in no distress. HEENT:    Head: normocephalic and atraumatic. Eyes:  EOM are normal. Pupils equal and round. Neck:  Normal range of motion. Neck supple. Cardiovascular: normal rate, regular rhythm and normal heart sounds. Pulmonary/Chest:  Effort normal and breath sounds normal.  No respiratory distress. No wheezes, no rales. Abdominal: soft, normal  bowel sounds. Musculoskeletal:  No edema, no tenderness. No calf tenderness or edema. Neurological:  Alert, oriented to person, place and time. Skin: skin is warm and dry. Assessment/ Plan:     Follow-up and Dispositions    · Return in about 3 months (around 5/10/2022). 1. Type 2 diabetes mellitus with hyperglycemia, without long-term current use of insulin (HCC)  Declines further DM education  Discussed diet  Insulin increase  - AMB POC HEMOGLOBIN A1C  - AMB POC GLUCOSE BLOOD, BY GLUCOSE MONITORING DEVICE  - METABOLIC PANEL, BASIC; Future  - LIPID PANEL; Future  - insulin glargine (LANTUS,BASAGLAR) 100 unit/mL (3 mL) inpn; 20 Units by SubCUTAneous route nightly. With snack  Dispense: 5 Adjustable Dose Pre-filled Pen Syringe; Refill: 1    2. Essential hypertension      3. Schizophrenia, unspecified type ([facility]            I have discussed the diagnosis with the patient and the intended plan as seen in the above orders.   The patient has received an after-visit summary and questions were answered concerning future plans. Pt conveyed understanding of plan. Medication Side Effects and Warnings were discussed with patient: yes  Patient Labs were reviewed: yes  Patient Past Records were reviewed:  yes    Julio Cesar Daly.  Joann Carlin NP

## 2022-03-04 DIAGNOSIS — E11.65 TYPE 2 DIABETES MELLITUS WITH HYPERGLYCEMIA, WITHOUT LONG-TERM CURRENT USE OF INSULIN (HCC): ICD-10-CM

## 2022-03-05 LAB
ANION GAP SERPL CALC-SCNC: 4 MMOL/L (ref 5–15)
BUN SERPL-MCNC: 5 MG/DL (ref 6–20)
BUN/CREAT SERPL: 5 (ref 12–20)
CALCIUM SERPL-MCNC: 9.9 MG/DL (ref 8.5–10.1)
CHLORIDE SERPL-SCNC: 101 MMOL/L (ref 97–108)
CHOLEST SERPL-MCNC: 238 MG/DL
CO2 SERPL-SCNC: 27 MMOL/L (ref 21–32)
CREAT SERPL-MCNC: 1.02 MG/DL (ref 0.55–1.02)
GLUCOSE SERPL-MCNC: 403 MG/DL (ref 65–100)
HDLC SERPL-MCNC: 32 MG/DL
HDLC SERPL: 7.4 {RATIO} (ref 0–5)
LDLC SERPL CALC-MCNC: 166.8 MG/DL (ref 0–100)
POTASSIUM SERPL-SCNC: 4.3 MMOL/L (ref 3.5–5.1)
SODIUM SERPL-SCNC: 132 MMOL/L (ref 136–145)
TRIGL SERPL-MCNC: 196 MG/DL (ref ?–150)
VLDLC SERPL CALC-MCNC: 39.2 MG/DL

## 2022-03-10 RX ORDER — ATORVASTATIN CALCIUM 40 MG/1
40 TABLET, FILM COATED ORAL
Qty: 90 TABLET | Refills: 0 | Status: SHIPPED | OUTPATIENT
Start: 2022-03-10 | End: 2022-06-30 | Stop reason: SDUPTHER

## 2022-03-11 ENCOUNTER — TELEPHONE (OUTPATIENT)
Dept: INTERNAL MEDICINE CLINIC | Age: 57
End: 2022-03-11

## 2022-03-11 NOTE — TELEPHONE ENCOUNTER
----- Message from [facility]. Spring Escamilla NP sent at 3/10/2022 11:27 AM EST -----  Please let her know her cholesterol is too high, increased dose of atorvastatin sent - instead of 20 take 40mg nightly  Also, how have her sugars been?  ----- Message -----  From: Justice Villanueva In [employer]  Sent: 3/5/2022  11:33 AM EST  To: Coby Escamilla NP

## 2022-03-11 NOTE — TELEPHONE ENCOUNTER
Informed pt of provider's response, pt verbalized understanding. Pt states sugar has been \"good\" but does not remember recent readings.

## 2022-03-19 PROBLEM — F25.0 SCHIZOAFFECTIVE DISORDER, BIPOLAR TYPE (HCC): Status: ACTIVE | Noted: 2017-07-07

## 2022-03-19 PROBLEM — F20.9 SCHIZOPHRENIA (HCC): Status: ACTIVE | Noted: 2019-04-18

## 2022-03-19 PROBLEM — I10 ESSENTIAL HYPERTENSION: Status: ACTIVE | Noted: 2017-07-07

## 2022-03-19 PROBLEM — F32.A DEPRESSION: Status: ACTIVE | Noted: 2018-07-18

## 2022-03-20 PROBLEM — H54.7 VISION IMPAIRMENT: Status: ACTIVE | Noted: 2017-07-07

## 2022-03-21 DIAGNOSIS — E11.65 TYPE 2 DIABETES MELLITUS WITH HYPERGLYCEMIA, WITHOUT LONG-TERM CURRENT USE OF INSULIN (HCC): ICD-10-CM

## 2022-03-21 RX ORDER — LOSARTAN POTASSIUM 25 MG/1
TABLET ORAL
Qty: 90 TABLET | Refills: 0 | Status: SHIPPED | OUTPATIENT
Start: 2022-03-21 | End: 2022-06-23

## 2022-03-24 RX ORDER — MELATONIN
Qty: 90 TABLET | Refills: 0 | Status: SHIPPED | OUTPATIENT
Start: 2022-03-24

## 2022-04-20 RX ORDER — SITAGLIPTIN 100 MG/1
TABLET, FILM COATED ORAL
Qty: 90 TABLET | Refills: 0 | Status: SHIPPED | OUTPATIENT
Start: 2022-04-20 | End: 2022-07-21

## 2022-05-12 NOTE — TELEPHONE ENCOUNTER
Patient  states that don't no pharmacy have any impaired meter, wants to know if any medical supply company have them, to check and see and if they do send the order to them and make sure they take her insurance, back pain, had injection recently

## 2022-06-23 DIAGNOSIS — E11.65 TYPE 2 DIABETES MELLITUS WITH HYPERGLYCEMIA, WITHOUT LONG-TERM CURRENT USE OF INSULIN (HCC): ICD-10-CM

## 2022-06-23 RX ORDER — LOSARTAN POTASSIUM 25 MG/1
TABLET ORAL
Qty: 90 TABLET | Refills: 0 | Status: SHIPPED | OUTPATIENT
Start: 2022-06-23

## 2022-06-28 ENCOUNTER — VIRTUAL VISIT (OUTPATIENT)
Dept: INTERNAL MEDICINE CLINIC | Age: 57
End: 2022-06-28
Payer: MEDICAID

## 2022-06-28 DIAGNOSIS — E78.2 MIXED HYPERLIPIDEMIA: ICD-10-CM

## 2022-06-28 DIAGNOSIS — I10 ESSENTIAL HYPERTENSION: ICD-10-CM

## 2022-06-28 DIAGNOSIS — K21.9 GASTROESOPHAGEAL REFLUX DISEASE WITHOUT ESOPHAGITIS: ICD-10-CM

## 2022-06-28 DIAGNOSIS — E11.65 TYPE 2 DIABETES MELLITUS WITH HYPERGLYCEMIA, WITHOUT LONG-TERM CURRENT USE OF INSULIN (HCC): Primary | ICD-10-CM

## 2022-06-28 PROCEDURE — 99442 PR PHYS/QHP TELEPHONE EVALUATION 11-20 MIN: CPT | Performed by: NURSE PRACTITIONER

## 2022-06-28 RX ORDER — OMEPRAZOLE 40 MG/1
40 CAPSULE, DELAYED RELEASE ORAL
Qty: 30 CAPSULE | Refills: 1 | Status: SHIPPED | OUTPATIENT
Start: 2022-06-28

## 2022-06-28 RX ORDER — INSULIN GLARGINE 100 [IU]/ML
20 INJECTION, SOLUTION SUBCUTANEOUS
Qty: 5 ADJUSTABLE DOSE PRE-FILLED PEN SYRINGE | Refills: 1 | Status: SHIPPED | OUTPATIENT
Start: 2022-06-28 | End: 2022-08-24 | Stop reason: SDUPTHER

## 2022-06-28 NOTE — PROGRESS NOTES
Boston Hernandez is a 65 y.o. female, evaluated via audio-only technology on 6/28/2022 for Diabetes (follow up, with insulin refill PHONE CALL [phone])  . Assessment & Plan:   Diagnoses and all orders for this visit:    1. Type 2 diabetes mellitus with hyperglycemia, without long-term current use of insulin (HCC)  -     insulin glargine (LANTUS,BASAGLAR) 100 unit/mL (3 mL) inpn; 20 Units by SubCUTAneous route nightly. With snack  -     HEMOGLOBIN A1C WITH EAG  -     METABOLIC PANEL, COMPREHENSIVE  -     CBC W/O DIFF  -     LIPID PANEL  -     MICROALBUMIN, UR, RAND W/ MICROALB/CREAT RATIO    2. Gastroesophageal reflux disease without esophagitis    3. Essential hypertension    4. Mixed hyperlipidemia    Other orders  -     omeprazole (PRILOSEC) 40 mg capsule; Take 1 Capsule by mouth daily as needed (acid reflux). The complexity of medical decision making for this visit is moderate     Discussed PPI not for long term use, fu INI  12  Subjective:       Prior to Admission medications    Medication Sig Start Date End Date Taking? Authorizing Provider   insulin glargine (LANTUS,BASAGLAR) 100 unit/mL (3 mL) inpn 20 Units by SubCUTAneous route nightly. With snack 6/28/22  Yes Allayne Opitz., NP   omeprazole (PRILOSEC) 40 mg capsule Take 1 Capsule by mouth daily as needed (acid reflux). 6/28/22  Yes Allayne Opitz., NP   losartan (COZAAR) 25 mg tablet take 1 tablet by mouth once daily 6/23/22  Yes Allayne Opitz., NP   Januvia 100 mg tablet take 1 tablet by mouth once daily 4/20/22  Yes Allayne Opitz., NP   cholecalciferol (VITAMIN D3) (1000 Units /25 mcg) tablet take 1 tablet by mouth once daily 3/24/22  Yes Allayne Opitz., NP   atorvastatin (LIPITOR) 40 mg tablet Take 1 Tablet by mouth nightly.  3/10/22  Yes Allayne Opitz., NP   Jardiance 25 mg tablet take 1 tablet by mouth once daily 1/24/22  Yes Allayne Opitz., NP   Insulin Needles, Disposable, 32 gauge x 5/32\" ndle 1 Each by Does Not Apply route daily. 8/5/21  Yes Barnet Spiaaron., MAMIE   alcohol swabs padm 1 Each by Apply Externally route daily. 8/5/21  Yes Cara Amador., MAMIE   polyethylene glycol (MIRALAX) 17 gram packet Take 1 Packet by mouth daily as needed for Constipation. 6/24/21  Yes Cara Amador., MAMIE   Blood-Glucose Meter monitoring kit Check sugar in the AM and PM. 10/29/20  Yes Cara Amador., MAMIE   lancets misc Use as directed. Dx: e11.65. check sugar once daily 10/29/20  Yes Charlienet Perry., MAMIE   glucose blood VI test strips (ASCENSIA AUTODISC VI, ONE TOUCH ULTRA TEST VI) strip E11.65 check sugar once daily fasting 10/29/20  Yes Cara Amador., MAMIE   lamoTRIgine (LAMICTAL) 100 mg tablet Take 0.5 Tabs by mouth two (2) times a day. Indications: Bipolar Depression 4/23/19  Yes La Nena Miller MD   benztropine (COGENTIN) 1 mg tablet Take 1 mg by mouth two (2) times a day. Yes Provider, Cl   haloperidol (HALDOL) 5 mg tablet Take 1 Tab by mouth nightly. Indications: psychotic disorder 7/23/18  Yes Stone Cedillo MD   sertraline (ZOLOFT) 50 mg tablet Take 1 Tab by mouth daily. Indications: major depressive disorder 7/24/18  Yes Stone Cedillo MD   traZODone (DESYREL) 50 mg tablet Take 1 Tab by mouth nightly. Indications: major depressive disorder 7/23/18  Yes Stone Cedillo MD   atorvastatin (LIPITOR) 20 mg tablet take 1 tablet by mouth nightly  Patient not taking: Reported on 6/28/2022 4/1/22 6/28/22  Junie Nix NP   insulin glargine (LANTUS,BASAGLAR) 100 unit/mL (3 mL) inpn 20 Units by SubCUTAneous route nightly. With snack 2/10/22 6/28/22  Cara Dubois, MAMIE     Patient Active Problem List   Diagnosis Code    Schizoaffective disorder, bipolar type ([facility] F25.0    Vision impairment H54.7    Essential hypertension I10    Depression F32. A    Schizophrenia ([address] F20.9       ROS     Diabetic Review of Systems - medication compliance: compliant most of the time, diabetic diet compliance: compliant most of the time, home glucose monitoring: is performed sporadically. Hypertension ROS:  taking medications as instructed, no medication side effects noted, no TIAs, no chest pain on exertion, no dyspnea on exertion, no swelling of ankles    Hyperlipidemia; tolerating statin    GERD: epigastric burning w/ many foods. No dysphagia, vomiting sob    No data recorded     Mable Cook was evaluated through a patient-initiated, synchronous (real-time) audio only encounter. She (or guardian if applicable) is aware that it is a billable service, which includes applicable co-pays, with coverage as determined by her insurance carrier. This visit was conducted with the patient's (and/or Yolanda Pope guardian's) verbal consent. She has not had a related appointment within my department in the past 7 days or scheduled within the next 24 hours. The patient was located in a state where the provider was licensed to provide care. The patient was located at: Home: [address]  The provider was located at: Home:     Total Time: minutes: 11-20 minutes    Coby Colon, MAMIE

## 2022-06-28 NOTE — PROGRESS NOTES
Pt is here for   Chief Complaint   Patient presents with    Diabetes     follow up, with insulin refill PHONE CALL [phone]     1. Have you been to the ER, urgent care clinic since your last visit? Hospitalized since your last visit? No    2. Have you seen or consulted any other health care providers outside of the [address] [place] [address] since your last visit? Include any pap smears or colon screening.  No

## 2022-07-21 LAB
ALBUMIN SERPL-MCNC: 4 G/DL (ref 3.8–4.9)
ALBUMIN/CREAT UR: 16 MG/G CREAT (ref 0–29)
ALBUMIN/GLOB SERPL: 1.3 {RATIO} (ref 1.2–2.2)
ALP SERPL-CCNC: 176 IU/L (ref 44–121)
ALT SERPL-CCNC: 19 IU/L (ref 0–32)
AST SERPL-CCNC: 18 IU/L (ref 0–40)
BILIRUB SERPL-MCNC: 0.3 MG/DL (ref 0–1.2)
BUN SERPL-MCNC: 6 MG/DL (ref 6–24)
BUN/CREAT SERPL: 8 (ref 9–23)
CALCIUM SERPL-MCNC: 10.4 MG/DL (ref 8.7–10.2)
CHLORIDE SERPL-SCNC: 97 MMOL/L (ref 96–106)
CHOLEST SERPL-MCNC: 257 MG/DL (ref 100–199)
CO2 SERPL-SCNC: 23 MMOL/L (ref 20–29)
CREAT SERPL-MCNC: 0.73 MG/DL (ref 0.57–1)
CREAT UR-MCNC: 43.2 MG/DL
EGFR: 96 ML/MIN/1.73
ERYTHROCYTE [DISTWIDTH] IN BLOOD BY AUTOMATED COUNT: 11.9 % (ref 11.7–15.4)
EST. AVERAGE GLUCOSE BLD GHB EST-MCNC: 318 MG/DL
GLOBULIN SER CALC-MCNC: 3 G/DL (ref 1.5–4.5)
GLUCOSE SERPL-MCNC: 468 MG/DL (ref 65–99)
HBA1C MFR BLD: 12.7 % (ref 4.8–5.6)
HCT VFR BLD AUTO: 39.5 % (ref 34–46.6)
HDLC SERPL-MCNC: 40 MG/DL
HGB BLD-MCNC: 13.2 G/DL (ref 11.1–15.9)
IMP & REVIEW OF LAB RESULTS: NORMAL
LDLC SERPL CALC-MCNC: 193 MG/DL (ref 0–99)
MCH RBC QN AUTO: 32.9 PG (ref 26.6–33)
MCHC RBC AUTO-ENTMCNC: 33.4 G/DL (ref 31.5–35.7)
MCV RBC AUTO: 99 FL (ref 79–97)
MICROALBUMIN UR-MCNC: 6.7 UG/ML
PLATELET # BLD AUTO: 264 X10E3/UL (ref 150–450)
POTASSIUM SERPL-SCNC: 5.2 MMOL/L (ref 3.5–5.2)
PROT SERPL-MCNC: 7 G/DL (ref 6–8.5)
RBC # BLD AUTO: 4.01 X10E6/UL (ref 3.77–5.28)
SODIUM SERPL-SCNC: 134 MMOL/L (ref 134–144)
TRIGL SERPL-MCNC: 130 MG/DL (ref 0–149)
VLDLC SERPL CALC-MCNC: 24 MG/DL (ref 5–40)
WBC # BLD AUTO: 12.8 X10E3/UL (ref 3.4–10.8)

## 2022-07-21 RX ORDER — SITAGLIPTIN 100 MG/1
TABLET, FILM COATED ORAL
Qty: 90 TABLET | Refills: 0 | Status: SHIPPED | OUTPATIENT
Start: 2022-07-21

## 2022-07-22 ENCOUNTER — TELEPHONE (OUTPATIENT)
Dept: INTERNAL MEDICINE CLINIC | Age: 57
End: 2022-07-22

## 2022-08-08 NOTE — BH NOTES
GROUP THERAPY PROGRESS NOTE    Cricket Joe is participating in reflections group.      Group time: 15 minutes    Personal goal for participation: Shower    Goal orientation: personal    Group therapy participation: active    Therapeutic interventions reviewed and discussed: Unit rules/Goals    Impression of participation: Active Patient: Madeline Szymanski    Procedure: Procedure(s):  Cervical 5 - Cervical 6 anterior cervical decompression and artificial disc replacement, use of microscope       Anesthesia Type:  General    Note:  Disposition: Outpatient   Postop Pain Control: Uneventful            Sign Out: Well controlled pain   PONV: No   Neuro/Psych: Uneventful            Sign Out: Acceptable/Baseline neuro status   Airway/Respiratory: Uneventful            Sign Out: Acceptable/Baseline resp. status   CV/Hemodynamics: Uneventful            Sign Out: Acceptable CV status; No obvious hypovolemia; No obvious fluid overload   Other NRE: NONE   DID A NON-ROUTINE EVENT OCCUR? No    Event details/Postop Comments:  Doing well, no nausea or vomiting.  Up to chair and feeling well and will likely head home soon.           Last vitals:  Vitals Value Taken Time   /66 08/08/22 1115   Temp 36.3  C (97.3  F) 08/08/22 1115   Pulse 81 08/08/22 1117   Resp 13 08/08/22 1116   SpO2 94 % 08/08/22 1117   Vitals shown include unvalidated device data.    Electronically Signed By: Christopher Kim MD  August 8, 2022  12:11 PM

## 2022-08-17 RX ORDER — PEN NEEDLE, DIABETIC 32GX 5/32"
NEEDLE, DISPOSABLE MISCELLANEOUS
Qty: 100 PEN NEEDLE | Refills: 11 | Status: SHIPPED | OUTPATIENT
Start: 2022-08-17

## 2022-08-24 ENCOUNTER — OFFICE VISIT (OUTPATIENT)
Dept: INTERNAL MEDICINE CLINIC | Age: 57
End: 2022-08-24
Payer: MEDICAID

## 2022-08-24 VITALS
HEIGHT: 60 IN | TEMPERATURE: 98.1 F | DIASTOLIC BLOOD PRESSURE: 73 MMHG | HEART RATE: 89 BPM | OXYGEN SATURATION: 97 % | WEIGHT: 134 LBS | BODY MASS INDEX: 26.31 KG/M2 | SYSTOLIC BLOOD PRESSURE: 109 MMHG | RESPIRATION RATE: 18 BRPM

## 2022-08-24 DIAGNOSIS — Z79.4 TYPE 2 DIABETES MELLITUS WITH HYPERGLYCEMIA, WITH LONG-TERM CURRENT USE OF INSULIN (HCC): ICD-10-CM

## 2022-08-24 DIAGNOSIS — I10 ESSENTIAL HYPERTENSION: Primary | ICD-10-CM

## 2022-08-24 DIAGNOSIS — F20.9 SCHIZOPHRENIA, UNSPECIFIED TYPE (HCC): ICD-10-CM

## 2022-08-24 DIAGNOSIS — F31.9 BIPOLAR 1 DISORDER (HCC): ICD-10-CM

## 2022-08-24 DIAGNOSIS — E11.65 TYPE 2 DIABETES MELLITUS WITH HYPERGLYCEMIA, WITHOUT LONG-TERM CURRENT USE OF INSULIN (HCC): ICD-10-CM

## 2022-08-24 DIAGNOSIS — E78.2 MIXED HYPERLIPIDEMIA: ICD-10-CM

## 2022-08-24 DIAGNOSIS — H54.8 LEGALLY BLIND: ICD-10-CM

## 2022-08-24 DIAGNOSIS — E11.65 TYPE 2 DIABETES MELLITUS WITH HYPERGLYCEMIA, WITH LONG-TERM CURRENT USE OF INSULIN (HCC): ICD-10-CM

## 2022-08-24 DIAGNOSIS — H54.7 VISION IMPAIRMENT: ICD-10-CM

## 2022-08-24 LAB — GLUCOSE POC: 112 MG/DL

## 2022-08-24 PROCEDURE — 82962 GLUCOSE BLOOD TEST: CPT | Performed by: NURSE PRACTITIONER

## 2022-08-24 PROCEDURE — 99214 OFFICE O/P EST MOD 30 MIN: CPT | Performed by: NURSE PRACTITIONER

## 2022-08-24 RX ORDER — LAMOTRIGINE 25 MG/1
25 TABLET ORAL 2 TIMES DAILY
COMMUNITY
Start: 2022-08-10

## 2022-08-24 RX ORDER — INSULIN GLARGINE 100 [IU]/ML
22 INJECTION, SOLUTION SUBCUTANEOUS
Qty: 5 ADJUSTABLE DOSE PRE-FILLED PEN SYRINGE | Refills: 1 | Status: SHIPPED | OUTPATIENT
Start: 2022-08-24

## 2022-08-24 NOTE — PROGRESS NOTES
Chief Complaint   Patient presents with    Follow-up       1. \"Have you been to the ER, urgent care clinic since your last visit? Hospitalized since your last visit? \" No    2. \"Have you seen or consulted any other health care providers outside of the [address] [place] [address] since your last visit? \" No     3. For patients aged 39-70: Has the patient had a colonoscopy / FIT/ Cologuard? Yes - no Care Gap present      If the patient is female:    4. For patients aged 41-77: Has the patient had a mammogram within the past 2 years? Yes - no Care Gap present      5. For patients aged 21-65: Has the patient had a pap smear?  Yes - no Care Gap present

## 2022-08-24 NOTE — PROGRESS NOTES
Subjective: (As above and below)     Chief Complaint   Patient presents with    Follow-up     DM     Parul Damon is a 65y.o. year old female who presents for     Hypertension ROS:  taking medications as instructed, no medication side effects noted, no TIAs, no chest pain on exertion, no dyspnea on exertion, no swelling of ankles    Diabetic Review of Systems - medication compliance: compliant all of the time, diabetic diet compliance: noncompliant some of the time, home glucose monitoring: is performed regularly. Checking 1 x daily, values range from 130-200's  Diet if fair, declines further DM education    Lab Results   Component Value Date/Time    Hemoglobin A1c 12.7 (H) 07/20/2022 03:46 PM    Hemoglobin A1c (POC) 11.3 02/10/2022 11:27 AM      Hyperlipidemia; tolerating statin      Sight impaired; utd on eye exam, recently dx w/ cataracts, will be having sx    Bipolar, schizophrenia; follows w/ psych, reports doing well    Reviewed PmHx, RxHx, FmHx, SocHx, AllgHx and updated in chart. Family History   Problem Relation Age of Onset    Cancer Maternal Aunt     Breast Cancer Maternal Aunt     Cancer Maternal Uncle     Breast Cancer Maternal Aunt        Past Medical History:   Diagnosis Date    Bipolar 1 disorder ([facility]     Diabetes ([facility]     Hypertension     Psychiatric disorder     Schizophrenia ([facility]       Social History     Socioeconomic History    Marital status: SINGLE   Tobacco Use    Smoking status: Some Days     Packs/day: 0.25     Types: Cigarettes    Smokeless tobacco: Never    Tobacco comments:     2-3 per day   Vaping Use    Vaping Use: Never used   Substance and Sexual Activity    Alcohol use: No    Drug use: No    Sexual activity: Not Currently          Current Outpatient Medications   Medication Sig    Januvia 100 mg tablet take 1 tablet by mouth once daily    atorvastatin (LIPITOR) 40 mg tablet Take 1 Tablet by mouth nightly.     insulin glargine (LANTUS,BASAGLAR) 100 unit/mL (3 mL) inpn 20 Units by SubCUTAneous route nightly. With snack    omeprazole (PRILOSEC) 40 mg capsule Take 1 Capsule by mouth daily as needed (acid reflux). losartan (COZAAR) 25 mg tablet take 1 tablet by mouth once daily    cholecalciferol (VITAMIN D3) (1000 Units /25 mcg) tablet take 1 tablet by mouth once daily    Jardiance 25 mg tablet take 1 tablet by mouth once daily    lamoTRIgine (LAMICTAL) 100 mg tablet Take 0.5 Tabs by mouth two (2) times a day. Indications: Bipolar Depression    benztropine (COGENTIN) 1 mg tablet Take 1 mg by mouth two (2) times a day.    haloperidol (HALDOL) 5 mg tablet Take 1 Tab by mouth nightly. Indications: psychotic disorder    sertraline (ZOLOFT) 50 mg tablet Take 1 Tab by mouth daily. Indications: major depressive disorder    traZODone (DESYREL) 50 mg tablet Take 1 Tab by mouth nightly. Indications: major depressive disorder    lamoTRIgine (LaMICtal) 25 mg tablet Take 25 mg by mouth two (2) times a day. BD Laura 2nd Gen Pen Needle 32 gauge x 5/32\" ndle use once daily    alcohol swabs padm 1 Each by Apply Externally route daily. Blood-Glucose Meter monitoring kit Check sugar in the AM and PM.    lancets misc Use as directed. Dx: e11.65. check sugar once daily    glucose blood VI test strips (ASCENSIA AUTODISC VI, ONE TOUCH ULTRA TEST VI) strip E11.65 check sugar once daily fasting     No current facility-administered medications for this visit. Review of Systems:   Constitutional:    Negative for fever and chills, negative diaphoresis. HEENT:              Negative for neck pain and stiffness. Eyes:                  Negative for visual disturbance, itching, redness or discharge. Respiratory:        Negative for cough and shortness of breath. Cardiovascular:  Negative for chest pain and palpitations. Gastrointestinal: Negative for nausea, vomiting, abdominal pain, diarrhea or constipation. Genitourinary:     Negative for dysuria and frequency.    Musculoskeletal: Negative for falls, tenderness and swelling. Skin:                    Negative for rash, masses or lesions. Neurological:       Negative for dizzyness, seizure, loss of consciousness, weakness and numbness. Objective:     Vitals:    08/24/22 1048   BP: 109/73   Pulse: 89   Resp: 18   Temp: 98.1 °F (36.7 °C)   TempSrc: Temporal   SpO2: 97%   Weight: 134 lb (60.8 kg)   Height: 5' (1.524 m)     Results for orders placed or performed in visit on 08/24/22   AMB POC GLUCOSE BLOOD, BY GLUCOSE MONITORING DEVICE   Result Value Ref Range    Glucose  MG/DL         Results for orders placed or performed in visit on 06/28/22   HEMOGLOBIN A1C WITH EAG   Result Value Ref Range    Hemoglobin A1c 12.7 (H) 4.8 - 5.6 %    Estimated average glucose 116 mg/dL   METABOLIC PANEL, COMPREHENSIVE   Result Value Ref Range    Glucose 468 (H) 65 - 99 mg/dL    BUN 6 6 - 24 mg/dL    Creatinine 0.73 0.57 - 1.00 mg/dL    eGFR 96 >59 mL/min/1.73    BUN/Creatinine ratio 8 (L) 9 - 23    Sodium 134 134 - 144 mmol/L    Potassium 5.2 3.5 - 5.2 mmol/L    Chloride 97 96 - 106 mmol/L    CO2 23 20 - 29 mmol/L    Calcium 10.4 (H) 8.7 - 10.2 mg/dL    Protein, total 7.0 6.0 - 8.5 g/dL    Albumin 4.0 3.8 - 4.9 g/dL    GLOBULIN, TOTAL 3.0 1.5 - 4.5 g/dL    A-G Ratio 1.3 1.2 - 2.2    Bilirubin, total 0.3 0.0 - 1.2 mg/dL    Alk.  phosphatase 176 (H) 44 - 121 IU/L    AST (SGOT) 18 0 - 40 IU/L    ALT (SGPT) 19 0 - 32 IU/L   CBC W/O DIFF   Result Value Ref Range    WBC 12.8 (H) 3.4 - 10.8 x10E3/uL    RBC 4.01 3.77 - 5.28 x10E6/uL    HGB 13.2 11.1 - 15.9 g/dL    HCT 39.5 34.0 - 46.6 %    MCV 99 (H) 79 - 97 fL    MCH 32.9 26.6 - 33.0 pg    MCHC 33.4 31.5 - 35.7 g/dL    RDW 11.9 11.7 - 15.4 %    PLATELET 155 886 - 718 x10E3/uL   LIPID PANEL   Result Value Ref Range    Cholesterol, total 257 (H) 100 - 199 mg/dL    Triglyceride 130 0 - 149 mg/dL    HDL Cholesterol 40 >39 mg/dL    VLDL, calculated 24 5 - 40 mg/dL    LDL, calculated 193 (H) 0 - 99 mg/dL   MICROALBUMIN, UR, RAND W/ MICROALB/CREAT RATIO   Result Value Ref Range    Creatinine, urine 43.2 Not Estab. mg/dL    Microalbumin, urine 6.7 Not Estab. ug/mL    Microalb/Creat ratio (ug/mg creat.) 16 0 - 29 mg/g creat   CVD REPORT   Result Value Ref Range    INTERPRETATION Note        Gen: Oriented to person, place and time and well-developed, well-nourished and in no distress. HEENT:    Head: normocephalic and atraumatic. Eyes:  EOM are normal. Pupils equal and round. Neck:  Normal range of motion. Neck supple. Cardiovascular: normal rate, regular rhythm and normal heart sounds. Pulmonary/Chest:  Effort normal and breath sounds normal.  No respiratory distress. No wheezes, no rales. Abdominal: soft, normal  bowel sounds. Musculoskeletal:  No edema, no tenderness. No calf tenderness or edema. Neurological:  Alert, oriented to person, place and time. Skin: skin is warm and dry. Assessment/ Plan:     1. Essential hypertension      2. Mixed hyperlipidemia      3. Type 2 diabetes mellitus with hyperglycemia, with long-term current use of insulin ([facility])    - AMB POC GLUCOSE BLOOD, BY GLUCOSE MONITORING DEVICE    4. Vision impairment      5. Bipolar 1 disorder ([address]      6. Schizophrenia, unspecified type ([facility]      7. Type 2 diabetes mellitus with hyperglycemia, without long-term current use of insulin ([facility])  Increase insulin  - insulin glargine (LANTUS,BASAGLAR) 100 unit/mL (3 mL) inpn; 22 Units by SubCUTAneous route nightly. With snack  Dispense: 5 Adjustable Dose Pre-filled Pen Syringe; Refill: 1    8. Legally blind          I have discussed the diagnosis with the patient and the intended plan as seen in the above orders. The patient has received an after-visit summary and questions were answered concerning future plans. Pt conveyed understanding of plan.       Medication Side Effects and Warnings were discussed with patient: yes  Patient Labs were reviewed: yes  Patient Past Records were reviewed: yes    Coby Chandler, NP

## 2022-09-07 RX ORDER — INSULIN PUMP SYRINGE, 3 ML
EACH MISCELLANEOUS
Qty: 1 KIT | Refills: 0 | Status: SHIPPED | OUTPATIENT
Start: 2022-09-07

## 2022-10-07 ENCOUNTER — HOSPITAL ENCOUNTER (EMERGENCY)
Age: 57
Discharge: LEFT BEFORE DISCHARGE | End: 2022-10-07
Attending: EMERGENCY MEDICINE
Payer: MEDICAID

## 2022-10-07 VITALS
HEIGHT: 60 IN | HEART RATE: 100 BPM | DIASTOLIC BLOOD PRESSURE: 83 MMHG | BODY MASS INDEX: 28.47 KG/M2 | RESPIRATION RATE: 17 BRPM | SYSTOLIC BLOOD PRESSURE: 147 MMHG | TEMPERATURE: 98.4 F | WEIGHT: 145 LBS | OXYGEN SATURATION: 97 %

## 2022-10-07 PROCEDURE — 75810000275 HC EMERGENCY DEPT VISIT NO LEVEL OF CARE

## 2022-10-08 NOTE — ED PROVIDER NOTES
Patient was recently roomed, went back to see patient and she had eloped from the department. No reason given for elopement. I was unable to assess the patient, unable to determine if there is an emergent medical condition.

## 2022-10-08 NOTE — ED TRIAGE NOTES
CC of LLQ abdominal pain since 2pm. Patient endorses 7/10 sharp LLQ abdominal pain. Patient denies fever, chills, nausea, vomiting, and diarrhea.

## 2022-10-09 RX ORDER — ATORVASTATIN CALCIUM 40 MG/1
40 TABLET, FILM COATED ORAL
Qty: 90 TABLET | Refills: 0 | Status: SHIPPED | OUTPATIENT
Start: 2022-10-09

## 2022-10-15 ENCOUNTER — HOSPITAL ENCOUNTER (EMERGENCY)
Age: 57
Discharge: HOME | End: 2022-10-15
Attending: EMERGENCY MEDICINE
Payer: MEDICAID

## 2022-10-15 ENCOUNTER — APPOINTMENT (OUTPATIENT)
Dept: CT IMAGING | Age: 57
End: 2022-10-15
Attending: EMERGENCY MEDICINE
Payer: MEDICAID

## 2022-10-15 VITALS
BODY MASS INDEX: 30.43 KG/M2 | HEIGHT: 60 IN | DIASTOLIC BLOOD PRESSURE: 80 MMHG | OXYGEN SATURATION: 98 % | TEMPERATURE: 99 F | SYSTOLIC BLOOD PRESSURE: 136 MMHG | HEART RATE: 108 BPM | WEIGHT: 155 LBS | RESPIRATION RATE: 18 BRPM

## 2022-10-15 DIAGNOSIS — R10.9 ACUTE ABDOMINAL PAIN: Primary | ICD-10-CM

## 2022-10-15 LAB
ALBUMIN SERPL-MCNC: 3.3 G/DL (ref 3.5–5)
ALBUMIN/GLOB SERPL: 0.9 {RATIO} (ref 1.1–2.2)
ALP SERPL-CCNC: 149 U/L (ref 45–117)
ALT SERPL-CCNC: 32 U/L (ref 12–78)
ANION GAP SERPL CALC-SCNC: 8 MMOL/L (ref 5–15)
APPEARANCE UR: CLEAR
AST SERPL-CCNC: 23 U/L (ref 15–37)
BACTERIA URNS QL MICRO: NEGATIVE /HPF
BASOPHILS # BLD: 0 K/UL (ref 0–0.1)
BASOPHILS NFR BLD: 0 % (ref 0–1)
BILIRUB SERPL-MCNC: 0.2 MG/DL (ref 0.2–1)
BILIRUB UR QL: NEGATIVE
BUN SERPL-MCNC: 5 MG/DL (ref 6–20)
BUN/CREAT SERPL: 6 (ref 12–20)
CALCIUM SERPL-MCNC: 9.3 MG/DL (ref 8.5–10.1)
CHLORIDE SERPL-SCNC: 101 MMOL/L (ref 97–108)
CO2 SERPL-SCNC: 28 MMOL/L (ref 21–32)
COLOR UR: ABNORMAL
CREAT SERPL-MCNC: 0.86 MG/DL (ref 0.55–1.02)
DIFFERENTIAL METHOD BLD: ABNORMAL
EOSINOPHIL # BLD: 0.1 K/UL (ref 0–0.4)
EOSINOPHIL NFR BLD: 1 % (ref 0–7)
EPITH CASTS URNS QL MICRO: ABNORMAL /LPF
ERYTHROCYTE [DISTWIDTH] IN BLOOD BY AUTOMATED COUNT: 12.1 % (ref 11.5–14.5)
GLOBULIN SER CALC-MCNC: 3.8 G/DL (ref 2–4)
GLUCOSE SERPL-MCNC: 207 MG/DL (ref 65–100)
GLUCOSE UR STRIP.AUTO-MCNC: >1000 MG/DL
HCT VFR BLD AUTO: 36.2 % (ref 35–47)
HGB BLD-MCNC: 12.2 G/DL (ref 11.5–16)
HGB UR QL STRIP: NEGATIVE
IMM GRANULOCYTES # BLD AUTO: 0.1 K/UL (ref 0–0.04)
IMM GRANULOCYTES NFR BLD AUTO: 0 % (ref 0–0.5)
KETONES UR QL STRIP.AUTO: NEGATIVE MG/DL
LEUKOCYTE ESTERASE UR QL STRIP.AUTO: NEGATIVE
LIPASE SERPL-CCNC: 77 U/L (ref 73–393)
LYMPHOCYTES # BLD: 5.5 K/UL (ref 0.8–3.5)
LYMPHOCYTES NFR BLD: 39 % (ref 12–49)
MCH RBC QN AUTO: 31.7 PG (ref 26–34)
MCHC RBC AUTO-ENTMCNC: 33.7 G/DL (ref 30–36.5)
MCV RBC AUTO: 94 FL (ref 80–99)
MONOCYTES # BLD: 0.8 K/UL (ref 0–1)
MONOCYTES NFR BLD: 6 % (ref 5–13)
MUCOUS THREADS URNS QL MICRO: ABNORMAL /LPF
NEUTS SEG # BLD: 7.6 K/UL (ref 1.8–8)
NEUTS SEG NFR BLD: 54 % (ref 32–75)
NITRITE UR QL STRIP.AUTO: NEGATIVE
NRBC # BLD: 0 K/UL (ref 0–0.01)
NRBC BLD-RTO: 0 PER 100 WBC
PH UR STRIP: 5.5 [PH] (ref 5–8)
PLATELET # BLD AUTO: 278 K/UL (ref 150–400)
PMV BLD AUTO: 9.4 FL (ref 8.9–12.9)
POTASSIUM SERPL-SCNC: 3.7 MMOL/L (ref 3.5–5.1)
PROT SERPL-MCNC: 7.1 G/DL (ref 6.4–8.2)
PROT UR STRIP-MCNC: NEGATIVE MG/DL
RBC # BLD AUTO: 3.85 M/UL (ref 3.8–5.2)
RBC #/AREA URNS HPF: ABNORMAL /HPF (ref 0–5)
SODIUM SERPL-SCNC: 137 MMOL/L (ref 136–145)
SP GR UR REFRACTOMETRY: 1.02
UA: UC IF INDICATED,UAUC: ABNORMAL
UROBILINOGEN UR QL STRIP.AUTO: 1 EU/DL (ref 0.2–1)
WBC # BLD AUTO: 14.1 K/UL (ref 3.6–11)
WBC URNS QL MICRO: ABNORMAL /HPF (ref 0–4)

## 2022-10-15 PROCEDURE — 74011250637 HC RX REV CODE- 250/637: Performed by: EMERGENCY MEDICINE

## 2022-10-15 PROCEDURE — 99285 EMERGENCY DEPT VISIT HI MDM: CPT

## 2022-10-15 PROCEDURE — 85025 COMPLETE CBC W/AUTO DIFF WBC: CPT

## 2022-10-15 PROCEDURE — 81001 URINALYSIS AUTO W/SCOPE: CPT

## 2022-10-15 PROCEDURE — 96375 TX/PRO/DX INJ NEW DRUG ADDON: CPT

## 2022-10-15 PROCEDURE — 74177 CT ABD & PELVIS W/CONTRAST: CPT

## 2022-10-15 PROCEDURE — 74011000636 HC RX REV CODE- 636: Performed by: EMERGENCY MEDICINE

## 2022-10-15 PROCEDURE — 74011250636 HC RX REV CODE- 250/636: Performed by: EMERGENCY MEDICINE

## 2022-10-15 PROCEDURE — 74011000250 HC RX REV CODE- 250: Performed by: EMERGENCY MEDICINE

## 2022-10-15 PROCEDURE — 83690 ASSAY OF LIPASE: CPT

## 2022-10-15 PROCEDURE — 36415 COLL VENOUS BLD VENIPUNCTURE: CPT

## 2022-10-15 PROCEDURE — 96374 THER/PROPH/DIAG INJ IV PUSH: CPT

## 2022-10-15 PROCEDURE — 80053 COMPREHEN METABOLIC PANEL: CPT

## 2022-10-15 RX ORDER — KETOROLAC TROMETHAMINE 30 MG/ML
30 INJECTION, SOLUTION INTRAMUSCULAR; INTRAVENOUS
Status: COMPLETED | OUTPATIENT
Start: 2022-10-15 | End: 2022-10-15

## 2022-10-15 RX ORDER — DICYCLOMINE HYDROCHLORIDE 20 MG/1
20 TABLET ORAL EVERY 6 HOURS
Qty: 20 TABLET | Refills: 0 | Status: SHIPPED | OUTPATIENT
Start: 2022-10-15

## 2022-10-15 RX ORDER — DICYCLOMINE HYDROCHLORIDE 10 MG/1
10 CAPSULE ORAL
Status: COMPLETED | OUTPATIENT
Start: 2022-10-15 | End: 2022-10-15

## 2022-10-15 RX ORDER — SODIUM CHLORIDE 0.9 % (FLUSH) 0.9 %
5-40 SYRINGE (ML) INJECTION EVERY 8 HOURS
Status: DISCONTINUED | OUTPATIENT
Start: 2022-10-15 | End: 2022-10-15 | Stop reason: HOSPADM

## 2022-10-15 RX ORDER — ONDANSETRON 2 MG/ML
4 INJECTION INTRAMUSCULAR; INTRAVENOUS
Status: COMPLETED | OUTPATIENT
Start: 2022-10-15 | End: 2022-10-15

## 2022-10-15 RX ORDER — SODIUM CHLORIDE 0.9 % (FLUSH) 0.9 %
5-40 SYRINGE (ML) INJECTION AS NEEDED
Status: DISCONTINUED | OUTPATIENT
Start: 2022-10-15 | End: 2022-10-15 | Stop reason: HOSPADM

## 2022-10-15 RX ORDER — ONDANSETRON 4 MG/1
4 TABLET, FILM COATED ORAL
Qty: 20 TABLET | Refills: 0 | Status: SHIPPED | OUTPATIENT
Start: 2022-10-15

## 2022-10-15 RX ADMIN — SODIUM CHLORIDE, PRESERVATIVE FREE 10 ML: 5 INJECTION INTRAVENOUS at 17:25

## 2022-10-15 RX ADMIN — KETOROLAC TROMETHAMINE 30 MG: 30 INJECTION, SOLUTION INTRAMUSCULAR; INTRAVENOUS at 17:25

## 2022-10-15 RX ADMIN — IOPAMIDOL 100 ML: 755 INJECTION, SOLUTION INTRAVENOUS at 18:48

## 2022-10-15 RX ADMIN — ONDANSETRON 4 MG: 2 INJECTION INTRAMUSCULAR; INTRAVENOUS at 17:25

## 2022-10-15 RX ADMIN — DICYCLOMINE HYDROCHLORIDE 10 MG: 10 CAPSULE ORAL at 20:02

## 2022-10-15 RX ADMIN — SODIUM CHLORIDE 1000 ML: 9 INJECTION, SOLUTION INTRAVENOUS at 17:25

## 2022-10-15 NOTE — ED NOTES
Patient brought here by  with c/o lower abdominal pains. Patient states symptoms x1 week. Patient reports nausea and diarrhea, however denies vomiting. Patient denies changes to diet, denies fevers, denies anyone else in the house with similar symptoms. Patient denies recent surgeries to the area, states she did have a  in the . Patient reports being previously seen for this chief complaint however states that she couldn't/wouldn't stay to complete work-up. Patient states she takes multiple daily medications, denies recent changes to her meds. Patient reports she is legally blind. Emergency Department Nursing Plan of Care       The Nursing Plan of Care is developed from the Nursing assessment and Emergency Department Attending provider initial evaluation. The plan of care may be reviewed in the ED Provider note.     The Plan of Care was developed with the following considerations:   Patient / Family readiness to learn indicated by:verbalized understanding  Persons(s) to be included in education: patient and family  Barriers to Learning/Limitations:No    Signed     Gerard Cantu RN    10/15/2022   4:45 PM

## 2022-10-15 NOTE — ED NOTES
Verbal shift change report given to Zurdo Gunn RN (oncoming nurse) by Linda Burkett RN (offgoing nurse). Report included the following information SBAR, ED Summary, MAR, and Recent Results.

## 2022-10-15 NOTE — ED PROVIDER NOTES
EMERGENCY DEPARTMENT HISTORY AND PHYSICAL EXAM      Date: 10/15/2022  Patient Name: Judy Bermeo    History of Presenting Illness     Chief Complaint   Patient presents with    Abdominal Pain     Times one week: per pt she was seen here last week and left       History Provided By: Patient    HPI: Judy Bermeo, 65 y.o. female presents to the ED with cc of abdominal pain. Patient presents with 1 week of persistent and progressive lower abdominal pain. Pain described as sharp and crampy in nature. She has had loss of appetite but no nausea or vomiting. She denies any prior history of similar complaints. She denies any surgical history. She is diabetic and legally blind. She has had menopause. There are no other complaints, changes, or physical findings at this time. PCP: Noe Sidhu., NP    No current facility-administered medications on file prior to encounter. Current Outpatient Medications on File Prior to Encounter   Medication Sig Dispense Refill    atorvastatin (LIPITOR) 40 mg tablet take 1 tablet by mouth nightly 90 Tablet 0    Blood-Glucose Meter monitoring kit Check sugar in the AM and PM. 1 Kit 0    lamoTRIgine (LaMICtal) 25 mg tablet Take 25 mg by mouth two (2) times a day. insulin glargine (LANTUS,BASAGLAR) 100 unit/mL (3 mL) inpn 22 Units by SubCUTAneous route nightly. With snack 5 Adjustable Dose Pre-filled Pen Syringe 1    BD Laura 2nd Gen Pen Needle 32 gauge x 5/32\" ndle use once daily 100 Pen Needle 11    Januvia 100 mg tablet take 1 tablet by mouth once daily 90 Tablet 0    omeprazole (PRILOSEC) 40 mg capsule Take 1 Capsule by mouth daily as needed (acid reflux).  30 Capsule 1    losartan (COZAAR) 25 mg tablet take 1 tablet by mouth once daily 90 Tablet 0    cholecalciferol (VITAMIN D3) (1000 Units /25 mcg) tablet take 1 tablet by mouth once daily 90 Tablet 0    Jardiance 25 mg tablet take 1 tablet by mouth once daily 90 Tablet 0    alcohol swabs padm 1 Each by Apply Externally route daily. 100 Pad 11    lancets misc Use as directed. Dx: e11.65. check sugar once daily 1 Each 11    glucose blood VI test strips (ASCENSIA AUTODISC VI, ONE TOUCH ULTRA TEST VI) strip E11.65 check sugar once daily fasting 100 Strip 1    lamoTRIgine (LAMICTAL) 100 mg tablet Take 0.5 Tabs by mouth two (2) times a day. Indications: Bipolar Depression 60 Tab 0    benztropine (COGENTIN) 1 mg tablet Take 1 mg by mouth two (2) times a day.      haloperidol (HALDOL) 5 mg tablet Take 1 Tab by mouth nightly. Indications: psychotic disorder 15 Tab 1    sertraline (ZOLOFT) 50 mg tablet Take 1 Tab by mouth daily. Indications: major depressive disorder 15 Tab 1    traZODone (DESYREL) 50 mg tablet Take 1 Tab by mouth nightly. Indications: major depressive disorder 15 Tab 0       Past History     Past Medical History:  Past Medical History:   Diagnosis Date    Bipolar 1 disorder ([facility] [address]     Diabetes ([facility] [address]     Hypertension     Psychiatric disorder     Schizophrenia ([facility] [address]        Past Surgical History:  Past Surgical History:   Procedure Laterality Date    HX GYN       x2    HX OTHER SURGICAL  1988    skin grafting    HX OTHER SURGICAL  2016    oral surgery       Family History:  Family History   Problem Relation Age of Onset    Cancer Maternal Aunt     Breast Cancer Maternal Aunt     Cancer Maternal Uncle     Breast Cancer Maternal Aunt        Social History:  Social History     Tobacco Use    Smoking status: Some Days     Packs/day: 0.25     Types: Cigarettes    Smokeless tobacco: Never    Tobacco comments:     2-3 per day   Vaping Use    Vaping Use: Never used   Substance Use Topics    Alcohol use: No    Drug use: No       Allergies: Allergies   Allergen Reactions    Aspirin Nausea and Vomiting    Lisinopril Cough    Metformin Other (comments)    Morphine Hives         Review of Systems   Review of Systems   Constitutional: Negative. Negative for chills and fever. HENT: Negative.   Negative for congestion and rhinorrhea. Respiratory: Negative. Negative for cough, chest tightness and wheezing. Cardiovascular: Negative. Negative for chest pain and palpitations. Gastrointestinal:  Positive for abdominal pain and nausea. Negative for constipation and vomiting. Endocrine: Negative. Genitourinary: Negative. Negative for decreased urine volume, flank pain, hematuria and pelvic pain. Musculoskeletal: Negative. Negative for back pain and neck pain. Skin: Negative. Negative for color change, pallor and rash. Neurological: Negative. Negative for dizziness, seizures, weakness, numbness and headaches. Hematological: Negative. Negative for adenopathy. Psychiatric/Behavioral: Negative. All other systems reviewed and are negative. Physical Exam   Physical Exam  Vitals and nursing note reviewed. Constitutional:       General: She is not in acute distress. Appearance: She is well-developed. She is not diaphoretic. HENT:      Head: Normocephalic and atraumatic. Mouth/Throat:      Pharynx: No oropharyngeal exudate. Eyes:      General: No scleral icterus. Right eye: No discharge. Left eye: No discharge. Conjunctiva/sclera: Conjunctivae normal.      Pupils: Pupils are equal, round, and reactive to light. Neck:      Vascular: No JVD. Cardiovascular:      Rate and Rhythm: Normal rate and regular rhythm. Heart sounds: Normal heart sounds. No murmur heard. No friction rub. No gallop. Pulmonary:      Effort: Pulmonary effort is normal. No respiratory distress. Breath sounds: Normal breath sounds. No stridor. No wheezing or rales. Chest:      Chest wall: No tenderness. Abdominal:      General: Bowel sounds are normal. There is no distension. Palpations: Abdomen is soft. There is no mass. Tenderness: There is abdominal tenderness in the right lower quadrant and suprapubic area. There is no guarding or rebound.    Musculoskeletal: Cervical back: Normal range of motion and neck supple. Skin:     General: Skin is warm. Coloration: Skin is not pale. Findings: No rash. Neurological:      Mental Status: She is alert and oriented to person, place, and time. Cranial Nerves: No cranial nerve deficit. Motor: No abnormal muscle tone. Coordination: Coordination normal.      Deep Tendon Reflexes: Reflexes normal.     618 p.m.-patient appears more comfortable but still has moderate tenderness suprapubic left lower quadrant. Made her aware of results and that we are waiting for CT of her abdomen. 7:04 PM patient care signed out to  who was made aware of patient's current clinical presentation and reason for visit. We are waiting CT result he will assist with disposition. Diagnostic Study Results     Labs -   No results found for this or any previous visit (from the past 12 hour(s)). Radiologic Studies -   CT ABD PELV W CONT   Final Result   No acute abdominal or pelvic abnormality is detected. Bibasilar atelectasis or   scar. CT Results  (Last 48 hours)                 10/15/22 1852  CT ABD PELV W CONT Final result    Impression:  No acute abdominal or pelvic abnormality is detected. Bibasilar atelectasis or   scar. Narrative:  EXAM: CT ABD PELV W CONT       INDICATION: RLQ pain . Diabetes and hypertension. Blindness and schizophrenia. COMPARISON: 8/5/2020        CONTRAST: 100 mL of Isovue-370. ORAL CONTRAST: None       TECHNIQUE:    Following the uneventful intravenous administration of contrast, thin axial   images were obtained through the abdomen and pelvis. Coronal and sagittal   reconstructions were generated. CT dose reduction was achieved through use of a   standardized protocol tailored for this examination and automatic exposure   control for dose modulation. FINDINGS:    LOWER THORAX: Bibasilar atelectasis or scar. LIVER: No mass.    BILIARY TREE: Gallbladder is within normal limits. CBD is not dilated. SPLEEN: within normal limits. PANCREAS: No mass or ductal dilatation. ADRENALS: Unremarkable. KIDNEYS: No mass, calculus, or hydronephrosis. STOMACH: Unremarkable. SMALL BOWEL: No dilatation or wall thickening. COLON: No dilatation or wall thickening. APPENDIX: Normal   PERITONEUM: No ascites or pneumoperitoneum. RETROPERITONEUM: No lymphadenopathy or aortic aneurysm. REPRODUCTIVE ORGANS: Unremarkable for age. URINARY BLADDER: No mass or calculus. BONES: No destructive bone lesion. ABDOMINAL WALL: No mass or hernia. ADDITIONAL COMMENTS: N/A                 CXR Results  (Last 48 hours)      None            Medical Decision Making   I am the first provider for this patient. I reviewed the vital signs, available nursing notes, past medical history, past surgical history, family history and social history. Vital Signs-Reviewed the patient's vital signs. No data found. Records Reviewed: Nursing Notes and Old Medical Records    Provider Notes (Medical Decision Making):   Differential diagnosis-acute appendicitis, diverticulitis, renal colic, UTI, hernia, aneurysm, gastroenteritis, bowel obstruction, ovarian cyst    Impression/plan-62year-old female history of hypertension diabetes to the ER with 1 week of progressive lower abdominal pain. She does have mild tenderness to the right lower quadrant but no guarding or rebound. Due to her her comorbidity and complaint we will obtain labs and CT. Final disposition pending results and response to treatment. ED Course:   Initial assessment performed. The patients presenting problems have been discussed, and they are in agreement with the care plan formulated and outlined with them. I have encouraged them to ask questions as they arise throughout their visit. ED Course as of 10/16/22 0738   Sat Oct 15, 2022   1957 CT negative; will discharge home.     Progress Note:  I have re-examined the patient. Pt states she feels much better and symptoms improved. Tolerating oral intake. Abdomen is soft and without guarding, rebound or other peritoneal signs. I have discussed with patient the importance of close f/u and to return to the ED if symptoms don't improve or worsen. [HW]      ED Course User Index  [HW] Elisa Knows, MD Meryle Jakes, MD      Disposition:      DC- Adult Discharges: All of the diagnostic tests were reviewed and questions answered. Diagnosis, care plan and treatment options were discussed. The patient understands the instructions and will follow up as directed. The patients results have been reviewed with them. They have been counseled regarding their diagnosis. The patient verbally convey understanding and agreement of the signs, symptoms, diagnosis, treatment and prognosis and additionally agrees to follow up as recommended with their PCP in 24 - 48 hours. They also agree with the care-plan and convey that all of their questions have been answered. I have also put together some discharge instructions for them that include: 1) educational information regarding their diagnosis, 2) how to care for their diagnosis at home, as well a 3) list of reasons why they would want to return to the ED prior to their follow-up appointment, should their condition change. DISCHARGE PLAN:  1. Discharge Medication List as of 10/15/2022  7:57 PM        START taking these medications    Details   dicyclomine (BENTYL) 20 mg tablet Take 1 Tablet by mouth every six (6) hours. , Normal, Disp-20 Tablet, R-0      ondansetron hcl (Zofran) 4 mg tablet Take 1 Tablet by mouth every eight (8) hours as needed for Nausea or Vomiting., Normal, Disp-20 Tablet, R-0           CONTINUE these medications which have NOT CHANGED    Details   atorvastatin (LIPITOR) 40 mg tablet take 1 tablet by mouth nightly, Normal, Disp-90 Tablet, R-0Dose increase      Blood-Glucose Meter monitoring kit Check sugar in the AM and PM., Normal, Disp-1 Kit, R-0She needs a meter for the visually impaired. !! lamoTRIgine (LaMICtal) 25 mg tablet Take 25 mg by mouth two (2) times a day., Historical Med      insulin glargine (LANTUS,BASAGLAR) 100 unit/mL (3 mL) inpn 22 Units by SubCUTAneous route nightly. With snack, Normal, Disp-5 Adjustable Dose Pre-filled Pen Syringe, R-1Pt really needs pen not vial as she is very vision impaired      BD Laura 2nd Gen Pen Needle 32 gauge x 5/32\" ndle use once daily, Normal, Disp-100 Pen Needle, R-11Please sub smallest needle you have      Januvia 100 mg tablet take 1 tablet by mouth once daily, Normal, Disp-90 Tablet, R-0      omeprazole (PRILOSEC) 40 mg capsule Take 1 Capsule by mouth daily as needed (acid reflux). , Normal, Disp-30 Capsule, R-1      losartan (COZAAR) 25 mg tablet take 1 tablet by mouth once daily, Normal, Disp-90 Tablet, R-0Instead of lisinpril      cholecalciferol (VITAMIN D3) (1000 Units /25 mcg) tablet take 1 tablet by mouth once daily, Normal, Disp-90 Tablet, R-0      Jardiance 25 mg tablet take 1 tablet by mouth once daily, Normal, Disp-90 Tablet, R-0      alcohol swabs padm 1 Each by Apply Externally route daily. , Normal, Disp-100 Pad, R-11      lancets misc Use as directed. Dx: e11.65. check sugar once daily, Normal, Disp-1 Each,R-11      glucose blood VI test strips (ASCENSIA AUTODISC VI, ONE TOUCH ULTRA TEST VI) strip E11.65 check sugar once daily fasting, Normal, Disp-100 Strip,R-1      !! lamoTRIgine (LAMICTAL) 100 mg tablet Take 0.5 Tabs by mouth two (2) times a day. Indications: Bipolar Depression, Print, Disp-60 Tab, R-0      benztropine (COGENTIN) 1 mg tablet Take 1 mg by mouth two (2) times a day., Historical Med      haloperidol (HALDOL) 5 mg tablet Take 1 Tab by mouth nightly. Indications: psychotic disorder, Print, Disp-15 Tab, R-1      sertraline (ZOLOFT) 50 mg tablet Take 1 Tab by mouth daily.  Indications: major depressive disorder, Print, Disp-15 Tab, R-1      traZODone (DESYREL) 50 mg tablet Take 1 Tab by mouth nightly. Indications: major depressive disorder, Print, Disp-15 Tab, R-0       !! - Potential duplicate medications found. Please discuss with provider. 2.   Follow-up Information       Follow up With Specialties Details Why [address] [facility] EMERGENCY DEPT Emergency Medicine  If symptoms worsen [address]          3. Return to ED if worse     Diagnosis     Clinical Impression:   1. Acute abdominal pain        Attestations:    Brenda Jeter MD        Please note that this dictation was completed with MeMeMe, the computer voice recognition software. Quite often unanticipated grammatical, syntax, homophones, and other interpretive errors are inadvertently transcribed by the computer software. Please disregard these errors. Please excuse any errors that have escaped final proofreading. Thank you.

## 2022-12-02 ENCOUNTER — OFFICE VISIT (OUTPATIENT)
Dept: INTERNAL MEDICINE CLINIC | Age: 57
End: 2022-12-02
Payer: MEDICAID

## 2022-12-02 VITALS
HEIGHT: 60 IN | WEIGHT: 133 LBS | DIASTOLIC BLOOD PRESSURE: 78 MMHG | BODY MASS INDEX: 26.11 KG/M2 | SYSTOLIC BLOOD PRESSURE: 121 MMHG | HEART RATE: 107 BPM | TEMPERATURE: 98.5 F | OXYGEN SATURATION: 98 % | RESPIRATION RATE: 18 BRPM

## 2022-12-02 DIAGNOSIS — Z79.4 TYPE 2 DIABETES MELLITUS WITH HYPERGLYCEMIA, WITH LONG-TERM CURRENT USE OF INSULIN (HCC): Primary | ICD-10-CM

## 2022-12-02 DIAGNOSIS — Z79.4 LONG-TERM INSULIN USE (HCC): ICD-10-CM

## 2022-12-02 DIAGNOSIS — H54.7 VISION IMPAIRMENT: ICD-10-CM

## 2022-12-02 DIAGNOSIS — I15.2 HYPERTENSION COMPLICATING DIABETES (HCC): ICD-10-CM

## 2022-12-02 DIAGNOSIS — E11.59 HYPERTENSION COMPLICATING DIABETES (HCC): ICD-10-CM

## 2022-12-02 DIAGNOSIS — E11.65 TYPE 2 DIABETES MELLITUS WITH HYPERGLYCEMIA, WITH LONG-TERM CURRENT USE OF INSULIN (HCC): Primary | ICD-10-CM

## 2022-12-02 PROBLEM — E11.9 HYPERTENSION COMPLICATING DIABETES (HCC): Status: ACTIVE | Noted: 2017-07-07

## 2022-12-02 PROBLEM — I10 HYPERTENSION COMPLICATING DIABETES (HCC): Status: ACTIVE | Noted: 2017-07-07

## 2022-12-02 LAB
GLUCOSE POC: 373 MG/DL
HBA1C MFR BLD HPLC: 12.4 %

## 2022-12-02 RX ORDER — LANCETS
EACH MISCELLANEOUS
Qty: 1 EACH | Refills: 11 | Status: SHIPPED | OUTPATIENT
Start: 2022-12-02

## 2022-12-02 RX ORDER — INSULIN GLARGINE 100 [IU]/ML
30 INJECTION, SOLUTION SUBCUTANEOUS
Qty: 5 ADJUSTABLE DOSE PRE-FILLED PEN SYRINGE | Refills: 1 | Status: SHIPPED | OUTPATIENT
Start: 2022-12-02

## 2022-12-02 RX ORDER — INSULIN PUMP SYRINGE, 3 ML
EACH MISCELLANEOUS
Qty: 1 KIT | Refills: 0 | Status: SHIPPED | OUTPATIENT
Start: 2022-12-02

## 2022-12-02 NOTE — PROGRESS NOTES
Chief Complaint   Patient presents with    Follow-up     HTN, DM        1. \"Have you been to the ER, urgent care clinic since your last visit? Hospitalized since your last visit? \" Yes When: 10/15/22 Where: [facility] Reason for visit: stomach pain    2. \"Have you seen or consulted any other health care providers outside of the [address] [place] [address] since your last visit? \" No     3. For patients aged 39-70: Has the patient had a colonoscopy / FIT/ Cologuard? Yes - no Care Gap present      If the patient is female:    4. For patients aged 41-77: Has the patient had a mammogram within the past 2 years? Yes - no Care Gap present      5. For patients aged 21-65: Has the patient had a pap smear?  Yes - no Care Gap present

## 2022-12-02 NOTE — PROGRESS NOTES
Kyaw House is a 65 y.o. female and presents with Follow-up (HTN, DM )  . Subjective:    PCP: Andreea Bartlett NP    Pt is accompanied by her counselor    Type 2 DM- pt is on lantus 22 and not taking januvia/jardiance   -pt declines DM education  Lab Results   Component Value Date/Time    Hemoglobin A1c 12.7 (H) 2022 03:46 PM    Hemoglobin A1c (POC) 11.3 02/10/2022 11:27 AM     Lab Results   Component Value Date/Time    Glucose 207 (H) 10/15/2022 06:03 PM    Glucose 136 (H) 2019 06:40 AM    Glucose (POC) 137 (H) 2021 01:00 PM    Glucose  2022 11:00 AM       HTN-losartan 25  BP Readings from Last 3 Encounters:   22 121/78   10/15/22 136/80   10/07/22 (!) 147/83        Visually Impaired    Review of Systems  Constitutional: negative for fevers, chills, anorexia and weight loss  Eyes:   negative for visual disturbance and irritation  ENT:   negative for tinnitus,sore throat,nasal congestion,ear pains. hoarseness  Respiratory:  negative for cough, hemoptysis, dyspnea,wheezing  CV:   negative for chest pain, palpitations, lower extremity edema  GI:   negative for nausea, vomiting, diarrhea, abdominal pain,melena  Endo:               negative for polyuria,polydipsia,polyphagia,heat intolerance  Genitourinary: negative for frequency, dysuria and hematuria  Integument:  negative for rash and pruritus  Hematologic:  negative for easy bruising and gum/nose bleeding  Musculoskel: negative for myalgias, arthralgias, back pain, muscle weakness, joint pain  Neurological:  negative for headaches, dizziness, vertigo, memory problems and gait   Behavl/Psych: negative for feelings of anxiety, depression, mood changes    Past Medical History:   Diagnosis Date    Bipolar 1 disorder ([facility]     Diabetes ([facility]     Hypertension     Psychiatric disorder     Schizophrenia ([facility]      Past Surgical History:   Procedure Laterality Date    HX GYN       x2    HX OTHER SURGICAL      skin grafting    HX OTHER SURGICAL  2016    oral surgery     Social History     Socioeconomic History    Marital status: SINGLE   Tobacco Use    Smoking status: Some Days     Packs/day: 0.25     Types: Cigarettes    Smokeless tobacco: Never    Tobacco comments:     2-3 per day   Vaping Use    Vaping Use: Never used   Substance and Sexual Activity    Alcohol use: No    Drug use: No    Sexual activity: Not Currently     Family History   Problem Relation Age of Onset    Cancer Maternal Aunt     Breast Cancer Maternal Aunt     Cancer Maternal Uncle     Breast Cancer Maternal Aunt      Current Outpatient Medications   Medication Sig Dispense Refill    lancets misc Use as directed. Dx: e11.65. check sugar once daily 1 Each 11    insulin glargine (LANTUS,BASAGLAR) 100 unit/mL (3 mL) inpn 30 Units by SubCUTAneous route nightly. With snack 5 Adjustable Dose Pre-filled Pen Syringe 1    glucose blood VI test strips strip E11.65 check sugar once daily fasting 100 Strip 1    SITagliptin phosphate (Januvia) 100 mg tablet Take 1 Tablet by mouth daily. 90 Tablet 0    empagliflozin (Jardiance) 25 mg tablet Take 1 Tablet by mouth daily. 90 Tablet 0    Blood-Glucose Meter monitoring kit Use BID Dx E11.9  Pt needs TALKING GLUCOMETER 1 Kit 0    dicyclomine (BENTYL) 20 mg tablet Take 1 Tablet by mouth every six (6) hours. 20 Tablet 0    ondansetron hcl (Zofran) 4 mg tablet Take 1 Tablet by mouth every eight (8) hours as needed for Nausea or Vomiting. 20 Tablet 0    atorvastatin (LIPITOR) 40 mg tablet take 1 tablet by mouth nightly 90 Tablet 0    lamoTRIgine (LaMICtal) 25 mg tablet Take 25 mg by mouth two (2) times a day. omeprazole (PRILOSEC) 40 mg capsule Take 1 Capsule by mouth daily as needed (acid reflux).  30 Capsule 1    losartan (COZAAR) 25 mg tablet take 1 tablet by mouth once daily 90 Tablet 0    cholecalciferol (VITAMIN D3) (1000 Units /25 mcg) tablet take 1 tablet by mouth once daily 90 Tablet 0    benztropine (COGENTIN) 1 mg tablet Take 1 mg by mouth two (2) times a day.      haloperidol (HALDOL) 5 mg tablet Take 1 Tab by mouth nightly. Indications: psychotic disorder 15 Tab 1    sertraline (ZOLOFT) 50 mg tablet Take 1 Tab by mouth daily. Indications: major depressive disorder 15 Tab 1    traZODone (DESYREL) 50 mg tablet Take 1 Tab by mouth nightly. Indications: major depressive disorder 15 Tab 0    BD Laura 2nd Gen Pen Needle 32 gauge x 5/32\" ndle use once daily 100 Pen Needle 11    alcohol swabs padm 1 Each by Apply Externally route daily. 100 Pad 11    lamoTRIgine (LAMICTAL) 100 mg tablet Take 0.5 Tabs by mouth two (2) times a day. Indications: Bipolar Depression 60 Tab 0     Allergies   Allergen Reactions    Aspirin Nausea and Vomiting    Lisinopril Cough    Metformin Other (comments)    Morphine Hives       Objective:  Visit Vitals  /78 (BP 1 Location: Left upper arm, BP Patient Position: Sitting, BP Cuff Size: Adult)   Pulse (!) 107   Temp 98.5 °F (36.9 °C) (Temporal)   Resp 18   Ht 5' (1.524 m)   Wt 133 lb (60.3 kg)   LMP  (LMP Unknown)   SpO2 98%   BMI 25.97 kg/m²     Physical Exam:   General appearance - alert, well appearing, and in no distress   Mental status - alert, oriented to person, place, and time  EYE-EOMI  Neck - supple,   Chest - symmetric air entry    Abdomen - obese  Ext-no pedal edema, no clubbing or cyanosis  Skin-Warm and dry.  no hyperpigmentation, vitiligo, or suspicious lesions  Neuro -alert, oriented, normal speech, no focal findings or movement disorder noted        Results for orders placed or performed during the hospital encounter of 10/15/22   URINALYSIS W/ REFLEX CULTURE    Specimen: Urine   Result Value Ref Range    Color YELLOW/STRAW      Appearance CLEAR CLEAR      Specific gravity 1.025      pH (UA) 5.5 5.0 - 8.0      Protein Negative NEG mg/dL    Glucose >1,000 (A) NEG mg/dL    Ketone Negative NEG mg/dL    Bilirubin Negative NEG      Blood Negative NEG      Urobilinogen 1.0 0.2 - 1.0 EU/dL Nitrites Negative NEG      Leukocyte Esterase Negative NEG      WBC 0-4 0 - 4 /hpf    RBC 0-5 0 - 5 /hpf    Epithelial cells MODERATE (A) FEW /lpf    Bacteria Negative NEG /hpf    UA:UC IF INDICATED CULTURE NOT INDICATED BY UA RESULT CNI      Mucus 1+ (A) NEG /lpf   METABOLIC PANEL, COMPREHENSIVE   Result Value Ref Range    Sodium 137 136 - 145 mmol/L    Potassium 3.7 3.5 - 5.1 mmol/L    Chloride 101 97 - 108 mmol/L    CO2 28 21 - 32 mmol/L    Anion gap 8 5 - 15 mmol/L    Glucose 207 (H) 65 - 100 mg/dL    BUN 5 (L) 6 - 20 MG/DL    Creatinine 0.86 0.55 - 1.02 MG/DL    BUN/Creatinine ratio 6 (L) 12 - 20      eGFR >60 >60 ml/min/1.73m2    Calcium 9.3 8.5 - 10.1 MG/DL    Bilirubin, total 0.2 0.2 - 1.0 MG/DL    ALT (SGPT) 32 12 - 78 U/L    AST (SGOT) 23 15 - 37 U/L    Alk. phosphatase 149 (H) 45 - 117 U/L    Protein, total 7.1 6.4 - 8.2 g/dL    Albumin 3.3 (L) 3.5 - 5.0 g/dL    Globulin 3.8 2.0 - 4.0 g/dL    A-G Ratio 0.9 (L) 1.1 - 2.2     CBC WITH AUTOMATED DIFF   Result Value Ref Range    WBC 14.1 (H) 3.6 - 11.0 K/uL    RBC 3.85 3.80 - 5.20 M/uL    HGB 12.2 11.5 - 16.0 g/dL    HCT 36.2 35.0 - 47.0 %    MCV 94.0 80.0 - 99.0 FL    MCH 31.7 26.0 - 34.0 PG    MCHC 33.7 30.0 - 36.5 g/dL    RDW 12.1 11.5 - 14.5 %    PLATELET 453 358 - 465 K/uL    MPV 9.4 8.9 - 12.9 FL    NRBC 0.0 0  WBC    ABSOLUTE NRBC 0.00 0.00 - 0.01 K/uL    NEUTROPHILS 54 32 - 75 %    LYMPHOCYTES 39 12 - 49 %    MONOCYTES 6 5 - 13 %    EOSINOPHILS 1 0 - 7 %    BASOPHILS 0 0 - 1 %    IMMATURE GRANULOCYTES 0 0.0 - 0.5 %    ABS. NEUTROPHILS 7.6 1.8 - 8.0 K/UL    ABS. LYMPHOCYTES 5.5 (H) 0.8 - 3.5 K/UL    ABS. MONOCYTES 0.8 0.0 - 1.0 K/UL    ABS. EOSINOPHILS 0.1 0.0 - 0.4 K/UL    ABS. BASOPHILS 0.0 0.0 - 0.1 K/UL    ABS. IMM. GRANS. 0.1 (H) 0.00 - 0.04 K/UL    DF AUTOMATED     LIPASE   Result Value Ref Range    Lipase 77 73 - 393 U/L       Assessment/Plan:    ICD-10-CM ICD-9-CM    1.  Type 2 diabetes mellitus with hyperglycemia, with long-term current use of insulin ([facility])  E11.65 250.00 insulin glargine (LANTUS,BASAGLAR) 100 unit/mL (3 mL) inpn    Z79.4 790.29 Blood-Glucose Meter monitoring kit     V58.67       2. Long-term insulin use ([facility])  Z79.4 V58.67       3. Hypertension complicating diabetes ([facility] [address]  E11.59 250.80     I15.2 401.9       4. Vision impairment  H54.7 369.9         Orders Placed This Encounter    lancets misc     Sig: Use as directed. Dx: e11.65. check sugar once daily     Dispense:  1 Each     Refill:  11    insulin glargine (LANTUS,BASAGLAR) 100 unit/mL (3 mL) inpn     Si Units by SubCUTAneous route nightly. With snack     Dispense:  5 Adjustable Dose Pre-filled Pen Syringe     Refill:  1     Pt really needs pen not vial as she is very vision impaired    glucose blood VI test strips strip     Sig: E11.65 check sugar once daily fasting     Dispense:  100 Strip     Refill:  1    SITagliptin phosphate (Januvia) 100 mg tablet     Sig: Take 1 Tablet by mouth daily. Dispense:  90 Tablet     Refill:  0    empagliflozin (Jardiance) 25 mg tablet     Sig: Take 1 Tablet by mouth daily. Dispense:  90 Tablet     Refill:  0    Blood-Glucose Meter monitoring kit     Sig: Use BID Dx E11.9  Pt needs TALKING GLUCOMETER     Dispense:  1 Kit     Refill:  0     1. Type 2 diabetes mellitus with hyperglycemia, with long-term current use of insulin ([facility])  Increase insulin  Rx talking glucometer, monitor fsg w goal ~100-150 fasting  - insulin glargine (LANTUS,BASAGLAR) 100 unit/mL (3 mL) inpn; 30 Units by SubCUTAneous route nightly. With snack  Dispense: 5 Adjustable Dose Pre-filled Pen Syringe; Refill: 1  - Blood-Glucose Meter monitoring kit; Use BID Dx E11.9  Pt needs TALKING GLUCOMETER  Dispense: 1 Kit; Refill: 0    2. Long-term insulin use ([facility] [address] [address]  Noted    3. Hypertension complicating diabetes ([facility]  BP controlled on ARB    4. Vision impairment  Noted    There are no Patient Instructions on file for this visit.    Follow-up and Dispositions    Return in about 4 weeks (around 12/30/2022) for marnie Villeda I have reviewed with the patient details of the assessment and plan and all questions were answered. Relevent patient education was performed. The most recent lab findings were reviewed with the patient. An After Visit Summary was printed and given to the patient.

## 2023-01-05 RX ORDER — ATORVASTATIN CALCIUM 40 MG/1
40 TABLET, FILM COATED ORAL
Qty: 90 TABLET | Refills: 0 | Status: SHIPPED | OUTPATIENT
Start: 2023-01-05

## 2023-01-18 ENCOUNTER — TRANSCRIBE ORDER (OUTPATIENT)
Dept: SCHEDULING | Age: 58
End: 2023-01-18

## 2023-01-18 DIAGNOSIS — Z12.31 ENCOUNTER FOR SCREENING MAMMOGRAM FOR MALIGNANT NEOPLASM OF BREAST: Primary | ICD-10-CM

## 2023-01-26 ENCOUNTER — OFFICE VISIT (OUTPATIENT)
Dept: INTERNAL MEDICINE CLINIC | Age: 58
End: 2023-01-26
Payer: MEDICAID

## 2023-01-26 VITALS
SYSTOLIC BLOOD PRESSURE: 128 MMHG | WEIGHT: 130.4 LBS | DIASTOLIC BLOOD PRESSURE: 86 MMHG | OXYGEN SATURATION: 96 % | HEART RATE: 100 BPM | TEMPERATURE: 98.4 F | BODY MASS INDEX: 25.6 KG/M2 | RESPIRATION RATE: 16 BRPM | HEIGHT: 60 IN

## 2023-01-26 DIAGNOSIS — H54.8 LEGALLY BLIND: ICD-10-CM

## 2023-01-26 DIAGNOSIS — E11.65 TYPE 2 DIABETES MELLITUS WITH HYPERGLYCEMIA, WITH LONG-TERM CURRENT USE OF INSULIN (HCC): Primary | ICD-10-CM

## 2023-01-26 DIAGNOSIS — Z79.4 TYPE 2 DIABETES MELLITUS WITH HYPERGLYCEMIA, WITH LONG-TERM CURRENT USE OF INSULIN (HCC): Primary | ICD-10-CM

## 2023-01-26 DIAGNOSIS — R63.4 WEIGHT LOSS: ICD-10-CM

## 2023-01-26 DIAGNOSIS — Z11.3 ROUTINE SCREENING FOR STI (SEXUALLY TRANSMITTED INFECTION): ICD-10-CM

## 2023-01-26 LAB
ALBUMIN UR QL STRIP: 10 MG/L
CREATININE, URINE POC: 50 MG/DL
GLUCOSE POC: NORMAL MG/DL
MICROALBUMIN/CREAT RATIO POC: NORMAL MG/G

## 2023-01-26 RX ORDER — FLUCONAZOLE 150 MG/1
150 TABLET ORAL DAILY
Qty: 1 TABLET | Refills: 0 | Status: SHIPPED | OUTPATIENT
Start: 2023-01-26 | End: 2023-01-27

## 2023-01-26 RX ORDER — INSULIN GLARGINE 100 [IU]/ML
34 INJECTION, SOLUTION SUBCUTANEOUS
Qty: 5 ADJUSTABLE DOSE PRE-FILLED PEN SYRINGE | Refills: 1 | Status: SHIPPED | OUTPATIENT
Start: 2023-01-26

## 2023-01-26 NOTE — PROGRESS NOTES
Roc Goel is a 63 y.o. female    Chief Complaint   Patient presents with    Diabetes       Visit Vitals  /86 (BP 1 Location: Left upper arm, BP Patient Position: Sitting, BP Cuff Size: Adult)   Pulse 100   Temp 98.4 °F (36.9 °C) (Temporal)   Resp 16   Ht 5' (1.524 m)   Wt 130 lb 6.4 oz (59.1 kg)   LMP  (LMP Unknown)   SpO2 96%   BMI 25.47 kg/m²           1. Have you been to the ER, urgent care clinic since your last visit? Hospitalized since your last visit? NO    2. Have you seen or consulted any other health care providers outside of the [address] [place] [address] since your last visit? Include any pap smears or colon screening.  NO

## 2023-01-27 NOTE — PROGRESS NOTES
Subjective: (As above and below)     Chief Complaint   Patient presents with    Diabetes     Parul OCHOA Daron Arana is a 63y.o. year old female who presents for     Diabetic Review of Systems - medication compliance: compliant all of the time, diabetic diet compliance: noncompliant some of the time, home glucose monitoring: is performed regularly. Bipolar; stable, follows w/ psychiatry    Legally blind; follows w/ eye dr    Weight loss    Wt Readings from Last 3 Encounters:   01/26/23 130 lb 6.4 oz (59.1 kg)   12/02/22 133 lb (60.3 kg)   10/15/22 155 lb (70.3 kg)     She reports eating usually 3 meals per day, normal amounts, varied foods  Mammo: utd  Colonoscopy 2017 but do not have path results  EGD not done  Occ GERD & mild dysphagia        Reviewed PmHx, RxHx, FmHx, SocHx, AllgHx and updated in chart. Family History   Problem Relation Age of Onset    Cancer Maternal Aunt     Breast Cancer Maternal Aunt     Cancer Maternal Uncle     Breast Cancer Maternal Aunt        Past Medical History:   Diagnosis Date    Bipolar 1 disorder ([facility] [address] [address]     Diabetes ([facility] [address]     Hypertension     Psychiatric disorder     Schizophrenia ([facility]       Social History     Socioeconomic History    Marital status: SINGLE   Tobacco Use    Smoking status: Some Days     Packs/day: 0.25     Types: Cigarettes    Smokeless tobacco: Never    Tobacco comments:     2-3 per day   Vaping Use    Vaping Use: Never used   Substance and Sexual Activity    Alcohol use: No    Drug use: No    Sexual activity: Not Currently          Current Outpatient Medications   Medication Sig    insulin glargine (LANTUS,BASAGLAR) 100 unit/mL (3 mL) inpn 34 Units by SubCUTAneous route nightly. With snack    fluconazole (DIFLUCAN) 150 mg tablet Take 1 Tablet by mouth daily for 1 day. FDA advises cautious prescribing of oral fluconazole in pregnancy. atorvastatin (LIPITOR) 40 mg tablet take 1 tablet by mouth nightly    lancets misc Use as directed.  Dx: e11.65. check sugar once daily    glucose blood VI test strips strip E11.65 check sugar once daily fasting    SITagliptin phosphate (Januvia) 100 mg tablet Take 1 Tablet by mouth daily. empagliflozin (Jardiance) 25 mg tablet Take 1 Tablet by mouth daily. Blood-Glucose Meter monitoring kit Use BID Dx E11.9  Pt needs TALKING GLUCOMETER    dicyclomine (BENTYL) 20 mg tablet Take 1 Tablet by mouth every six (6) hours. lamoTRIgine (LaMICtal) 25 mg tablet Take 25 mg by mouth two (2) times a day. BD Laura 2nd Gen Pen Needle 32 gauge x 5/32\" ndle use once daily    omeprazole (PRILOSEC) 40 mg capsule Take 1 Capsule by mouth daily as needed (acid reflux). losartan (COZAAR) 25 mg tablet take 1 tablet by mouth once daily    cholecalciferol (VITAMIN D3) (1000 Units /25 mcg) tablet take 1 tablet by mouth once daily    alcohol swabs padm 1 Each by Apply Externally route daily. lamoTRIgine (LAMICTAL) 100 mg tablet Take 0.5 Tabs by mouth two (2) times a day. Indications: Bipolar Depression    benztropine (COGENTIN) 1 mg tablet Take 1 mg by mouth two (2) times a day.    haloperidol (HALDOL) 5 mg tablet Take 1 Tab by mouth nightly. Indications: psychotic disorder    sertraline (ZOLOFT) 50 mg tablet Take 1 Tab by mouth daily. Indications: major depressive disorder    traZODone (DESYREL) 50 mg tablet Take 1 Tab by mouth nightly. Indications: major depressive disorder     No current facility-administered medications for this visit. Review of Systems:   Constitutional:    Negative for fever and chills, negative diaphoresis. HEENT:              Negative for neck pain and stiffness. Eyes:                  Negative for visual disturbance, itching, redness or discharge. Respiratory:        Negative for cough and shortness of breath. Cardiovascular:  Negative for chest pain and palpitations. Gastrointestinal: Negative for nausea, vomiting, abdominal pain, diarrhea or constipation.   Genitourinary:     Negative for dysuria and frequency. Musculoskeletal: Negative for falls, tenderness and swelling. Skin:                    Negative for rash, masses or lesions. Neurological:       Negative for dizzyness, seizure, loss of consciousness, weakness and numbness. Objective:     Vitals:    01/26/23 1553   BP: 128/86   Pulse: 100   Resp: 16   Temp: 98.4 °F (36.9 °C)   TempSrc: Temporal   SpO2: 96%   Weight: 130 lb 6.4 oz (59.1 kg)   Height: 5' (1.524 m)       Results for orders placed or performed in visit on 01/26/23   AMB POC GLUCOSE BLOOD, BY GLUCOSE MONITORING DEVICE   Result Value Ref Range    Glucose POC [facility] MG/DL   AMB POC URINE, MICROALBUMIN, SEMIQUANT (3 RESULTS)   Result Value Ref Range    ALBUMIN, URINE POC 10 Negative mg/L    CREATININE, URINE POC 50 mg/dL    Microalbumin/creat ratio (POC)  <30 MG/G     Gen: Oriented to person, place and time and well-developed, well-nourished and in no distress. HEENT:    Head: normocephalic and atraumatic. Eyes:  EOM are normal. Pupils equal and round. Neck:  Normal range of motion. Neck supple. Cardiovascular: normal rate, regular rhythm and normal heart sounds. Pulmonary/Chest:  Effort normal and breath sounds normal.  No respiratory distress. No wheezes, no rales. Abdominal: soft, normal  bowel sounds. Musculoskeletal:  No edema, no tenderness. No calf tenderness or edema. Neurological:  Alert, oriented to person, place and time. Skin: skin is warm and dry. Assessment/ Plan:       1. Type 2 diabetes mellitus with hyperglycemia, with long-term current use of insulin (HCC)  Refusing foot exam today  Increase lantus  Declines DM ed  - AMB POC GLUCOSE BLOOD, BY GLUCOSE MONITORING DEVICE  - insulin glargine (LANTUS,BASAGLAR) 100 unit/mL (3 mL) inpn; 34 Units by SubCUTAneous route nightly. With snack  Dispense: 5 Adjustable Dose Pre-filled Pen Syringe;  Refill: 1  - AMB POC URINE, MICROALBUMIN, SEMIQUANT (3 RESULTS)  - METABOLIC PANEL, BASIC  - HEPATITIS C QT BY PCR WITH REFLEX GENOTYPE; Future  - HIV 1/2 AG/AB, 4TH GENERATION,W RFLX CONFIRM  - T PALLIDUM SCREEN W/REFLEX  - HEPATITIS C QT BY PCR WITH REFLEX GENOTYPE    2. Weight loss  REFERRAL TO GASTROENTEROLOGY  - THYROID CASCADE PROFILE; Future  - THYROID CASCADE PROFILE    3. Routine screening for STI (sexually transmitted infection)    - CT/NG/T.VAGINALIS AMPLIFICATION    4. Legally blind            I have discussed the diagnosis with the patient and the intended plan as seen in the above orders. The patient has received an after-visit summary and questions were answered concerning future plans. Pt conveyed understanding of plan. Medication Side Effects and Warnings were discussed with patient: yes  Patient Labs were reviewed: yes  Patient Past Records were reviewed:  yes    Matthias Pimentel.  Eamon Martino, NP

## 2023-01-29 LAB
C TRACH RRNA SPEC QL NAA+PROBE: NEGATIVE
N GONORRHOEA RRNA SPEC QL NAA+PROBE: NEGATIVE
T VAGINALIS RRNA SPEC QL NAA+PROBE: NEGATIVE

## 2023-04-23 DIAGNOSIS — Z12.31 ENCOUNTER FOR SCREENING MAMMOGRAM FOR MALIGNANT NEOPLASM OF BREAST: Primary | ICD-10-CM

## 2023-07-27 RX ORDER — EMPAGLIFLOZIN 25 MG/1
TABLET, FILM COATED ORAL
Qty: 90 TABLET | Refills: 0 | Status: SHIPPED | OUTPATIENT
Start: 2023-07-27

## 2023-07-27 RX ORDER — SITAGLIPTIN 100 MG/1
TABLET, FILM COATED ORAL
Qty: 90 TABLET | Refills: 0 | Status: SHIPPED | OUTPATIENT
Start: 2023-07-27

## 2023-08-14 NOTE — PROGRESS NOTES
Problem: Falls - Risk of  Goal: *Absence of Falls  Document Georgiana Fall Risk and appropriate interventions in the flowsheet. Outcome: Progressing Towards Goal  Fall Risk Interventions:  Mobility Interventions: Assess mobility with egress test  In bed asleep with respirations noted as even and unlabored as chest was rising and falling.  Will continue to monitor for safety and 15 minute checks throughout shift                 History of Falls Interventions: Bed/chair exit alarm Female Pregnancy Counseling Text: Female patients should also be on two forms of birth control while taking this medication and for one month after their last dose.

## 2023-10-27 RX ORDER — EMPAGLIFLOZIN 25 MG/1
TABLET, FILM COATED ORAL
Qty: 90 TABLET | Refills: 0 | Status: SHIPPED | OUTPATIENT
Start: 2023-10-27

## 2023-11-13 RX ORDER — SITAGLIPTIN 100 MG/1
TABLET, FILM COATED ORAL
Qty: 90 TABLET | Refills: 0 | Status: SHIPPED | OUTPATIENT
Start: 2023-11-13

## 2023-12-26 ENCOUNTER — HOSPITAL ENCOUNTER (INPATIENT)
Facility: HOSPITAL | Age: 58
LOS: 9 days | Discharge: HOME | DRG: 750 | End: 2024-01-05
Attending: STUDENT IN AN ORGANIZED HEALTH CARE EDUCATION/TRAINING PROGRAM | Admitting: PSYCHIATRY & NEUROLOGY
Payer: MEDICAID

## 2023-12-26 DIAGNOSIS — R45.851 SUICIDAL IDEATION: Primary | ICD-10-CM

## 2023-12-26 LAB
ALBUMIN SERPL-MCNC: 3.6 G/DL (ref 3.5–5)
ALBUMIN/GLOB SERPL: 0.9 (ref 1.1–2.2)
ALP SERPL-CCNC: 159 U/L (ref 45–117)
ALT SERPL-CCNC: 20 U/L (ref 12–78)
AMORPH CRY URNS QL MICRO: ABNORMAL
AMPHET UR QL SCN: NEGATIVE
ANION GAP SERPL CALC-SCNC: 5 MMOL/L (ref 5–15)
APAP SERPL-MCNC: <2 UG/ML (ref 10–30)
APPEARANCE UR: CLEAR
AST SERPL-CCNC: 10 U/L (ref 15–37)
BACTERIA URNS QL MICRO: ABNORMAL /HPF
BARBITURATES UR QL SCN: NEGATIVE
BASOPHILS # BLD: 0 K/UL (ref 0–0.1)
BASOPHILS NFR BLD: 0 % (ref 0–1)
BENZODIAZ UR QL: NEGATIVE
BILIRUB SERPL-MCNC: 0.4 MG/DL (ref 0.2–1)
BILIRUB UR QL: NEGATIVE
BUN SERPL-MCNC: 10 MG/DL (ref 6–20)
BUN/CREAT SERPL: 16 (ref 12–20)
CALCIUM SERPL-MCNC: 10.2 MG/DL (ref 8.5–10.1)
CANNABINOIDS UR QL SCN: NEGATIVE
CHLORIDE SERPL-SCNC: 104 MMOL/L (ref 97–108)
CO2 SERPL-SCNC: 29 MMOL/L (ref 21–32)
COCAINE UR QL SCN: NEGATIVE
COLOR UR: ABNORMAL
COMMENT:: NORMAL
CREAT SERPL-MCNC: 0.64 MG/DL (ref 0.55–1.02)
DIFFERENTIAL METHOD BLD: ABNORMAL
EOSINOPHIL # BLD: 0.1 K/UL (ref 0–0.4)
EOSINOPHIL NFR BLD: 1 % (ref 0–7)
EPITH CASTS URNS QL MICRO: ABNORMAL /LPF
ERYTHROCYTE [DISTWIDTH] IN BLOOD BY AUTOMATED COUNT: 13.9 % (ref 11.5–14.5)
ETHANOL SERPL-MCNC: <10 MG/DL (ref 0–0.08)
GLOBULIN SER CALC-MCNC: 4.2 G/DL (ref 2–4)
GLUCOSE SERPL-MCNC: 174 MG/DL (ref 65–100)
GLUCOSE UR STRIP.AUTO-MCNC: 100 MG/DL
HCT VFR BLD AUTO: 35.1 % (ref 35–47)
HGB BLD-MCNC: 11.9 G/DL (ref 11.5–16)
HGB UR QL STRIP: NEGATIVE
HIV 1+2 AB+HIV1 P24 AG SERPL QL IA: NONREACTIVE
HIV 1/2 RESULT COMMENT: NORMAL
IMM GRANULOCYTES # BLD AUTO: 0 K/UL (ref 0–0.04)
IMM GRANULOCYTES NFR BLD AUTO: 0 % (ref 0–0.5)
KETONES UR QL STRIP.AUTO: NEGATIVE MG/DL
LEUKOCYTE ESTERASE UR QL STRIP.AUTO: NEGATIVE
LYMPHOCYTES # BLD: 3.3 K/UL (ref 0.8–3.5)
LYMPHOCYTES NFR BLD: 28 % (ref 12–49)
Lab: NORMAL
MCH RBC QN AUTO: 32.2 PG (ref 26–34)
MCHC RBC AUTO-ENTMCNC: 33.9 G/DL (ref 30–36.5)
MCV RBC AUTO: 94.9 FL (ref 80–99)
METHADONE UR QL: NEGATIVE
MONOCYTES # BLD: 0.7 K/UL (ref 0–1)
MONOCYTES NFR BLD: 6 % (ref 5–13)
NEUTS SEG # BLD: 7.7 K/UL (ref 1.8–8)
NEUTS SEG NFR BLD: 65 % (ref 32–75)
NITRITE UR QL STRIP.AUTO: NEGATIVE
NRBC # BLD: 0 K/UL (ref 0–0.01)
NRBC BLD-RTO: 0 PER 100 WBC
OPIATES UR QL: NEGATIVE
PCP UR QL: NEGATIVE
PH UR STRIP: 6 (ref 5–8)
PLATELET # BLD AUTO: 246 K/UL (ref 150–400)
PMV BLD AUTO: 9.8 FL (ref 8.9–12.9)
POTASSIUM SERPL-SCNC: 3.8 MMOL/L (ref 3.5–5.1)
PROT SERPL-MCNC: 7.8 G/DL (ref 6.4–8.2)
PROT UR STRIP-MCNC: ABNORMAL MG/DL
RBC # BLD AUTO: 3.7 M/UL (ref 3.8–5.2)
RBC #/AREA URNS HPF: ABNORMAL /HPF (ref 0–5)
SARS-COV-2 RNA RESP QL NAA+PROBE: NOT DETECTED
SODIUM SERPL-SCNC: 138 MMOL/L (ref 136–145)
SOURCE: NORMAL
SP GR UR REFRACTOMETRY: 1.02 (ref 1–1.03)
SPECIMEN HOLD: NORMAL
SPECIMEN HOLD: NORMAL
UROBILINOGEN UR QL STRIP.AUTO: 1 EU/DL (ref 0.2–1)
WBC # BLD AUTO: 11.8 K/UL (ref 3.6–11)
WBC URNS QL MICRO: ABNORMAL /HPF (ref 0–4)

## 2023-12-26 PROCEDURE — 85025 COMPLETE CBC W/AUTO DIFF WBC: CPT

## 2023-12-26 PROCEDURE — 81001 URINALYSIS AUTO W/SCOPE: CPT

## 2023-12-26 PROCEDURE — 80143 DRUG ASSAY ACETAMINOPHEN: CPT

## 2023-12-26 PROCEDURE — 99285 EMERGENCY DEPT VISIT HI MDM: CPT

## 2023-12-26 PROCEDURE — 87389 HIV-1 AG W/HIV-1&-2 AB AG IA: CPT

## 2023-12-26 PROCEDURE — 80053 COMPREHEN METABOLIC PANEL: CPT

## 2023-12-26 PROCEDURE — 82077 ASSAY SPEC XCP UR&BREATH IA: CPT

## 2023-12-26 PROCEDURE — 36415 COLL VENOUS BLD VENIPUNCTURE: CPT

## 2023-12-26 PROCEDURE — 80307 DRUG TEST PRSMV CHEM ANLYZR: CPT

## 2023-12-26 PROCEDURE — 90791 PSYCH DIAGNOSTIC EVALUATION: CPT

## 2023-12-26 PROCEDURE — 87635 SARS-COV-2 COVID-19 AMP PRB: CPT

## 2023-12-26 ASSESSMENT — PAIN - FUNCTIONAL ASSESSMENT: PAIN_FUNCTIONAL_ASSESSMENT: NONE - DENIES PAIN

## 2023-12-26 ASSESSMENT — PAIN SCALES - GENERAL: PAINLEVEL_OUTOF10: 0

## 2023-12-26 NOTE — BSMART NOTE
Comprehensive Assessment Form Part 1      Section I - Disposition    Schizoaffective disorder, per history    Past Medical History:   Diagnosis Date    Bipolar 1 disorder (HCC)     Diabetes (HCC)     Hypertension     Psychiatric disorder     Schizophrenia (HCC)        The Medical Doctor to Psychiatrist conference was not completed.  The Medical Doctor is in agreement with Psychiatrist disposition because patient is agreeable to inpatient BHU admission.  The plan is to admit to the BHU.  The on-call Psychiatrist consulted was N/A.  The admitting Psychiatrist will be SALUD.  The admitting Diagnosis is Schizoaffective disorder.  The Payor source is not on file.      This writer reviewed the [place] Suicide Severity Rating Scale in nursing flowsheet and the risk level assigned is high risk.  Based on this assessment, the risk of suicide is high risk and the plan is to admit to the BHU.    Section II - Integrated Summary  Summary:  Patient arrived to the ED via EMS with complaints of homelessness and SI. This clinician met with Geovanna face to face in the ED. She was appropriately dressed and neatly groomed. She was oriented x4 and was calm, cooperative, and pleasant during the assessment. She presented with a depressed mood and repeatedly stated \"I'm not going to cry.\" Len endorses SI with a plan but refused to disclose what the plan is to this clinician. Per the triage note, she refused to disclose the plan to nursing staff as well. She reports a history of multiple suicide attempts, most recently via overdose in May. When asked about HI, she responded \"anybody\" but denies a plan or any specific target. She reports a history of multiple inpatient hospitalizations between 1994-present, most recently in May 2023.     Geovanna has a historical diagnosis of Schizoaffective disorder and is receiving medication management through [facility]. She is prescribed Sertraline 50 mg 1x daily, Lamotrigine 25 mg 2x daily,

## 2023-12-26 NOTE — BSMART NOTE
BSMART assessment completed, and suicide risk level noted to be high risk. Charge Nurse Bernarda and Physician Dr. Gallegos notified. Concerns observed by patient needs a sitter, to be wanded by security, and needs to change into a green gown.

## 2023-12-26 NOTE — ED TRIAGE NOTES
Patient brought by EMS. States she is homeless and hungry. Also voicing SI- states has plan but will not reveal to RN. Aox4 in triage.

## 2023-12-27 PROBLEM — F25.9 SCHIZOAFFECTIVE DISORDER (HCC): Status: ACTIVE | Noted: 2023-12-27

## 2023-12-27 LAB
GLUCOSE BLD STRIP.AUTO-MCNC: 149 MG/DL (ref 65–117)
GLUCOSE BLD STRIP.AUTO-MCNC: 173 MG/DL (ref 65–117)
GLUCOSE BLD STRIP.AUTO-MCNC: 205 MG/DL (ref 65–117)
SERVICE CMNT-IMP: ABNORMAL

## 2023-12-27 PROCEDURE — 6370000000 HC RX 637 (ALT 250 FOR IP): Performed by: STUDENT IN AN ORGANIZED HEALTH CARE EDUCATION/TRAINING PROGRAM

## 2023-12-27 PROCEDURE — 1240000000 HC EMOTIONAL WELLNESS R&B

## 2023-12-27 PROCEDURE — 82962 GLUCOSE BLOOD TEST: CPT

## 2023-12-27 RX ORDER — SENNOSIDES A AND B 8.6 MG/1
1 TABLET, FILM COATED ORAL DAILY PRN
Status: DISCONTINUED | OUTPATIENT
Start: 2023-12-27 | End: 2024-01-05 | Stop reason: HOSPADM

## 2023-12-27 RX ORDER — BENZTROPINE MESYLATE 1 MG/1
1 TABLET ORAL 2 TIMES DAILY
Status: DISCONTINUED | OUTPATIENT
Start: 2023-12-27 | End: 2024-01-05 | Stop reason: HOSPADM

## 2023-12-27 RX ORDER — HALOPERIDOL 5 MG/1
5 TABLET ORAL DAILY
Status: DISCONTINUED | OUTPATIENT
Start: 2023-12-27 | End: 2023-12-27 | Stop reason: SDUPTHER

## 2023-12-27 RX ORDER — TRAZODONE HYDROCHLORIDE 50 MG/1
50 TABLET ORAL NIGHTLY PRN
Status: DISCONTINUED | OUTPATIENT
Start: 2023-12-27 | End: 2023-12-29

## 2023-12-27 RX ORDER — LAMOTRIGINE 25 MG/1
25 TABLET ORAL 2 TIMES DAILY
Status: DISCONTINUED | OUTPATIENT
Start: 2023-12-27 | End: 2023-12-27

## 2023-12-27 RX ORDER — ACETAMINOPHEN 325 MG/1
650 TABLET ORAL EVERY 4 HOURS PRN
Status: DISCONTINUED | OUTPATIENT
Start: 2023-12-27 | End: 2024-01-05 | Stop reason: HOSPADM

## 2023-12-27 RX ORDER — DIPHENHYDRAMINE HYDROCHLORIDE 50 MG/ML
50 INJECTION INTRAMUSCULAR; INTRAVENOUS EVERY 4 HOURS PRN
Status: DISCONTINUED | OUTPATIENT
Start: 2023-12-27 | End: 2024-01-05 | Stop reason: HOSPADM

## 2023-12-27 RX ORDER — HALOPERIDOL 5 MG/1
5 TABLET ORAL EVERY 4 HOURS PRN
Status: DISCONTINUED | OUTPATIENT
Start: 2023-12-27 | End: 2024-01-05 | Stop reason: HOSPADM

## 2023-12-27 RX ORDER — MAGNESIUM HYDROXIDE/ALUMINUM HYDROXICE/SIMETHICONE 120; 1200; 1200 MG/30ML; MG/30ML; MG/30ML
30 SUSPENSION ORAL EVERY 6 HOURS PRN
Status: DISCONTINUED | OUTPATIENT
Start: 2023-12-27 | End: 2024-01-05 | Stop reason: HOSPADM

## 2023-12-27 RX ORDER — HYDROXYZINE 50 MG/1
50 TABLET, FILM COATED ORAL 3 TIMES DAILY PRN
Status: DISCONTINUED | OUTPATIENT
Start: 2023-12-27 | End: 2024-01-05 | Stop reason: HOSPADM

## 2023-12-27 RX ORDER — HALOPERIDOL 5 MG/ML
5 INJECTION INTRAMUSCULAR EVERY 4 HOURS PRN
Status: DISCONTINUED | OUTPATIENT
Start: 2023-12-27 | End: 2024-01-05 | Stop reason: HOSPADM

## 2023-12-27 RX ORDER — INSULIN LISPRO 100 [IU]/ML
0-4 INJECTION, SOLUTION INTRAVENOUS; SUBCUTANEOUS NIGHTLY
Status: DISCONTINUED | OUTPATIENT
Start: 2023-12-27 | End: 2023-12-29

## 2023-12-27 RX ORDER — DEXTROSE MONOHYDRATE 100 MG/ML
INJECTION, SOLUTION INTRAVENOUS CONTINUOUS PRN
Status: DISCONTINUED | OUTPATIENT
Start: 2023-12-27 | End: 2024-01-05 | Stop reason: HOSPADM

## 2023-12-27 RX ORDER — INSULIN LISPRO 100 [IU]/ML
0-4 INJECTION, SOLUTION INTRAVENOUS; SUBCUTANEOUS
Status: DISCONTINUED | OUTPATIENT
Start: 2023-12-27 | End: 2023-12-29

## 2023-12-27 RX ORDER — HALOPERIDOL 5 MG/1
5 TABLET ORAL 2 TIMES DAILY
Status: DISCONTINUED | OUTPATIENT
Start: 2023-12-27 | End: 2024-01-05 | Stop reason: HOSPADM

## 2023-12-27 RX ORDER — POLYETHYLENE GLYCOL 3350 17 G/17G
17 POWDER, FOR SOLUTION ORAL DAILY PRN
Status: DISCONTINUED | OUTPATIENT
Start: 2023-12-27 | End: 2024-01-05 | Stop reason: HOSPADM

## 2023-12-27 RX ADMIN — BENZTROPINE 1 MG: 1 TABLET ORAL at 21:20

## 2023-12-27 RX ADMIN — HALOPERIDOL 5 MG: 5 TABLET ORAL at 21:20

## 2023-12-27 RX ADMIN — HALOPERIDOL 5 MG: 5 TABLET ORAL at 16:12

## 2023-12-27 RX ADMIN — BENZTROPINE 1 MG: 1 TABLET ORAL at 16:12

## 2023-12-27 RX ADMIN — LAMOTRIGINE 25 MG: 25 TABLET ORAL at 16:12

## 2023-12-27 RX ADMIN — SERTRALINE HYDROCHLORIDE 50 MG: 50 TABLET ORAL at 16:12

## 2023-12-27 ASSESSMENT — LIFESTYLE VARIABLES
HOW MANY STANDARD DRINKS CONTAINING ALCOHOL DO YOU HAVE ON A TYPICAL DAY: PATIENT DOES NOT DRINK
HOW OFTEN DO YOU HAVE A DRINK CONTAINING ALCOHOL: NEVER

## 2023-12-27 ASSESSMENT — SLEEP AND FATIGUE QUESTIONNAIRES
SLEEP PATTERN: RESTLESSNESS
DO YOU USE A SLEEP AID: YES
AVERAGE NUMBER OF SLEEP HOURS: 7
DO YOU HAVE DIFFICULTY SLEEPING: YES

## 2023-12-27 ASSESSMENT — PAIN SCALES - GENERAL: PAINLEVEL_OUTOF10: 0

## 2023-12-27 NOTE — ED NOTES
Suicide precautions in place. Pt in green hospital gown. Pt environment evaluated & modified to remove extraneous ligature risks. Pt belongings recorded & removed from pt care area & secured in pt belongings cabinet. Assigned sitter at bedside for 1:1 constant observation. Pt limited to 1 visitor at this time. Pt within view of nurses’ station.

## 2023-12-27 NOTE — BSMART NOTE
Arict spoke with bed access who reported pt weill be presented to psychiatrist for admission after until discharges. Attending physician updated on plan.

## 2023-12-27 NOTE — ED PROVIDER NOTES
10:05 PM  Change of shift. Care of patient taken over from Dr. Gallegos; H&P reviewed, bedside handoff complete.  Awaiting Bsmart consult and recommendations.       I spoke with BSMART.  Patient medically clear.  Patient awaiting bed placement at this facility however there will be no open beds until the morning.  Patient will remain in the ER until bed opens up at which point she will go upstairs.     Mauro Tello, DO  12/26/23 [id number]    
ED SIGN OUT NOTE  Care assumed at [facility] 9:35 AM EST    Patient was signed out to me by Dr. Tello.     Patient signed out pending re-evaluation, disposition, and psychiatric bed placement      /79   Pulse 89   Temp 98.7 °F (37.1 °C) (Oral)   Resp 18   Ht 1.524 m (5')   Wt 52.6 kg (116 lb) Comment: no bed scale available  SpO2 95%   BMI 22.65 kg/m²   Labs Reviewed   CBC WITH AUTO DIFFERENTIAL - Abnormal; Notable for the following components:       Result Value    WBC 11.8 (*)     RBC 3.70 (*)     All other components within normal limits   COMPREHENSIVE METABOLIC PANEL - Abnormal; Notable for the following components:    Glucose 174 (*)     Calcium 10.2 (*)     AST 10 (*)     Alk Phosphatase 159 (*)     Globulin 4.2 (*)     Albumin/Globulin Ratio 0.9 (*)     All other components within normal limits   ACETAMINOPHEN LEVEL - Abnormal; Notable for the following components:    Acetaminophen Level <2 (*)     All other components within normal limits   URINALYSIS WITH MICROSCOPIC - Abnormal; Notable for the following components:    Protein, UA TRACE (*)     Glucose,  (*)     Epithelial Cells UA MANY (*)     BACTERIA, URINE 1+ (*)     Amorphous Crystal 1+ (*)     All other components within normal limits   POCT GLUCOSE - Abnormal; Notable for the following components:    POC Glucose 205 (*)     All other components within normal limits   COVID ONLY ([facility])   URINE CULTURE HOLD SAMPLE   EXTRA TUBES HOLD   ETHANOL   URINE DRUG SCREEN   HIV 1/2 AG/AB, 4TH GENERATION,W RFLX CONFIRM     No orders to display     ED Course as of 12/27/23 1348   Tue Dec 26, 2023   1741 Patient is medically cleared for psychiatric admission. [AS]   Wed Dec 27, 2023   1333 Patient's BP stable for psychiatric admission.  [MG]      ED Course User Index  [AS] Cole Gallegos MD  [MG] Breanna Szymanski DO     Diagnosis:   1. Suicidal ideation      Disposition:   Admitted 12/27/2023 01:24:35 
Pulmonary effort is normal.      Breath sounds: Normal breath sounds.   Abdominal:      General: Abdomen is flat.   Musculoskeletal:         General: No deformity or signs of injury.      Cervical back: Normal range of motion and neck supple.   Skin:     General: Skin is warm.   Neurological:      General: No focal deficit present.      Mental Status: She is alert.   Psychiatric:         Mood and Affect: Mood normal.         DIAGNOSTIC RESULTS   EKG: If obtained, EKG interpretation provided in the ED course    RADIOLOGY:   No orders to display        LABS:  Labs Reviewed   CBC WITH AUTO DIFFERENTIAL - Abnormal; Notable for the following components:       Result Value    WBC 11.8 (*)     RBC 3.70 (*)     All other components within normal limits   COMPREHENSIVE METABOLIC PANEL - Abnormal; Notable for the following components:    Glucose 174 (*)     Calcium 10.2 (*)     AST 10 (*)     Alk Phosphatase 159 (*)     Globulin 4.2 (*)     Albumin/Globulin Ratio 0.9 (*)     All other components within normal limits   ACETAMINOPHEN LEVEL - Abnormal; Notable for the following components:    Acetaminophen Level <2 (*)     All other components within normal limits   URINALYSIS WITH MICROSCOPIC - Abnormal; Notable for the following components:    Protein, UA TRACE (*)     Glucose,  (*)     Epithelial Cells UA MANY (*)     BACTERIA, URINE 1+ (*)     Amorphous Crystal 1+ (*)     All other components within normal limits   POCT GLUCOSE - Abnormal; Notable for the following components:    POC Glucose 205 (*)     All other components within normal limits   POCT GLUCOSE - Abnormal; Notable for the following components:    POC Glucose 173 (*)     All other components within normal limits   POCT GLUCOSE - Abnormal; Notable for the following components:    POC Glucose 149 (*)     All other components within normal limits   POCT GLUCOSE - Abnormal; Notable for the following components:    POC Glucose 169 (*)     All other components

## 2023-12-27 NOTE — BSMART NOTE
Per Access, pt accepted by Dr. Laureano to [facility] room#744-B. Nursing report [id number]. Ramandeep/Sayra updated on admission.

## 2023-12-27 NOTE — BSMART NOTE
BSMART Liaison Team Note     LOS:  0     Patient goal(s) for today: take medications as prescribed, make needs known in an appropriate manner.  BSMART Liaison team focus/goals: assess MH needs, provide therapeutic support, brief therapy, and education, as needed.  Assist medical care management team with recommendations for coordination of care.    Psychiatric Consult: ED hold      Progress note: Per triage, Patient brought by EMS. States she is homeless and hungry. Also voicing SI- states has plan but will not reveal to RN      Liaison met with pt, FTF, in the ED, with psychiatric NP, Guillermina Amador.  Pt is sleeping but wakes, briefly, to answer a few questions.  She is alert but drowsy, oriented, thoughts logical and goal-directed, affect is restricted, speech is WNL, pt is legally blind.  Pt endorses SI \"a little bit\" but will not disclose her plan.  Pt confirms past suicide attempts with most recent in May/June 2023 via OD on pills.  Pt denies current HI/AVH.  Pt confirms \"many\" BHU admissions.  Stressors according to chart: homelessness, 6 children/11 grands, command AH to hurt self and others.    Barriers to Discharge: [facility] bed  Guns in the home:  no    Outpatient provider(s):  Jelly Rm NP  Insurance info/prescription coverage:  not on file    Diagnosis: schizoaffective d/o by     Plan:  BHU recommended.  Please refer to most recent psychiatric consult note and medical team for recommendations and disposition.     Follow up Psych Consult placed? No .   Psychiatrist updated? No      Participating treatment team members: Geovanna Ojeda, Stephanie Townsend LCSW, Guillerimna Amador, TABITHA DudleyMayo Clinic HospitalNereida Townsend LCSW  BSMART Liaison  Available on [employer]

## 2023-12-27 NOTE — ED NOTES
Patient resting quietly in stretcher with sitter at bedside. No acute signs of distress at this time. RN will continue to monitor.

## 2023-12-27 NOTE — BSMART NOTE
Pt accepted by Dr. Laureano to [facility]. Room assignment to be placed at 2:30 pre access.

## 2023-12-27 NOTE — ED NOTES
TRANSFER - IN REPORT:    Verbal report received from GISELL Fields on Geovanna PIPER Ojeda  being received from GISELL Nicole for routine progression of patient care      Report consisted of patient's Situation, Background, Assessment and   Recommendations(SBAR).     Information from the following report(s) Nurse Handoff Report, Index, ED Encounter Summary, ED SBAR, Adult Overview, Surgery Report, Intake/Output, MAR, and Recent Results was reviewed with the receiving nurse.    Opportunity for questions and clarification was provided.      Assessment completed upon patient's arrival to unit and care assumed.

## 2023-12-28 PROBLEM — R45.851 SUICIDAL IDEATION: Status: ACTIVE | Noted: 2023-12-28

## 2023-12-28 PROBLEM — E11.65 TYPE 2 DIABETES MELLITUS WITH HYPERGLYCEMIA (HCC): Status: ACTIVE | Noted: 2022-12-02

## 2023-12-28 LAB
GLUCOSE BLD STRIP.AUTO-MCNC: 155 MG/DL (ref 65–117)
GLUCOSE BLD STRIP.AUTO-MCNC: 157 MG/DL (ref 65–117)
GLUCOSE BLD STRIP.AUTO-MCNC: 169 MG/DL (ref 65–117)
GLUCOSE BLD STRIP.AUTO-MCNC: 208 MG/DL (ref 65–117)
SERVICE CMNT-IMP: ABNORMAL

## 2023-12-28 PROCEDURE — 1240000000 HC EMOTIONAL WELLNESS R&B

## 2023-12-28 PROCEDURE — 6370000000 HC RX 637 (ALT 250 FOR IP): Performed by: CLINICAL NURSE SPECIALIST

## 2023-12-28 PROCEDURE — 97116 GAIT TRAINING THERAPY: CPT

## 2023-12-28 PROCEDURE — 6370000000 HC RX 637 (ALT 250 FOR IP): Performed by: STUDENT IN AN ORGANIZED HEALTH CARE EDUCATION/TRAINING PROGRAM

## 2023-12-28 PROCEDURE — 97161 PT EVAL LOW COMPLEX 20 MIN: CPT

## 2023-12-28 PROCEDURE — 82962 GLUCOSE BLOOD TEST: CPT

## 2023-12-28 PROCEDURE — 99221 1ST HOSP IP/OBS SF/LOW 40: CPT | Performed by: CLINICAL NURSE SPECIALIST

## 2023-12-28 RX ORDER — INSULIN GLARGINE 100 [IU]/ML
8 INJECTION, SOLUTION SUBCUTANEOUS NIGHTLY
Status: DISCONTINUED | OUTPATIENT
Start: 2023-12-28 | End: 2023-12-29

## 2023-12-28 RX ADMIN — INSULIN GLARGINE 8 UNITS: 100 INJECTION, SOLUTION SUBCUTANEOUS at 21:07

## 2023-12-28 RX ADMIN — HALOPERIDOL 5 MG: 5 TABLET ORAL at 21:07

## 2023-12-28 RX ADMIN — INSULIN LISPRO 1 UNITS: 100 INJECTION, SOLUTION INTRAVENOUS; SUBCUTANEOUS at 12:17

## 2023-12-28 RX ADMIN — BENZTROPINE 1 MG: 1 TABLET ORAL at 21:07

## 2023-12-28 RX ADMIN — SERTRALINE HYDROCHLORIDE 50 MG: 50 TABLET ORAL at 09:17

## 2023-12-28 RX ADMIN — BENZTROPINE 1 MG: 1 TABLET ORAL at 09:17

## 2023-12-28 RX ADMIN — HALOPERIDOL 5 MG: 5 TABLET ORAL at 09:17

## 2023-12-28 NOTE — PLAN OF CARE
Problem: Physical Therapy - Adult  Goal: By Discharge: Performs mobility at highest level of function for planned discharge setting.  See evaluation for individualized goals.  Description: FUNCTIONAL STATUS PRIOR TO ADMISSION: Patient was independent and active without use of DME.    HOME SUPPORT PRIOR TO ADMISSION: patient was currently unhoused.    Physical Therapy Goals  Initiated 12/28/2023  1.  Patient will move from supine to sit and sit to supine, scoot up and down, and roll side to side in bed with independence within 7 day(s).    2.  Patient will perform sit to stand with independence within 7 day(s).  3.  Patient will transfer from bed to chair and chair to bed with independence using the least restrictive device within 7 day(s).  4.  Patient will ambulate with independence for 300 feet with the least restrictive device within 7 day(s).     Outcome: Progressing     PHYSICAL THERAPY EVALUATION    Patient: Geovanna Ojeda (58 y.o. female)  Date: 12/28/2023  Primary Diagnosis: Suicidal ideation [R45.851]  Schizoaffective disorder (HCC) [F25.9]       Precautions: Fall Risk                    ASSESSMENT :   DEFICITS/IMPAIRMENTS:   The patient is limited by decreased functional mobility, strength, activity tolerance, coordination, balance. This is a 58 year old female who presented to hospital with SI and increased weakness in legs. Patient currently on BHU of this hospital. Agreeable to treatment. Noted to have slight RLE weakness compared to L. Significant imbalance noted during static and dynamic movement. Gait pattern improves with use of RW. High risk for falls based on HER balance assessment score 13/56. Patient endorses that she at times has tingling and burning in her legs. Patient would benefit from skilled PT 2-3 more times to assess need for AD for discharge. Will continue to follow in acute setting. Patient will benefit from skilled intervention to address the above impairments.    Functional

## 2023-12-28 NOTE — PROGRESS NOTES
Laboratory Monitoring for Antipsychotics:    This patient is currently prescribed the following medication(s):   Current Facility-Administered Medications: [START ON 12/29/2023] sertraline (ZOLOFT) tablet 75 mg, 75 mg, Oral, Daily  benztropine (COGENTIN) tablet 1 mg, 1 mg, Oral, BID  glucose chewable tablet 16 g, 4 tablet, Oral, PRN  dextrose bolus 10% 125 mL, 125 mL, IntraVENous, PRN **OR** dextrose bolus 10% 250 mL, 250 mL, IntraVENous, PRN  glucagon injection 1 mg, 1 mg, SubCUTAneous, PRN  dextrose 10 % infusion, , IntraVENous, Continuous PRN  insulin lispro (HUMALOG) injection vial 0-4 Units, 0-4 Units, SubCUTAneous, TID WC  insulin lispro (HUMALOG) injection vial 0-4 Units, 0-4 Units, SubCUTAneous, Nightly  haloperidol (HALDOL) tablet 5 mg, 5 mg, Oral, BID  acetaminophen (TYLENOL) tablet 650 mg, 650 mg, Oral, Q4H PRN  polyethylene glycol (GLYCOLAX) packet 17 g, 17 g, Oral, Daily PRN  senna (SENOKOT) tablet 8.6 mg, 1 tablet, Oral, Daily PRN  aluminum & magnesium hydroxide-simethicone (MAALOX) 200-200-20 MG/5ML suspension 30 mL, 30 mL, Oral, Q6H PRN  hydrOXYzine HCl (ATARAX) tablet 50 mg, 50 mg, Oral, TID PRN  haloperidol (HALDOL) tablet 5 mg, 5 mg, Oral, Q4H PRN **OR** haloperidol lactate (HALDOL) injection 5 mg, 5 mg, IntraMUSCular, Q4H PRN  diphenhydrAMINE (BENADRYL) injection 50 mg, 50 mg, IntraMUSCular, Q4H PRN  traZODone (DESYREL) tablet 50 mg, 50 mg, Oral, Nightly PRN    The following labs have been completed for monitoring of antipsychotics and/or mood stabilizers:    Height, Weight, BMI Estimation  Estimated body mass index is 22.65 kg/m² as calculated from the following:    Height as of this encounter: 1.524 m (5').    Weight as of this encounter: 52.6 kg (116 lb).     Vital Signs/Blood Pressure  /65   Pulse (!) 105   Temp 98.2 °F (36.8 °C) (Oral)   Resp 16   Ht 1.524 m (5')   Wt 52.6 kg (116 lb) Comment: no bed scale available  SpO2 100%   BMI 22.65 kg/m²     Renal Function, Hepatic

## 2023-12-28 NOTE — PLAN OF CARE
Problem: Safety - Adult  Goal: Free from fall injury  Outcome: Progressing     Pt appears to be resting in bed in no apparent distress, respirations even and unlabored. No voiced concerns. Bed is in lowest position with the wheels locked, pathway free of clutter. Walker within reach if needed. Q15m rounds for safety continued per provider order.

## 2023-12-28 NOTE — H&P
[facility] BEHAVIORAL HEALTH  INITIAL PSYCHIATRIC INTERVIEW:    Name: Geovanna Ojeda  MR#: [id number]  ACCOUNT#: [id number]  : 1965  ADMIT DATE: 2023    CHIEF COMPLAINT: \"I'll do it, but I won't tell you where, when, or how.\"     HISTORY OF PRESENTING COMPLAINT:  This is a 58 y.o. female who is currently admitted to the geriatric psychiatric floor at [facility] on a voluntary basis for suicidal ideation as well as auditory hallucinations of voices telling her to harm herself. She has identified homelessness as the biggest contributing factor to worsening mental health recently. She has been compliant with medications and takes Zoloft 50mg daily, Lamictal 25mg BID, Haldol 5mg BID, trazodone 50mg QHS, and Cogentin 1mg BID. The pt reports she's been hearing voices of people talking, telling her to harm herself or others. This is not new, rather has been going on for some time. She endorses suicidal ideation, states \"I'll do it, but I'm not going to tell you where, when, or how.\" She states suicidal thoughts are chronic, and are not new, present at baseline. She had been living in a rooming house, but states she doesn't want to go back there. She feels homelessness is her biggest concern at this time. She feels her medications are \"sometimes\" helpful. She reports appetite is lower than usual, and she is worried that one of her medications is making her lose weight.     PAST PSYCHIATRIC HISTORY: The pt has a hx of schizoaffective disorder. She has a hx of multiple past psychiatric hospitalizations, more than she can count, most recently May of 2023 at [facility], as well as past suicide attempts, most recently in May of 2023, prior to that admission. The pt sees Jelly Rm NP for outpatient psychiatry. She does not have a  or therapist either.     PAST MEDICATION TRIALS: The pt denies any past medication trials other than current medications.     SUBSTANCE ABUSE HISTORY:

## 2023-12-28 NOTE — PLAN OF CARE
Problem: Pain  Goal: Verbalizes/displays adequate comfort level or baseline comfort level  Outcome: Progressing     Problem: Safety - Adult  Goal: Free from fall injury  12/28/2023 0956 by Jaz Quinteros RN  Outcome: Progressing

## 2023-12-29 LAB
GLUCOSE BLD STRIP.AUTO-MCNC: 103 MG/DL (ref 65–117)
GLUCOSE BLD STRIP.AUTO-MCNC: 113 MG/DL (ref 65–117)
GLUCOSE BLD STRIP.AUTO-MCNC: 122 MG/DL (ref 65–117)
GLUCOSE BLD STRIP.AUTO-MCNC: 165 MG/DL (ref 65–117)
GLUCOSE BLD STRIP.AUTO-MCNC: 178 MG/DL (ref 65–117)
SERVICE CMNT-IMP: ABNORMAL
SERVICE CMNT-IMP: NORMAL
SERVICE CMNT-IMP: NORMAL

## 2023-12-29 PROCEDURE — 82962 GLUCOSE BLOOD TEST: CPT

## 2023-12-29 PROCEDURE — 1240000000 HC EMOTIONAL WELLNESS R&B

## 2023-12-29 PROCEDURE — 99231 SBSQ HOSP IP/OBS SF/LOW 25: CPT | Performed by: CLINICAL NURSE SPECIALIST

## 2023-12-29 PROCEDURE — 6370000000 HC RX 637 (ALT 250 FOR IP): Performed by: NURSE PRACTITIONER

## 2023-12-29 PROCEDURE — 6370000000 HC RX 637 (ALT 250 FOR IP): Performed by: STUDENT IN AN ORGANIZED HEALTH CARE EDUCATION/TRAINING PROGRAM

## 2023-12-29 PROCEDURE — 6370000000 HC RX 637 (ALT 250 FOR IP): Performed by: CLINICAL NURSE SPECIALIST

## 2023-12-29 PROCEDURE — 6370000000 HC RX 637 (ALT 250 FOR IP): Performed by: PSYCHIATRY & NEUROLOGY

## 2023-12-29 RX ORDER — INSULIN LISPRO 100 [IU]/ML
0-4 INJECTION, SOLUTION INTRAVENOUS; SUBCUTANEOUS
Status: DISCONTINUED | OUTPATIENT
Start: 2023-12-29 | End: 2024-01-05 | Stop reason: HOSPADM

## 2023-12-29 RX ORDER — LAMOTRIGINE 25 MG/1
25 TABLET ORAL 2 TIMES DAILY
Status: DISCONTINUED | OUTPATIENT
Start: 2023-12-29 | End: 2024-01-05 | Stop reason: HOSPADM

## 2023-12-29 RX ORDER — INSULIN GLARGINE 100 [IU]/ML
6 INJECTION, SOLUTION SUBCUTANEOUS NIGHTLY
Status: DISCONTINUED | OUTPATIENT
Start: 2023-12-29 | End: 2024-01-05 | Stop reason: HOSPADM

## 2023-12-29 RX ORDER — TRAZODONE HYDROCHLORIDE 50 MG/1
50 TABLET ORAL NIGHTLY
Status: DISCONTINUED | OUTPATIENT
Start: 2023-12-29 | End: 2024-01-05 | Stop reason: HOSPADM

## 2023-12-29 RX ADMIN — BENZTROPINE 1 MG: 1 TABLET ORAL at 09:58

## 2023-12-29 RX ADMIN — TRAZODONE HYDROCHLORIDE 50 MG: 50 TABLET ORAL at 01:50

## 2023-12-29 RX ADMIN — ACETAMINOPHEN 650 MG: 325 TABLET ORAL at 13:16

## 2023-12-29 RX ADMIN — TRAZODONE HYDROCHLORIDE 50 MG: 50 TABLET ORAL at 20:13

## 2023-12-29 RX ADMIN — HALOPERIDOL 5 MG: 5 TABLET ORAL at 20:13

## 2023-12-29 RX ADMIN — INSULIN GLARGINE 6 UNITS: 100 INJECTION, SOLUTION SUBCUTANEOUS at 20:13

## 2023-12-29 RX ADMIN — BENZTROPINE 1 MG: 1 TABLET ORAL at 20:13

## 2023-12-29 RX ADMIN — HALOPERIDOL 5 MG: 5 TABLET ORAL at 09:59

## 2023-12-29 RX ADMIN — LAMOTRIGINE 25 MG: 25 TABLET ORAL at 20:13

## 2023-12-29 RX ADMIN — SERTRALINE HYDROCHLORIDE 75 MG: 50 TABLET ORAL at 09:58

## 2023-12-29 ASSESSMENT — PAIN DESCRIPTION - LOCATION: LOCATION: HEAD

## 2023-12-29 ASSESSMENT — PAIN DESCRIPTION - ORIENTATION: ORIENTATION: ANTERIOR

## 2023-12-29 ASSESSMENT — PAIN DESCRIPTION - DESCRIPTORS: DESCRIPTORS: THROBBING

## 2023-12-29 ASSESSMENT — PAIN SCALES - GENERAL: PAINLEVEL_OUTOF10: 7

## 2023-12-29 NOTE — CONSULTS
[facility] DIAB[facility] HEALTH  DIABETES MANAGEMENT CONSULT    Consulted by Provider for advanced nursing evaluation and care for inpatient blood glucose management.    Evaluation and Action Plan   Geovanna Ojeda is a 58 y.o. AA female who is voluntarily admitted to the [facility] for SI/ and AH which are telling her to harm herself.  Has had many past psychiatric admissions for the same with suicide attempts.  POC random BG >200 in setting of established diabetes per PMH. A1C ordered and pending. Past A1Cs  chronically elevated per past results review.  PTA diabetes medications not listed or patient not taking anything currently-  Per H&P, is currently homeless for 9mo.  Correctional insulin in place [facility].      Living with T2D since 2019. Patient has been blind \"all my life\".  Endorses she takes insulin PTA, could not verbalize what kind or how much.  Verbalizes decreased appetite in recent months- Would initiate low dose basal insulin given body habitus/ BMI along with correctional insulin.     Management Rationale Action Plan   Medication   Basal needs Using 0.2 units/kg/D based on BMI Lantus 10 units QHS   Nutritional needs NA    Corrective insulin Using MEDIUM sensitivity  ACHS   Lab [x]        Hemoglobin [id number]     Referral [x] Behavioral health   Additional orders          Diabetes Discharge Plan   Medication  TBD,    Referral  []        Outpatient diabetes education   Additional orders            Initial Presentation   Geovanna Ojeda is a 58 y.o. female admitted to [facility] voluntarily after experiencing SI/AH with voices telling her to harm herself. Homelessness for 9mo. Past  admissions for SI/ and suicide attempts.     HX:   Past Medical History:   Diagnosis Date    Bipolar 1 disorder (HCC)     Diabetes (HCC)     Hypertension     Psychiatric disorder     Schizophrenia (HCC)         INITIAL DX: Suicidal ideation [R45.851]  Schizoaffective disorder (HCC) [F25.9]     Current Treatment     [place]: 
[facility]  PSYCHIATRY CONSULT NOTE:    Name: Geovanna Ojeda  MR#: [id number]  : 1965  ACCOUNT#: [id number]  ADMIT DATE: 2023    REASON FOR CONSULT: suicidal ideation    HISTORY OF PRESENTING COMPLAINT:  Geovanna Ojeda is a 58 y.o. female with PMH of HTN, DM-2, and schizoaffective disorder who initially presented to the ED c/o suicidal ideation with a plan. She is currently seen in the ED at [facility]. Upon assessment today, she drowsy but able to answer questions. She continues to report suicidal ideation with a plan that she will not disclose. She has a history of suicide attempts. She denies AVH at this time. She denies HI. She reports poor sleep and poor appetite with weight loss.    PAST PSYCHIATRIC HISTORY: Positive for prior hospitalizations. She reports compliance with medications. She has a history of suicide attempts.     SUBSTANCE ABUSE HISTORY: Denies    PSYCHOSOCIAL HISTORY: She is currently homeless and cites this as her main trigger for her worsening mood. She is legally blind but able to perform ADLs independently.    MENTAL STATUS EXAM:   Geovanna Ojeda is a 58 y.o. Black /  female who appears her stated age. She is cooperative with assessment questions. She is legally blind, but turns her head to engage with this provider. No psychomotor agitation/retardation is observed. Her speech is normal in rate, tone, and volume. Her self-reported mood is \"depressed\". Her affect is congruent with mood and situation, restricted, depressed. She denies auditory and visual hallucinations at this time. No paranoia or delusions are elicited with assessment. Her thought processes are linear and goal-directed. She endorses suicidal, denies homicidal ideation. She is alert and oriented X 4. Her memory appears intact as evidenced by conversation/answers to my questions. Insight and judgment are limited/poor.    DIAGNOSTIC IMPRESSION:  Schizoaffective 
necessitating voluntary admission to [facility].      Diabetes History   T2D per PMH , no PTA medications on file -No primary care provider on file.    Prior A1Cs    Diabetes-related Medical History  Neurological complications  Peripheral neuropathy  Microvascular disease  NONE  Macrovascular disease  NONE  Other associated conditions     Schizophrenia disorder    Diabetes Medication History  Diabetes drug class Diabetes drug name Additional Comments   Biguanide  \"I'm allergic to it\"    Sulfonylureas \"I'm allergie to it\"    Insulin \"Maybe short term insulin\"      Diabetes self-management practices:   Eating pattern-decreased appetite recently/ when does eats consumes hamberger/cheeseburger/ water / apple juice/ diet pepsi      Physical activity pattern-homeless    Monitoring pattern-checks 137-231 per report     Taking medications pattern  [x] ? Consistent administration  [x] Affordable    Social determinants of health impacting diabetes self-management practices    Worried that housing situation is unstable    Overall evaluation:    [x] ?? Achieving A1c target with drug therapy & self-care practices    Subjective   ”I stay here there.”     Objective   Physical exam  General Normal weight AA female/ in no acute distress/. Conversant and cooperative  Neuro  Alert, oriented   Vital Signs   Vitals:    12/29/23 1123   BP: 102/77   Pulse: 99   Resp: 20   Temp:    SpO2: 100%     Skin  Warm and dry.  Heart   Regular rate and rhythm. No murmurs, rubs or gallops  Lungs  Clear to auscultation without rales or rhonchi  Extremities No foot wounds    Diabetic foot exam: verbalized neuropathy bilateral feet            Laboratory  Recent Labs     12/26/23  1607   WBC 11.8*   HGB 11.9   HCT 35.1   MCV 94.9          Recent Labs     12/26/23  1607      K 3.8      CO2 29   BUN 10   CREATININE 0.64       Lab Results   Component Value Date    ALT 20 12/26/2023    AST 10 (L) 12/26/2023    ALKPHOS 159 (H) 12/26/2023    BILITOT

## 2023-12-29 NOTE — PLAN OF CARE
Problem: Safety - Adult  Goal: Free from fall injury  Outcome: Progressing     Pt received resting in bed with eyes closed. Breathing is even and unlabored. Walkways are free and clear from clutter.

## 2023-12-29 NOTE — PLAN OF CARE
Problem: Pain  Goal: Verbalizes/displays adequate comfort level or baseline comfort level  Outcome: Progressing     Problem: Safety - Adult  Goal: Free from fall injury  Outcome: Progressing

## 2023-12-30 LAB
GLUCOSE BLD STRIP.AUTO-MCNC: 101 MG/DL (ref 65–117)
GLUCOSE BLD STRIP.AUTO-MCNC: 137 MG/DL (ref 65–117)
GLUCOSE BLD STRIP.AUTO-MCNC: 145 MG/DL (ref 65–117)
GLUCOSE BLD STRIP.AUTO-MCNC: 151 MG/DL (ref 65–117)
SERVICE CMNT-IMP: ABNORMAL
SERVICE CMNT-IMP: NORMAL

## 2023-12-30 PROCEDURE — 6370000000 HC RX 637 (ALT 250 FOR IP): Performed by: NURSE PRACTITIONER

## 2023-12-30 PROCEDURE — 6370000000 HC RX 637 (ALT 250 FOR IP): Performed by: CLINICAL NURSE SPECIALIST

## 2023-12-30 PROCEDURE — 82962 GLUCOSE BLOOD TEST: CPT

## 2023-12-30 PROCEDURE — 6370000000 HC RX 637 (ALT 250 FOR IP): Performed by: STUDENT IN AN ORGANIZED HEALTH CARE EDUCATION/TRAINING PROGRAM

## 2023-12-30 PROCEDURE — 1240000000 HC EMOTIONAL WELLNESS R&B

## 2023-12-30 RX ADMIN — BENZTROPINE 1 MG: 1 TABLET ORAL at 21:02

## 2023-12-30 RX ADMIN — SERTRALINE HYDROCHLORIDE 75 MG: 50 TABLET ORAL at 09:34

## 2023-12-30 RX ADMIN — INSULIN GLARGINE 6 UNITS: 100 INJECTION, SOLUTION SUBCUTANEOUS at 21:02

## 2023-12-30 RX ADMIN — TRAZODONE HYDROCHLORIDE 50 MG: 50 TABLET ORAL at 21:02

## 2023-12-30 RX ADMIN — HALOPERIDOL 5 MG: 5 TABLET ORAL at 21:02

## 2023-12-30 RX ADMIN — LAMOTRIGINE 25 MG: 25 TABLET ORAL at 21:02

## 2023-12-30 RX ADMIN — HALOPERIDOL 5 MG: 5 TABLET ORAL at 09:34

## 2023-12-30 RX ADMIN — LAMOTRIGINE 25 MG: 25 TABLET ORAL at 09:34

## 2023-12-30 RX ADMIN — BENZTROPINE 1 MG: 1 TABLET ORAL at 09:35

## 2023-12-30 ASSESSMENT — PAIN - FUNCTIONAL ASSESSMENT: PAIN_FUNCTIONAL_ASSESSMENT: NONE - DENIES PAIN

## 2023-12-30 ASSESSMENT — PAIN SCALES - GENERAL: PAINLEVEL_OUTOF10: 0

## 2023-12-30 NOTE — PLAN OF CARE
Problem: Depression  Goal: Will be euthymic at discharge  Description: INTERVENTIONS:  1. Administer medication as ordered  2. Provide emotional support via 1:1 interaction with staff  3. Encourage involvement in milieu/groups/activities  4. Monitor for social isolation  Outcome: Progressing  Note: Pt out on the unit for small periods of time. Taking scheduled medications during the shift. Little interactions with peers. Continues to have poor sleep. Encouraged pt to participate on the unit.

## 2023-12-30 NOTE — PLAN OF CARE
Problem: Safety - Adult  Goal: Free from fall injury  12/29/2023 2319 by Marilyn Tyler LPN  Outcome: Progressing   Received pt in bed with eyes closed, appears to be asleep. Respirations even and unlabored, no respiratory distress noted. Pathway is free of clutter. Walker by bedside. Q15 min safety checks in place.

## 2023-12-30 NOTE — PLAN OF CARE
Problem: Safety - Adult  Goal: Free from fall injury  Outcome: Progressing   Will continue q safety checks as per policy

## 2023-12-31 LAB
GLUCOSE BLD STRIP.AUTO-MCNC: 166 MG/DL (ref 65–117)
GLUCOSE BLD STRIP.AUTO-MCNC: 198 MG/DL (ref 65–117)
GLUCOSE BLD STRIP.AUTO-MCNC: 211 MG/DL (ref 65–117)
GLUCOSE BLD STRIP.AUTO-MCNC: 97 MG/DL (ref 65–117)
SERVICE CMNT-IMP: ABNORMAL
SERVICE CMNT-IMP: NORMAL

## 2023-12-31 PROCEDURE — 6370000000 HC RX 637 (ALT 250 FOR IP): Performed by: CLINICAL NURSE SPECIALIST

## 2023-12-31 PROCEDURE — 1240000000 HC EMOTIONAL WELLNESS R&B

## 2023-12-31 PROCEDURE — 82962 GLUCOSE BLOOD TEST: CPT

## 2023-12-31 PROCEDURE — 6370000000 HC RX 637 (ALT 250 FOR IP): Performed by: NURSE PRACTITIONER

## 2023-12-31 PROCEDURE — 6370000000 HC RX 637 (ALT 250 FOR IP): Performed by: STUDENT IN AN ORGANIZED HEALTH CARE EDUCATION/TRAINING PROGRAM

## 2023-12-31 RX ADMIN — LAMOTRIGINE 25 MG: 25 TABLET ORAL at 21:08

## 2023-12-31 RX ADMIN — INSULIN GLARGINE 6 UNITS: 100 INJECTION, SOLUTION SUBCUTANEOUS at 21:08

## 2023-12-31 RX ADMIN — BENZTROPINE 1 MG: 1 TABLET ORAL at 10:10

## 2023-12-31 RX ADMIN — HALOPERIDOL 5 MG: 5 TABLET ORAL at 10:09

## 2023-12-31 RX ADMIN — SERTRALINE HYDROCHLORIDE 75 MG: 50 TABLET ORAL at 10:10

## 2023-12-31 RX ADMIN — LAMOTRIGINE 25 MG: 25 TABLET ORAL at 10:09

## 2023-12-31 RX ADMIN — BENZTROPINE 1 MG: 1 TABLET ORAL at 21:10

## 2023-12-31 RX ADMIN — TRAZODONE HYDROCHLORIDE 50 MG: 50 TABLET ORAL at 21:08

## 2023-12-31 RX ADMIN — HALOPERIDOL 5 MG: 5 TABLET ORAL at 21:08

## 2023-12-31 RX ADMIN — INSULIN LISPRO 1 UNITS: 100 INJECTION, SOLUTION INTRAVENOUS; SUBCUTANEOUS at 12:20

## 2023-12-31 ASSESSMENT — PAIN - FUNCTIONAL ASSESSMENT: PAIN_FUNCTIONAL_ASSESSMENT: NONE - DENIES PAIN

## 2023-12-31 NOTE — PLAN OF CARE
Problem: Depression  Goal: Will be euthymic at discharge  Description: INTERVENTIONS:  1. Administer medication as ordered  2. Provide emotional support via 1:1 interaction with staff  3. Encourage involvement in milieu/groups/activities  4. Monitor for social isolation  Outcome: Progressing  Note: Pt participated in treatment team. Out on the unit for small periods of time. No complaints of SI/HI at this time. Mood has improved and feeling less depressed.

## 2023-12-31 NOTE — PLAN OF CARE
Problem: Safety - Adult  Goal: Free from fall injury  12/31/2023 0127 by Hannah Syed LPN  Outcome: Progressing     Pt received resting in bed with eyes closed. Breathing is even and unlabored. Walkways are free and clear from clutter.

## 2024-01-01 LAB
GLUCOSE BLD STRIP.AUTO-MCNC: 113 MG/DL (ref 65–117)
GLUCOSE BLD STRIP.AUTO-MCNC: 171 MG/DL (ref 65–117)
GLUCOSE BLD STRIP.AUTO-MCNC: 184 MG/DL (ref 65–117)
GLUCOSE BLD STRIP.AUTO-MCNC: 196 MG/DL (ref 65–117)
SERVICE CMNT-IMP: ABNORMAL
SERVICE CMNT-IMP: NORMAL

## 2024-01-01 PROCEDURE — 6370000000 HC RX 637 (ALT 250 FOR IP): Performed by: NURSE PRACTITIONER

## 2024-01-01 PROCEDURE — 6370000000 HC RX 637 (ALT 250 FOR IP): Performed by: STUDENT IN AN ORGANIZED HEALTH CARE EDUCATION/TRAINING PROGRAM

## 2024-01-01 PROCEDURE — 6370000000 HC RX 637 (ALT 250 FOR IP): Performed by: CLINICAL NURSE SPECIALIST

## 2024-01-01 PROCEDURE — 1240000000 HC EMOTIONAL WELLNESS R&B

## 2024-01-01 PROCEDURE — 82962 GLUCOSE BLOOD TEST: CPT

## 2024-01-01 RX ADMIN — LAMOTRIGINE 25 MG: 25 TABLET ORAL at 10:21

## 2024-01-01 RX ADMIN — TRAZODONE HYDROCHLORIDE 50 MG: 50 TABLET ORAL at 20:51

## 2024-01-01 RX ADMIN — BENZTROPINE 1 MG: 1 TABLET ORAL at 20:51

## 2024-01-01 RX ADMIN — SERTRALINE HYDROCHLORIDE 75 MG: 50 TABLET ORAL at 10:20

## 2024-01-01 RX ADMIN — LAMOTRIGINE 25 MG: 25 TABLET ORAL at 20:51

## 2024-01-01 RX ADMIN — INSULIN GLARGINE 6 UNITS: 100 INJECTION, SOLUTION SUBCUTANEOUS at 20:51

## 2024-01-01 RX ADMIN — HALOPERIDOL 5 MG: 5 TABLET ORAL at 20:51

## 2024-01-01 RX ADMIN — BENZTROPINE 1 MG: 1 TABLET ORAL at 10:20

## 2024-01-01 RX ADMIN — HALOPERIDOL 5 MG: 5 TABLET ORAL at 10:21

## 2024-01-01 ASSESSMENT — PAIN SCALES - GENERAL: PAINLEVEL_OUTOF10: 0

## 2024-01-01 NOTE — PLAN OF CARE
Problem: Depression  Goal: Will be euthymic at discharge  Description: INTERVENTIONS:  1. Administer medication as ordered  2. Provide emotional support via 1:1 interaction with staff  3. Encourage involvement in milieu/groups/activities  4. Monitor for social isolation  Outcome: Progressing  Note: Out on unit passively engaged, mood and affect stable appearing sad to slightly withdrawn to self. Denies SI, no self harming behaviors. Daily goal is to work on being present and discuss d/c plans. Tomorrow with discuss with  d/c planning

## 2024-01-01 NOTE — PLAN OF CARE
Problem: Safety - Adult  Goal: Free from fall injury  Outcome: Progressing     Problem: Depression  Goal: Will be euthymic at discharge  Description: INTERVENTIONS:  1. Administer medication as ordered  2. Provide emotional support via 1:1 interaction with staff  3. Encourage involvement in milieu/groups/activities  4. Monitor for social isolation  1/1/2024 1613 by Kimberly Moyer, RN  Outcome: Progressing

## 2024-01-01 NOTE — PLAN OF CARE
Problem: Safety - Adult  Goal: Free from fall injury  Outcome: Progressing   Received pt in bed, appears to be sleeping. Respirations even and unlabored, no respiratory distress noted. Walker by bedside within reach. Q15 min safety checks in place.

## 2024-01-02 LAB
GLUCOSE BLD STRIP.AUTO-MCNC: 144 MG/DL (ref 65–117)
GLUCOSE BLD STRIP.AUTO-MCNC: 172 MG/DL (ref 65–117)
GLUCOSE BLD STRIP.AUTO-MCNC: 205 MG/DL (ref 65–117)
GLUCOSE BLD STRIP.AUTO-MCNC: 95 MG/DL (ref 65–117)
SERVICE CMNT-IMP: ABNORMAL
SERVICE CMNT-IMP: NORMAL

## 2024-01-02 PROCEDURE — 6370000000 HC RX 637 (ALT 250 FOR IP): Performed by: NURSE PRACTITIONER

## 2024-01-02 PROCEDURE — 6370000000 HC RX 637 (ALT 250 FOR IP): Performed by: STUDENT IN AN ORGANIZED HEALTH CARE EDUCATION/TRAINING PROGRAM

## 2024-01-02 PROCEDURE — 82962 GLUCOSE BLOOD TEST: CPT

## 2024-01-02 PROCEDURE — 1240000000 HC EMOTIONAL WELLNESS R&B

## 2024-01-02 PROCEDURE — 6370000000 HC RX 637 (ALT 250 FOR IP): Performed by: CLINICAL NURSE SPECIALIST

## 2024-01-02 PROCEDURE — 6370000000 HC RX 637 (ALT 250 FOR IP): Performed by: PSYCHIATRY & NEUROLOGY

## 2024-01-02 RX ADMIN — HALOPERIDOL 5 MG: 5 TABLET ORAL at 21:03

## 2024-01-02 RX ADMIN — SERTRALINE HYDROCHLORIDE 75 MG: 50 TABLET ORAL at 10:03

## 2024-01-02 RX ADMIN — BENZTROPINE 1 MG: 1 TABLET ORAL at 21:03

## 2024-01-02 RX ADMIN — LAMOTRIGINE 25 MG: 25 TABLET ORAL at 21:03

## 2024-01-02 RX ADMIN — SENNOSIDES 8.6 MG: 8.6 TABLET, FILM COATED ORAL at 13:08

## 2024-01-02 RX ADMIN — INSULIN LISPRO 1 UNITS: 100 INJECTION, SOLUTION INTRAVENOUS; SUBCUTANEOUS at 12:50

## 2024-01-02 RX ADMIN — ACETAMINOPHEN 650 MG: 325 TABLET ORAL at 12:53

## 2024-01-02 RX ADMIN — INSULIN GLARGINE 6 UNITS: 100 INJECTION, SOLUTION SUBCUTANEOUS at 21:03

## 2024-01-02 RX ADMIN — HALOPERIDOL 5 MG: 5 TABLET ORAL at 10:04

## 2024-01-02 RX ADMIN — BENZTROPINE 1 MG: 1 TABLET ORAL at 10:04

## 2024-01-02 RX ADMIN — TRAZODONE HYDROCHLORIDE 50 MG: 50 TABLET ORAL at 21:03

## 2024-01-02 RX ADMIN — LAMOTRIGINE 25 MG: 25 TABLET ORAL at 10:04

## 2024-01-02 ASSESSMENT — PAIN DESCRIPTION - DESCRIPTORS: DESCRIPTORS: ACHING

## 2024-01-02 ASSESSMENT — PAIN - FUNCTIONAL ASSESSMENT: PAIN_FUNCTIONAL_ASSESSMENT: PREVENTS OR INTERFERES SOME ACTIVE ACTIVITIES AND ADLS

## 2024-01-02 ASSESSMENT — PAIN SCALES - GENERAL
PAINLEVEL_OUTOF10: 8
PAINLEVEL_OUTOF10: 5

## 2024-01-02 ASSESSMENT — PAIN DESCRIPTION - ORIENTATION: ORIENTATION: ANTERIOR

## 2024-01-02 ASSESSMENT — PAIN DESCRIPTION - LOCATION: LOCATION: HEAD

## 2024-01-02 NOTE — DIABETES MGMT
[facility]  [facility] HEALTH  DIABETES MANAGEMENT CONSULT    FOLLOW UP AND DISCHARGE RECOMMENDATIONS    Consulted by Provider for advanced nursing evaluation and care for inpatient blood glucose management.    Evaluation and Action Plan   Geovanna Ojeda is a 58 y.o. AA female who is voluntarily admitted to the [facility] for suicidal ideation and auditory hallucinations.  She has had many past psychiatric admissions for the same with suicide attempts.  POC random BG >200 in setting of established diabetes per PMH. A1C ordered and pending. Past A1Cs chronically elevated, last 12.7% 7/22.  PTA diabetes medications not listed or patient not taking anything currently.  Per H&P, is currently homeless for 9mo.      She has been living with T2D since 2019. Patient has been blind \"all my life\".  Endorses she takes insulin PTA, could not verbalize what kind or how much.  Verbalizes decreased appetite in recent months- Low dose basal insulin was started with excellent glycemic response.    1/2: A1C 12.9%.  BG trends in target on low dose basal insulin once daily.  Overall mental health improving. Given homelessness would require simple regimen to control diabetes.      Diabetes Management Team to sign off at this point as patient's blood glucose remains stable.  Please re-consult us if patient needs change.  Thank you for including us in their care.        Management Rationale Action Plan   Medication   Basal needs Using 0.2 units/kg/D based on BMI Reduced Lantus to 6 units QHS.    Reduce by 2 units daily for fasting BG under 140mg/dl   Nutritional needs NA    Corrective insulin Using MEDIUM sensitivity  ACHS   Additional orders  Continue carb consistent diet       Diabetes Discharge Plan   Medication  Low dose basal insulin at hospital dose  May need new glucometer and supplies          Initial Presentation   Geovanna Ojeda is a 58 y.o. female admitted to [facility] voluntarily after experiencing SI/AH with voices

## 2024-01-02 NOTE — INTERDISCIPLINARY ROUNDS
Interdisciplinary Rounds Note         Patient goal(s) for today: Communicate needs to staff   Treatment team focus/goals: Assess pt, manage medications, discharge planning   Progress note: Patient met with treatment team and appeared with a fair mood and affect. Patient denies SI/HI and AHVH at this time, denies side effects from medications. Patient is to discharge to [facility] pending bed availability.    1450: [facility] and they are awaiting insurance approval prior to pre-screening, stating patient plan may have switched to [facility] Plan. Awaiting confirmation at this time.    LOS:  6  Expected LOS: 7-8    Financial concerns/prescription coverage:  Insured  Family contact: daughter  Family requesting physician contact today:  No  Discharge plan: Discharge to [facility]SU  Guns in the home: None     Outpatient provider(s): None at this time    Participating treatment team members: Geovanna Ojeda, RAYMOND Marmolejo, Hannah PAEZ RN, Natali Finney, NP

## 2024-01-02 NOTE — PLAN OF CARE
Problem: Safety - Adult  Goal: Free from fall injury  1/2/2024 0022 by Hannah Syed LPN  Outcome: Progressing     Pt received resting in bed with eyes closed. Breathing is even and unlabored. Walkways are free and clear from clutter.

## 2024-01-02 NOTE — PLAN OF CARE
Problem: Depression  Goal: Will be euthymic at discharge  Description: INTERVENTIONS:  1. Administer medication as ordered  2. Provide emotional support via 1:1 interaction with staff  3. Encourage involvement in milieu/groups/activities  4. Monitor for social isolation  Outcome: Progressing  Note: Out on unit engaged w a brighter affect, mood stable, daily goal is to increase physical activity. Meeting goal this am by walking hallway for 10 minutes session completing three sessions. Denies SI, no self harming behaviors, no plan, no intent. Staff focus is on offering support

## 2024-01-02 NOTE — PLAN OF CARE
Problem: Pain  Goal: Verbalizes/displays adequate comfort level or baseline comfort level  Outcome: Progressing     Problem: Depression  Goal: Will be euthymic at discharge  Description: INTERVENTIONS:  1. Administer medication as ordered  2. Provide emotional support via 1:1 interaction with staff  3. Encourage involvement in milieu/groups/activities  4. Monitor for social isolation  1/2/2024 1634 by Jaz Quinteros RN  Outcome: Progressing

## 2024-01-02 NOTE — PROGRESS NOTES
SPIRITUALITY GROUP      Meeting Topic: Hope    Meeting Time:  45-60 minutes    Meeting Goal: Patients will define hope, reflect on hope versus curtis, and practice building hope through relationships, goals, coping and spirituality.    Geovanna Ojeda attended and participated in the group appropriately respective of current diagnosis.        For additional spiritual care, please contact the  on-call at ([phone]).    Laya Pope MDiv, MS, [facility]  Staff

## 2024-01-03 LAB
GLUCOSE BLD STRIP.AUTO-MCNC: 103 MG/DL (ref 65–117)
GLUCOSE BLD STRIP.AUTO-MCNC: 111 MG/DL (ref 65–117)
GLUCOSE BLD STRIP.AUTO-MCNC: 157 MG/DL (ref 65–117)
GLUCOSE BLD STRIP.AUTO-MCNC: 180 MG/DL (ref 65–117)
SERVICE CMNT-IMP: ABNORMAL
SERVICE CMNT-IMP: ABNORMAL
SERVICE CMNT-IMP: NORMAL
SERVICE CMNT-IMP: NORMAL

## 2024-01-03 PROCEDURE — 6370000000 HC RX 637 (ALT 250 FOR IP): Performed by: STUDENT IN AN ORGANIZED HEALTH CARE EDUCATION/TRAINING PROGRAM

## 2024-01-03 PROCEDURE — 6370000000 HC RX 637 (ALT 250 FOR IP): Performed by: PSYCHIATRY & NEUROLOGY

## 2024-01-03 PROCEDURE — 1240000000 HC EMOTIONAL WELLNESS R&B

## 2024-01-03 PROCEDURE — 82962 GLUCOSE BLOOD TEST: CPT

## 2024-01-03 PROCEDURE — 6370000000 HC RX 637 (ALT 250 FOR IP): Performed by: NURSE PRACTITIONER

## 2024-01-03 PROCEDURE — 6370000000 HC RX 637 (ALT 250 FOR IP): Performed by: CLINICAL NURSE SPECIALIST

## 2024-01-03 RX ADMIN — HALOPERIDOL 5 MG: 5 TABLET ORAL at 09:55

## 2024-01-03 RX ADMIN — BENZTROPINE 1 MG: 1 TABLET ORAL at 21:37

## 2024-01-03 RX ADMIN — LAMOTRIGINE 25 MG: 25 TABLET ORAL at 09:56

## 2024-01-03 RX ADMIN — INSULIN GLARGINE 6 UNITS: 100 INJECTION, SOLUTION SUBCUTANEOUS at 21:37

## 2024-01-03 RX ADMIN — TRAZODONE HYDROCHLORIDE 50 MG: 50 TABLET ORAL at 21:37

## 2024-01-03 RX ADMIN — LAMOTRIGINE 25 MG: 25 TABLET ORAL at 21:38

## 2024-01-03 RX ADMIN — SENNOSIDES 8.6 MG: 8.6 TABLET, FILM COATED ORAL at 14:14

## 2024-01-03 RX ADMIN — BENZTROPINE 1 MG: 1 TABLET ORAL at 09:56

## 2024-01-03 RX ADMIN — SERTRALINE HYDROCHLORIDE 75 MG: 50 TABLET ORAL at 09:56

## 2024-01-03 RX ADMIN — HALOPERIDOL 5 MG: 5 TABLET ORAL at 21:36

## 2024-01-03 ASSESSMENT — PAIN - FUNCTIONAL ASSESSMENT: PAIN_FUNCTIONAL_ASSESSMENT: NONE - DENIES PAIN

## 2024-01-03 NOTE — PLAN OF CARE
Problem: Safety - Adult  Goal: Free from fall injury  Outcome: Progressing   Will continue safety checks as per policy  1530- no voiced complaints or acute distress at present  Pleasant and cooperative.  Ambulates with walker,gait steady

## 2024-01-03 NOTE — INTERDISCIPLINARY ROUNDS
Interdisciplinary Rounds Note         Patient goal(s) for today: Communicate needs to staff   Treatment team focus/goals: Assess pt, manage medications, discharge planning   Progress note: Patient met with treatment team and appeared with a fair mood and affect. SW spoke with CSU, they are experiencing some difficulties with insurance authorization due to [employer] vs [employer] plan differences. CSU plans to assess this afternoon by phone and can accept on Friday for in-home residential CSU.    LOS:  7  Expected LOS: 9    Participating treatment team members: Geovanna Ojeda, RAYMOND Marmolejo, Delphine MAYORGA, GISELL, Natali Finney, EUNICE, Laurie VenegasD

## 2024-01-03 NOTE — PLAN OF CARE
Problem: Safety - Adult  Goal: Free from fall injury  Outcome: Progressing     Pt received resting in bed with eyes closed. Breathing is even and unlabored. Walkways are free and clear from clutter.    [facility]  Post op call note    Bryant Mckenzie was called for an update regarding his recovery.  He underwent an excision of epidermal inclusion cyst on 4/17 with Dr. Murray. Pathology confirmed this diagnosis. Today he tells me he is doing well and denies any complaints, in fact, never had pain after surgery and never took any medications.  He is eating a normal diet and his bowels are regular.   The patient states he has resumed normal activity.  He states his wounds are healing well and the steri strips are on, so will work in the shower on taking them off soon and will call if any wound concerns. He denies any erythema or drainage at his wounds.  The patient denies fever/chills, n/v/d, abdominal pain, changes in urination or BM, or wound concerns.     He was instructed to remove steri strips and continue to keep wounds clean.  The patient states all of his questions were answered and he understands our discussion.  He agrees to follow up as needed and to call our office with any concerns.    Jimmie Vaz MD  General Surgery Resident  Pager [phone]

## 2024-01-04 LAB
GLUCOSE BLD STRIP.AUTO-MCNC: 115 MG/DL (ref 65–117)
GLUCOSE BLD STRIP.AUTO-MCNC: 123 MG/DL (ref 65–117)
GLUCOSE BLD STRIP.AUTO-MCNC: 147 MG/DL (ref 65–117)
GLUCOSE BLD STRIP.AUTO-MCNC: 93 MG/DL (ref 65–117)
SERVICE CMNT-IMP: ABNORMAL
SERVICE CMNT-IMP: ABNORMAL
SERVICE CMNT-IMP: NORMAL
SERVICE CMNT-IMP: NORMAL

## 2024-01-04 PROCEDURE — 1240000000 HC EMOTIONAL WELLNESS R&B

## 2024-01-04 PROCEDURE — 6370000000 HC RX 637 (ALT 250 FOR IP): Performed by: CLINICAL NURSE SPECIALIST

## 2024-01-04 PROCEDURE — 6370000000 HC RX 637 (ALT 250 FOR IP): Performed by: STUDENT IN AN ORGANIZED HEALTH CARE EDUCATION/TRAINING PROGRAM

## 2024-01-04 PROCEDURE — 82962 GLUCOSE BLOOD TEST: CPT

## 2024-01-04 PROCEDURE — 6370000000 HC RX 637 (ALT 250 FOR IP): Performed by: NURSE PRACTITIONER

## 2024-01-04 RX ADMIN — INSULIN GLARGINE 6 UNITS: 100 INJECTION, SOLUTION SUBCUTANEOUS at 21:52

## 2024-01-04 RX ADMIN — LAMOTRIGINE 25 MG: 25 TABLET ORAL at 21:52

## 2024-01-04 RX ADMIN — LAMOTRIGINE 25 MG: 25 TABLET ORAL at 08:40

## 2024-01-04 RX ADMIN — HALOPERIDOL 5 MG: 5 TABLET ORAL at 08:40

## 2024-01-04 RX ADMIN — BENZTROPINE 1 MG: 1 TABLET ORAL at 08:40

## 2024-01-04 RX ADMIN — HALOPERIDOL 5 MG: 5 TABLET ORAL at 21:52

## 2024-01-04 RX ADMIN — SERTRALINE HYDROCHLORIDE 75 MG: 50 TABLET ORAL at 08:40

## 2024-01-04 RX ADMIN — TRAZODONE HYDROCHLORIDE 50 MG: 50 TABLET ORAL at 21:52

## 2024-01-04 RX ADMIN — BENZTROPINE 1 MG: 1 TABLET ORAL at 21:52

## 2024-01-04 ASSESSMENT — PAIN - FUNCTIONAL ASSESSMENT: PAIN_FUNCTIONAL_ASSESSMENT: NONE - DENIES PAIN

## 2024-01-04 NOTE — PLAN OF CARE
Problem: Depression  Goal: Will be euthymic at discharge  Description: INTERVENTIONS:  1. Administer medication as ordered  2. Provide emotional support via 1:1 interaction with staff  3. Encourage involvement in milieu/groups/activities  4. Monitor for social isolation  Outcome: Progressing  Note: Out on unit brighter affect, mood stable, hopeful and anticipating d/c plans to CSU on Friday. Pt verbalized understanding of dc plans. Staff focus is on offering support

## 2024-01-04 NOTE — INTERDISCIPLINARY ROUNDS
Behavioral Health Interdisciplinary Rounds     Patient Name: Geovanna Ojeda  Age: 58 y.o.  Room/Bed:  [facility]  Primary Diagnosis: Schizoaffective disorder (HCC)   Admission Status: Voluntary    Readmission within 30 days: No  Power of  in place: No  Patient requires a blocked bed: No          Reason for blocked bed: n/a  Sleep hours: 6+ hours       Participation in Care/Groups:  No  Medication Compliant?: Yes  PRNS (last 24 hours): None   Restraints (last 24 hours):  No  __________________________________________________  OQ Admission Analysis Survey completed:  OQ Admission Analysis Survey score:  __________________________________________________     Alcohol screening (AUDIT) completed -     If applicable, date SBIRT discussed in treatment team AND documented:    Tobacco - patient is a smoker:    Illegal Drugs use:      24 hour chart check complete: Yes    _______________________________________________    Patient goal(s) for today:   Treatment team focus/goals:   Progress note: Patient met with treatment team and appeared with a fair mood and affect. Patient denies SI/HI and AHVH at this time, patient to complete phone assessment with [facility] this afternoon for admission tomorrow.    LOS:  8  Expected LOS: 9    Participating treatment team members: Geovanna Ojeda, Svitlana Ballesteros, MSW, Hannah PAEZ RN, Natali Finney NP, Laurie VenegasD

## 2024-01-04 NOTE — PLAN OF CARE
Problem: Safety - Adult  Goal: Free from fall injury  1/4/2024 0111 by Marisol Zelaya, RN  Outcome: Progressing     Pt appears to be resting in bed in no apparent distress, respirations even and unlabored. No voiced concerns. Walker within reach if needed. Bed in lowest position with wheels locked, pathway free of clutter. Q15m rounds for safety continued per provider order.

## 2024-01-04 NOTE — PLAN OF CARE
Problem: Safety - Adult  Goal: Free from fall injury  Outcome: Progressing   Will continue safety checks as per policy

## 2024-01-05 VITALS
DIASTOLIC BLOOD PRESSURE: 79 MMHG | TEMPERATURE: 97.7 F | HEART RATE: 92 BPM | HEIGHT: 60 IN | WEIGHT: 111.9 LBS | RESPIRATION RATE: 20 BRPM | BODY MASS INDEX: 21.97 KG/M2 | OXYGEN SATURATION: 98 % | SYSTOLIC BLOOD PRESSURE: 115 MMHG

## 2024-01-05 LAB
GLUCOSE BLD STRIP.AUTO-MCNC: 132 MG/DL (ref 65–117)
GLUCOSE BLD STRIP.AUTO-MCNC: 90 MG/DL (ref 65–117)
SERVICE CMNT-IMP: ABNORMAL
SERVICE CMNT-IMP: NORMAL

## 2024-01-05 PROCEDURE — 82962 GLUCOSE BLOOD TEST: CPT

## 2024-01-05 PROCEDURE — 6370000000 HC RX 637 (ALT 250 FOR IP): Performed by: STUDENT IN AN ORGANIZED HEALTH CARE EDUCATION/TRAINING PROGRAM

## 2024-01-05 PROCEDURE — 6370000000 HC RX 637 (ALT 250 FOR IP): Performed by: NURSE PRACTITIONER

## 2024-01-05 RX ORDER — SERTRALINE HYDROCHLORIDE 100 MG/1
100 TABLET, FILM COATED ORAL DAILY
Qty: 30 TABLET | Refills: 0 | Status: SHIPPED | OUTPATIENT
Start: 2024-01-06

## 2024-01-05 RX ORDER — INSULIN GLARGINE 100 [IU]/ML
6 INJECTION, SOLUTION SUBCUTANEOUS DAILY
Qty: 5 ADJUSTABLE DOSE PRE-FILLED PEN SYRINGE | Refills: 0 | Status: SHIPPED | OUTPATIENT
Start: 2024-01-05

## 2024-01-05 RX ORDER — DIPHENHYDRAMINE HYDROCHLORIDE 25 MG/1
CAPSULE, LIQUID FILLED ORAL
Qty: 1 KIT | Refills: 0 | Status: SHIPPED | OUTPATIENT
Start: 2024-01-05

## 2024-01-05 RX ORDER — GLUCOSAMINE HCL/CHONDROITIN SU 500-400 MG
CAPSULE ORAL
Qty: 500 STRIP | Refills: 0 | Status: SHIPPED | OUTPATIENT
Start: 2024-01-05

## 2024-01-05 RX ADMIN — HALOPERIDOL 5 MG: 5 TABLET ORAL at 08:37

## 2024-01-05 RX ADMIN — SERTRALINE HYDROCHLORIDE 75 MG: 50 TABLET ORAL at 08:37

## 2024-01-05 RX ADMIN — BENZTROPINE 1 MG: 1 TABLET ORAL at 08:37

## 2024-01-05 RX ADMIN — LAMOTRIGINE 25 MG: 25 TABLET ORAL at 08:37

## 2024-01-05 ASSESSMENT — PAIN SCALES - GENERAL: PAINLEVEL_OUTOF10: 0

## 2024-01-05 NOTE — PLAN OF CARE
Problem: Discharge Planning  Goal: Discharge to home or other facility with appropriate resources  Outcome: Progressing  Flowsheets (Taken 1/5/2024 1030)  Discharge to home or other facility with appropriate resources:   Identify barriers to discharge with patient and caregiver   Refer to discharge planning if patient needs post-hospital services based on physician order or complex needs related to functional status, cognitive ability or social support system   Identify discharge learning needs (meds, wound care, etc)   Arrange for needed discharge resources and transportation as appropriate

## 2024-01-05 NOTE — INTERDISCIPLINARY ROUNDS
Behavioral Health Interdisciplinary Rounds     Patient Name: Geovanna Ojeda  Age: 58 y.o.  Room/Bed:  [facility]  Primary Diagnosis: Schizoaffective disorder (HCC)   Admission Status: Voluntary    Readmission within 30 days: No  Power of  in place: No  Patient requires a blocked bed: No          Reason for blocked bed:   Sleep hours:        Participation in Care/Groups:  Yes  Medication Compliant?: Yes  PRNS (last 24 hours): None   Restraints (last 24 hours):  No  __________________________________________________  OQ Admission Analysis Survey completed:  OQ Admission Analysis Survey score:  __________________________________________________     Alcohol screening (AUDIT) completed -     If applicable, date SBIRT discussed in treatment team AND documented:    Tobacco - patient is a smoker:    Illegal Drugs use:      24 hour chart check complete: No    _______________________________________________    Patient goal(s) for today:   Treatment team focus/goals:   Progress note: Patient met with treatment team and appeared with a fair mood and affect. Patient denies SI/HI and AHVH at this time, patient denies safety concerns at this time. Patient to discharge to [facility] this afternoon, medications filled through [facility] 30 day supply provided. SW to contact daughter regarding discharge plans.    1330: SW left message for daughter Alesha with name of CSU, phone number and information regarding next medication appointment, per patient request.     LOS:  9  Expected LOS: 9    Participating treatment team members: Geovanna Ojeda, Svitlana Ballesteros MSW, Hannah PAEZ RN, Natali Finney, EUNICE, Laurie VenegasD
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
## 2024-01-05 NOTE — DISCHARGE INSTRUCTIONS
DISCHARGE SUMMARY    NAME:Geovanna Ojeda  : 1965  MRN: [id number]    The patient Geovanna Ojeda exhibits the ability to control behavior in a less restrictive environment.  Patient's level of functioning is improving.  No assaultive/destructive behavior has been observed for the past 24 hours.  No suicidal/homicidal threat or behavior has been observed for the past 24 hours.  There is no evidence of serious medication side effects.  Patient has not been in physical or protective restraints for at least the past 24 hours.    If weapons involved, how are they secured? None    Is patient aware of and in agreement with discharge plan? Yes    Arrangements for medication:  Prescriptions filled through [facility], 30 day supply provided    Copy of discharge instructions to provider?:  Yes    Arrangements for transportation home: [facility]     Keep all follow up appointments as scheduled, continue to take prescribed medications per physician instructions.  Mental health crisis number:  911 or your local mental health crisis line number at [facility] at [phone]      Mental Health Emergency WARM LINE      [phone]-MH ([phone])      M-F: 9am to 9pm      Sat & Sun: 5pm - 9pm  National suicide prevention lines:                             5-158-EAELIDY (1-879.430.4936)       2-979-618-TALK (1-543.332.8476)    Crisis Text Line:  Text HOME to 747536        DISCHARGE SUMMARY from Nurse    PATIENT INSTRUCTIONS:      What to do at Home:  Recommended activity: activity as tolerated,     If you experience any of the following symptoms:feeling unsafe with your anxiety level - talk with staff at [facility], Kelli Rm NP.     *  Please give a list of your current medications to your Primary Care Provider.    *  Please update this list whenever your medications are discontinued, doses are      changed, or new medications (including over-the-counter products) are added.    *  Please carry medication

## 2024-01-05 NOTE — PROGRESS NOTES
Pharmacist Discharge Medication Reconciliation    Discharging Provider: Natali Finney NP    Significant PMH:   Past Medical History:   Diagnosis Date    Bipolar 1 disorder ([facility])     Diabetes ([facility])     Hypertension     Psychiatric disorder     Schizophrenia ([facility])      Chief Complaint for this Admission:   Chief Complaint   Patient presents with    Suicidal     Allergies: Lisinopril, Metformin, Morphine, and Aspirin    Discharge Medications:      Medication List        START taking these medications      Blood Glucose Monitor System w/Device Kit  Pharmacist to identify preferred meter and strips.     blood glucose test strips  Test 3 times a day BEFORE MEALS & and BEDTIME as needed for symptoms of irregular blood glucose. Dispense sufficient amount for indicated testing frequency plus additional to accommodate PRN testing needs. Pharmacist to identify preferred brand.     Insulin Pen Needle 31G X 4 MM Misc  Place a new needle with each insulin injection    Pharmacist to identify & substitute with preferred needle for insulin pen device.            CHANGE how you take these medications      lamoTRIgine 25 MG tablet  Commonly known as: LAMICTAL  What changed: Another medication with the same name was removed. Continue taking this medication, and follow the directions you see here.     Lancets 30G Misc  Test 3 times a day & as needed for symptoms of irregular blood glucose. Dispense sufficient amount for indicated testing frequency plus additional to accommodate PRN testing needs. Pharmacist to identify preferred brand.  What changed: See the new instructions.     Lantus SoloStar 100 UNIT/ML injection pen  Generic drug: insulin glargine  Inject 6 Units into the skin daily  What changed:   how much to take  when to take this     sertraline 100 MG tablet  Commonly known as: ZOLOFT  Take 1 tablet by mouth daily  Start taking on: January 6, 2024  What changed:   medication strength  how much to take            CONTINUE

## 2024-01-05 NOTE — DIABETES MGMT
[facility] FOR DIABETES HEALTH  DIABETES MANAGEMENT CONSULT    FOLLOW UP AND DISCHARGE RECOMMENDATIONS    Consulted by Provider for advanced nursing evaluation and care for inpatient blood glucose management.    Evaluation and Action Plan   Geovanna Ojeda is a 58 y.o. AA female who is voluntarily [facility] for suicidal ideation and auditory hallucinations.  She has had many past psychiatric admissions for the same with suicide attempts.  POC random BG >200 in setting of established diabetes per PMH. A1C ordered and pending. Past A1Cs chronically elevated, last 12.7% 7/22.  PTA diabetes medications not listed or patient not taking anything currently.  Per H&P, is currently homeless for 9mo.  Updated A1C 12.7%    She has been living with T2D since 2019. Patient has been blind \"all my life\".  Endorses she takes insulin PTA, could not verbalize what kind or how much.  Verbalizes decreased appetite in recent months- Low dose basal insulin was started with excellent glycemic response.    1/5: Patient discharging today to a crisis stabilization unit.  Diabetes mgmt re-consulted for recs on insulin dosing and ensuring proper glucometer supplies were in place- Instructed patient on how to count the clicks with the pen as she is blind, patient performed successful return demonstration.        Management Rationale Action Plan   Medication   Basal needs Using 0.2 units/kg/D based on BMI Reduced Lantus to 6 units QHS.    Reduce by 2 units daily for fasting BG under 140mg/dl   Nutritional needs NA    Corrective insulin Using MEDIUM sensitivity  ACHS   Additional orders  Continue carb consistent diet       Diabetes Discharge Plan   Medication-all sent to [facility] pharmacy -  Script for Lantus PEN 6 units daily   Script for Pen Needle, Diabetic 32 Gauge x 5/32\" (1 box)   Script for [employer] voice activated glucometer and supplies          Initial Presentation   Geovanna Ojeda is a 58 y.o. female admitted

## 2024-01-05 NOTE — DISCHARGE SUMMARY
DISCHARGE SUMMARY  Some parts of the discharge summary are from the initial Psychiatric interview that was done on admission by the admitting psychiatrist.     Name: Geovanna Ojeda  MR#: [id number]  Account#: [id number]  : 1965  Date of Admission: 2023    Date of Discharge: 2024     TYPE OF DISCHARGE:   REGULAR     INITIAL PSYCHIATRIC INTERVIEW:   CHIEF COMPLAINT: \"I'll do it, but I won't tell you where, when, or how.\"      HISTORY OF PRESENTING COMPLAINT:  This is a 58 y.o. female who is currently admitted to the geriatric psychiatric floor at [facility] on a voluntary basis for suicidal ideation as well as auditory hallucinations of voices telling her to harm herself. She has identified homelessness as the biggest contributing factor to worsening mental health recently. She has been compliant with medications and takes Zoloft 50mg daily, Lamictal 25mg BID, Haldol 5mg BID, trazodone 50mg QHS, and Cogentin 1mg BID. The pt reports she's been hearing voices of people talking, telling her to harm herself or others. This is not new, rather has been going on for some time. She endorses suicidal ideation, states \"I'll do it, but I'm not going to tell you where, when, or how.\" She states suicidal thoughts are chronic, and are not new, present at baseline. She had been living in a rooming house, but states she doesn't want to go back there. She feels homelessness is her biggest concern at this time. She feels her medications are \"sometimes\" helpful. She reports appetite is lower than usual, and she is worried that one of her medications is making her lose weight.      PAST PSYCHIATRIC HISTORY: The pt has a hx of schizoaffective disorder. She has a hx of multiple past psychiatric hospitalizations, more than she can count, most recently May of 2023 at [facility], as well as past suicide attempts, most recently in May of 2023, prior to that admission. The pt sees Jelly mR NP for outpatient

## 2024-01-05 NOTE — PLAN OF CARE
Problem: Safety - Adult  Goal: Free from fall injury  1/5/2024 0008 by Marilyn Tyler LPN  Outcome: Progressing   Pt in bed with eyes closed, appears to be sleeping. No respiratory distress noted, respirations even and unlabored.  Q15 min safety checks in place. Pathway is free of clutter.

## 2024-03-28 ENCOUNTER — OFFICE VISIT (OUTPATIENT)
Facility: CLINIC | Age: 59
End: 2024-03-28
Payer: MEDICAID

## 2024-03-28 VITALS
SYSTOLIC BLOOD PRESSURE: 138 MMHG | HEIGHT: 60 IN | OXYGEN SATURATION: 98 % | WEIGHT: 114.5 LBS | BODY MASS INDEX: 22.48 KG/M2 | RESPIRATION RATE: 18 BRPM | HEART RATE: 100 BPM | DIASTOLIC BLOOD PRESSURE: 88 MMHG | TEMPERATURE: 96.3 F

## 2024-03-28 DIAGNOSIS — Z12.31 ENCOUNTER FOR SCREENING MAMMOGRAM FOR BREAST CANCER: ICD-10-CM
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
## 2024-03-28 DIAGNOSIS — I15.2 HYPERTENSION COMPLICATING DIABETES (HCC): Primary | ICD-10-CM

## 2024-03-28 DIAGNOSIS — Z79.4 TYPE 2 DIABETES MELLITUS WITH HYPERGLYCEMIA, WITH LONG-TERM CURRENT USE OF INSULIN (HCC): ICD-10-CM

## 2024-03-28 DIAGNOSIS — F25.9 SCHIZOAFFECTIVE DISORDER, UNSPECIFIED TYPE (HCC): ICD-10-CM

## 2024-03-28 DIAGNOSIS — M25.511 CHRONIC RIGHT SHOULDER PAIN: ICD-10-CM

## 2024-03-28 DIAGNOSIS — Z11.59 SCREENING FOR VIRAL DISEASE: ICD-10-CM

## 2024-03-28 DIAGNOSIS — E11.59 HYPERTENSION COMPLICATING DIABETES (HCC): Primary | ICD-10-CM

## 2024-03-28 DIAGNOSIS — R41.3 MEMORY IMPAIRMENT: ICD-10-CM

## 2024-03-28 DIAGNOSIS — G89.29 CHRONIC RIGHT SHOULDER PAIN: ICD-10-CM

## 2024-03-28 DIAGNOSIS — E11.65 TYPE 2 DIABETES MELLITUS WITH HYPERGLYCEMIA, WITH LONG-TERM CURRENT USE OF INSULIN (HCC): ICD-10-CM

## 2024-03-28 DIAGNOSIS — H54.40 BLINDNESS OF LEFT EYE, UNSPECIFIED RIGHT EYE VISUAL IMPAIRMENT CATEGORY: ICD-10-CM

## 2024-03-28 PROCEDURE — 3079F DIAST BP 80-89 MM HG: CPT | Performed by: NURSE PRACTITIONER

## 2024-03-28 PROCEDURE — 3051F HG A1C>EQUAL 7.0%<8.0%: CPT | Performed by: NURSE PRACTITIONER

## 2024-03-28 PROCEDURE — 3075F SYST BP GE 130 - 139MM HG: CPT | Performed by: NURSE PRACTITIONER

## 2024-03-28 PROCEDURE — 99214 OFFICE O/P EST MOD 30 MIN: CPT | Performed by: NURSE PRACTITIONER

## 2024-03-28 RX ORDER — TIZANIDINE 4 MG/1
4 TABLET ORAL
Qty: 30 TABLET | Refills: 0 | Status: SHIPPED | OUTPATIENT
Start: 2024-03-28

## 2024-03-28 RX ORDER — DIPHENHYDRAMINE HYDROCHLORIDE 25 MG/1
CAPSULE, LIQUID FILLED ORAL
Qty: 1 KIT | Refills: 0 | Status: SHIPPED | OUTPATIENT
Start: 2024-03-28

## 2024-03-28 RX ORDER — INSULIN GLARGINE 100 [IU]/ML
6 INJECTION, SOLUTION SUBCUTANEOUS DAILY
Qty: 5 ADJUSTABLE DOSE PRE-FILLED PEN SYRINGE | Refills: 3 | Status: SHIPPED | OUTPATIENT
Start: 2024-03-28

## 2024-03-28 RX ORDER — GLUCOSAMINE HCL/CHONDROITIN SU 500-400 MG
CAPSULE ORAL
Qty: 500 STRIP | Refills: 0 | Status: SHIPPED | OUTPATIENT
Start: 2024-03-28

## 2024-03-28 RX ORDER — ACETAMINOPHEN 500 MG
500 TABLET ORAL 4 TIMES DAILY PRN
Qty: 90 TABLET | Refills: 0 | Status: SHIPPED | OUTPATIENT
Start: 2024-03-28

## 2024-03-28 SDOH — ECONOMIC STABILITY: FOOD INSECURITY: WITHIN THE PAST 12 MONTHS, THE FOOD YOU BOUGHT JUST DIDN'T LAST AND YOU DIDN'T HAVE MONEY TO GET MORE.: SOMETIMES TRUE

## 2024-03-28 SDOH — ECONOMIC STABILITY: HOUSING INSECURITY
IN THE LAST 12 MONTHS, WAS THERE A TIME WHEN YOU DID NOT HAVE A STEADY PLACE TO SLEEP OR SLEPT IN A SHELTER (INCLUDING NOW)?: NO

## 2024-03-28 SDOH — ECONOMIC STABILITY: FOOD INSECURITY: WITHIN THE PAST 12 MONTHS, YOU WORRIED THAT YOUR FOOD WOULD RUN OUT BEFORE YOU GOT MONEY TO BUY MORE.: SOMETIMES TRUE

## 2024-03-28 SDOH — ECONOMIC STABILITY: INCOME INSECURITY: HOW HARD IS IT FOR YOU TO PAY FOR THE VERY BASICS LIKE FOOD, HOUSING, MEDICAL CARE, AND HEATING?: NOT HARD AT ALL

## 2024-03-28 ASSESSMENT — COLUMBIA-SUICIDE SEVERITY RATING SCALE - C-SSRS
6. HAVE YOU EVER DONE ANYTHING, STARTED TO DO ANYTHING, OR PREPARED TO DO ANYTHING TO END YOUR LIFE?: NO
2. HAVE YOU ACTUALLY HAD ANY THOUGHTS OF KILLING YOURSELF?: NO
1. WITHIN THE PAST MONTH, HAVE YOU WISHED YOU WERE DEAD OR WISHED YOU COULD GO TO SLEEP AND NOT WAKE UP?: NO

## 2024-03-28 ASSESSMENT — PATIENT HEALTH QUESTIONNAIRE - PHQ9
SUM OF ALL RESPONSES TO PHQ QUESTIONS 1-9: 22
7. TROUBLE CONCENTRATING ON THINGS, SUCH AS READING THE NEWSPAPER OR WATCHING TELEVISION: NEARLY EVERY DAY
10. IF YOU CHECKED OFF ANY PROBLEMS, HOW DIFFICULT HAVE THESE PROBLEMS MADE IT FOR YOU TO DO YOUR WORK, TAKE CARE OF THINGS AT HOME, OR GET ALONG WITH OTHER PEOPLE: NOT DIFFICULT AT ALL
5. POOR APPETITE OR OVEREATING: NEARLY EVERY DAY
3. TROUBLE FALLING OR STAYING ASLEEP: NEARLY EVERY DAY
1. LITTLE INTEREST OR PLEASURE IN DOING THINGS: NEARLY EVERY DAY
SUM OF ALL RESPONSES TO PHQ QUESTIONS 1-9: 22
SUM OF ALL RESPONSES TO PHQ QUESTIONS 1-9: 22
2. FEELING DOWN, DEPRESSED OR HOPELESS: NEARLY EVERY DAY
SUM OF ALL RESPONSES TO PHQ9 QUESTIONS 1 & 2: 6
4. FEELING TIRED OR HAVING LITTLE ENERGY: NEARLY EVERY DAY
SUM OF ALL RESPONSES TO PHQ QUESTIONS 1-9: 22
8. MOVING OR SPEAKING SO SLOWLY THAT OTHER PEOPLE COULD HAVE NOTICED. OR THE OPPOSITE, BEING SO FIGETY OR RESTLESS THAT YOU HAVE BEEN MOVING AROUND A LOT MORE THAN USUAL: SEVERAL DAYS
6. FEELING BAD ABOUT YOURSELF - OR THAT YOU ARE A FAILURE OR HAVE LET YOURSELF OR YOUR FAMILY DOWN: NEARLY EVERY DAY
9. THOUGHTS THAT YOU WOULD BE BETTER OFF DEAD, OR OF HURTING YOURSELF: NOT AT ALL

## 2024-03-28 NOTE — PROGRESS NOTES
BP (!) 158/92 (Site: Left Upper Arm, Position: Sitting, Cuff Size: Medium Adult)   Pulse 100   Temp (!) 96.3 °F (35.7 °C) (Temporal)   Resp 18   Ht 1.524 m (5')   Wt 51.9 kg (114 lb 8 oz)   SpO2 98%   BMI 22.36 kg/m²   Chief Complaint   Patient presents with    Memory Loss    Fall     Pt states she fell down a couple flights of states 3 weeks ago, right hand and knee left shoulder and ankle pt states she went to [facility] ed 2 days letter     1. Have you been to the ER, urgent care clinic since your last visit?  Hospitalized since your last visit?No    2. Have you seen or consulted any other health care providers outside of the [facility] since your last visit?  Include any pap smears or colon screening. No  . Pt only remembered one word, unable to draw a clock she doesn't remember.    
Pharmacist to identify preferred brand.    tiZANidine (ZANAFLEX) 4 MG tablet Take 1 tablet by mouth nightly as needed (pain) May cause drowsiness    acetaminophen (TYLENOL) 500 MG tablet Take 1 tablet by mouth 4 times daily as needed for Pain    sertraline (ZOLOFT) 100 MG tablet Take 1 tablet by mouth daily    benztropine (COGENTIN) 1 MG tablet Take 1 tablet by mouth 2 times daily    haloperidol (HALDOL) 5 MG tablet Take 1 tablet by mouth 2 times daily    lamoTRIgine (LAMICTAL) 25 MG tablet Take 1 tablet by mouth 2 times daily    traZODone (DESYREL) 50 MG tablet Take 1 tablet by mouth nightly     No current facility-administered medications for this visit.       Review of Systems:   Constitutional:    Negative for fever and chills, negative diaphoresis.   HEENT:              Negative for neck pain and stiffness.  Eyes:                  Negative for visual disturbance, itching, redness or discharge.   Respiratory:        Negative for cough and shortness of breath.   Cardiovascular:  Negative for chest pain and palpitations.   Gastrointestinal: Negative for nausea, vomiting, abdominal pain, diarrhea or constipation.  Genitourinary:     Negative for dysuria and frequency.   Musculoskeletal: Negative for falls, tenderness and swelling.  Skin:                    Negative for rash, masses or lesions.   Neurological:       Negative for dizzyness, seizure, loss of consciousness, weakness and numbness.     Objective:     Vitals:    03/28/24 1602 03/28/24 1618   BP: (!) 158/92 138/88   Site: Left Upper Arm Right Upper Arm   Position: Sitting Sitting   Cuff Size: Medium Adult Medium Adult   Pulse: 100    Resp: 18    Temp: (!) 96.3 °F (35.7 °C)    TempSrc: Temporal    SpO2: 98%    Weight: 51.9 kg (114 lb 8 oz)    Height: 1.524 m (5')        No results found for this visit on 03/28/24.     Gen: Oriented to person, place and time and well-developed, well-nourished and in no distress.   HEENT:    Head: normocephalic and

## 2024-03-29 LAB
ALBUMIN SERPL-MCNC: 4 G/DL (ref 3.5–5)
ALBUMIN/GLOB SERPL: 1.2 (ref 1.1–2.2)
ALP SERPL-CCNC: 177 U/L (ref 45–117)
ALT SERPL-CCNC: 34 U/L (ref 12–78)
ANION GAP SERPL CALC-SCNC: 4 MMOL/L (ref 5–15)
AST SERPL-CCNC: 19 U/L (ref 15–37)
BILIRUB SERPL-MCNC: 0.3 MG/DL (ref 0.2–1)
BUN SERPL-MCNC: 7 MG/DL (ref 6–20)
BUN/CREAT SERPL: 9 (ref 12–20)
CALCIUM SERPL-MCNC: 10.4 MG/DL (ref 8.5–10.1)
CHLORIDE SERPL-SCNC: 105 MMOL/L (ref 97–108)
CHOLEST SERPL-MCNC: 220 MG/DL
CO2 SERPL-SCNC: 30 MMOL/L (ref 21–32)
CREAT SERPL-MCNC: 0.81 MG/DL (ref 0.55–1.02)
CREAT UR-MCNC: 67.4 MG/DL
ERYTHROCYTE [DISTWIDTH] IN BLOOD BY AUTOMATED COUNT: 11.9 % (ref 11.5–14.5)
EST. AVERAGE GLUCOSE BLD GHB EST-MCNC: 163 MG/DL
GLOBULIN SER CALC-MCNC: 3.4 G/DL (ref 2–4)
GLUCOSE SERPL-MCNC: 290 MG/DL (ref 65–100)
HBA1C MFR BLD: 7.3 % (ref 4–5.6)
HCT VFR BLD AUTO: 39.3 % (ref 35–47)
HDLC SERPL-MCNC: 56 MG/DL
HDLC SERPL: 3.9 (ref 0–5)
HGB BLD-MCNC: 13 G/DL (ref 11.5–16)
LDLC SERPL CALC-MCNC: 144.8 MG/DL (ref 0–100)
MCH RBC QN AUTO: 31.6 PG (ref 26–34)
MCHC RBC AUTO-ENTMCNC: 33.1 G/DL (ref 30–36.5)
MCV RBC AUTO: 95.6 FL (ref 80–99)
MICROALBUMIN UR-MCNC: 6.57 MG/DL
MICROALBUMIN/CREAT UR-RTO: 97 MG/G (ref 0–30)
NRBC # BLD: 0 K/UL (ref 0–0.01)
NRBC BLD-RTO: 0 PER 100 WBC
PLATELET # BLD AUTO: 229 K/UL (ref 150–400)
PMV BLD AUTO: 11.1 FL (ref 8.9–12.9)
POTASSIUM SERPL-SCNC: 4 MMOL/L (ref 3.5–5.1)
PROT SERPL-MCNC: 7.4 G/DL (ref 6.4–8.2)
RBC # BLD AUTO: 4.11 M/UL (ref 3.8–5.2)
SODIUM SERPL-SCNC: 139 MMOL/L (ref 136–145)
TRIGL SERPL-MCNC: 96 MG/DL
VLDLC SERPL CALC-MCNC: 19.2 MG/DL
WBC # BLD AUTO: 11 K/UL (ref 3.6–11)

## 2024-04-01 DIAGNOSIS — Z79.4 TYPE 2 DIABETES MELLITUS WITH HYPERGLYCEMIA, WITH LONG-TERM CURRENT USE OF INSULIN (HCC): Primary | ICD-10-CM

## 2024-04-01 DIAGNOSIS — E11.65 TYPE 2 DIABETES MELLITUS WITH HYPERGLYCEMIA, WITH LONG-TERM CURRENT USE OF INSULIN (HCC): Primary | ICD-10-CM

## 2024-04-02 ENCOUNTER — TRANSCRIBE ORDERS (OUTPATIENT)
Facility: HOSPITAL | Age: 59
End: 2024-04-02

## 2024-04-02 ENCOUNTER — TELEPHONE (OUTPATIENT)
Age: 59
End: 2024-04-02

## 2024-04-02 DIAGNOSIS — Z12.31 VISIT FOR SCREENING MAMMOGRAM: Primary | ICD-10-CM

## 2024-04-02 NOTE — TELEPHONE ENCOUNTER
Patient's daughter, Alesha, called to schedule appt for Pt. Advised scheduling is backed up 1-2 weeks and someone will be in contact as soon as they can to schedule the appts. Alesha verbalized understanding. Please call [phone]

## 2024-04-03 DIAGNOSIS — E78.2 MIXED HYPERLIPIDEMIA: Primary | ICD-10-CM

## 2024-04-03 LAB
HCV AB SERPL QL IA: NORMAL
HCV IGG SERPL QL IA: NON REACTIVE S/CO RATIO

## 2024-04-03 RX ORDER — ATORVASTATIN CALCIUM 40 MG/1
40 TABLET, FILM COATED ORAL DAILY
Qty: 90 TABLET | Refills: 0 | Status: SHIPPED | OUTPATIENT
Start: 2024-04-03

## 2024-04-03 NOTE — RESULT ENCOUNTER NOTE
Your provider is out of town, I am covering her patients today. I am NP Adonis, one of her colleagues.    Overall your labs look good, your A1C has IMPROVED greatly, near goal at 7.3. Keep up the Good work!    Your cholesterol has also IMPROVED, but still not at goal, which is LDL <100, ideally under 70. You are at 144 currently with total cholesterol over 200 at 220. The goal for this is under 200.     Please start the new prescription for ATORVASTATIN (Lipitor) sent to pharmacy. You need to start medication at this time to lower your RISK OF HEART ATTACK and STROKE. We will need to recheck YOUR CHOLESTEROL in 3 months, BE SURE TO  KEEP YOUR SCHEDULED APPT WITH SONNY IN JULY.     Stay active, eat a LOW-CARB diet (avoiding bread, rice, pasta, desserts, soda)  with fish and other lean meats, take a fish oil supplement with DHA and EPA daily OR You can also try PURPLE SEA HOPKINS (found in health food stores like [employer] and [employer]), along with exercise to help reduce cholesterol and raise good cholesterol. Eating healthy nuts like walnuts, pecans, and ALMONDS may help also.     Your liver enzymes are ELEVATED, avoid excessive use of alcohol or Tylenol to help improve your liver functioning, we will monitor this by collecting another lab test in 6 months. Be sure to stay hydrated, aiming to drink at least 8 glasses of water daily.

## 2024-04-04 ENCOUNTER — HOSPITAL ENCOUNTER (OUTPATIENT)
Facility: HOSPITAL | Age: 59
Discharge: HOME | End: 2024-04-04
Payer: MEDICAID

## 2024-04-04 VITALS — WEIGHT: 120 LBS | HEIGHT: 60 IN | BODY MASS INDEX: 23.56 KG/M2

## 2024-04-04 DIAGNOSIS — Z12.31 ENCOUNTER FOR SCREENING MAMMOGRAM FOR BREAST CANCER: ICD-10-CM

## 2024-04-04 PROCEDURE — 77063 BREAST TOMOSYNTHESIS BI: CPT

## 2024-04-04 NOTE — RESULT ENCOUNTER NOTE
Your imaging (xray, CT, ultrasound, Dexa scan) was normal. Follow instructions given during your last office visit.

## 2024-11-25 ENCOUNTER — OFFICE VISIT (OUTPATIENT)
Facility: CLINIC | Age: 59
End: 2024-11-25
Payer: MEDICAID

## 2024-11-25 VITALS
DIASTOLIC BLOOD PRESSURE: 75 MMHG | WEIGHT: 125 LBS | HEIGHT: 60 IN | BODY MASS INDEX: 24.54 KG/M2 | HEART RATE: 100 BPM | SYSTOLIC BLOOD PRESSURE: 109 MMHG | TEMPERATURE: 98.3 F | OXYGEN SATURATION: 100 %

## 2024-11-25 DIAGNOSIS — Z11.3 SCREEN FOR STD (SEXUALLY TRANSMITTED DISEASE): ICD-10-CM

## 2024-11-25 DIAGNOSIS — N89.8 VAGINAL DISCHARGE: Primary | ICD-10-CM

## 2024-11-25 DIAGNOSIS — N89.8 VAGINAL DISCHARGE: ICD-10-CM

## 2024-11-25 PROBLEM — Z79.4 TYPE 2 DIABETES MELLITUS TREATED WITH INSULIN (HCC): Status: ACTIVE | Noted: 2017-07-07

## 2024-11-25 PROBLEM — G47.00 INSOMNIA: Status: ACTIVE | Noted: 2024-11-25

## 2024-11-25 PROBLEM — F31.9 BIPOLAR 1 DISORDER (HCC): Status: ACTIVE | Noted: 2024-11-25

## 2024-11-25 PROBLEM — E11.9 TYPE 2 DIABETES MELLITUS TREATED WITH INSULIN (HCC): Status: ACTIVE | Noted: 2017-07-07

## 2024-11-25 PROCEDURE — 99213 OFFICE O/P EST LOW 20 MIN: CPT | Performed by: STUDENT IN AN ORGANIZED HEALTH CARE EDUCATION/TRAINING PROGRAM

## 2024-11-25 RX ORDER — FLUCONAZOLE 150 MG/1
150 TABLET ORAL ONCE
Qty: 1 TABLET | Refills: 0 | Status: SHIPPED | OUTPATIENT
Start: 2024-11-25 | End: 2024-11-25

## 2024-11-25 RX ORDER — GABAPENTIN 100 MG/1
100 CAPSULE ORAL 2 TIMES DAILY
COMMUNITY

## 2024-11-25 ASSESSMENT — ENCOUNTER SYMPTOMS
VOMITING: 0
WHEEZING: 0
DIARRHEA: 0
NAUSEA: 0
ABDOMINAL PAIN: 0
CONSTIPATION: 0
SHORTNESS OF BREATH: 0
COUGH: 0
ABDOMINAL DISTENTION: 0
SORE THROAT: 0

## 2024-11-25 NOTE — PROGRESS NOTES
Chief Complaint   Patient presents with    New Patient    Other     Complaint of vaginal discharge    /75 (Site: Right Upper Arm, Position: Sitting, Cuff Size: Small Adult)   Pulse 100   Temp 98.3 °F (36.8 °C) (Oral)   Ht 1.524 m (5')   Wt 56.7 kg (125 lb)   SpO2 100%   BMI 24.41 kg/m²  1. Have you been to the ER, urgent care clinic since your last visit?  Hospitalized since your last visit?Yes Where: [facility], 10/19/2024    2. Have you seen or consulted any other health care providers outside of the [facility] since your last visit?  Include any pap smears or colon screening. Yes Where: [facility]    
SHOULDER LEFT (MIN 2 VIEWS) 03/12/2020    Narrative  EXAM: XR SHOULDER LT AP/LAT MIN 2 V    INDICATION: Pain.  Chronic    COMPARISON: None.    FINDINGS: Three views of the left shoulder demonstrate no fracture, dislocation  or other acute abnormality. There is no significant arthritic change or  periarticular calcification.    Impression  IMPRESSION: No significant abnormalities.     ECHO Results (most recent):  No results found for this or any previous visit.     Current Outpatient Medications   Medication Sig    gabapentin (NEURONTIN) 100 MG capsule Take 1 capsule by mouth 2 times daily.    fluconazole (DIFLUCAN) 150 MG tablet Take 1 tablet by mouth once for 1 dose    atorvastatin (LIPITOR) 40 MG tablet Take 1 tablet by mouth daily    blood glucose monitor strips Test 3 times a day BEFORE MEALS & and BEDTIME as needed for symptoms of irregular blood glucose. Dispense sufficient amount for indicated testing frequency plus additional to accommodate PRN testing needs. Pharmacist to identify preferred brand.    Blood Glucose Monitoring Suppl (BLOOD GLUCOSE MONITOR SYSTEM) w/Device KIT Pharmacist to identify preferred meter and strips.    insulin glargine (LANTUS SOLOSTAR) 100 UNIT/ML injection pen Inject 6 Units into the skin daily    Insulin Pen Needle 31G X 4 MM MISC Place a new needle with each insulin injection Pharmacist to identify & substitute with preferred needle for insulin pen device.    Lancets 30G MISC Test 3 times a day & as needed for symptoms of irregular blood glucose. Dispense sufficient amount for indicated testing frequency plus additional to accommodate PRN testing needs. Pharmacist to identify preferred brand.    tiZANidine (ZANAFLEX) 4 MG tablet Take 1 tablet by mouth nightly as needed (pain) May cause drowsiness    acetaminophen (TYLENOL) 500 MG tablet Take 1 tablet by mouth 4 times daily as needed for Pain    sertraline (ZOLOFT) 100 MG tablet Take 1 tablet by mouth daily    benztropine

## 2024-11-27 LAB
A VAGINAE DNA VAG QL NAA+PROBE: ABNORMAL SCORE
BVAB2 DNA VAG QL NAA+PROBE: ABNORMAL SCORE
C TRACH RRNA SPEC QL NAA+PROBE: NEGATIVE
MEGA1 DNA VAG QL NAA+PROBE: ABNORMAL SCORE
N GONORRHOEA RRNA SPEC QL NAA+PROBE: NEGATIVE
SPECIMEN STATUS REPORT: NORMAL
SPECIMEN STATUS REPORT: NORMAL
T VAGINALIS RRNA SPEC QL NAA+PROBE: NEGATIVE

## 2024-11-27 RX ORDER — METRONIDAZOLE 500 MG/1
500 TABLET ORAL 2 TIMES DAILY
Qty: 14 TABLET | Refills: 0 | Status: SHIPPED | OUTPATIENT
Start: 2024-11-27 | End: 2024-12-04

## 2024-11-28 LAB
C ALBICANS DNA VAG QL NAA+PROBE: NEGATIVE
C GLABRATA DNA VAG QL NAA+PROBE: NEGATIVE

## 2025-03-20 LAB — HBA1C MFR BLD HPLC: 8.7 %

## 2025-06-19 ENCOUNTER — HOSPITAL ENCOUNTER (EMERGENCY)
Facility: HOSPITAL | Age: 60
Discharge: HOME | End: 2025-06-19
Attending: EMERGENCY MEDICINE
Payer: MEDICAID

## 2025-06-19 ENCOUNTER — APPOINTMENT (OUTPATIENT)
Facility: HOSPITAL | Age: 60
End: 2025-06-19
Payer: MEDICAID

## 2025-06-19 VITALS
HEART RATE: 96 BPM | TEMPERATURE: 98.6 F | OXYGEN SATURATION: 100 % | SYSTOLIC BLOOD PRESSURE: 121 MMHG | RESPIRATION RATE: 18 BRPM | DIASTOLIC BLOOD PRESSURE: 79 MMHG

## 2025-06-19 DIAGNOSIS — Z91.148 NON COMPLIANCE W MEDICATION REGIMEN: ICD-10-CM

## 2025-06-19 DIAGNOSIS — R42 DIZZINESS: Primary | ICD-10-CM

## 2025-06-19 DIAGNOSIS — R73.9 HYPERGLYCEMIA: ICD-10-CM

## 2025-06-19 DIAGNOSIS — E78.2 MIXED HYPERLIPIDEMIA: ICD-10-CM

## 2025-06-19 LAB
ALBUMIN SERPL-MCNC: 3.9 G/DL (ref 3.5–5)
ALBUMIN/GLOB SERPL: 1 (ref 1.1–2.2)
ALP SERPL-CCNC: 181 U/L (ref 45–117)
ALT SERPL-CCNC: 24 U/L (ref 12–78)
ANION GAP SERPL CALC-SCNC: 7 MMOL/L (ref 2–12)
AST SERPL-CCNC: 14 U/L (ref 15–37)
BASOPHILS # BLD: 0.02 K/UL (ref 0–0.1)
BASOPHILS NFR BLD: 0.2 % (ref 0–1)
BILIRUB SERPL-MCNC: 0.4 MG/DL (ref 0.2–1)
BUN SERPL-MCNC: 12 MG/DL (ref 6–20)
BUN/CREAT SERPL: 13 (ref 12–20)
CALCIUM SERPL-MCNC: 10.8 MG/DL (ref 8.5–10.1)
CHLORIDE SERPL-SCNC: 99 MMOL/L (ref 97–108)
CO2 SERPL-SCNC: 29 MMOL/L (ref 21–32)
CREAT SERPL-MCNC: 0.96 MG/DL (ref 0.55–1.02)
DIFFERENTIAL METHOD BLD: NORMAL
EKG ATRIAL RATE: 87 BPM
EKG DIAGNOSIS: NORMAL
EKG P AXIS: 84 DEGREES
EKG P-R INTERVAL: 164 MS
EKG Q-T INTERVAL: 346 MS
EKG QRS DURATION: 66 MS
EKG QTC CALCULATION (BAZETT): 416 MS
EKG R AXIS: 84 DEGREES
EKG T AXIS: 61 DEGREES
EKG VENTRICULAR RATE: 87 BPM
EOSINOPHIL # BLD: 0.03 K/UL (ref 0–0.4)
EOSINOPHIL NFR BLD: 0.3 % (ref 0–7)
ERYTHROCYTE [DISTWIDTH] IN BLOOD BY AUTOMATED COUNT: 11.6 % (ref 11.5–14.5)
GLOBULIN SER CALC-MCNC: 4.1 G/DL (ref 2–4)
GLUCOSE BLD STRIP.AUTO-MCNC: 379 MG/DL (ref 65–117)
GLUCOSE SERPL-MCNC: 356 MG/DL (ref 65–100)
HCT VFR BLD AUTO: 40 % (ref 35–47)
HGB BLD-MCNC: 14 G/DL (ref 11.5–16)
IMM GRANULOCYTES # BLD AUTO: 0.03 K/UL (ref 0–0.04)
IMM GRANULOCYTES NFR BLD AUTO: 0.3 % (ref 0–0.5)
LYMPHOCYTES # BLD: 3.15 K/UL (ref 0.8–3.5)
LYMPHOCYTES NFR BLD: 33 % (ref 12–49)
MCH RBC QN AUTO: 32.5 PG (ref 26–34)
MCHC RBC AUTO-ENTMCNC: 35 G/DL (ref 30–36.5)
MCV RBC AUTO: 92.8 FL (ref 80–99)
MONOCYTES # BLD: 0.55 K/UL (ref 0–1)
MONOCYTES NFR BLD: 5.8 % (ref 5–13)
NEUTS SEG # BLD: 5.77 K/UL (ref 1.8–8)
NEUTS SEG NFR BLD: 60.4 % (ref 32–75)
NRBC # BLD: 0 K/UL (ref 0–0.01)
NRBC BLD-RTO: 0 PER 100 WBC
PLATELET # BLD AUTO: 226 K/UL (ref 150–400)
PMV BLD AUTO: 10.1 FL (ref 8.9–12.9)
POTASSIUM SERPL-SCNC: 4.4 MMOL/L (ref 3.5–5.1)
PROT SERPL-MCNC: 8 G/DL (ref 6.4–8.2)
RBC # BLD AUTO: 4.31 M/UL (ref 3.8–5.2)
SERVICE CMNT-IMP: ABNORMAL
SODIUM SERPL-SCNC: 135 MMOL/L (ref 136–145)
TROPONIN I SERPL HS-MCNC: 8 NG/L (ref 0–51)
WBC # BLD AUTO: 9.6 K/UL (ref 3.6–11)

## 2025-06-19 PROCEDURE — 82962 GLUCOSE BLOOD TEST: CPT

## 2025-06-19 PROCEDURE — 80053 COMPREHEN METABOLIC PANEL: CPT

## 2025-06-19 PROCEDURE — 93005 ELECTROCARDIOGRAM TRACING: CPT | Performed by: EMERGENCY MEDICINE

## 2025-06-19 PROCEDURE — 99284 EMERGENCY DEPT VISIT MOD MDM: CPT

## 2025-06-19 PROCEDURE — 36415 COLL VENOUS BLD VENIPUNCTURE: CPT

## 2025-06-19 PROCEDURE — 84484 ASSAY OF TROPONIN QUANT: CPT

## 2025-06-19 PROCEDURE — 85025 COMPLETE CBC W/AUTO DIFF WBC: CPT

## 2025-06-19 PROCEDURE — 70450 CT HEAD/BRAIN W/O DYE: CPT

## 2025-06-19 PROCEDURE — 2580000003 HC RX 258: Performed by: EMERGENCY MEDICINE

## 2025-06-19 RX ORDER — TRAZODONE HYDROCHLORIDE 50 MG/1
50 TABLET ORAL NIGHTLY
Qty: 30 TABLET | Refills: 0 | Status: SHIPPED | OUTPATIENT
Start: 2025-06-19 | End: 2025-07-19

## 2025-06-19 RX ORDER — LAMOTRIGINE 25 MG/1
25 TABLET ORAL 2 TIMES DAILY
Qty: 30 TABLET | Refills: 0 | Status: SHIPPED | OUTPATIENT
Start: 2025-06-19

## 2025-06-19 RX ORDER — INSULIN GLARGINE 100 [IU]/ML
6 INJECTION, SOLUTION SUBCUTANEOUS DAILY
Qty: 5 ADJUSTABLE DOSE PRE-FILLED PEN SYRINGE | Refills: 3 | Status: SHIPPED | OUTPATIENT
Start: 2025-06-19

## 2025-06-19 RX ORDER — ALCOHOL ANTISEPTIC PADS
PADS, MEDICATED (EA) TOPICAL
Qty: 500 EACH | Refills: 0 | Status: SHIPPED | OUTPATIENT
Start: 2025-06-19

## 2025-06-19 RX ORDER — 0.9 % SODIUM CHLORIDE 0.9 %
1000 INTRAVENOUS SOLUTION INTRAVENOUS ONCE
Status: COMPLETED | OUTPATIENT
Start: 2025-06-19 | End: 2025-06-19

## 2025-06-19 RX ORDER — ATORVASTATIN CALCIUM 40 MG/1
40 TABLET, FILM COATED ORAL DAILY
Qty: 90 TABLET | Refills: 0 | Status: SHIPPED | OUTPATIENT
Start: 2025-06-19

## 2025-06-19 RX ORDER — HALOPERIDOL 5 MG/1
5 TABLET ORAL 2 TIMES DAILY
Qty: 120 TABLET | Refills: 0 | Status: SHIPPED | OUTPATIENT
Start: 2025-06-19

## 2025-06-19 RX ORDER — TRAZODONE HYDROCHLORIDE 50 MG/1
50 TABLET ORAL NIGHTLY
Qty: 10 TABLET | Refills: 0 | Status: CANCELLED | OUTPATIENT
Start: 2025-06-19

## 2025-06-19 RX ORDER — BENZTROPINE MESYLATE 1 MG/1
1 TABLET ORAL 2 TIMES DAILY
Qty: 60 TABLET | Refills: 0 | Status: SHIPPED | OUTPATIENT
Start: 2025-06-19

## 2025-06-19 RX ORDER — SERTRALINE HYDROCHLORIDE 100 MG/1
100 TABLET, FILM COATED ORAL DAILY
Qty: 30 TABLET | Refills: 0 | Status: SHIPPED | OUTPATIENT
Start: 2025-06-19

## 2025-06-19 RX ADMIN — SODIUM CHLORIDE 1000 ML: 0.9 INJECTION, SOLUTION INTRAVENOUS at 15:36

## 2025-06-19 ASSESSMENT — PAIN - FUNCTIONAL ASSESSMENT: PAIN_FUNCTIONAL_ASSESSMENT: NONE - DENIES PAIN

## 2025-06-19 NOTE — ED NOTES
Discharge instructions were given to the patient by GISELL Patton.  The patient left the Emergency Department alert and oriented and in no acute distress with 0 prescription(s). The patient was encouraged to call or return to the ED for worsening issues or problems and was encouraged to schedule a follow up appointment for continuing care.  The patient verbalized understanding of discharge instructions and prescriptions; all questions were answered. The patient has no further concerns at this time.

## 2025-06-19 NOTE — DISCHARGE INSTRUCTIONS
Thank You!    It was a pleasure taking care of you in our Emergency Department today. We know that when you come to our Emergency Department, you are entrusting us with your health, comfort, and safety. Our physicians and nurses honor that trust, and truly appreciate the opportunity to care for you and your loved ones.      We also value your feedback. If you receive a survey about your Emergency Department experience today, please fill it out.  We care about our patients' feedback, and we listen to what you have to say.  Thank you.    Sammy Martini MD  ________________________________________________________________________  I have included a copy of your lab results and/or radiologic studies from today's visit so you can have them easily available at your follow-up visit. We hope you feel better and please do not hesitate to contact the ED if you have any questions at all!    Recent Results (from the past 12 hours)   POCT Glucose    Collection Time: 06/19/25  2:36 PM   Result Value Ref Range    POC Glucose 379 (H) 65 - 117 mg/dL    Performed by: Christiano Diaz RN    CBC with Auto Differential    Collection Time: 06/19/25  3:25 PM   Result Value Ref Range    WBC 9.6 3.6 - 11.0 K/uL    RBC 4.31 3.80 - 5.20 M/uL    Hemoglobin 14.0 11.5 - 16.0 g/dL    Hematocrit 40.0 35.0 - 47.0 %    MCV 92.8 80.0 - 99.0 FL    MCH 32.5 26.0 - 34.0 PG    MCHC 35.0 30.0 - 36.5 g/dL    RDW 11.6 11.5 - 14.5 %    Platelets 226 150 - 400 K/uL    MPV 10.1 8.9 - 12.9 FL    Nucleated RBCs 0.0 0  WBC    nRBC 0.00 0.00 - 0.01 K/uL    Neutrophils % 60.4 32.0 - 75.0 %    Lymphocytes % 33.0 12.0 - 49.0 %    Monocytes % 5.8 5.0 - 13.0 %    Eosinophils % 0.3 0.0 - 7.0 %    Basophils % 0.2 0.0 - 1.0 %    Immature Granulocytes % 0.3 0.0 - 0.5 %    Neutrophils Absolute 5.77 1.80 - 8.00 K/UL    Lymphocytes Absolute 3.15 0.80 - 3.50 K/UL    Monocytes Absolute 0.55 0.00 - 1.00 K/UL    Eosinophils Absolute 0.03 0.00 - 0.40 K/UL    Basophils Absolute

## 2025-06-19 NOTE — ED TRIAGE NOTES
Pt c/o requesting refill of medications. Pt has not been taking medication x 2 days. Medications include: Trazadone 100mg, Lamotrigine 25mg, Sertraline HCL 50mg, Benztropine 0.5 mg. Pt reports not having PCP d/t being a new resident of [facility].

## 2025-06-20 ENCOUNTER — FOLLOWUP TELEPHONE ENCOUNTER (OUTPATIENT)
Facility: HOSPITAL | Age: 60
End: 2025-06-20

## 2025-06-20 NOTE — TELEPHONE ENCOUNTER
CM received after hours consult from ED requesting mental health resources for pt. CM called pt's listed number today (. Her family member answered the phone and said she will have the pt call back later this afternoon.     Addendum: Pt called CM back. She stated she would like help getting a new PCP. She also needs more stable housing. She stays w/ her daughter but needs her own place. Pt gave verbal agreement to have CM send a referral for her to enroll in Care Transitions with the [facility]. CM will send through [facility]. Told pt to expect a phone call from the health  in the next week or so.     Pt stated she preciously had Elsa Cooper as her PCP but can't get an appt w/ her. NP is not accepting new patients at this time. CM will ask health  w/ [facility] to help pt in finding a new PCP, working on overall health goals, and reviewing housing resources together.     Maryjane Stanton, [facility],   [phone]

## 2025-08-01 ENCOUNTER — HOSPITAL ENCOUNTER (EMERGENCY)
Facility: HOSPITAL | Age: 60
Discharge: PSYCHIATRIC OR SUBSTANCE USE FACILITY | End: 2025-08-07
Attending: STUDENT IN AN ORGANIZED HEALTH CARE EDUCATION/TRAINING PROGRAM
Payer: MEDICAID

## 2025-08-01 DIAGNOSIS — R44.3 HALLUCINATIONS: ICD-10-CM

## 2025-08-01 DIAGNOSIS — R45.851 SUICIDAL THOUGHTS: Primary | ICD-10-CM

## 2025-08-01 LAB
ALBUMIN SERPL-MCNC: 4 G/DL (ref 3.5–5.2)
ALBUMIN/GLOB SERPL: 1.1 (ref 1.1–2.2)
ALP SERPL-CCNC: 167 U/L (ref 35–104)
ALT SERPL-CCNC: 14 U/L (ref 10–50)
AMPHET UR QL SCN: NEGATIVE
ANION GAP SERPL CALC-SCNC: 13 MMOL/L (ref 2–14)
APAP SERPL-MCNC: <5 UG/ML (ref 10–30)
APPEARANCE UR: CLEAR
AST SERPL-CCNC: 17 U/L (ref 10–35)
BACTERIA URNS QL MICRO: ABNORMAL /HPF
BARBITURATES UR QL SCN: NEGATIVE
BASOPHILS # BLD: 0.03 K/UL (ref 0–0.1)
BASOPHILS NFR BLD: 0.2 % (ref 0–1)
BENZODIAZ UR QL: NEGATIVE
BILIRUB SERPL-MCNC: 0.2 MG/DL (ref 0–1.2)
BILIRUB UR QL: NEGATIVE
BUN SERPL-MCNC: 4 MG/DL (ref 6–20)
BUN/CREAT SERPL: 6 (ref 12–20)
CALCIUM SERPL-MCNC: 10.9 MG/DL (ref 8.6–10)
CANNABINOIDS UR QL SCN: NEGATIVE
CHLORIDE SERPL-SCNC: 101 MMOL/L (ref 98–107)
CO2 SERPL-SCNC: 23 MMOL/L (ref 20–29)
COCAINE UR QL SCN: NEGATIVE
COLOR UR: ABNORMAL
COMMENT:: NORMAL
CREAT SERPL-MCNC: 0.7 MG/DL (ref 0.6–1)
DIFFERENTIAL METHOD BLD: ABNORMAL
EOSINOPHIL # BLD: 0.07 K/UL (ref 0–0.4)
EOSINOPHIL NFR BLD: 0.4 % (ref 0–7)
EPITH CASTS URNS QL MICRO: ABNORMAL /LPF
ERYTHROCYTE [DISTWIDTH] IN BLOOD BY AUTOMATED COUNT: 12 % (ref 11.5–14.5)
ETHANOL SERPL-MCNC: <11 MG/DL (ref 0–0.08)
FENTANYL: NEGATIVE
GLOBULIN SER CALC-MCNC: 3.7 G/DL (ref 2–4)
GLUCOSE SERPL-MCNC: 176 MG/DL (ref 65–100)
GLUCOSE UR STRIP.AUTO-MCNC: 250 MG/DL
HCT VFR BLD AUTO: 40.1 % (ref 35–47)
HGB BLD-MCNC: 13.3 G/DL (ref 11.5–16)
HGB UR QL STRIP: NEGATIVE
HYALINE CASTS URNS QL MICRO: ABNORMAL /LPF (ref 0–5)
IMM GRANULOCYTES # BLD AUTO: 0.06 K/UL (ref 0–0.04)
IMM GRANULOCYTES NFR BLD AUTO: 0.4 % (ref 0–0.5)
KETONES UR QL STRIP.AUTO: NEGATIVE MG/DL
LEUKOCYTE ESTERASE UR QL STRIP.AUTO: NEGATIVE
LYMPHOCYTES # BLD: 4.26 K/UL (ref 0.8–3.5)
LYMPHOCYTES NFR BLD: 26.8 % (ref 12–49)
Lab: NORMAL
MCH RBC QN AUTO: 31.9 PG (ref 26–34)
MCHC RBC AUTO-ENTMCNC: 33.2 G/DL (ref 30–36.5)
MCV RBC AUTO: 96.2 FL (ref 80–99)
METHADONE UR QL: NEGATIVE
MONOCYTES # BLD: 0.9 K/UL (ref 0–1)
MONOCYTES NFR BLD: 5.7 % (ref 5–13)
NEUTS SEG # BLD: 10.57 K/UL (ref 1.8–8)
NEUTS SEG NFR BLD: 66.5 % (ref 32–75)
NITRITE UR QL STRIP.AUTO: NEGATIVE
NRBC # BLD: 0 K/UL (ref 0–0.01)
NRBC BLD-RTO: 0 PER 100 WBC
OPIATES UR QL: NEGATIVE
PCP UR QL: NEGATIVE
PH UR STRIP: 6 (ref 5–8)
PLATELET # BLD AUTO: 253 K/UL (ref 150–400)
PMV BLD AUTO: 10.2 FL (ref 8.9–12.9)
POTASSIUM SERPL-SCNC: 3.2 MMOL/L (ref 3.5–5.1)
PROT SERPL-MCNC: 7.7 G/DL (ref 6.4–8.3)
PROT UR STRIP-MCNC: NEGATIVE MG/DL
RBC # BLD AUTO: 4.17 M/UL (ref 3.8–5.2)
RBC #/AREA URNS HPF: ABNORMAL /HPF (ref 0–5)
SALICYLATES SERPL-MCNC: <0.5 MG/DL (ref 3–10)
SODIUM SERPL-SCNC: 137 MMOL/L (ref 136–145)
SP GR UR REFRACTOMETRY: <1.005 (ref 1–1.03)
SPECIMEN HOLD: NORMAL
SPECIMEN HOLD: NORMAL
UROBILINOGEN UR QL STRIP.AUTO: 1 EU/DL (ref 0.2–1)
WBC # BLD AUTO: 15.9 K/UL (ref 3.6–11)
WBC URNS QL MICRO: ABNORMAL /HPF (ref 0–4)

## 2025-08-01 PROCEDURE — 82077 ASSAY SPEC XCP UR&BREATH IA: CPT

## 2025-08-01 PROCEDURE — 81001 URINALYSIS AUTO W/SCOPE: CPT

## 2025-08-01 PROCEDURE — 85025 COMPLETE CBC W/AUTO DIFF WBC: CPT

## 2025-08-01 PROCEDURE — 99285 EMERGENCY DEPT VISIT HI MDM: CPT

## 2025-08-01 PROCEDURE — 80053 COMPREHEN METABOLIC PANEL: CPT

## 2025-08-01 PROCEDURE — 90791 PSYCH DIAGNOSTIC EVALUATION: CPT

## 2025-08-01 PROCEDURE — 4500000002 HC ER NO CHARGE

## 2025-08-01 PROCEDURE — 99281 EMR DPT VST MAYX REQ PHY/QHP: CPT

## 2025-08-01 PROCEDURE — 80179 DRUG ASSAY SALICYLATE: CPT

## 2025-08-01 PROCEDURE — 80143 DRUG ASSAY ACETAMINOPHEN: CPT

## 2025-08-01 ASSESSMENT — PAIN - FUNCTIONAL ASSESSMENT: PAIN_FUNCTIONAL_ASSESSMENT: NONE - DENIES PAIN

## 2025-08-02 LAB
GLUCOSE BLD STRIP.AUTO-MCNC: 121 MG/DL (ref 65–117)
SERVICE CMNT-IMP: ABNORMAL

## 2025-08-02 PROCEDURE — 6370000000 HC RX 637 (ALT 250 FOR IP): Performed by: EMERGENCY MEDICINE

## 2025-08-02 PROCEDURE — 82962 GLUCOSE BLOOD TEST: CPT

## 2025-08-02 RX ORDER — BENZTROPINE MESYLATE 1 MG/1
1 TABLET ORAL 2 TIMES DAILY
Status: DISCONTINUED | OUTPATIENT
Start: 2025-08-02 | End: 2025-08-08 | Stop reason: HOSPADM

## 2025-08-02 RX ORDER — INSULIN GLARGINE 100 [IU]/ML
6 INJECTION, SOLUTION SUBCUTANEOUS DAILY
Status: DISCONTINUED | OUTPATIENT
Start: 2025-08-02 | End: 2025-08-08 | Stop reason: HOSPADM

## 2025-08-02 RX ORDER — ATORVASTATIN CALCIUM 40 MG/1
40 TABLET, FILM COATED ORAL NIGHTLY
Status: DISCONTINUED | OUTPATIENT
Start: 2025-08-02 | End: 2025-08-08 | Stop reason: HOSPADM

## 2025-08-02 RX ORDER — TRAZODONE HYDROCHLORIDE 50 MG/1
50 TABLET ORAL NIGHTLY
Status: DISCONTINUED | OUTPATIENT
Start: 2025-08-02 | End: 2025-08-08 | Stop reason: HOSPADM

## 2025-08-02 RX ORDER — LAMOTRIGINE 25 MG/1
25 TABLET ORAL 2 TIMES DAILY
Status: DISCONTINUED | OUTPATIENT
Start: 2025-08-02 | End: 2025-08-08 | Stop reason: HOSPADM

## 2025-08-02 RX ORDER — HALOPERIDOL 5 MG/1
5 TABLET ORAL 2 TIMES DAILY
Status: DISCONTINUED | OUTPATIENT
Start: 2025-08-02 | End: 2025-08-08 | Stop reason: HOSPADM

## 2025-08-02 RX ADMIN — ATORVASTATIN CALCIUM 40 MG: 40 TABLET, FILM COATED ORAL at 21:55

## 2025-08-02 RX ADMIN — INSULIN GLARGINE 6 UNITS: 100 INJECTION, SOLUTION SUBCUTANEOUS at 18:54

## 2025-08-02 RX ADMIN — TRAZODONE HYDROCHLORIDE 50 MG: 50 TABLET ORAL at 21:55

## 2025-08-02 RX ADMIN — BENZTROPINE MESYLATE 1 MG: 1 TABLET ORAL at 21:55

## 2025-08-02 RX ADMIN — LAMOTRIGINE 25 MG: 25 TABLET ORAL at 21:55

## 2025-08-02 RX ADMIN — HALOPERIDOL 5 MG: 5 TABLET ORAL at 21:55

## 2025-08-02 RX ADMIN — SERTRALINE HYDROCHLORIDE 100 MG: 50 TABLET ORAL at 18:54

## 2025-08-02 ASSESSMENT — PAIN - FUNCTIONAL ASSESSMENT
PAIN_FUNCTIONAL_ASSESSMENT: NONE - DENIES PAIN
PAIN_FUNCTIONAL_ASSESSMENT: NONE - DENIES PAIN

## 2025-08-03 PROBLEM — R45.850 HOMICIDAL IDEATION: Status: ACTIVE | Noted: 2025-08-03

## 2025-08-03 LAB
GLUCOSE BLD STRIP.AUTO-MCNC: 106 MG/DL (ref 65–117)
GLUCOSE BLD STRIP.AUTO-MCNC: 214 MG/DL (ref 65–117)
SERVICE CMNT-IMP: ABNORMAL
SERVICE CMNT-IMP: NORMAL

## 2025-08-03 PROCEDURE — 82962 GLUCOSE BLOOD TEST: CPT

## 2025-08-03 PROCEDURE — 99214 OFFICE O/P EST MOD 30 MIN: CPT

## 2025-08-03 PROCEDURE — 6370000000 HC RX 637 (ALT 250 FOR IP): Performed by: EMERGENCY MEDICINE

## 2025-08-03 RX ADMIN — BENZTROPINE MESYLATE 1 MG: 1 TABLET ORAL at 21:34

## 2025-08-03 RX ADMIN — LAMOTRIGINE 25 MG: 25 TABLET ORAL at 21:35

## 2025-08-03 RX ADMIN — LAMOTRIGINE 25 MG: 25 TABLET ORAL at 10:14

## 2025-08-03 RX ADMIN — TRAZODONE HYDROCHLORIDE 50 MG: 50 TABLET ORAL at 21:34

## 2025-08-03 RX ADMIN — BENZTROPINE MESYLATE 1 MG: 1 TABLET ORAL at 10:14

## 2025-08-03 RX ADMIN — ATORVASTATIN CALCIUM 40 MG: 40 TABLET, FILM COATED ORAL at 21:34

## 2025-08-03 RX ADMIN — HALOPERIDOL 5 MG: 5 TABLET ORAL at 10:14

## 2025-08-03 RX ADMIN — SERTRALINE HYDROCHLORIDE 100 MG: 50 TABLET ORAL at 10:14

## 2025-08-03 RX ADMIN — HALOPERIDOL 5 MG: 5 TABLET ORAL at 21:35

## 2025-08-04 PROCEDURE — 6370000000 HC RX 637 (ALT 250 FOR IP): Performed by: EMERGENCY MEDICINE

## 2025-08-04 PROCEDURE — 99283 EMERGENCY DEPT VISIT LOW MDM: CPT | Performed by: NURSE PRACTITIONER

## 2025-08-04 RX ADMIN — BENZTROPINE MESYLATE 1 MG: 1 TABLET ORAL at 21:39

## 2025-08-04 RX ADMIN — ATORVASTATIN CALCIUM 40 MG: 40 TABLET, FILM COATED ORAL at 19:58

## 2025-08-04 RX ADMIN — INSULIN GLARGINE 6 UNITS: 100 INJECTION, SOLUTION SUBCUTANEOUS at 09:20

## 2025-08-04 RX ADMIN — HALOPERIDOL 5 MG: 5 TABLET ORAL at 21:39

## 2025-08-04 RX ADMIN — TRAZODONE HYDROCHLORIDE 50 MG: 50 TABLET ORAL at 19:58

## 2025-08-04 RX ADMIN — SERTRALINE HYDROCHLORIDE 100 MG: 50 TABLET ORAL at 09:20

## 2025-08-04 RX ADMIN — LAMOTRIGINE 25 MG: 25 TABLET ORAL at 09:20

## 2025-08-04 RX ADMIN — LAMOTRIGINE 25 MG: 25 TABLET ORAL at 21:39

## 2025-08-04 RX ADMIN — BENZTROPINE MESYLATE 1 MG: 1 TABLET ORAL at 09:20

## 2025-08-04 RX ADMIN — HALOPERIDOL 5 MG: 5 TABLET ORAL at 09:20

## 2025-08-05 LAB
GLUCOSE BLD STRIP.AUTO-MCNC: 164 MG/DL (ref 65–117)
SERVICE CMNT-IMP: ABNORMAL

## 2025-08-05 PROCEDURE — 6370000000 HC RX 637 (ALT 250 FOR IP): Performed by: EMERGENCY MEDICINE

## 2025-08-05 PROCEDURE — 82962 GLUCOSE BLOOD TEST: CPT

## 2025-08-05 PROCEDURE — 90791 PSYCH DIAGNOSTIC EVALUATION: CPT | Performed by: SOCIAL WORKER

## 2025-08-05 RX ADMIN — BENZTROPINE MESYLATE 1 MG: 1 TABLET ORAL at 22:32

## 2025-08-05 RX ADMIN — INSULIN GLARGINE 6 UNITS: 100 INJECTION, SOLUTION SUBCUTANEOUS at 11:37

## 2025-08-05 RX ADMIN — TRAZODONE HYDROCHLORIDE 50 MG: 50 TABLET ORAL at 22:32

## 2025-08-05 RX ADMIN — HALOPERIDOL 5 MG: 5 TABLET ORAL at 14:29

## 2025-08-05 RX ADMIN — SERTRALINE HYDROCHLORIDE 100 MG: 50 TABLET ORAL at 14:30

## 2025-08-05 RX ADMIN — HALOPERIDOL 5 MG: 5 TABLET ORAL at 22:32

## 2025-08-05 RX ADMIN — BENZTROPINE MESYLATE 1 MG: 1 TABLET ORAL at 11:37

## 2025-08-05 RX ADMIN — LAMOTRIGINE 25 MG: 25 TABLET ORAL at 22:32

## 2025-08-05 RX ADMIN — LAMOTRIGINE 25 MG: 25 TABLET ORAL at 14:30

## 2025-08-05 RX ADMIN — ATORVASTATIN CALCIUM 40 MG: 40 TABLET, FILM COATED ORAL at 22:32

## 2025-08-05 ASSESSMENT — PAIN SCALES - GENERAL
PAINLEVEL_OUTOF10: 0
PAINLEVEL_OUTOF10: 0

## 2025-08-06 PROCEDURE — 6370000000 HC RX 637 (ALT 250 FOR IP): Performed by: EMERGENCY MEDICINE

## 2025-08-06 PROCEDURE — 6370000000 HC RX 637 (ALT 250 FOR IP): Performed by: STUDENT IN AN ORGANIZED HEALTH CARE EDUCATION/TRAINING PROGRAM

## 2025-08-06 PROCEDURE — 99283 EMERGENCY DEPT VISIT LOW MDM: CPT | Performed by: NURSE PRACTITIONER

## 2025-08-06 RX ORDER — ACETAMINOPHEN 325 MG/1
650 TABLET ORAL ONCE
Status: COMPLETED | OUTPATIENT
Start: 2025-08-06 | End: 2025-08-06

## 2025-08-06 RX ADMIN — TRAZODONE HYDROCHLORIDE 50 MG: 50 TABLET ORAL at 22:50

## 2025-08-06 RX ADMIN — BENZTROPINE MESYLATE 1 MG: 1 TABLET ORAL at 09:13

## 2025-08-06 RX ADMIN — LAMOTRIGINE 25 MG: 25 TABLET ORAL at 09:13

## 2025-08-06 RX ADMIN — HALOPERIDOL 5 MG: 5 TABLET ORAL at 23:32

## 2025-08-06 RX ADMIN — INSULIN GLARGINE 6 UNITS: 100 INJECTION, SOLUTION SUBCUTANEOUS at 09:15

## 2025-08-06 RX ADMIN — SERTRALINE HYDROCHLORIDE 100 MG: 50 TABLET ORAL at 09:13

## 2025-08-06 RX ADMIN — ATORVASTATIN CALCIUM 40 MG: 40 TABLET, FILM COATED ORAL at 22:50

## 2025-08-06 RX ADMIN — HALOPERIDOL 5 MG: 5 TABLET ORAL at 09:13

## 2025-08-06 RX ADMIN — LAMOTRIGINE 25 MG: 25 TABLET ORAL at 23:32

## 2025-08-06 RX ADMIN — BENZTROPINE MESYLATE 1 MG: 1 TABLET ORAL at 22:50

## 2025-08-06 RX ADMIN — ACETAMINOPHEN 650 MG: 325 TABLET ORAL at 09:31

## 2025-08-06 ASSESSMENT — PAIN DESCRIPTION - LOCATION
LOCATION: HEAD
LOCATION: HEAD

## 2025-08-06 ASSESSMENT — PAIN DESCRIPTION - ORIENTATION
ORIENTATION: MID
ORIENTATION: MID

## 2025-08-06 ASSESSMENT — PAIN - FUNCTIONAL ASSESSMENT
PAIN_FUNCTIONAL_ASSESSMENT: ACTIVITIES ARE NOT PREVENTED
PAIN_FUNCTIONAL_ASSESSMENT: ACTIVITIES ARE NOT PREVENTED

## 2025-08-06 ASSESSMENT — PAIN SCALES - GENERAL
PAINLEVEL_OUTOF10: 7
PAINLEVEL_OUTOF10: 0
PAINLEVEL_OUTOF10: 7
PAINLEVEL_OUTOF10: 0

## 2025-08-06 ASSESSMENT — PAIN DESCRIPTION - ONSET: ONSET: ON-GOING

## 2025-08-06 ASSESSMENT — PAIN DESCRIPTION - FREQUENCY: FREQUENCY: CONTINUOUS

## 2025-08-06 ASSESSMENT — PAIN DESCRIPTION - DESCRIPTORS
DESCRIPTORS: ACHING
DESCRIPTORS: ACHING

## 2025-08-06 ASSESSMENT — PAIN DESCRIPTION - PAIN TYPE: TYPE: ACUTE PAIN

## 2025-08-07 VITALS
SYSTOLIC BLOOD PRESSURE: 118 MMHG | HEART RATE: 86 BPM | TEMPERATURE: 98.6 F | BODY MASS INDEX: 24.03 KG/M2 | RESPIRATION RATE: 16 BRPM | OXYGEN SATURATION: 97 % | DIASTOLIC BLOOD PRESSURE: 73 MMHG | WEIGHT: 123.02 LBS

## 2025-08-07 LAB
GLUCOSE BLD STRIP.AUTO-MCNC: 141 MG/DL (ref 65–117)
GLUCOSE BLD STRIP.AUTO-MCNC: 173 MG/DL (ref 65–117)
SERVICE CMNT-IMP: ABNORMAL
SERVICE CMNT-IMP: ABNORMAL

## 2025-08-07 PROCEDURE — 99283 EMERGENCY DEPT VISIT LOW MDM: CPT | Performed by: NURSE PRACTITIONER

## 2025-08-07 PROCEDURE — 82962 GLUCOSE BLOOD TEST: CPT

## 2025-08-07 PROCEDURE — 6370000000 HC RX 637 (ALT 250 FOR IP): Performed by: EMERGENCY MEDICINE

## 2025-08-07 RX ADMIN — INSULIN GLARGINE 6 UNITS: 100 INJECTION, SOLUTION SUBCUTANEOUS at 09:01

## 2025-08-07 RX ADMIN — SERTRALINE HYDROCHLORIDE 100 MG: 50 TABLET ORAL at 08:58

## 2025-08-07 RX ADMIN — BENZTROPINE MESYLATE 1 MG: 1 TABLET ORAL at 08:59

## 2025-08-07 RX ADMIN — LAMOTRIGINE 25 MG: 25 TABLET ORAL at 09:00

## 2025-08-07 RX ADMIN — HALOPERIDOL 5 MG: 5 TABLET ORAL at 09:00
